# Patient Record
Sex: FEMALE | Race: OTHER | Employment: UNEMPLOYED | URBAN - METROPOLITAN AREA
[De-identification: names, ages, dates, MRNs, and addresses within clinical notes are randomized per-mention and may not be internally consistent; named-entity substitution may affect disease eponyms.]

---

## 2017-12-12 PROBLEM — E66.09 CLASS 1 OBESITY DUE TO EXCESS CALORIES WITHOUT SERIOUS COMORBIDITY WITH BODY MASS INDEX (BMI) OF 30.0 TO 30.9 IN ADULT: Chronic | Status: ACTIVE | Noted: 2017-12-12

## 2017-12-12 PROBLEM — F41.9 ANXIETY: Chronic | Status: ACTIVE | Noted: 2017-12-12

## 2017-12-12 PROBLEM — E04.2 MULTIPLE THYROID NODULES: Chronic | Status: ACTIVE | Noted: 2017-12-12

## 2017-12-12 PROBLEM — E07.9 THYROID DYSFUNCTION: Chronic | Status: ACTIVE | Noted: 2017-12-12

## 2017-12-12 PROBLEM — E66.811 CLASS 1 OBESITY DUE TO EXCESS CALORIES WITHOUT SERIOUS COMORBIDITY WITH BODY MASS INDEX (BMI) OF 30.0 TO 30.9 IN ADULT: Chronic | Status: ACTIVE | Noted: 2017-12-12

## 2017-12-13 DIAGNOSIS — E66.09 CLASS 1 OBESITY DUE TO EXCESS CALORIES WITHOUT SERIOUS COMORBIDITY WITH BODY MASS INDEX (BMI) OF 30.0 TO 30.9 IN ADULT: Chronic | ICD-10-CM

## 2017-12-13 DIAGNOSIS — F41.9 ANXIETY: Chronic | ICD-10-CM

## 2017-12-13 DIAGNOSIS — E04.2 MULTIPLE THYROID NODULES: Chronic | ICD-10-CM

## 2017-12-13 DIAGNOSIS — E07.9 THYROID DYSFUNCTION: Chronic | ICD-10-CM

## 2017-12-13 LAB
ALBUMIN SERPL-MCNC: 4.5 G/DL (ref 3.9–4.9)
ALP BLD-CCNC: 106 U/L (ref 40–130)
ALT SERPL-CCNC: 17 U/L (ref 0–33)
ANION GAP SERPL CALCULATED.3IONS-SCNC: 15 MEQ/L (ref 9–17)
AST SERPL-CCNC: 15 U/L (ref 0–35)
BASOPHILS ABSOLUTE: 0.1 K/UL (ref 0–0.2)
BASOPHILS RELATIVE PERCENT: 1 %
BILIRUB SERPL-MCNC: 0.5 MG/DL (ref 0–1.2)
BUN BLDV-MCNC: 13 MG/DL (ref 6–20)
CALCIUM SERPL-MCNC: 9.5 MG/DL (ref 8.6–10.2)
CHLORIDE BLD-SCNC: 99 MEQ/L (ref 97–107)
CHOLESTEROL, FASTING: 174 MG/DL (ref 0–199)
CO2: 25 MEQ/L (ref 17–24)
CREAT SERPL-MCNC: 0.68 MG/DL (ref 0.5–0.9)
EOSINOPHILS ABSOLUTE: 0.1 K/UL (ref 0–0.7)
EOSINOPHILS RELATIVE PERCENT: 0.7 %
GFR AFRICAN AMERICAN: >60
GFR NON-AFRICAN AMERICAN: >60
GLOBULIN: 3.6 G/DL (ref 2.3–3.5)
GLUCOSE FASTING: 78 MG/DL (ref 74–109)
HCT VFR BLD CALC: 45.8 % (ref 37–47)
HDLC SERPL-MCNC: 39 MG/DL (ref 40–59)
HEMOGLOBIN: 15.4 G/DL (ref 12–16)
LDL CHOLESTEROL CALCULATED: 116 MG/DL (ref 0–129)
LYMPHOCYTES ABSOLUTE: 1.5 K/UL (ref 1–4.8)
LYMPHOCYTES RELATIVE PERCENT: 16.5 %
MCH RBC QN AUTO: 31.7 PG (ref 27–31.3)
MCHC RBC AUTO-ENTMCNC: 33.5 % (ref 33–37)
MCV RBC AUTO: 94.5 FL (ref 82–100)
MONOCYTES ABSOLUTE: 0.5 K/UL (ref 0.2–0.8)
MONOCYTES RELATIVE PERCENT: 5.8 %
NEUTROPHILS ABSOLUTE: 7 K/UL (ref 1.4–6.5)
NEUTROPHILS RELATIVE PERCENT: 76 %
PDW BLD-RTO: 13.6 % (ref 11.5–14.5)
PLATELET # BLD: 356 K/UL (ref 130–400)
POTASSIUM SERPL-SCNC: 4.8 MEQ/L (ref 3.2–4.4)
RBC # BLD: 4.84 M/UL (ref 4.2–5.4)
SODIUM BLD-SCNC: 139 MEQ/L (ref 132–138)
T4 FREE: 1.24 NG/DL (ref 0.93–1.7)
TOTAL PROTEIN: 8.1 G/DL (ref 6.4–8.1)
TRIGLYCERIDE, FASTING: 94 MG/DL (ref 0–200)
TSH SERPL DL<=0.05 MIU/L-ACNC: 1.09 UIU/ML (ref 0.27–4.2)
WBC # BLD: 9.2 K/UL (ref 4.8–10.8)

## 2017-12-15 LAB — HIV-1 AND HIV-2 ANTIBODIES: NEGATIVE

## 2017-12-21 ENCOUNTER — APPOINTMENT (OUTPATIENT)
Dept: CT IMAGING | Age: 22
End: 2017-12-21
Payer: MEDICAID

## 2017-12-21 ENCOUNTER — HOSPITAL ENCOUNTER (EMERGENCY)
Age: 22
Discharge: HOME | End: 2017-12-21
Attending: EMERGENCY MEDICINE
Payer: MEDICAID

## 2017-12-21 VITALS
HEART RATE: 75 BPM | WEIGHT: 196 LBS | OXYGEN SATURATION: 100 % | RESPIRATION RATE: 16 BRPM | TEMPERATURE: 97.9 F | BODY MASS INDEX: 32.65 KG/M2 | DIASTOLIC BLOOD PRESSURE: 69 MMHG | HEIGHT: 65 IN | SYSTOLIC BLOOD PRESSURE: 112 MMHG

## 2017-12-21 DIAGNOSIS — R10.13 ABDOMINAL PAIN, EPIGASTRIC: Primary | ICD-10-CM

## 2017-12-21 LAB
ALBUMIN SERPL-MCNC: 4.2 G/DL (ref 3.9–4.9)
ALP BLD-CCNC: 94 U/L (ref 40–130)
ALT SERPL-CCNC: 17 U/L (ref 0–33)
ANION GAP SERPL CALCULATED.3IONS-SCNC: 13 MEQ/L (ref 7–13)
AST SERPL-CCNC: 19 U/L (ref 0–35)
BACTERIA: ABNORMAL /HPF
BASOPHILS ABSOLUTE: 0.1 K/UL (ref 0–0.2)
BASOPHILS RELATIVE PERCENT: 0.7 %
BILIRUB SERPL-MCNC: 0.5 MG/DL (ref 0–1.2)
BILIRUBIN URINE: ABNORMAL
BLOOD, URINE: ABNORMAL
BUN BLDV-MCNC: 11 MG/DL (ref 6–20)
CALCIUM SERPL-MCNC: 8.9 MG/DL (ref 8.6–10.2)
CHLORIDE BLD-SCNC: 102 MEQ/L (ref 98–107)
CLARITY: ABNORMAL
CO2: 22 MEQ/L (ref 22–29)
COLOR: ABNORMAL
CREAT SERPL-MCNC: 0.54 MG/DL (ref 0.5–0.9)
EOSINOPHILS ABSOLUTE: 0.1 K/UL (ref 0–0.7)
EOSINOPHILS RELATIVE PERCENT: 1.5 %
EPITHELIAL CELLS, UA: ABNORMAL /HPF
GFR AFRICAN AMERICAN: >60
GFR NON-AFRICAN AMERICAN: >60
GLOBULIN: 3.2 G/DL (ref 2.3–3.5)
GLUCOSE BLD-MCNC: 86 MG/DL (ref 74–109)
GLUCOSE URINE: NEGATIVE MG/DL
HCT VFR BLD CALC: 41.8 % (ref 37–47)
HEMOGLOBIN: 14.8 G/DL (ref 12–16)
KETONES, URINE: NEGATIVE MG/DL
LEUKOCYTE ESTERASE, URINE: ABNORMAL
LIPASE: 22 U/L (ref 13–60)
LYMPHOCYTES ABSOLUTE: 2.1 K/UL (ref 1–4.8)
LYMPHOCYTES RELATIVE PERCENT: 23.3 %
MCH RBC QN AUTO: 32.7 PG (ref 27–31.3)
MCHC RBC AUTO-ENTMCNC: 35.5 % (ref 33–37)
MCV RBC AUTO: 92 FL (ref 82–100)
MONOCYTES ABSOLUTE: 0.6 K/UL (ref 0.2–0.8)
MONOCYTES RELATIVE PERCENT: 6.1 %
MUCUS: PRESENT
NEUTROPHILS ABSOLUTE: 6.3 K/UL (ref 1.4–6.5)
NEUTROPHILS RELATIVE PERCENT: 68.4 %
NITRITE, URINE: NEGATIVE
PDW BLD-RTO: 13.1 % (ref 11.5–14.5)
PH UA: 5.5 (ref 5–9)
PLATELET # BLD: 322 K/UL (ref 130–400)
POTASSIUM SERPL-SCNC: 4.3 MEQ/L (ref 3.5–5.1)
PROTEIN UA: 30 MG/DL
RBC # BLD: 4.54 M/UL (ref 4.2–5.4)
RBC UA: ABNORMAL /HPF (ref 0–2)
SODIUM BLD-SCNC: 137 MEQ/L (ref 132–144)
SPECIFIC GRAVITY UA: 1.04 (ref 1–1.03)
TOTAL PROTEIN: 7.4 G/DL (ref 6.4–8.1)
UROBILINOGEN, URINE: 0.2 E.U./DL
WBC # BLD: 9.2 K/UL (ref 4.8–10.8)
WBC UA: ABNORMAL /HPF (ref 0–5)

## 2017-12-21 PROCEDURE — 6360000002 HC RX W HCPCS: Performed by: EMERGENCY MEDICINE

## 2017-12-21 PROCEDURE — 36415 COLL VENOUS BLD VENIPUNCTURE: CPT

## 2017-12-21 PROCEDURE — 83690 ASSAY OF LIPASE: CPT

## 2017-12-21 PROCEDURE — 80053 COMPREHEN METABOLIC PANEL: CPT

## 2017-12-21 PROCEDURE — 6360000004 HC RX CONTRAST MEDICATION: Performed by: EMERGENCY MEDICINE

## 2017-12-21 PROCEDURE — 2580000003 HC RX 258: Performed by: EMERGENCY MEDICINE

## 2017-12-21 PROCEDURE — 96374 THER/PROPH/DIAG INJ IV PUSH: CPT

## 2017-12-21 PROCEDURE — 96375 TX/PRO/DX INJ NEW DRUG ADDON: CPT

## 2017-12-21 PROCEDURE — 74177 CT ABD & PELVIS W/CONTRAST: CPT

## 2017-12-21 PROCEDURE — S0028 INJECTION, FAMOTIDINE, 20 MG: HCPCS | Performed by: EMERGENCY MEDICINE

## 2017-12-21 PROCEDURE — 85025 COMPLETE CBC W/AUTO DIFF WBC: CPT

## 2017-12-21 PROCEDURE — 81001 URINALYSIS AUTO W/SCOPE: CPT

## 2017-12-21 PROCEDURE — 2500000003 HC RX 250 WO HCPCS: Performed by: EMERGENCY MEDICINE

## 2017-12-21 PROCEDURE — 99284 EMERGENCY DEPT VISIT MOD MDM: CPT

## 2017-12-21 RX ORDER — KETOROLAC TROMETHAMINE 30 MG/ML
30 INJECTION, SOLUTION INTRAMUSCULAR; INTRAVENOUS ONCE
Status: COMPLETED | OUTPATIENT
Start: 2017-12-21 | End: 2017-12-21

## 2017-12-21 RX ORDER — 0.9 % SODIUM CHLORIDE 0.9 %
1000 INTRAVENOUS SOLUTION INTRAVENOUS ONCE
Status: COMPLETED | OUTPATIENT
Start: 2017-12-21 | End: 2017-12-21

## 2017-12-21 RX ORDER — ONDANSETRON 2 MG/ML
4 INJECTION INTRAMUSCULAR; INTRAVENOUS ONCE
Status: COMPLETED | OUTPATIENT
Start: 2017-12-21 | End: 2017-12-21

## 2017-12-21 RX ORDER — MORPHINE SULFATE 4 MG/ML
4 INJECTION, SOLUTION INTRAMUSCULAR; INTRAVENOUS
Status: DISCONTINUED | OUTPATIENT
Start: 2017-12-21 | End: 2017-12-21 | Stop reason: HOSPADM

## 2017-12-21 RX ORDER — TRAMADOL HYDROCHLORIDE 50 MG/1
50 TABLET ORAL EVERY 6 HOURS PRN
Qty: 20 TABLET | Refills: 0 | Status: SHIPPED | OUTPATIENT
Start: 2017-12-21 | End: 2017-12-26

## 2017-12-21 RX ADMIN — Medication 4 MG: at 11:59

## 2017-12-21 RX ADMIN — IOPAMIDOL 100 ML: 612 INJECTION, SOLUTION INTRAVENOUS at 12:43

## 2017-12-21 RX ADMIN — FAMOTIDINE 20 MG: 10 INJECTION, SOLUTION INTRAVENOUS at 13:23

## 2017-12-21 RX ADMIN — SODIUM CHLORIDE 1000 ML: 900 INJECTION, SOLUTION INTRAVENOUS at 12:10

## 2017-12-21 RX ADMIN — KETOROLAC TROMETHAMINE 30 MG: 30 INJECTION, SOLUTION INTRAMUSCULAR at 12:00

## 2017-12-21 RX ADMIN — ONDANSETRON 4 MG: 2 INJECTION INTRAMUSCULAR; INTRAVENOUS at 12:00

## 2017-12-21 ASSESSMENT — ENCOUNTER SYMPTOMS
COUGH: 0
VOMITING: 0
BACK PAIN: 0
SORE THROAT: 0
DIARRHEA: 0
SHORTNESS OF BREATH: 0
ABDOMINAL PAIN: 1
NAUSEA: 1

## 2017-12-21 ASSESSMENT — PAIN SCALES - GENERAL
PAINLEVEL_OUTOF10: 3
PAINLEVEL_OUTOF10: 0
PAINLEVEL_OUTOF10: 7

## 2017-12-21 ASSESSMENT — PAIN DESCRIPTION - LOCATION
LOCATION: ABDOMEN
LOCATION: ABDOMEN

## 2017-12-21 ASSESSMENT — PAIN DESCRIPTION - DESCRIPTORS
DESCRIPTORS: CRAMPING
DESCRIPTORS: ACHING;CRAMPING

## 2017-12-21 ASSESSMENT — PAIN DESCRIPTION - PAIN TYPE
TYPE: ACUTE PAIN
TYPE: ACUTE PAIN

## 2017-12-21 ASSESSMENT — PAIN DESCRIPTION - ORIENTATION
ORIENTATION: LEFT;LOWER
ORIENTATION: LEFT;LOWER

## 2017-12-21 NOTE — ED TRIAGE NOTES
Pt comes in with abdominal pain x 3 months. Stated pain was worse today.  Denies emesis, but does have nausea

## 2017-12-21 NOTE — ED NOTES
Pt denies any n/v since medicated. Pain is a 3. Boyfriend remains at bedside. No needs at this time. Will continue to monitor.      Maxwell Boyer RN  12/21/17 2038

## 2017-12-22 LAB
GFR AFRICAN AMERICAN: >60
GFR NON-AFRICAN AMERICAN: >60
PERFORMED ON: NORMAL
POC CREATININE: 0.7 MG/DL (ref 0.6–1.1)
POC SAMPLE TYPE: NORMAL

## 2018-01-17 ENCOUNTER — OFFICE VISIT (OUTPATIENT)
Dept: SURGERY | Age: 23
End: 2018-01-17

## 2018-01-17 VITALS
WEIGHT: 191 LBS | SYSTOLIC BLOOD PRESSURE: 120 MMHG | HEART RATE: 105 BPM | HEIGHT: 65 IN | DIASTOLIC BLOOD PRESSURE: 80 MMHG | BODY MASS INDEX: 31.82 KG/M2

## 2018-01-17 DIAGNOSIS — E05.20 GOITER, TOXIC, MULTINODULAR: ICD-10-CM

## 2018-01-17 DIAGNOSIS — E05.90 HYPERTHYROIDISM: Primary | ICD-10-CM

## 2018-01-17 PROBLEM — Z3A.01 LESS THAN 8 WEEKS GESTATION OF PREGNANCY: Chronic | Status: ACTIVE | Noted: 2018-01-17

## 2018-01-17 PROCEDURE — G8427 DOCREV CUR MEDS BY ELIG CLIN: HCPCS | Performed by: INTERNAL MEDICINE

## 2018-01-17 PROCEDURE — G8417 CALC BMI ABV UP PARAM F/U: HCPCS | Performed by: INTERNAL MEDICINE

## 2018-01-17 PROCEDURE — 1036F TOBACCO NON-USER: CPT | Performed by: INTERNAL MEDICINE

## 2018-01-17 PROCEDURE — G8484 FLU IMMUNIZE NO ADMIN: HCPCS | Performed by: INTERNAL MEDICINE

## 2018-01-17 PROCEDURE — 99203 OFFICE O/P NEW LOW 30 MIN: CPT | Performed by: INTERNAL MEDICINE

## 2018-01-27 ENCOUNTER — HOSPITAL ENCOUNTER (OUTPATIENT)
Dept: ULTRASOUND IMAGING | Age: 23
Discharge: HOME | End: 2018-01-27
Payer: MEDICAID

## 2018-01-27 DIAGNOSIS — E05.20 GOITER, TOXIC, MULTINODULAR: ICD-10-CM

## 2018-01-27 PROCEDURE — 76536 US EXAM OF HEAD AND NECK: CPT

## 2018-01-31 ENCOUNTER — OFFICE VISIT (OUTPATIENT)
Dept: OBGYN CLINIC | Age: 23
End: 2018-01-31
Payer: MEDICAID

## 2018-01-31 VITALS
HEIGHT: 65 IN | BODY MASS INDEX: 32.15 KG/M2 | DIASTOLIC BLOOD PRESSURE: 82 MMHG | WEIGHT: 193 LBS | SYSTOLIC BLOOD PRESSURE: 126 MMHG

## 2018-01-31 DIAGNOSIS — N91.2 AMENORRHEA: ICD-10-CM

## 2018-01-31 DIAGNOSIS — N91.2 AMENORRHEA: Primary | ICD-10-CM

## 2018-01-31 LAB
BACTERIA: ABNORMAL /HPF
BILIRUBIN URINE: NEGATIVE
BLOOD, URINE: NEGATIVE
CLARITY: ABNORMAL
COLOR: ABNORMAL
CONTROL: YES
EPITHELIAL CELLS, UA: ABNORMAL /HPF
GLUCOSE URINE: NEGATIVE MG/DL
KETONES, URINE: NEGATIVE MG/DL
LEUKOCYTE ESTERASE, URINE: ABNORMAL
MUCUS: PRESENT
NITRITE, URINE: NEGATIVE
PH UA: 5 (ref 5–9)
PREGNANCY TEST URINE, POC: POSITIVE
PROTEIN UA: ABNORMAL MG/DL
RBC UA: ABNORMAL /HPF (ref 0–2)
SPECIFIC GRAVITY UA: 1.03 (ref 1–1.03)
UROBILINOGEN, URINE: 0.2 E.U./DL
WBC UA: ABNORMAL /HPF (ref 0–5)
YEAST: PRESENT

## 2018-01-31 PROCEDURE — 81025 URINE PREGNANCY TEST: CPT | Performed by: OBSTETRICS & GYNECOLOGY

## 2018-01-31 PROCEDURE — 1036F TOBACCO NON-USER: CPT | Performed by: OBSTETRICS & GYNECOLOGY

## 2018-01-31 PROCEDURE — G8427 DOCREV CUR MEDS BY ELIG CLIN: HCPCS | Performed by: OBSTETRICS & GYNECOLOGY

## 2018-01-31 PROCEDURE — G8484 FLU IMMUNIZE NO ADMIN: HCPCS | Performed by: OBSTETRICS & GYNECOLOGY

## 2018-01-31 PROCEDURE — 99204 OFFICE O/P NEW MOD 45 MIN: CPT | Performed by: OBSTETRICS & GYNECOLOGY

## 2018-01-31 PROCEDURE — G8417 CALC BMI ABV UP PARAM F/U: HCPCS | Performed by: OBSTETRICS & GYNECOLOGY

## 2018-01-31 RX ORDER — PNV,CALCIUM 72/IRON/FOLIC ACID 27 MG-1 MG
TABLET ORAL
Refills: 10 | COMMUNITY
Start: 2018-01-22 | End: 2018-08-01

## 2018-02-01 ASSESSMENT — ENCOUNTER SYMPTOMS
SORE THROAT: 0
DIARRHEA: 0
COUGH: 0
NAUSEA: 0
ABDOMINAL DISTENTION: 0
VOMITING: 0
CONSTIPATION: 0
SHORTNESS OF BREATH: 0
BLOOD IN STOOL: 0
WHEEZING: 0
ABDOMINAL PAIN: 0

## 2018-02-01 NOTE — PROGRESS NOTES
Genitourinary: Positive for menstrual problem. Negative for difficulty urinating, dyspareunia, dysuria, frequency, genital sores, hematuria, pelvic pain, urgency, vaginal bleeding, vaginal discharge and vaginal pain. Musculoskeletal: Negative for arthralgias. Skin: Negative for rash. Neurological: Negative for dizziness, weakness, light-headedness and headaches. Hematological: Negative for adenopathy. Does not bruise/bleed easily. Psychiatric/Behavioral: Negative for confusion and sleep disturbance. Objective:   /82 (Site: Right Arm, Position: Sitting, Cuff Size: Large Adult)   Ht 5' 5\" (1.651 m)   Wt 193 lb (87.5 kg)   LMP 12/19/2017 (Approximate)   Breastfeeding? No   BMI 32.12 kg/m²     Physical Exam   Constitutional: She is oriented to person, place, and time. She appears well-developed and well-nourished. Eyes: Pupils are equal, round, and reactive to light. Cardiovascular: Normal rate, regular rhythm, normal heart sounds and intact distal pulses. Pulmonary/Chest: Effort normal.   Abdominal: Soft. Bowel sounds are normal.   Genitourinary: There is no rash, tenderness, lesion or injury on the right labia. There is no rash, tenderness, lesion or injury on the left labia. Uterus is not deviated, not enlarged, not fixed and not tender. Cervix exhibits no motion tenderness, no discharge and no friability. Right adnexum displays no mass, no tenderness and no fullness. Left adnexum displays no mass, no tenderness and no fullness. No erythema, tenderness or bleeding in the vagina. No foreign body in the vagina. No signs of injury around the vagina. No vaginal discharge found. Neurological: She is alert and oriented to person, place, and time. Psychiatric: She has a normal mood and affect. Assessment:      1. Amenorrhea  POCT urine pregnancy    C. Trachomatis / N.  Gonorrhoeae, DNA    CBC Auto Differential    HCG, Quantitative, Pregnancy    Hemoglobinopathy Eval (Electrophoresis)    Hepatitis B Surface Antigen    PAP SMEAR    POCT urine pregnancy    RPR Reflex to Titer and TPPA    Rubella antibody, IgG    Type and screen    Urine Culture    Urinalysis    Urine Drug Screen 9 Panel    US OB Less Than 14 Weeks Single Fetus    Varicella Zoster Antibody, IgG    Bacterial Vaginosis Panel    Candida DNA probe    Trichomonas Screen    HSV 1/2, DNA PCR         Plan:      Orders Placed This Encounter   Procedures    C. Trachomatis / N. Gonorrhoeae, DNA     Standing Status:   Future     Standing Expiration Date:   1/31/2019    Urine Culture     Standing Status:   Future     Number of Occurrences:   1     Standing Expiration Date:   1/31/2019     Order Specific Question:   Specify (ex-cath, midstream, cysto, etc)? Answer:   midstream    Bacterial Vaginosis Panel     Standing Status:   Future     Standing Expiration Date:   1/31/2019    Candida DNA probe     Candida Vaginitis Panel-MDL Test Code 560     Standing Status:   Future     Standing Expiration Date:   1/31/2019    Trichomonas Screen     Standing Status:   Future     Standing Expiration Date:   1/31/2019     OB Less Than 14 Weeks Single Fetus     Standing Status:   Future     Standing Expiration Date:   1/31/2019    CBC Auto Differential     Standing Status:   Future     Standing Expiration Date:   1/31/2019    HCG, Quantitative, Pregnancy     Standing Status:   Future     Standing Expiration Date:   1/31/2019    Hemoglobinopathy Eval (Electrophoresis)     Standing Status:   Future     Standing Expiration Date:   1/31/2019    Hepatitis B Surface Antigen     Standing Status:   Future     Standing Expiration Date:   1/31/2019    PAP SMEAR     Standing Status:   Future     Standing Expiration Date:   1/31/2019     Order Specific Question:   Collection Type     Answer:    Thin Prep     Order Specific Question:   Prior Abnormal Pap Test     Answer:   No     Order Specific Question:   Screening or Diagnostic

## 2018-02-02 LAB
AMPHETAMINE SCREEN, URINE: NEGATIVE NG/ML
BARBITURATE SCREEN URINE: NEGATIVE NG/ML
BENZODIAZEPINE SCREEN, URINE: NEGATIVE NG/ML
CANNABINOID SCREEN URINE: NEGATIVE NG/ML
COCAINE METABOLITE SCREEN URINE: NEGATIVE NG/ML
CREATININE URINE: 297 MG/DL (ref 20–400)
Lab: NORMAL
MDMA URINE: NEGATIVE NG/ML
METHADONE SCREEN, URINE: NEGATIVE NG/ML
OPIATE SCREEN URINE: NEGATIVE NG/ML
OXYCODONE SCREEN URINE: NEGATIVE NG/ML
PHENCYCLIDINE SCREEN URINE: NEGATIVE NG/ML
PROPOXYPHENE SCREEN: NEGATIVE NG/ML

## 2018-02-03 LAB
ORGANISM: ABNORMAL
URINE CULTURE, ROUTINE: ABNORMAL
URINE CULTURE, ROUTINE: ABNORMAL

## 2018-02-05 RX ORDER — SULFAMETHOXAZOLE AND TRIMETHOPRIM 800; 160 MG/1; MG/1
1 TABLET ORAL 2 TIMES DAILY
Qty: 14 TABLET | Refills: 0 | Status: SHIPPED | OUTPATIENT
Start: 2018-02-05 | End: 2018-02-05 | Stop reason: CLARIF

## 2018-02-05 RX ORDER — CEPHALEXIN 500 MG/1
500 CAPSULE ORAL 2 TIMES DAILY
Qty: 14 CAPSULE | Refills: 0 | Status: SHIPPED | OUTPATIENT
Start: 2018-02-05 | End: 2018-02-12

## 2018-02-07 DIAGNOSIS — N91.2 AMENORRHEA: ICD-10-CM

## 2018-02-07 LAB
ABO/RH: NORMAL
ANTIBODY SCREEN: NORMAL
BASOPHILS ABSOLUTE: 0 K/UL (ref 0–0.2)
BASOPHILS RELATIVE PERCENT: 0.6 %
EOSINOPHILS ABSOLUTE: 0.1 K/UL (ref 0–0.7)
EOSINOPHILS RELATIVE PERCENT: 1.8 %
GONADOTROPIN, CHORIONIC (HCG) QUANT: 4231 MIU/ML
HCT VFR BLD CALC: 42.2 % (ref 37–47)
HEMOGLOBIN: 13.9 G/DL (ref 12–16)
HEPATITIS B SURFACE ANTIGEN INTERPRETATION: NORMAL
LYMPHOCYTES ABSOLUTE: 2.2 K/UL (ref 1–4.8)
LYMPHOCYTES RELATIVE PERCENT: 28.1 %
MCH RBC QN AUTO: 31.1 PG (ref 27–31.3)
MCHC RBC AUTO-ENTMCNC: 33 % (ref 33–37)
MCV RBC AUTO: 94.4 FL (ref 82–100)
MONOCYTES ABSOLUTE: 0.4 K/UL (ref 0.2–0.8)
MONOCYTES RELATIVE PERCENT: 5.7 %
NEUTROPHILS ABSOLUTE: 4.9 K/UL (ref 1.4–6.5)
NEUTROPHILS RELATIVE PERCENT: 63.8 %
PDW BLD-RTO: 13.6 % (ref 11.5–14.5)
PLATELET # BLD: 311 K/UL (ref 130–400)
RBC # BLD: 4.47 M/UL (ref 4.2–5.4)
RUBELLA ANTIBODY IGG: 102.9 IU/ML
WBC # BLD: 7.7 K/UL (ref 4.8–10.8)

## 2018-02-08 LAB
RPR: NORMAL
VZV IGG SER QL IA: 22 IV

## 2018-02-09 LAB
HEMOGLOBIN A-1 QUANTITATION: 96.7 % (ref 95–97.9)
HEMOGLOBIN A2 QUANTITATION: 2.9 % (ref 2–3.5)
HEMOGLOBIN C QUANTITATION: 0 % (ref 0–0)
HEMOGLOBIN E QUANTITATION: 0 % (ref 0–0)
HEMOGLOBIN ELECTROPHORESIS: NORMAL
HEMOGLOBIN EVALUATION: NORMAL
HEMOGLOBIN F QUANTITATION: 0.4 % (ref 0–2.1)
HEMOGLOBIN OTHER: 0 % (ref 0–0)
HEMOGLOBIN S QUANTITATION: 0 % (ref 0–0)
SICKLE CELL: NORMAL

## 2018-02-13 ENCOUNTER — HOSPITAL ENCOUNTER (EMERGENCY)
Age: 23
Discharge: HOME | End: 2018-02-13
Payer: MEDICAID

## 2018-02-13 VITALS
HEART RATE: 94 BPM | DIASTOLIC BLOOD PRESSURE: 83 MMHG | TEMPERATURE: 98.8 F | OXYGEN SATURATION: 99 % | RESPIRATION RATE: 16 BRPM | SYSTOLIC BLOOD PRESSURE: 130 MMHG | WEIGHT: 193 LBS | BODY MASS INDEX: 32.15 KG/M2 | HEIGHT: 65 IN

## 2018-02-13 DIAGNOSIS — R11.2 NON-INTRACTABLE VOMITING WITH NAUSEA, UNSPECIFIED VOMITING TYPE: Primary | ICD-10-CM

## 2018-02-13 PROCEDURE — 99283 EMERGENCY DEPT VISIT LOW MDM: CPT

## 2018-02-13 PROCEDURE — 6360000002 HC RX W HCPCS: Performed by: NURSE PRACTITIONER

## 2018-02-13 PROCEDURE — 96372 THER/PROPH/DIAG INJ SC/IM: CPT

## 2018-02-13 RX ORDER — DOXYLAMINE SUCCINATE AND PYRIDOXINE HYDROCHLORIDE, DELAYED RELEASE TABLETS 10 MG/10 MG 10; 10 MG/1; MG/1
TABLET, DELAYED RELEASE ORAL
Qty: 60 TABLET | Refills: 1 | Status: SHIPPED | OUTPATIENT
Start: 2018-02-13 | End: 2018-03-20 | Stop reason: ALTCHOICE

## 2018-02-13 RX ORDER — PROMETHAZINE HYDROCHLORIDE 25 MG/ML
25 INJECTION, SOLUTION INTRAMUSCULAR; INTRAVENOUS ONCE
Status: COMPLETED | OUTPATIENT
Start: 2018-02-13 | End: 2018-02-13

## 2018-02-13 RX ADMIN — PROMETHAZINE HYDROCHLORIDE 25 MG: 25 INJECTION INTRAMUSCULAR; INTRAVENOUS at 10:07

## 2018-02-13 ASSESSMENT — PAIN DESCRIPTION - PAIN TYPE: TYPE: ACUTE PAIN

## 2018-02-13 ASSESSMENT — ENCOUNTER SYMPTOMS
SHORTNESS OF BREATH: 0
DIARRHEA: 0
COUGH: 0
NAUSEA: 1
VOMITING: 1
ABDOMINAL PAIN: 1

## 2018-02-13 ASSESSMENT — PAIN DESCRIPTION - LOCATION: LOCATION: ABDOMEN

## 2018-02-13 ASSESSMENT — PAIN SCALES - GENERAL: PAINLEVEL_OUTOF10: 6

## 2018-02-13 NOTE — ED PROVIDER NOTES
[address] [facility] ED  eMERGENCY dEPARTMENT eNCOUnter      Pt Name: Kristal Morales  MRN: [id number]  [place] 1995  Date of evaluation: 2/13/2018  Provider: Edu [id number]       Chief Complaint   Patient presents with    Abdominal Pain      pt states 8 weeks pregnant, denies vaginal bleeding, onset this am    Nausea    Emesis         HISTORY OF PRESENT ILLNESS  (Location/Symptom, Timing/Onset, Context/Setting, Quality, Duration, Modifying Factors, Severity.)   Kristal Morales is a 25 y.o. female who presents to the emergency department With complaints of nausea vomiting and abdominal pain times one episode this morning. Patient states that she had epigastric abdominal pain only with the active vomiting. The patient has some mild remaining nausea that no further episodes of vomiting or abdominal pain. She admits to being approximately 8 weeks pregnant and is followed by Dr. Shashank Lugo. No headaches dizziness lightheadedness fever sweats or chills chest pain shortness of breath diarrhea constipation. Denies dysuria. Denies vaginal bleeding or discharge. HPI    Nursing Notes were reviewed and I agree. REVIEW OF SYSTEMS    (2-9 systems for level 4, 10 or more for level 5)     Review of Systems   Constitutional: Negative for chills, diaphoresis, fatigue and fever. HENT: Negative for congestion. Respiratory: Negative for cough and shortness of breath. Cardiovascular: Negative for chest pain and palpitations. Gastrointestinal: Positive for abdominal pain, nausea and vomiting. Negative for diarrhea. Genitourinary: Negative for dysuria and flank pain. Skin: Negative for rash. Neurological: Negative for dizziness, light-headedness and headaches. Except as noted above the remainder of the review of systems was reviewed and negative.        PAST HISTORY     Past Medical History:   Diagnosis Date    Anxiety 12/12/2017    Class 1 obesity due [facility]    In 1 day  For continued evaluation and management      DISCHARGE MEDICATIONS:  New Prescriptions    DOXYLAMINE-PYRIDOXINE (DICLEGIS) 10-10 MG TBEC    Start: 2 tabs p.o. q.h.s.; if symptoms persist after 2 days increase to 1 tab p.o. q.a.m. and 2 tabs p.o. q.h.s.; may increase to 1 tab p.o. q.a.m., 1 tab p.o. q. midafternoon and 2 tabs p.o. q.h.s.  4 tabs per day. If unable to afford, instruct the patient about substituting the OTC components.        (Please note that portions of this note were completed with a voice recognition program.  Efforts were made to edit the dictations but occasionally words are mis-transcribed.)    Joseluis Mast, [address] [facility], [address] [facility]  02/13/18 1738

## 2018-02-13 NOTE — ED NOTES
Medication verified with JUAN Saleem and Dr. Theodore Claude prior to administration. Pt reports nausea and pain up her esophagus that started this morning. Resps even and non labored. Lungs CTA. Skin p/w/d. +2 radials.  Mucous membranes pink and moist. SHARI larios [facility]  02/13/18 3346

## 2018-02-19 ENCOUNTER — OFFICE VISIT (OUTPATIENT)
Dept: ENDOCRINOLOGY | Age: 23
End: 2018-02-19
Payer: MEDICAID

## 2018-02-19 VITALS
HEART RATE: 116 BPM | WEIGHT: 198 LBS | BODY MASS INDEX: 32.99 KG/M2 | HEIGHT: 65 IN | SYSTOLIC BLOOD PRESSURE: 114 MMHG | DIASTOLIC BLOOD PRESSURE: 72 MMHG

## 2018-02-19 DIAGNOSIS — E05.90 HYPERTHYROIDISM: ICD-10-CM

## 2018-02-19 DIAGNOSIS — E04.9 GOITER: ICD-10-CM

## 2018-02-19 DIAGNOSIS — E04.9 GOITER: Primary | ICD-10-CM

## 2018-02-19 LAB
T4 FREE: 1.11 NG/DL (ref 0.93–1.7)
TSH SERPL DL<=0.05 MIU/L-ACNC: 2.04 UIU/ML (ref 0.27–4.2)

## 2018-02-19 PROCEDURE — 99213 OFFICE O/P EST LOW 20 MIN: CPT | Performed by: INTERNAL MEDICINE

## 2018-02-19 PROCEDURE — G8427 DOCREV CUR MEDS BY ELIG CLIN: HCPCS | Performed by: INTERNAL MEDICINE

## 2018-02-19 PROCEDURE — G8484 FLU IMMUNIZE NO ADMIN: HCPCS | Performed by: INTERNAL MEDICINE

## 2018-02-19 PROCEDURE — G8417 CALC BMI ABV UP PARAM F/U: HCPCS | Performed by: INTERNAL MEDICINE

## 2018-02-19 PROCEDURE — 1036F TOBACCO NON-USER: CPT | Performed by: INTERNAL MEDICINE

## 2018-02-19 NOTE — PROGRESS NOTES
Subjective:      Patient ID: Eli Foster is a 25 y.o. female. Other   This is a chronic (hyperthyroidism ) problem. The current episode started more than 1 year ago. The problem has been resolved. Associated symptoms include fatigue. Associated symptoms comments: Patient's lab work is normal without any Tapazole  Is seeing OB for her current pregnancy. Treatments tried: tapazole in the past.     Patient Active Problem List   Diagnosis    Anxiety    Multiple thyroid nodules    Thyroid dysfunction    Class 1 obesity due to excess calories without serious comorbidity with body mass index (BMI) of 30.0 to 30.9 in adult    Less than 8 weeks gestation of pregnancy    Hyperthyroidism           No Known Allergies      Current Outpatient Prescriptions:     doxylamine-pyridoxine (DICLEGIS) 10-10 MG TBEC, Start: 2 tabs p.o. q.h.s.; if symptoms persist after 2 days increase to 1 tab p.o. q.a.m. and 2 tabs p.o. q.h.s.; may increase to 1 tab p.o. q.a.m., 1 tab p.o. q. midafternoon and 2 tabs p.o. q.h.s.  4 tabs per day. If unable to afford, instruct the patient about substituting the OTC components. , Disp: 60 tablet, Rfl: 1    Prenatal Vit-Fe Fumarate-FA (PREPLUS) 27-1 MG TABS, , Disp: , Rfl: 10    Prenatal Vit-Fe Fumarate-FA (PRENATAL VITAMIN) 27-0.8 MG TABS, Take 1 tablet by mouth daily, Disp: 30 tablet, Rfl: 11      Review of Systems   Constitutional: Positive for fatigue. Endocrine: Negative for cold intolerance and heat intolerance. Psychiatric/Behavioral: The patient is nervous/anxious. All other systems reviewed and are negative. Vitals:    02/19/18 0959   BP: 114/72   Site: Left Arm   Position: Sitting   Cuff Size: Medium Adult   Pulse: 116   Weight: 198 lb (89.8 kg)   Height: 5' 5\" (1.651 m)       Objective:   Physical Exam   Constitutional: She appears well-developed and well-nourished. HENT:   Head: Normocephalic and atraumatic. Neck: Neck supple. Thyromegaly present. Cardiovascular: Normal rate. Pulmonary/Chest: Effort normal.   Musculoskeletal: Normal range of motion. Neurological: She is alert. EXAMINATION: US HEAD NECK SOFT TISSUE THYROID       CLINICAL HISTORY: 70-year-old with hyperthyroidism       COMPARISONS: None available.       FINDINGS: Thyroid ultrasonography was performed. The gland is at the upper limits of normal in size       The right lobe measures 5.2 x 1.8 x 1.6 cm. There is a 6 x 5 x 4 mm hypoechoic solid nodule which is smooth, wider than tall, without echogenic foci in the posterior inferior right lobe. It demonstrates ACR TI RADS TR 4 characteristics. The remainder of    the right lobe is homogeneous and within normal limits in echotexture. Thyroid isthmus measures 5-6 mm in thickness. No isthmic nodules.       Left lobe measures 4.9 x 1.6 x 1.3 cm and demonstrates normal echotexture. No left thyroid cysts, nodules or calcifications.       No significant thyroid hyperemia on color Doppler assessment           Impression   THYROID GLAND AT THE UPPER LIMITS OF NORMAL IN SIZE WITH SUBCENTIMETER RIGHT THYROID HYPOECHOIC NODULE WITH ULTRASOUND CHARACTERISTICS AS DETAILED ABOVE. OTHERWISE UNREMARKABLE THYROID ULTRASOUND.       TR4: Moderately suspicious;  ? 1.0 cm follow up, ? 1.5 cm FNA             follow up: 1, 2, 3 and 5 years         Assessment:      1. Goiter  T4, Free    TSH without Reflex    Thyroid Peroxidase Antibody    Thyroid Stimulating Immunoglobulin   2.  Hyperthyroidism             Plan:      Orders Placed This Encounter   Procedures    T4, Free     Standing Status:   Future     Number of Occurrences:   1     Standing Expiration Date:   2/19/2019    TSH without Reflex     Standing Status:   Future     Number of Occurrences:   1     Standing Expiration Date:   2/19/2019    Thyroid Peroxidase Antibody     Standing Status:   Future     Number of Occurrences:   1     Standing Expiration Date:   2/19/2019   [facility] Thyroid Stimulating

## 2018-02-21 LAB — THYROID PEROXIDASE (TPO) ABS: 1.3 IU/ML (ref 0–9)

## 2018-02-22 ENCOUNTER — HOSPITAL ENCOUNTER (OUTPATIENT)
Dept: ULTRASOUND IMAGING | Age: 23
Discharge: HOME | End: 2018-02-24
Payer: MEDICAID

## 2018-02-22 DIAGNOSIS — N91.2 AMENORRHEA: ICD-10-CM

## 2018-02-22 PROCEDURE — 76801 OB US < 14 WKS SINGLE FETUS: CPT

## 2018-02-22 PROCEDURE — 76817 TRANSVAGINAL US OBSTETRIC: CPT

## 2018-02-23 ENCOUNTER — OFFICE VISIT (OUTPATIENT)
Dept: OBGYN CLINIC | Age: 23
End: 2018-02-23
Payer: MEDICAID

## 2018-02-23 VITALS
HEIGHT: 65 IN | WEIGHT: 198 LBS | SYSTOLIC BLOOD PRESSURE: 136 MMHG | DIASTOLIC BLOOD PRESSURE: 82 MMHG | BODY MASS INDEX: 32.99 KG/M2

## 2018-02-23 DIAGNOSIS — O20.0 THREATENED ABORTION IN EARLY PREGNANCY: Primary | ICD-10-CM

## 2018-02-23 PROCEDURE — G8484 FLU IMMUNIZE NO ADMIN: HCPCS | Performed by: OBSTETRICS & GYNECOLOGY

## 2018-02-23 PROCEDURE — 1036F TOBACCO NON-USER: CPT | Performed by: OBSTETRICS & GYNECOLOGY

## 2018-02-23 PROCEDURE — G8417 CALC BMI ABV UP PARAM F/U: HCPCS | Performed by: OBSTETRICS & GYNECOLOGY

## 2018-02-23 PROCEDURE — 99213 OFFICE O/P EST LOW 20 MIN: CPT | Performed by: OBSTETRICS & GYNECOLOGY

## 2018-02-23 PROCEDURE — G8427 DOCREV CUR MEDS BY ELIG CLIN: HCPCS | Performed by: OBSTETRICS & GYNECOLOGY

## 2018-02-23 RX ORDER — ESCITALOPRAM OXALATE 10 MG/1
TABLET ORAL
Refills: 3 | COMMUNITY
Start: 2018-02-03 | End: 2018-02-24

## 2018-02-23 ASSESSMENT — ENCOUNTER SYMPTOMS
APNEA: 0
SHORTNESS OF BREATH: 0
ABDOMINAL PAIN: 0

## 2018-02-24 ENCOUNTER — HOSPITAL ENCOUNTER (EMERGENCY)
Age: 23
Discharge: HOME | End: 2018-02-24
Attending: EMERGENCY MEDICINE
Payer: MEDICAID

## 2018-02-24 VITALS
WEIGHT: 198 LBS | OXYGEN SATURATION: 100 % | SYSTOLIC BLOOD PRESSURE: 142 MMHG | BODY MASS INDEX: 32.99 KG/M2 | DIASTOLIC BLOOD PRESSURE: 92 MMHG | RESPIRATION RATE: 18 BRPM | TEMPERATURE: 98.7 F | HEIGHT: 65 IN | HEART RATE: 92 BPM

## 2018-02-24 DIAGNOSIS — O03.9 SPONTANEOUS MISCARRIAGE: Primary | ICD-10-CM

## 2018-02-24 LAB
BILIRUBIN URINE: NEGATIVE
BLOOD, URINE: ABNORMAL
CLARITY: ABNORMAL
COLOR: YELLOW
EPITHELIAL CELLS, UA: ABNORMAL /HPF
GLUCOSE URINE: NEGATIVE MG/DL
KETONES, URINE: NEGATIVE MG/DL
LEUKOCYTE ESTERASE, URINE: ABNORMAL
NITRITE, URINE: NEGATIVE
PH UA: 7 (ref 5–9)
PROTEIN UA: NEGATIVE MG/DL
RBC UA: ABNORMAL /HPF (ref 0–2)
SPECIFIC GRAVITY UA: 1.02 (ref 1–1.03)
THYROID STIMULATING IMMUNOGLOBULIN: 89 %
URINE REFLEX TO CULTURE: YES
UROBILINOGEN, URINE: 0.2 E.U./DL
WBC UA: ABNORMAL /HPF (ref 0–5)

## 2018-02-24 PROCEDURE — 87086 URINE CULTURE/COLONY COUNT: CPT

## 2018-02-24 PROCEDURE — 6370000000 HC RX 637 (ALT 250 FOR IP): Performed by: EMERGENCY MEDICINE

## 2018-02-24 PROCEDURE — 81001 URINALYSIS AUTO W/SCOPE: CPT

## 2018-02-24 PROCEDURE — 99284 EMERGENCY DEPT VISIT MOD MDM: CPT

## 2018-02-24 RX ORDER — ACETAMINOPHEN AND CODEINE PHOSPHATE 300; 30 MG/1; MG/1
1 TABLET ORAL EVERY 4 HOURS PRN
Qty: 14 TABLET | Refills: 0 | Status: SHIPPED | OUTPATIENT
Start: 2018-02-24 | End: 2018-02-27

## 2018-02-24 RX ORDER — HYDROCODONE BITARTRATE AND ACETAMINOPHEN 5; 325 MG/1; MG/1
1 TABLET ORAL ONCE
Status: COMPLETED | OUTPATIENT
Start: 2018-02-24 | End: 2018-02-24

## 2018-02-24 RX ADMIN — HYDROCODONE BITARTRATE AND ACETAMINOPHEN 1 TABLET: 5; 325 TABLET ORAL at 20:03

## 2018-02-24 ASSESSMENT — PAIN SCALES - GENERAL
PAINLEVEL_OUTOF10: 8
PAINLEVEL_OUTOF10: 8

## 2018-02-24 ASSESSMENT — ENCOUNTER SYMPTOMS
SHORTNESS OF BREATH: 0
VOMITING: 0
COUGH: 0

## 2018-02-24 ASSESSMENT — PAIN DESCRIPTION - PAIN TYPE: TYPE: ACUTE PAIN

## 2018-02-24 ASSESSMENT — PAIN DESCRIPTION - DESCRIPTORS: DESCRIPTORS: CRAMPING

## 2018-02-24 ASSESSMENT — PAIN DESCRIPTION - FREQUENCY: FREQUENCY: INTERMITTENT

## 2018-02-24 ASSESSMENT — PAIN DESCRIPTION - ORIENTATION: ORIENTATION: LOWER

## 2018-02-24 ASSESSMENT — PAIN DESCRIPTION - LOCATION: LOCATION: ABDOMEN

## 2018-02-25 NOTE — ED PROVIDER NOTES
Surgical History:   Procedure Laterality Date    BACK SURGERY  2004         CURRENT MEDICATIONS       Previous Medications    DOXYLAMINE-PYRIDOXINE (DICLEGIS) 10-10 MG TBEC    Start: 2 tabs p.o. q.h.s.; if symptoms persist after 2 days increase to 1 tab p.o. q.a.m. and 2 tabs p.o. q.h.s.; may increase to 1 tab p.o. q.a.m., 1 tab p.o. q. midafternoon and 2 tabs p.o. q.h.s.  4 tabs per day. If unable to afford, instruct the patient about substituting the OTC components. PRENATAL VIT-FE FUMARATE-FA (PRENATAL VITAMIN) 27-0.8 MG TABS    Take 1 tablet by mouth daily    PRENATAL VIT-FE FUMARATE-FA (PREPLUS) 27-1 MG TABS           ALLERGIES     Review of patient's allergies indicates no known allergies. FAMILY HISTORY       Family History   Problem Relation Age of Onset    No Known Problems Mother     Diabetes Father     No Known Problems Sister     No Known Problems Brother     No Known Problems Sister           SOCIAL HISTORY       Social History     Social History    Marital status: Single     Spouse name: N/A    Number of children: N/A    Years of education: N/A     Social History Main Topics    Smoking status: Never Smoker    Smokeless tobacco: Never Used    Alcohol use No    Drug use: No    Sexual activity: Yes      Comment: no birth-control     Other Topics Concern    None     Social History Narrative    None       SCREENINGS             PHYSICAL EXAM    (up to 7 for level 4, 8 or more for level 5)     ED Triage Vitals [02/24/18 1931]   BP Temp Temp Source Pulse Resp SpO2 Height Weight   (!) 142/92 98.7 °F (37.1 °C) Oral 92 18 100 % 5' 5\" (1.651 m) 198 lb (89.8 kg)       Physical Exam   Constitutional: She appears well-developed and well-nourished. No distress. HENT:   Head: Normocephalic and atraumatic. Mouth/Throat: Oropharynx is clear and moist.   Eyes: Conjunctivae are normal.   Pupils are equally round and reacting normally. Extraoccular muscles are grossly intact.     Neck: Normal range

## 2018-02-25 NOTE — ED TRIAGE NOTES
Pt is approx 9 weeks pregnant, had her first appt with ob-gyn yesterday andf they were unable to hear the baby's heart beat, she started bleeding about an hour ago, she is having lower abdominal cramping that started before the bleeding started, she denies painful urination but said she feels pressure when she urinates.

## 2018-02-26 ENCOUNTER — OFFICE VISIT (OUTPATIENT)
Dept: OBGYN CLINIC | Age: 23
End: 2018-02-26
Payer: MEDICAID

## 2018-02-26 VITALS
BODY MASS INDEX: 33.15 KG/M2 | WEIGHT: 199 LBS | DIASTOLIC BLOOD PRESSURE: 80 MMHG | SYSTOLIC BLOOD PRESSURE: 116 MMHG | HEIGHT: 65 IN

## 2018-02-26 DIAGNOSIS — O02.1 MISSED ABORTION: Primary | ICD-10-CM

## 2018-02-26 DIAGNOSIS — O02.1 MISSED ABORTION: ICD-10-CM

## 2018-02-26 LAB
GONADOTROPIN, CHORIONIC (HCG) QUANT: 643.1 MIU/ML
URINE CULTURE, ROUTINE: NORMAL

## 2018-02-26 PROCEDURE — 99213 OFFICE O/P EST LOW 20 MIN: CPT | Performed by: OBSTETRICS & GYNECOLOGY

## 2018-02-26 PROCEDURE — G8427 DOCREV CUR MEDS BY ELIG CLIN: HCPCS | Performed by: OBSTETRICS & GYNECOLOGY

## 2018-02-26 PROCEDURE — G8417 CALC BMI ABV UP PARAM F/U: HCPCS | Performed by: OBSTETRICS & GYNECOLOGY

## 2018-02-26 PROCEDURE — 1036F TOBACCO NON-USER: CPT | Performed by: OBSTETRICS & GYNECOLOGY

## 2018-02-26 PROCEDURE — G8484 FLU IMMUNIZE NO ADMIN: HCPCS | Performed by: OBSTETRICS & GYNECOLOGY

## 2018-02-26 RX ORDER — OXYCODONE HYDROCHLORIDE AND ACETAMINOPHEN 5; 325 MG/1; MG/1
1 TABLET ORAL EVERY 6 HOURS PRN
Qty: 12 TABLET | Refills: 0 | Status: SHIPPED | OUTPATIENT
Start: 2018-02-26 | End: 2018-03-01

## 2018-02-26 RX ORDER — METHYLERGONOVINE MALEATE 0.2 MG/1
0.2 TABLET ORAL 3 TIMES DAILY
Qty: 6 TABLET | Refills: 0 | Status: SHIPPED | OUTPATIENT
Start: 2018-02-26 | End: 2018-02-28

## 2018-02-26 RX ORDER — DOXYCYCLINE HYCLATE 100 MG/1
100 CAPSULE ORAL 2 TIMES DAILY
Qty: 20 CAPSULE | Refills: 0 | Status: SHIPPED | OUTPATIENT
Start: 2018-02-26 | End: 2018-03-08

## 2018-02-26 ASSESSMENT — ENCOUNTER SYMPTOMS
APNEA: 0
SHORTNESS OF BREATH: 0
ABDOMINAL PAIN: 1

## 2018-03-06 ENCOUNTER — OFFICE VISIT (OUTPATIENT)
Dept: FAMILY MEDICINE CLINIC | Age: 23
End: 2018-03-06
Payer: MEDICAID

## 2018-03-06 VITALS
DIASTOLIC BLOOD PRESSURE: 64 MMHG | HEART RATE: 87 BPM | BODY MASS INDEX: 32.99 KG/M2 | TEMPERATURE: 98.2 F | WEIGHT: 198 LBS | OXYGEN SATURATION: 98 % | RESPIRATION RATE: 16 BRPM | HEIGHT: 65 IN | SYSTOLIC BLOOD PRESSURE: 100 MMHG

## 2018-03-06 DIAGNOSIS — M25.50 PAIN IN JOINT INVOLVING MULTIPLE SITES: Chronic | ICD-10-CM

## 2018-03-06 PROBLEM — E05.90 HYPERTHYROIDISM: Status: RESOLVED | Noted: 2018-01-17 | Resolved: 2018-03-06

## 2018-03-06 PROBLEM — E07.9 THYROID DYSFUNCTION: Chronic | Status: RESOLVED | Noted: 2017-12-12 | Resolved: 2018-03-06

## 2018-03-06 PROBLEM — Z3A.01 LESS THAN 8 WEEKS GESTATION OF PREGNANCY: Chronic | Status: RESOLVED | Noted: 2018-01-17 | Resolved: 2018-03-06

## 2018-03-06 PROCEDURE — 99213 OFFICE O/P EST LOW 20 MIN: CPT | Performed by: FAMILY MEDICINE

## 2018-03-06 PROCEDURE — G8417 CALC BMI ABV UP PARAM F/U: HCPCS | Performed by: FAMILY MEDICINE

## 2018-03-06 PROCEDURE — G8427 DOCREV CUR MEDS BY ELIG CLIN: HCPCS | Performed by: FAMILY MEDICINE

## 2018-03-06 PROCEDURE — 1036F TOBACCO NON-USER: CPT | Performed by: FAMILY MEDICINE

## 2018-03-06 PROCEDURE — G8484 FLU IMMUNIZE NO ADMIN: HCPCS | Performed by: FAMILY MEDICINE

## 2018-03-06 RX ORDER — NAPROXEN 500 MG/1
500 TABLET ORAL 2 TIMES DAILY PRN
Qty: 60 TABLET | Refills: 0 | Status: SHIPPED | OUTPATIENT
Start: 2018-03-06 | End: 2018-03-12

## 2018-03-06 RX ORDER — NAPROXEN 500 MG/1
500 TABLET ORAL 2 TIMES DAILY PRN
Qty: 60 TABLET | Refills: 0 | Status: SHIPPED | OUTPATIENT
Start: 2018-03-06 | End: 2018-03-06 | Stop reason: SDUPTHER

## 2018-03-06 ASSESSMENT — ENCOUNTER SYMPTOMS
NAUSEA: 0
ABDOMINAL PAIN: 0
DIARRHEA: 0
CONSTIPATION: 0
BACK PAIN: 1
VOMITING: 0

## 2018-03-12 ENCOUNTER — OFFICE VISIT (OUTPATIENT)
Dept: OBGYN CLINIC | Age: 23
End: 2018-03-12
Payer: MEDICAID

## 2018-03-12 ENCOUNTER — HOSPITAL ENCOUNTER (OUTPATIENT)
Dept: ULTRASOUND IMAGING | Age: 23
Discharge: HOME | End: 2018-03-14
Payer: MEDICAID

## 2018-03-12 VITALS
SYSTOLIC BLOOD PRESSURE: 116 MMHG | HEIGHT: 65 IN | BODY MASS INDEX: 33.49 KG/M2 | DIASTOLIC BLOOD PRESSURE: 82 MMHG | WEIGHT: 201 LBS

## 2018-03-12 DIAGNOSIS — O20.0 THREATENED ABORTION: Primary | ICD-10-CM

## 2018-03-12 DIAGNOSIS — O02.1 MISSED ABORTION: ICD-10-CM

## 2018-03-12 DIAGNOSIS — O20.0 THREATENED ABORTION IN EARLY PREGNANCY: ICD-10-CM

## 2018-03-12 LAB — GONADOTROPIN, CHORIONIC (HCG) QUANT: 1.4 MIU/ML

## 2018-03-12 PROCEDURE — G8427 DOCREV CUR MEDS BY ELIG CLIN: HCPCS | Performed by: OBSTETRICS & GYNECOLOGY

## 2018-03-12 PROCEDURE — 1036F TOBACCO NON-USER: CPT | Performed by: OBSTETRICS & GYNECOLOGY

## 2018-03-12 PROCEDURE — 76856 US EXAM PELVIC COMPLETE: CPT

## 2018-03-12 PROCEDURE — G8482 FLU IMMUNIZE ORDER/ADMIN: HCPCS | Performed by: OBSTETRICS & GYNECOLOGY

## 2018-03-12 PROCEDURE — 99212 OFFICE O/P EST SF 10 MIN: CPT | Performed by: OBSTETRICS & GYNECOLOGY

## 2018-03-12 PROCEDURE — G8417 CALC BMI ABV UP PARAM F/U: HCPCS | Performed by: OBSTETRICS & GYNECOLOGY

## 2018-03-12 PROCEDURE — 76830 TRANSVAGINAL US NON-OB: CPT

## 2018-03-12 ASSESSMENT — ENCOUNTER SYMPTOMS
SHORTNESS OF BREATH: 0
APNEA: 0
ABDOMINAL PAIN: 1

## 2018-03-12 NOTE — PROGRESS NOTES
Subjective:      Patient ID:  Bekah Joyce is a 25 y.o. female with chief complaint of:  Chief Complaint   Patient presents with    Results     f/u results. pt did not have labs or u/s done yet. pt will call and schedule u/s and get bloodwork done today. pt still wanted to speak with Dr. Christina Maharaj. Patient presents today as a follow-up to a missed AB that was diagnosed last week. Patient was to have a repeat ultrasound and hCG levels obtained for further confirmation however she has been noncompliant. She denies any bleeding there has been some crampy discomfort in the suprapubic region. He was discussed with ultrasound they will scan her today at 3 PM and patient will leave the office today and have blood work obtained        Past Medical History:   Diagnosis Date    Anxiety 12/12/2017    Class 1 obesity due to excess calories without serious comorbidity with body mass index (BMI) of 30.0 to 30.9 in adult 12/12/2017    Hyperthyroidism 1/17/2018     Past Surgical History:   Procedure Laterality Date    BACK SURGERY  2004     Family History   Problem Relation Age of Onset    No Known Problems Mother     Diabetes Father     No Known Problems Sister     No Known Problems Brother     No Known Problems Sister     Rheum Arthritis Maternal Grandmother      Current Outpatient Prescriptions on File Prior to Visit   Medication Sig Dispense Refill    doxylamine-pyridoxine (DICLEGIS) 10-10 MG TBEC Start: 2 tabs p.o. q.h.s.; if symptoms persist after 2 days increase to 1 tab p.o. q.a.m. and 2 tabs p.o. q.h.s.; may increase to 1 tab p.o. q.a.m., 1 tab p.o. q. midafternoon and 2 tabs p.o. q.h.s.  4 tabs per day. If unable to afford, instruct the patient about substituting the OTC components. 60 tablet 1    Prenatal Vit-Fe Fumarate-FA (PREPLUS) 27-1 MG TABS   10     No current facility-administered medications on file prior to visit. Allergies:  Patient has no known allergies.     Review of Systems Constitutional: Negative for fatigue and fever. Respiratory: Negative for apnea and shortness of breath. Cardiovascular: Negative for chest pain and palpitations. Gastrointestinal: Positive for abdominal pain. Genitourinary: Positive for pelvic pain. Negative for difficulty urinating, dysuria, vaginal bleeding and vaginal discharge. Neurological: Negative for dizziness, weakness and light-headedness. Objective:   /82 (Site: Right Arm, Position: Sitting, Cuff Size: Large Adult)   Ht 5' 5\" (1.651 m)   Wt 201 lb (91.2 kg)   BMI 33.45 kg/m²     Physical Exam   Constitutional: She is oriented to person, place, and time. She appears well-developed and well-nourished. Abdominal: Bowel sounds are normal.   Neurological: She is alert and oriented to person, place, and time. Psychiatric: She has a normal mood and affect. Assessment:      1. Threatened            Plan:      No orders of the defined types were placed in this encounter. No orders of the defined types were placed in this encounter. And light of previous findings we'll repeat ultrasound and lab work today is missed AB is further conference will proceed with operative evacuation of products of conception       Return for follow up results.      Reji Sanchez,

## 2018-03-16 ENCOUNTER — TELEPHONE (OUTPATIENT)
Dept: OBGYN CLINIC | Age: 23
End: 2018-03-16

## 2018-03-16 NOTE — TELEPHONE ENCOUNTER
Called pt and made her aware of her u/s results and Dr. Alfredo Eastern message that she will need no further follow up care. Pt understood.

## 2018-03-20 ENCOUNTER — OFFICE VISIT (OUTPATIENT)
Dept: FAMILY MEDICINE CLINIC | Age: 23
End: 2018-03-20
Payer: MEDICAID

## 2018-03-20 VITALS
HEART RATE: 88 BPM | WEIGHT: 199 LBS | SYSTOLIC BLOOD PRESSURE: 104 MMHG | DIASTOLIC BLOOD PRESSURE: 72 MMHG | RESPIRATION RATE: 16 BRPM | BODY MASS INDEX: 33.15 KG/M2 | TEMPERATURE: 97.7 F | OXYGEN SATURATION: 98 % | HEIGHT: 65 IN

## 2018-03-20 DIAGNOSIS — Z00.00 ENCOUNTER FOR HEALTH MAINTENANCE EXAMINATION: Primary | ICD-10-CM

## 2018-03-20 DIAGNOSIS — Z11.1 TUBERCULOSIS SCREENING: Chronic | ICD-10-CM

## 2018-03-20 PROCEDURE — 99395 PREV VISIT EST AGE 18-39: CPT | Performed by: FAMILY MEDICINE

## 2018-03-20 PROCEDURE — 86580 TB INTRADERMAL TEST: CPT | Performed by: FAMILY MEDICINE

## 2018-03-20 NOTE — PROGRESS NOTES
Tuberculin skin test applied to left ventral forearm. Explained she will need to come in to have the test read in 48-72 hours. Patient verbalized understanding. Forms filled out and scanned into media. Patient kept original copies and will return with them when she comes to have the test read.

## 2018-03-20 NOTE — PROGRESS NOTES
Subjective:       Anya Mata is a 25 y.o. female and is here for a comprehensive physical exam.  The patient reports no problems     History:  Any STD's in the past? none  Past Medical History:   Diagnosis Date    Anxiety 12/12/2017    Class 1 obesity due to excess calories without serious comorbidity with body mass index (BMI) of 30.0 to 30.9 in adult 12/12/2017    Hyperthyroidism 1/17/2018     Patient Active Problem List    Diagnosis Date Noted    Pain in joint involving multiple sites 03/06/2018    Anxiety 12/12/2017    Multiple thyroid nodules 12/12/2017    Class 1 obesity due to excess calories without serious comorbidity with body mass index (BMI) of 30.0 to 30.9 in adult 12/12/2017     Past Surgical History:   Procedure Laterality Date    BACK SURGERY  2004     Family History   Problem Relation Age of Onset    No Known Problems Mother     Diabetes Father     No Known Problems Sister     No Known Problems Brother     No Known Problems Sister     Rheum Arthritis Maternal Grandmother      Social History     Social History    Marital status: Single     Spouse name: N/A    Number of children: N/A    Years of education: N/A     Social History Main Topics    Smoking status: Never Smoker    Smokeless tobacco: Never Used    Alcohol use No    Drug use: No    Sexual activity: Yes      Comment: no birth-control     Other Topics Concern    None     Social History Narrative    None     Current Outpatient Prescriptions   Medication Sig Dispense Refill    NAPROXEN 500 MG EC tablet   0    doxylamine-pyridoxine (DICLEGIS) 10-10 MG TBEC Start: 2 tabs p.o. q.h.s.; if symptoms persist after 2 days increase to 1 tab p.o. q.a.m. and 2 tabs p.o. q.h.s.; may increase to 1 tab p.o. q.a.m., 1 tab p.o. q. midafternoon and 2 tabs p.o. q.h.s.  4 tabs per day. If unable to afford, instruct the patient about substituting the OTC components.  60 tablet 1    Prenatal Vit-Fe Fumarate-FA (PREPLUS) 27-1 MG unlabored   Chest Wall:    No tenderness or deformity    Heart:    Regular rate and rhythm, S1 and S2 normal, no murmur, rub   or gallop   Breast Exam:    No tenderness, masses, or nipple abnormality   Abdomen:     Soft, non-tender, bowel sounds active all four quadrants,     no masses, no organomegaly           Extremities:   Extremities normal, atraumatic, no cyanosis or edema   Pulses:   2+ and symmetric all extremities   Skin:   Skin color, texture, turgor normal, no rashes or lesions   Lymph nodes:   Cervical, supraclavicular, and axillary nodes normal   Neurologic:   CNII-XII intact, normal strength, sensation and reflexes     throughout         Assessment:      Healthy female exam.        Plan:      1. Healthy  2. Patient Counseling:  --Nutrition: Stressed importance of moderation in sodium/caffeine intake, saturated fat and cholesterol, caloric balance, sufficient intake of fresh fruits, vegetables, fiber, calcium, iron, and 1 mg of folate supplement per day (for females capable of pregnancy). --Discussed the issue of estrogen replacement, calcium supplement, and the daily use of baby aspirin. --Exercise: Stressed the importance of regular exercise. --Substance Abuse: Discussed cessation/primary prevention of tobacco, alcohol, or other drug use; driving or other dangerous activities under the influence; availability of treatment for abuse. --Sexuality: Continue PNVs given desire for pregnancy  --Injury prevention: Discussed safety belts, safety helmets, smoke detector, smoking near bedding or upholstery. --Dental health: Discussed importance of regular tooth brushing, flossing, and dental visits. --Immunizations reviewed.   3. Follow up in one year

## 2018-03-23 ENCOUNTER — NURSE ONLY (OUTPATIENT)
Dept: FAMILY MEDICINE CLINIC | Age: 23
End: 2018-03-23

## 2018-03-23 VITALS — WEIGHT: 199 LBS | BODY MASS INDEX: 33.15 KG/M2 | HEIGHT: 65 IN

## 2018-03-23 PROBLEM — Z11.1 ENCOUNTER FOR PPD SKIN TEST READING: Chronic | Status: RESOLVED | Noted: 2018-03-20 | Resolved: 2018-03-23

## 2018-03-30 ENCOUNTER — NURSE ONLY (OUTPATIENT)
Dept: FAMILY MEDICINE CLINIC | Age: 23
End: 2018-03-30
Payer: MEDICAID

## 2018-03-30 DIAGNOSIS — Z11.1 VISIT FOR TB SKIN TEST: Primary | ICD-10-CM

## 2018-03-30 PROCEDURE — 86580 TB INTRADERMAL TEST: CPT | Performed by: PHYSICIAN ASSISTANT

## 2018-04-02 ENCOUNTER — NURSE ONLY (OUTPATIENT)
Dept: FAMILY MEDICINE CLINIC | Age: 23
End: 2018-04-02

## 2018-04-02 DIAGNOSIS — Z11.1 ENCOUNTER FOR PPD SKIN TEST READING: Primary | ICD-10-CM

## 2018-04-02 LAB
INDURATION: NORMAL
TB SKIN TEST: NEGATIVE

## 2018-06-08 ENCOUNTER — HOSPITAL ENCOUNTER (EMERGENCY)
Age: 23
Discharge: HOME | End: 2018-06-08
Payer: MEDICAID

## 2018-06-08 VITALS
HEART RATE: 78 BPM | BODY MASS INDEX: 33.32 KG/M2 | TEMPERATURE: 98.1 F | SYSTOLIC BLOOD PRESSURE: 107 MMHG | HEIGHT: 65 IN | WEIGHT: 200 LBS | DIASTOLIC BLOOD PRESSURE: 81 MMHG | OXYGEN SATURATION: 99 % | RESPIRATION RATE: 18 BRPM

## 2018-06-08 DIAGNOSIS — N30.00 ACUTE CYSTITIS WITHOUT HEMATURIA: Primary | ICD-10-CM

## 2018-06-08 DIAGNOSIS — K29.00 ACUTE GASTRITIS WITHOUT HEMORRHAGE, UNSPECIFIED GASTRITIS TYPE: ICD-10-CM

## 2018-06-08 LAB
ALBUMIN SERPL-MCNC: 4.2 G/DL (ref 3.9–4.9)
ALP BLD-CCNC: 107 U/L (ref 40–130)
ALT SERPL-CCNC: 26 U/L (ref 0–33)
ANION GAP SERPL CALCULATED.3IONS-SCNC: 14 MEQ/L (ref 7–13)
AST SERPL-CCNC: 22 U/L (ref 0–35)
BACTERIA: ABNORMAL /HPF
BASOPHILS ABSOLUTE: 0.1 K/UL (ref 0–0.2)
BASOPHILS RELATIVE PERCENT: 1.1 %
BILIRUB SERPL-MCNC: 0.4 MG/DL (ref 0–1.2)
BILIRUBIN URINE: NEGATIVE
BLOOD, URINE: ABNORMAL
BUN BLDV-MCNC: 15 MG/DL (ref 6–20)
CALCIUM SERPL-MCNC: 9.4 MG/DL (ref 8.6–10.2)
CHLORIDE BLD-SCNC: 100 MEQ/L (ref 98–107)
CLARITY: ABNORMAL
CO2: 21 MEQ/L (ref 22–29)
COLOR: YELLOW
CREAT SERPL-MCNC: 0.78 MG/DL (ref 0.5–0.9)
EOSINOPHILS ABSOLUTE: 0.2 K/UL (ref 0–0.7)
EOSINOPHILS RELATIVE PERCENT: 1.8 %
EPITHELIAL CELLS, UA: ABNORMAL /HPF
GFR AFRICAN AMERICAN: >60
GFR NON-AFRICAN AMERICAN: >60
GLOBULIN: 3.9 G/DL (ref 2.3–3.5)
GLUCOSE BLD-MCNC: 93 MG/DL (ref 74–109)
GLUCOSE URINE: NEGATIVE MG/DL
HCT VFR BLD CALC: 44.6 % (ref 37–47)
HEMOGLOBIN: 15.1 G/DL (ref 12–16)
KETONES, URINE: NEGATIVE MG/DL
LACTIC ACID: 1.5 MMOL/L (ref 0.5–2.2)
LEUKOCYTE ESTERASE, URINE: ABNORMAL
LIPASE: 30 U/L (ref 13–60)
LYMPHOCYTES ABSOLUTE: 2.5 K/UL (ref 1–4.8)
LYMPHOCYTES RELATIVE PERCENT: 24.7 %
MCH RBC QN AUTO: 30.9 PG (ref 27–31.3)
MCHC RBC AUTO-ENTMCNC: 33.9 % (ref 33–37)
MCV RBC AUTO: 91.3 FL (ref 82–100)
MONOCYTES ABSOLUTE: 0.6 K/UL (ref 0.2–0.8)
MONOCYTES RELATIVE PERCENT: 5.7 %
NEUTROPHILS ABSOLUTE: 6.7 K/UL (ref 1.4–6.5)
NEUTROPHILS RELATIVE PERCENT: 66.7 %
NITRITE, URINE: NEGATIVE
PDW BLD-RTO: 12.9 % (ref 11.5–14.5)
PH UA: 5.5 (ref 5–9)
PLATELET # BLD: 352 K/UL (ref 130–400)
POTASSIUM SERPL-SCNC: 4.7 MEQ/L (ref 3.5–5.1)
PREGNANCY TEST URINE, POC: NEGATIVE
PROTEIN UA: ABNORMAL MG/DL
RBC # BLD: 4.89 M/UL (ref 4.2–5.4)
RBC UA: ABNORMAL /HPF (ref 0–2)
SODIUM BLD-SCNC: 135 MEQ/L (ref 132–144)
SPECIFIC GRAVITY UA: 1.03 (ref 1–1.03)
TOTAL PROTEIN: 8.1 G/DL (ref 6.4–8.1)
URINE REFLEX TO CULTURE: YES
UROBILINOGEN, URINE: 1 E.U./DL
WBC # BLD: 10.1 K/UL (ref 4.8–10.8)
WBC UA: ABNORMAL /HPF (ref 0–5)

## 2018-06-08 PROCEDURE — 96374 THER/PROPH/DIAG INJ IV PUSH: CPT

## 2018-06-08 PROCEDURE — C9113 INJ PANTOPRAZOLE SODIUM, VIA: HCPCS | Performed by: PHYSICIAN ASSISTANT

## 2018-06-08 PROCEDURE — 6370000000 HC RX 637 (ALT 250 FOR IP): Performed by: PHYSICIAN ASSISTANT

## 2018-06-08 PROCEDURE — 83605 ASSAY OF LACTIC ACID: CPT

## 2018-06-08 PROCEDURE — 83690 ASSAY OF LIPASE: CPT

## 2018-06-08 PROCEDURE — 99284 EMERGENCY DEPT VISIT MOD MDM: CPT

## 2018-06-08 PROCEDURE — 2500000003 HC RX 250 WO HCPCS: Performed by: PHYSICIAN ASSISTANT

## 2018-06-08 PROCEDURE — 85025 COMPLETE CBC W/AUTO DIFF WBC: CPT

## 2018-06-08 PROCEDURE — 2580000003 HC RX 258: Performed by: PHYSICIAN ASSISTANT

## 2018-06-08 PROCEDURE — 36415 COLL VENOUS BLD VENIPUNCTURE: CPT

## 2018-06-08 PROCEDURE — 6360000002 HC RX W HCPCS: Performed by: PHYSICIAN ASSISTANT

## 2018-06-08 PROCEDURE — 87086 URINE CULTURE/COLONY COUNT: CPT

## 2018-06-08 PROCEDURE — S0028 INJECTION, FAMOTIDINE, 20 MG: HCPCS | Performed by: PHYSICIAN ASSISTANT

## 2018-06-08 PROCEDURE — 81001 URINALYSIS AUTO W/SCOPE: CPT

## 2018-06-08 PROCEDURE — 80053 COMPREHEN METABOLIC PANEL: CPT

## 2018-06-08 PROCEDURE — 96375 TX/PRO/DX INJ NEW DRUG ADDON: CPT

## 2018-06-08 RX ORDER — PANTOPRAZOLE SODIUM 40 MG/10ML
40 INJECTION, POWDER, LYOPHILIZED, FOR SOLUTION INTRAVENOUS ONCE
Status: COMPLETED | OUTPATIENT
Start: 2018-06-08 | End: 2018-06-08

## 2018-06-08 RX ORDER — SUCRALFATE 1 G/1
1 TABLET ORAL ONCE
Status: COMPLETED | OUTPATIENT
Start: 2018-06-08 | End: 2018-06-08

## 2018-06-08 RX ORDER — SUCRALFATE ORAL 1 G/10ML
1 SUSPENSION ORAL 4 TIMES DAILY
Qty: 400 ML | Refills: 0 | Status: SHIPPED | OUTPATIENT
Start: 2018-06-08 | End: 2018-08-01

## 2018-06-08 RX ORDER — CEPHALEXIN 500 MG/1
500 CAPSULE ORAL 4 TIMES DAILY
Qty: 40 CAPSULE | Refills: 0 | Status: SHIPPED | OUTPATIENT
Start: 2018-06-08 | End: 2018-08-01

## 2018-06-08 RX ORDER — 0.9 % SODIUM CHLORIDE 0.9 %
1000 INTRAVENOUS SOLUTION INTRAVENOUS ONCE
Status: COMPLETED | OUTPATIENT
Start: 2018-06-08 | End: 2018-06-08

## 2018-06-08 RX ORDER — ONDANSETRON 2 MG/ML
4 INJECTION INTRAMUSCULAR; INTRAVENOUS ONCE
Status: COMPLETED | OUTPATIENT
Start: 2018-06-08 | End: 2018-06-08

## 2018-06-08 RX ADMIN — SODIUM CHLORIDE 1000 ML: 9 INJECTION, SOLUTION INTRAVENOUS at 21:36

## 2018-06-08 RX ADMIN — ONDANSETRON 4 MG: 2 INJECTION INTRAMUSCULAR; INTRAVENOUS at 21:38

## 2018-06-08 RX ADMIN — SUCRALFATE 1 G: 1 TABLET ORAL at 21:40

## 2018-06-08 RX ADMIN — PANTOPRAZOLE SODIUM 40 MG: 40 INJECTION, POWDER, FOR SOLUTION INTRAVENOUS at 21:40

## 2018-06-08 RX ADMIN — FAMOTIDINE 20 MG: 10 INJECTION, SOLUTION INTRAVENOUS at 21:38

## 2018-06-08 ASSESSMENT — ENCOUNTER SYMPTOMS
ALLERGIC/IMMUNOLOGIC NEGATIVE: 1
EYE PAIN: 0
APNEA: 0
TROUBLE SWALLOWING: 0
COLOR CHANGE: 0
SHORTNESS OF BREATH: 0
ABDOMINAL PAIN: 1
DIARRHEA: 0
NAUSEA: 1
VOMITING: 1

## 2018-06-08 ASSESSMENT — PAIN SCALES - GENERAL: PAINLEVEL_OUTOF10: 6

## 2018-06-08 ASSESSMENT — PAIN DESCRIPTION - ORIENTATION: ORIENTATION: MID;UPPER

## 2018-06-08 ASSESSMENT — PAIN DESCRIPTION - DESCRIPTORS: DESCRIPTORS: CRAMPING

## 2018-06-08 ASSESSMENT — PAIN DESCRIPTION - LOCATION: LOCATION: ABDOMEN

## 2018-06-08 ASSESSMENT — PAIN DESCRIPTION - PAIN TYPE: TYPE: ACUTE PAIN

## 2018-06-10 LAB — URINE CULTURE, ROUTINE: NORMAL

## 2018-07-25 DIAGNOSIS — M25.50 PAIN IN JOINT INVOLVING MULTIPLE SITES: Chronic | ICD-10-CM

## 2018-07-25 LAB
C-REACTIVE PROTEIN: 18.3 MG/L (ref 0–5)
RHEUMATOID FACTOR: <10 IU/ML (ref 0–14)

## 2018-07-26 LAB
ANA INTERPRETATION: NORMAL
ANTI-NUCLEAR ANTIBODY (ANA): NEGATIVE

## 2018-07-27 LAB — HLA B27: NEGATIVE

## 2018-08-01 ENCOUNTER — OFFICE VISIT (OUTPATIENT)
Dept: FAMILY MEDICINE CLINIC | Age: 23
End: 2018-08-01
Payer: MEDICAID

## 2018-08-01 VITALS
DIASTOLIC BLOOD PRESSURE: 80 MMHG | WEIGHT: 213 LBS | BODY MASS INDEX: 35.45 KG/M2 | TEMPERATURE: 97.7 F | RESPIRATION RATE: 18 BRPM | OXYGEN SATURATION: 98 % | SYSTOLIC BLOOD PRESSURE: 110 MMHG | HEART RATE: 85 BPM

## 2018-08-01 DIAGNOSIS — M25.50 PAIN IN JOINT INVOLVING MULTIPLE SITES: Primary | Chronic | ICD-10-CM

## 2018-08-01 DIAGNOSIS — E04.2 MULTIPLE THYROID NODULES: Chronic | ICD-10-CM

## 2018-08-01 PROBLEM — E66.812 CLASS 2 OBESITY DUE TO EXCESS CALORIES WITHOUT SERIOUS COMORBIDITY WITH BODY MASS INDEX (BMI) OF 35.0 TO 35.9 IN ADULT: Chronic | Status: ACTIVE | Noted: 2017-12-12

## 2018-08-01 LAB
T4 FREE: 1.33 NG/DL (ref 0.93–1.7)
TSH SERPL DL<=0.05 MIU/L-ACNC: 2.74 UIU/ML (ref 0.27–4.2)

## 2018-08-01 PROCEDURE — 99214 OFFICE O/P EST MOD 30 MIN: CPT | Performed by: FAMILY MEDICINE

## 2018-08-01 PROCEDURE — G8417 CALC BMI ABV UP PARAM F/U: HCPCS | Performed by: FAMILY MEDICINE

## 2018-08-01 PROCEDURE — 1036F TOBACCO NON-USER: CPT | Performed by: FAMILY MEDICINE

## 2018-08-01 PROCEDURE — G8427 DOCREV CUR MEDS BY ELIG CLIN: HCPCS | Performed by: FAMILY MEDICINE

## 2018-08-01 ASSESSMENT — PATIENT HEALTH QUESTIONNAIRE - PHQ9
SUM OF ALL RESPONSES TO PHQ QUESTIONS 1-9: 1
2. FEELING DOWN, DEPRESSED OR HOPELESS: 1
1. LITTLE INTEREST OR PLEASURE IN DOING THINGS: 0
SUM OF ALL RESPONSES TO PHQ9 QUESTIONS 1 & 2: 1

## 2018-08-01 NOTE — PROGRESS NOTES
Subjective  Randell Green, 21 y.o. female presents today with:  Chief Complaint   Patient presents with    Discuss Labs     Here to discuss labs           HPI    Complains of increasing size of thyroid nodules with pain in neck unrelieved by NSAIDs. Was supposed to be on methimazole but was pregnant the last time she saw Dr. Juventino Zambrano. Has appointment in 10 days with Dr. Juventino Zambrano. Continues to have ankle and wrist pain. Ankle pain becomes severe and causes her to stop walking after about 10 minutes. Naprosyn helps some. She takes it about once a day. She is aware that it may cause gastritis with bleeding and renal damage. No other questions and or concerns for today's visit      Review of Systems This patient has no chest pain or pressure. She has gained weight and complains of intermittent shortness of breath. There is no nausea, diaphoresis or radiation of pain into the neck or arm or back.'  No diarrhea. No constipation. She does experience cold intolerance. No difficulty swallowing. Past Medical History:   Diagnosis Date    Anxiety 12/12/2017    Class 1 obesity due to excess calories without serious comorbidity with body mass index (BMI) of 30.0 to 30.9 in adult 12/12/2017    Hyperthyroidism 1/17/2018     Past Surgical History:   Procedure Laterality Date    BACK SURGERY  2004     Social History     Social History    Marital status: Single     Spouse name: N/A    Number of children: N/A    Years of education: N/A     Occupational History    Not on file.      Social History Main Topics    Smoking status: Never Smoker    Smokeless tobacco: Never Used    Alcohol use No    Drug use: No    Sexual activity: Yes      Comment: no birth-control     Other Topics Concern    Not on file     Social History Narrative    No narrative on file     Family History   Problem Relation Age of Onset    No Known Problems Mother     Diabetes Father     No Known Problems Sister     No Known nodules        With h/o of hyperthyroidism. One nodule is borderline. Patient has follow-up with Dr. Francisco Javier Galeana. TSH and free T4 ordered in advance of that appointment for his rev    TSH Without Reflex [[id number] Custom]   - Future Order    T4, Free [[id number] Custom]   - Future Order               Reviewed with the patient: all disease processes, current clinical status, medications, activities and diet.      Side effects, adverse effects of the medication prescribed today, as well as treatment plan/ rationale and result expectations have been discussed with the patient who expresses understanding and desires to proceed.     Close follow up to evaluate treatment results and for coordination of care. I have reviewed the patient's medical history in detail and updated the computerized patient record. More than 50% of the appointment was spent in face-to-face counseling, education and care coordination. Orders Placed This Encounter   Procedures    TSH Without Reflex     Standing Status:   Future     Standing Expiration Date:   8/1/2019    T4, Free     Standing Status:   Future     Standing Expiration Date:   8/1/2019     Orders Placed This Encounter   Medications    NAPROXEN 500 MG EC tablet     Sig: Take 1 tablet by mouth 2 times daily as needed for Pain     Dispense:  60 tablet     Refill:  2     Medications Discontinued During This Encounter   Medication Reason    cephALEXin (KEFLEX) 500 MG capsule LIST CLEANUP    sucralfate (CARAFATE) 1 GM/10ML suspension LIST CLEANUP    Prenatal Vit-Fe Fumarate-FA (PREPLUS) 27-1 MG TABS LIST CLEANUP    NAPROXEN 500 MG EC tablet REORDER     Return in about 3 months (around 11/1/2018) for joint pain. Controlled Substances Monitoring:     No flowsheet data found.     Dwain Harkins MD

## 2018-08-07 VITALS
RESPIRATION RATE: 18 BRPM | HEIGHT: 65 IN | DIASTOLIC BLOOD PRESSURE: 85 MMHG | WEIGHT: 213 LBS | HEART RATE: 90 BPM | OXYGEN SATURATION: 99 % | SYSTOLIC BLOOD PRESSURE: 131 MMHG | BODY MASS INDEX: 35.49 KG/M2 | TEMPERATURE: 98.4 F

## 2018-08-07 ASSESSMENT — PAIN DESCRIPTION - PAIN TYPE: TYPE: ACUTE PAIN

## 2018-08-07 ASSESSMENT — PAIN SCALES - GENERAL: PAINLEVEL_OUTOF10: 9

## 2018-08-07 ASSESSMENT — PAIN DESCRIPTION - LOCATION: LOCATION: THROAT;NECK

## 2018-08-07 ASSESSMENT — PAIN DESCRIPTION - DESCRIPTORS: DESCRIPTORS: DISCOMFORT;PRESSURE

## 2018-08-08 ENCOUNTER — HOSPITAL ENCOUNTER (EMERGENCY)
Age: 23
Discharge: HOME | End: 2018-08-08
Attending: EMERGENCY MEDICINE
Payer: MEDICAID

## 2018-08-08 ENCOUNTER — APPOINTMENT (OUTPATIENT)
Dept: GENERAL RADIOLOGY | Age: 23
End: 2018-08-08
Payer: MEDICAID

## 2018-08-08 DIAGNOSIS — R00.2 PALPITATIONS: Primary | ICD-10-CM

## 2018-08-08 LAB
EKG ATRIAL RATE: 85 BPM
EKG P AXIS: 53 DEGREES
EKG P-R INTERVAL: 148 MS
EKG Q-T INTERVAL: 350 MS
EKG QRS DURATION: 70 MS
EKG QTC CALCULATION (BAZETT): 416 MS
EKG R AXIS: 11 DEGREES
EKG T AXIS: 18 DEGREES
EKG VENTRICULAR RATE: 85 BPM

## 2018-08-08 PROCEDURE — 93005 ELECTROCARDIOGRAM TRACING: CPT

## 2018-08-08 PROCEDURE — 93010 ELECTROCARDIOGRAM REPORT: CPT | Performed by: INTERNAL MEDICINE

## 2018-08-08 PROCEDURE — 70360 X-RAY EXAM OF NECK: CPT

## 2018-08-08 PROCEDURE — 99284 EMERGENCY DEPT VISIT MOD MDM: CPT

## 2018-08-08 ASSESSMENT — ENCOUNTER SYMPTOMS
NAUSEA: 0
COUGH: 0
VOMITING: 0
SORE THROAT: 1

## 2018-08-08 NOTE — ED TRIAGE NOTES
Pt arrived to ED c/o heart palpitations. Pt states she is also having difficulty swallowing and trouble breathing due to her thyroid related issues. Pt has history of hyperthyroid but states she now has nodules, goiter, and weight gain. PCP cancelled her appointment and has not rescheduled.

## 2018-08-09 ENCOUNTER — OFFICE VISIT (OUTPATIENT)
Dept: FAMILY MEDICINE CLINIC | Age: 23
End: 2018-08-09
Payer: MEDICAID

## 2018-08-09 VITALS
OXYGEN SATURATION: 98 % | HEART RATE: 80 BPM | BODY MASS INDEX: 35.49 KG/M2 | WEIGHT: 213 LBS | SYSTOLIC BLOOD PRESSURE: 108 MMHG | RESPIRATION RATE: 14 BRPM | TEMPERATURE: 97.2 F | DIASTOLIC BLOOD PRESSURE: 60 MMHG | HEIGHT: 65 IN

## 2018-08-09 DIAGNOSIS — R07.0 THROAT PAIN: Primary | ICD-10-CM

## 2018-08-09 PROCEDURE — G8417 CALC BMI ABV UP PARAM F/U: HCPCS | Performed by: FAMILY MEDICINE

## 2018-08-09 PROCEDURE — 1036F TOBACCO NON-USER: CPT | Performed by: FAMILY MEDICINE

## 2018-08-09 PROCEDURE — 99213 OFFICE O/P EST LOW 20 MIN: CPT | Performed by: FAMILY MEDICINE

## 2018-08-09 PROCEDURE — G8427 DOCREV CUR MEDS BY ELIG CLIN: HCPCS | Performed by: FAMILY MEDICINE

## 2018-08-09 ASSESSMENT — ENCOUNTER SYMPTOMS
CHOKING: 0
RHINORRHEA: 0
COUGH: 0
TROUBLE SWALLOWING: 1
CHEST TIGHTNESS: 0
SHORTNESS OF BREATH: 0
SORE THROAT: 1

## 2018-08-09 NOTE — PROGRESS NOTES
Sister     No Known Problems Brother     No Known Problems Sister     Rheum Arthritis Maternal Grandmother      No Known Allergies  Current Outpatient Prescriptions   Medication Sig Dispense Refill    NAPROXEN 500 MG EC tablet Take 1 tablet by mouth 2 times daily as needed for Pain 60 tablet 2     No current facility-administered medications for this visit. PMH, Surgical Hx, Family Hx, and Social Hx reviewed and updated. Health Maintenance reviewed. Objective    Vitals:    08/09/18 0709   BP: 108/60   Site: Right Arm   Position: Sitting   Cuff Size: Large Adult   Pulse: 80   Resp: 14   Temp: 97.2 °F (36.2 °C)   TempSrc: Temporal   SpO2: 98%   Weight: 213 lb (96.6 kg)   Height: 5' 5\" (1.651 m)       Physical Exam   Constitutional: She is oriented to person, place, and time. She appears well-developed and well-nourished. No distress. HENT:   Head: Normocephalic and atraumatic. Right Ear: Tympanic membrane, external ear and ear canal normal.   Left Ear: Tympanic membrane, external ear and ear canal normal.   Nose: Nose normal.   Mouth/Throat: Uvula is midline, oropharynx is clear and moist and mucous membranes are normal.   Eyes: Conjunctivae are normal. No scleral icterus. Neck: Normal range of motion. Neck supple. No edema, no erythema and normal range of motion present. No thyroid mass and no thyromegaly present. Diffuse anterior neck TTP mild   Cardiovascular: Normal rate, regular rhythm and normal heart sounds. Pulmonary/Chest: Effort normal and breath sounds normal.   Lymphadenopathy:     She has no cervical adenopathy. Neurological: She is alert and oriented to person, place, and time. Skin: Skin is warm and dry. Psychiatric: She has a normal mood and affect.          No results found for: LABA1C  Lab Results   Component Value Date    CREATININE 0.78 06/08/2018     Lab Results   Component Value Date    ALT 26 06/08/2018    AST 22 06/08/2018     Lab Results   Component Value Date HDL 39 (L) 12/13/2017    LDLCALC 116 12/13/2017        Assessment & Plan   Visit Diagnoses and Associated Orders     Throat pain    -  Primary    Unclear etiology. Possibly related to thyroiditis though TFTs are normal.  Has history of hyperthyroidism. Has endocrine appointment tomorrow. Consider ENT. No orders of the defined types were placed in this encounter. No orders of the defined types were placed in this encounter. There are no discontinued medications. Return for has f/u appt made. patient understands plan of care and all questions were answered    Controlled Substances Monitoring:     No flowsheet data found.     Juju Gandara MD

## 2018-08-10 ENCOUNTER — OFFICE VISIT (OUTPATIENT)
Dept: ENDOCRINOLOGY | Age: 23
End: 2018-08-10
Payer: MEDICAID

## 2018-08-10 VITALS
SYSTOLIC BLOOD PRESSURE: 110 MMHG | WEIGHT: 212 LBS | DIASTOLIC BLOOD PRESSURE: 76 MMHG | HEIGHT: 65 IN | BODY MASS INDEX: 35.32 KG/M2 | HEART RATE: 80 BPM

## 2018-08-10 DIAGNOSIS — E04.9 GOITER: ICD-10-CM

## 2018-08-10 DIAGNOSIS — E05.90 HYPERTHYROIDISM: Primary | ICD-10-CM

## 2018-08-10 DIAGNOSIS — E88.81 INSULIN RESISTANCE: ICD-10-CM

## 2018-08-10 PROCEDURE — G8417 CALC BMI ABV UP PARAM F/U: HCPCS | Performed by: INTERNAL MEDICINE

## 2018-08-10 PROCEDURE — 99213 OFFICE O/P EST LOW 20 MIN: CPT | Performed by: INTERNAL MEDICINE

## 2018-08-10 PROCEDURE — 1036F TOBACCO NON-USER: CPT | Performed by: INTERNAL MEDICINE

## 2018-08-10 PROCEDURE — G8427 DOCREV CUR MEDS BY ELIG CLIN: HCPCS | Performed by: INTERNAL MEDICINE

## 2018-08-17 NOTE — PROGRESS NOTES
Subjective:      Patient ID: Evette Lazaro is a 21 y.o. female. Other   This is a chronic (hyperthyroidism ) problem. The current episode started more than 1 year ago. The problem has been resolved. Treatments tried: tapazole in the past.     6 month f/u   Reviewed labs    Results for Sophie Reddy (MRN [id number]) as of 8/17/2018 07:40   Ref. Range 8/1/2018 07:58   TSH Latest Ref Range: 0.270 - 4.200 uIU/mL 2.740   T4 Free Latest Ref Range: 0.93 - 1.70 ng/dL 1.33       Goiter had thyroid u/s done 6 months ago     Obesity Body mass index is 35.28 kg/m². Pt has gained weight     Patient Active Problem List   Diagnosis    Anxiety    Multiple thyroid nodules    Class 2 obesity due to excess calories without serious comorbidity with body mass index (BMI) of 35.0 to 35.9 in adult    Pain in joint involving multiple sites           No Known Allergies      Current Outpatient Prescriptions:     NAPROXEN 500 MG EC tablet, Take 1 tablet by mouth 2 times daily as needed for Pain, Disp: 60 tablet, Rfl: 2      Review of Systems   Endocrine: Negative for cold intolerance and heat intolerance. Psychiatric/Behavioral: The patient is nervous/anxious. All other systems reviewed and are negative. Vitals:    08/10/18 1031   BP: 110/76   Site: Left Arm   Position: Sitting   Cuff Size: Medium Adult   Pulse: 80   Weight: 212 lb (96.2 kg)   Height: 5' 5\" (1.651 m)       Objective:   Physical Exam   Constitutional: She appears well-developed and well-nourished. HENT:   Head: Normocephalic and atraumatic. Eyes: Conjunctivae are normal.   Neck: Neck supple. Thyromegaly present. Cardiovascular: Normal rate. Pulmonary/Chest: Effort normal.   Abdominal:   Obese    Musculoskeletal: Normal range of motion. Neurological: She is alert. Assessment:       Diagnosis Orders   1. Hyperthyroidism  T4, Free    TSH without Reflex    Basic Metabolic Panel    US HEAD NECK SOFT TISSUE THYROID   2.  Goiter 3. Insulin resistance             Plan:      Orders Placed This Encounter   Procedures    US HEAD NECK SOFT TISSUE THYROID     Standing Status:   Future     Standing Expiration Date:   8/10/2019     Order Specific Question:   Reason for exam:     Answer:   goiter    T4, Free     Standing Status:   Future     Standing Expiration Date:   8/10/2019    TSH without Reflex     Standing Status:   Future     Standing Expiration Date:   8/10/2019    Basic Metabolic Panel     Standing Status:   Future     Standing Expiration Date:   8/10/2019

## 2018-09-05 ENCOUNTER — OFFICE VISIT (OUTPATIENT)
Dept: FAMILY MEDICINE CLINIC | Age: 23
End: 2018-09-05
Payer: MEDICAID

## 2018-09-05 VITALS
WEIGHT: 216 LBS | HEART RATE: 88 BPM | DIASTOLIC BLOOD PRESSURE: 66 MMHG | OXYGEN SATURATION: 98 % | RESPIRATION RATE: 16 BRPM | HEIGHT: 65 IN | BODY MASS INDEX: 35.99 KG/M2 | TEMPERATURE: 97.5 F | SYSTOLIC BLOOD PRESSURE: 116 MMHG

## 2018-09-05 DIAGNOSIS — E66.09 CLASS 2 OBESITY DUE TO EXCESS CALORIES WITHOUT SERIOUS COMORBIDITY WITH BODY MASS INDEX (BMI) OF 35.0 TO 35.9 IN ADULT: Primary | Chronic | ICD-10-CM

## 2018-09-05 PROCEDURE — G8427 DOCREV CUR MEDS BY ELIG CLIN: HCPCS | Performed by: FAMILY MEDICINE

## 2018-09-05 PROCEDURE — 99213 OFFICE O/P EST LOW 20 MIN: CPT | Performed by: FAMILY MEDICINE

## 2018-09-05 PROCEDURE — 1036F TOBACCO NON-USER: CPT | Performed by: FAMILY MEDICINE

## 2018-09-05 PROCEDURE — G8417 CALC BMI ABV UP PARAM F/U: HCPCS | Performed by: FAMILY MEDICINE

## 2018-09-05 NOTE — PROGRESS NOTES
Maternal Grandmother      No Known Allergies  Current Outpatient Prescriptions   Medication Sig Dispense Refill    NAPROXEN 500 MG EC tablet Take 1 tablet by mouth 2 times daily as needed for Pain 60 tablet 2     No current facility-administered medications for this visit. PMH, Surgical Hx, Family Hx, and Social Hx reviewed and updated. Health Maintenance reviewed. Objective    Vitals:    09/05/18 0744   BP: 116/66   Site: Right Arm   Position: Sitting   Cuff Size: Large Adult   Pulse: 88   Resp: 16   Temp: 97.5 °F (36.4 °C)   TempSrc: Temporal   SpO2: 98%   Weight: 216 lb (98 kg)   Height: 5' 5\" (1.651 m)       Physical Exam   Constitutional: She is oriented to person, place, and time. Obese no acute distress   HENT:   Head: Normocephalic. Eyes: Conjunctivae are normal.   Pulmonary/Chest: Effort normal.   Neurological: She is alert and oriented to person, place, and time. Skin: Skin is warm and dry. No rash noted. Acanthosis nigricans, mild, around neck   Psychiatric: She has a normal mood and affect. Her behavior is normal. Judgment and thought content normal.         No results found for: LABA1C  Lab Results   Component Value Date    CREATININE 0.78 06/08/2018     Lab Results   Component Value Date    ALT 26 06/08/2018    AST 22 06/08/2018     Lab Results   Component Value Date    HDL 39 (L) 12/13/2017    LDLCALC 116 12/13/2017        Assessment & Plan   Visit Diagnoses and Associated Orders     Class 2 obesity due to excess calories without serious comorbidity with body mass index (BMI) of 35.0 to 35.9 in adult    -  Primary    Hemoglobin A1C [15540 Custom]   - Future Order               Reviewed healthy, low carb diet, moderately rigorous exercise 45 minutes 5 days a week. Discussed complications related to obesity including diabetes.   Follow up when necessary and for annual exam.  Patient understands plan of care and all questions were answered    Please note this report has been partially

## 2018-09-14 ENCOUNTER — HOSPITAL ENCOUNTER (EMERGENCY)
Age: 23
Discharge: HOME | End: 2018-09-14
Payer: MEDICAID

## 2018-09-14 ENCOUNTER — APPOINTMENT (OUTPATIENT)
Dept: GENERAL RADIOLOGY | Age: 23
End: 2018-09-14
Payer: MEDICAID

## 2018-09-14 VITALS
OXYGEN SATURATION: 98 % | DIASTOLIC BLOOD PRESSURE: 65 MMHG | HEART RATE: 85 BPM | BODY MASS INDEX: 35.99 KG/M2 | TEMPERATURE: 98.2 F | SYSTOLIC BLOOD PRESSURE: 116 MMHG | WEIGHT: 216 LBS | RESPIRATION RATE: 18 BRPM | HEIGHT: 65 IN

## 2018-09-14 DIAGNOSIS — R00.2 PALPITATIONS: ICD-10-CM

## 2018-09-14 DIAGNOSIS — R51.9 ACUTE NONINTRACTABLE HEADACHE, UNSPECIFIED HEADACHE TYPE: Primary | ICD-10-CM

## 2018-09-14 LAB
ALBUMIN SERPL-MCNC: 4.4 G/DL (ref 3.9–4.9)
ALP BLD-CCNC: 94 U/L (ref 40–130)
ALT SERPL-CCNC: 27 U/L (ref 0–33)
ANION GAP SERPL CALCULATED.3IONS-SCNC: 11 MEQ/L (ref 7–13)
AST SERPL-CCNC: 19 U/L (ref 0–35)
BASOPHILS ABSOLUTE: 0.1 K/UL (ref 0–0.2)
BASOPHILS RELATIVE PERCENT: 0.9 %
BILIRUB SERPL-MCNC: 0.3 MG/DL (ref 0–1.2)
BUN BLDV-MCNC: 11 MG/DL (ref 6–20)
CALCIUM SERPL-MCNC: 9.4 MG/DL (ref 8.6–10.2)
CHLORIDE BLD-SCNC: 105 MEQ/L (ref 98–107)
CO2: 24 MEQ/L (ref 22–29)
CREAT SERPL-MCNC: 0.73 MG/DL (ref 0.5–0.9)
EKG ATRIAL RATE: 88 BPM
EKG P AXIS: 43 DEGREES
EKG P-R INTERVAL: 150 MS
EKG Q-T INTERVAL: 374 MS
EKG QRS DURATION: 74 MS
EKG QTC CALCULATION (BAZETT): 452 MS
EKG R AXIS: 8 DEGREES
EKG T AXIS: 12 DEGREES
EKG VENTRICULAR RATE: 88 BPM
EOSINOPHILS ABSOLUTE: 0.2 K/UL (ref 0–0.7)
EOSINOPHILS RELATIVE PERCENT: 1.7 %
GFR AFRICAN AMERICAN: >60
GFR NON-AFRICAN AMERICAN: >60
GLOBULIN: 3.5 G/DL (ref 2.3–3.5)
GLUCOSE BLD-MCNC: 92 MG/DL (ref 74–109)
HCT VFR BLD CALC: 42.8 % (ref 37–47)
HEMOGLOBIN: 14.7 G/DL (ref 12–16)
LYMPHOCYTES ABSOLUTE: 2.5 K/UL (ref 1–4.8)
LYMPHOCYTES RELATIVE PERCENT: 27.2 %
MAGNESIUM: 2.1 MG/DL (ref 1.7–2.3)
MCH RBC QN AUTO: 30.4 PG (ref 27–31.3)
MCHC RBC AUTO-ENTMCNC: 34.2 % (ref 33–37)
MCV RBC AUTO: 88.8 FL (ref 82–100)
MONOCYTES ABSOLUTE: 0.6 K/UL (ref 0.2–0.8)
MONOCYTES RELATIVE PERCENT: 6.6 %
NEUTROPHILS ABSOLUTE: 5.9 K/UL (ref 1.4–6.5)
NEUTROPHILS RELATIVE PERCENT: 63.6 %
PDW BLD-RTO: 13.1 % (ref 11.5–14.5)
PLATELET # BLD: 364 K/UL (ref 130–400)
POTASSIUM SERPL-SCNC: 4 MEQ/L (ref 3.5–5.1)
RBC # BLD: 4.82 M/UL (ref 4.2–5.4)
SODIUM BLD-SCNC: 140 MEQ/L (ref 132–144)
TOTAL PROTEIN: 7.9 G/DL (ref 6.4–8.1)
TROPONIN: <0.01 NG/ML (ref 0–0.01)
WBC # BLD: 9.3 K/UL (ref 4.8–10.8)

## 2018-09-14 PROCEDURE — 96375 TX/PRO/DX INJ NEW DRUG ADDON: CPT

## 2018-09-14 PROCEDURE — 2580000003 HC RX 258: Performed by: PHYSICIAN ASSISTANT

## 2018-09-14 PROCEDURE — 96374 THER/PROPH/DIAG INJ IV PUSH: CPT

## 2018-09-14 PROCEDURE — 93005 ELECTROCARDIOGRAM TRACING: CPT

## 2018-09-14 PROCEDURE — 6360000002 HC RX W HCPCS: Performed by: PHYSICIAN ASSISTANT

## 2018-09-14 PROCEDURE — 36415 COLL VENOUS BLD VENIPUNCTURE: CPT

## 2018-09-14 PROCEDURE — 99284 EMERGENCY DEPT VISIT MOD MDM: CPT

## 2018-09-14 PROCEDURE — 85025 COMPLETE CBC W/AUTO DIFF WBC: CPT

## 2018-09-14 PROCEDURE — 83735 ASSAY OF MAGNESIUM: CPT

## 2018-09-14 PROCEDURE — 71045 X-RAY EXAM CHEST 1 VIEW: CPT

## 2018-09-14 PROCEDURE — 80053 COMPREHEN METABOLIC PANEL: CPT

## 2018-09-14 PROCEDURE — 84484 ASSAY OF TROPONIN QUANT: CPT

## 2018-09-14 RX ORDER — METOCLOPRAMIDE HYDROCHLORIDE 5 MG/ML
10 INJECTION INTRAMUSCULAR; INTRAVENOUS ONCE
Status: COMPLETED | OUTPATIENT
Start: 2018-09-14 | End: 2018-09-14

## 2018-09-14 RX ORDER — 0.9 % SODIUM CHLORIDE 0.9 %
1000 INTRAVENOUS SOLUTION INTRAVENOUS ONCE
Status: COMPLETED | OUTPATIENT
Start: 2018-09-14 | End: 2018-09-14

## 2018-09-14 RX ORDER — KETOROLAC TROMETHAMINE 30 MG/ML
30 INJECTION, SOLUTION INTRAMUSCULAR; INTRAVENOUS ONCE
Status: COMPLETED | OUTPATIENT
Start: 2018-09-14 | End: 2018-09-14

## 2018-09-14 RX ORDER — DEXAMETHASONE SODIUM PHOSPHATE 10 MG/ML
10 INJECTION INTRAMUSCULAR; INTRAVENOUS ONCE
Status: DISCONTINUED | OUTPATIENT
Start: 2018-09-14 | End: 2018-09-14 | Stop reason: RX

## 2018-09-14 RX ORDER — DEXAMETHASONE SODIUM PHOSPHATE 4 MG/ML
10 INJECTION, SOLUTION INTRA-ARTICULAR; INTRALESIONAL; INTRAMUSCULAR; INTRAVENOUS; SOFT TISSUE ONCE
Status: DISCONTINUED | OUTPATIENT
Start: 2018-09-14 | End: 2018-09-15 | Stop reason: HOSPADM

## 2018-09-14 RX ORDER — DIPHENHYDRAMINE HYDROCHLORIDE 50 MG/ML
25 INJECTION INTRAMUSCULAR; INTRAVENOUS ONCE
Status: COMPLETED | OUTPATIENT
Start: 2018-09-14 | End: 2018-09-14

## 2018-09-14 RX ADMIN — DIPHENHYDRAMINE HYDROCHLORIDE 25 MG: 50 INJECTION, SOLUTION INTRAMUSCULAR; INTRAVENOUS at 20:35

## 2018-09-14 RX ADMIN — SODIUM CHLORIDE 1000 ML: 9 INJECTION, SOLUTION INTRAVENOUS at 20:34

## 2018-09-14 RX ADMIN — METOCLOPRAMIDE 10 MG: 5 INJECTION, SOLUTION INTRAMUSCULAR; INTRAVENOUS at 20:35

## 2018-09-14 RX ADMIN — KETOROLAC TROMETHAMINE 30 MG: 30 INJECTION, SOLUTION INTRAMUSCULAR at 20:34

## 2018-09-14 ASSESSMENT — ENCOUNTER SYMPTOMS
VOMITING: 0
DIARRHEA: 0
RHINORRHEA: 0
COUGH: 0
BACK PAIN: 0
EYE PAIN: 0
PHOTOPHOBIA: 0
NAUSEA: 0
SORE THROAT: 0
ABDOMINAL PAIN: 0
SHORTNESS OF BREATH: 0

## 2018-09-14 ASSESSMENT — PAIN DESCRIPTION - PAIN TYPE: TYPE: ACUTE PAIN

## 2018-09-14 ASSESSMENT — PAIN SCALES - GENERAL
PAINLEVEL_OUTOF10: 6
PAINLEVEL_OUTOF10: 3
PAINLEVEL_OUTOF10: 4

## 2018-09-14 ASSESSMENT — PAIN DESCRIPTION - LOCATION
LOCATION: HEAD
LOCATION: HEAD

## 2018-09-14 NOTE — ED TRIAGE NOTES
Patient arrived to ER via walk in with complaints of dizziness and headache. Patient states it started around 1500. Patient took Tylenol around 1600 and hasn't helped with her headache. Patient is alert and oriented. Mainly [place] speaking,  is assisting with translation. Patient is able to follow commands. Neuro checks are intact.

## 2018-09-15 NOTE — ED NOTES
Pt given water and hot blanket, family at bedside. Lights off.  TV on.      Neville Griffiths RN  25/80/65 [id number]

## 2018-09-15 NOTE — ED PROVIDER NOTES
regular rhythm, normal heart sounds, intact distal pulses and normal pulses. Pulmonary/Chest: Effort normal and breath sounds normal.   Abdominal: Soft. Normal appearance and bowel sounds are normal. There is no tenderness. Neurological: She is alert and oriented to person, place, and time. She has normal strength. Bilateral equal hand grasps. Freely moving all 4 extremities without difficulty. No focal neurological deficits. Skin: Skin is warm, dry and intact. No rash noted. She is not diaphoretic. Nursing note and vitals reviewed. All other labs were within normal range or not returned as of this dictation. EMERGENCY DEPARTMENT COURSE and DIFFERENTIAL DIAGNOSIS/MDM:   Vitals:    Vitals:    09/14/18 1933   BP: 114/61   Pulse: 82   Resp: 16   Temp: 98.2 °F (36.8 °C)   TempSrc: Oral   SpO2: 98%   Weight: 216 lb (98 kg)   Height: 5' 5\" (1.651 m)            EKG reveals normal sinus rhythm at 88 bpm with no acute ST changes and no ectopy. Chest x-ray is negative. Lab work is grossly normal.  Patient does have a history of anxiety and  Hyperthyroidism. Social history in the emergency Department normal saline, Reglan, Toradol and Benadryl. On reevaluation patient's symptoms have resolved. Patient asking if she can go home now. Patient is instructed follow-up with her family physician in 2 days. She is advised return to the ED for any new or worsening symptoms. Patient verbalized understanding of this plan. Patient stable and ready for discharge. PROCEDURES:  Unless otherwise noted below, none     Procedures      FINAL IMPRESSION      1. Acute nonintractable headache, unspecified headache type    2.  Palpitations          DISPOSITION/PLAN   DISPOSITION Decision To Discharge 09/14/2018 09:51:59 PM          Nadir Costello PA-C (electronically signed)  Attending Emergency Physician       Nadir Costello PA-C  09/14/18 [id number]

## 2018-09-17 PROCEDURE — 93010 ELECTROCARDIOGRAM REPORT: CPT | Performed by: INTERNAL MEDICINE

## 2018-10-19 ENCOUNTER — NURSE ONLY (OUTPATIENT)
Dept: FAMILY MEDICINE CLINIC | Age: 23
End: 2018-10-19
Payer: MEDICAID

## 2018-10-19 DIAGNOSIS — Z23 FLU VACCINE NEED: Primary | ICD-10-CM

## 2018-10-19 PROCEDURE — 90688 IIV4 VACCINE SPLT 0.5 ML IM: CPT | Performed by: FAMILY MEDICINE

## 2018-10-19 PROCEDURE — 90471 IMMUNIZATION ADMIN: CPT | Performed by: FAMILY MEDICINE

## 2018-10-29 ENCOUNTER — HOSPITAL ENCOUNTER (EMERGENCY)
Age: 23
Discharge: HOME | End: 2018-10-29
Payer: MEDICAID

## 2018-10-29 VITALS
DIASTOLIC BLOOD PRESSURE: 80 MMHG | TEMPERATURE: 97.4 F | RESPIRATION RATE: 16 BRPM | SYSTOLIC BLOOD PRESSURE: 113 MMHG | BODY MASS INDEX: 35.57 KG/M2 | HEIGHT: 65 IN | HEART RATE: 87 BPM | OXYGEN SATURATION: 98 % | WEIGHT: 213.5 LBS

## 2018-10-29 DIAGNOSIS — J06.9 ACUTE UPPER RESPIRATORY INFECTION: Primary | ICD-10-CM

## 2018-10-29 LAB — S PYO AG THROAT QL: NEGATIVE

## 2018-10-29 PROCEDURE — 87880 STREP A ASSAY W/OPTIC: CPT

## 2018-10-29 PROCEDURE — 99283 EMERGENCY DEPT VISIT LOW MDM: CPT

## 2018-10-29 PROCEDURE — 87081 CULTURE SCREEN ONLY: CPT

## 2018-10-29 ASSESSMENT — ENCOUNTER SYMPTOMS
SORE THROAT: 1
EYES NEGATIVE: 1
COUGH: 1
GASTROINTESTINAL NEGATIVE: 1

## 2018-10-29 ASSESSMENT — PAIN DESCRIPTION - LOCATION: LOCATION: THROAT

## 2018-10-29 ASSESSMENT — PAIN SCALES - GENERAL: PAINLEVEL_OUTOF10: 8

## 2018-10-29 NOTE — ED PROVIDER NOTES
N/A    Number of children: N/A    Years of education: N/A     Social History Main Topics    Smoking status: Never Smoker    Smokeless tobacco: Never Used    Alcohol use No    Drug use: No    Sexual activity: Yes      Comment: no birth-control     Other Topics Concern    None     Social History Narrative    None       SCREENINGS      @[id number])@      PHYSICAL EXAM    (up to 7 for level 4, 8 or more for level 5)     ED Triage Vitals [10/29/18 1330]   BP Temp Temp Source Pulse Resp SpO2 Height Weight   113/80 97.4 °F (36.3 °C) Oral 87 16 98 % 5' 5\" (1.651 m) 213 lb 8 oz (96.8 kg)       Physical Exam   Constitutional: She is oriented to person, place, and time. She appears well-developed and well-nourished. No distress. HENT:   Head: Normocephalic and atraumatic. Nose: Rhinorrhea present. Mouth/Throat: Posterior oropharyngeal erythema present. No oropharyngeal exudate or posterior oropharyngeal edema. Eyes: Pupils are equal, round, and reactive to light. Conjunctivae are normal.   Neck: Normal range of motion. Neck supple. Cardiovascular: Normal rate and regular rhythm. No murmur heard. Pulmonary/Chest: Breath sounds normal. No respiratory distress. She has no wheezes. She has no rales. Abdominal: Soft. She exhibits no distension. There is no tenderness. Musculoskeletal: Normal range of motion. Neurological: She is alert and oriented to person, place, and time. No cranial nerve deficit. Skin: Skin is warm and dry. No rash noted. No erythema. Psychiatric: She has a normal mood and affect. Judgment normal.         All other labs were within normal range or not returned as of this dictation. EMERGENCY DEPARTMENT COURSE and DIFFERENTIALDIAGNOSIS/MDM:   Vitals:    Vitals:    10/29/18 1330   BP: 113/80   Pulse: 87   Resp: 16   Temp: 97.4 °F (36.3 °C)   TempSrc: Oral   SpO2: 98%   Weight: 213 lb 8 oz (96.8 kg)   Height: 5' 5\" (1.651 m)       Strep test is negative.   Patient instructed

## 2018-10-31 ENCOUNTER — OFFICE VISIT (OUTPATIENT)
Dept: FAMILY MEDICINE CLINIC | Age: 23
End: 2018-10-31
Payer: MEDICAID

## 2018-10-31 VITALS
HEART RATE: 77 BPM | TEMPERATURE: 97 F | BODY MASS INDEX: 35.99 KG/M2 | HEIGHT: 65 IN | RESPIRATION RATE: 16 BRPM | WEIGHT: 216 LBS | DIASTOLIC BLOOD PRESSURE: 64 MMHG | SYSTOLIC BLOOD PRESSURE: 122 MMHG | OXYGEN SATURATION: 98 %

## 2018-10-31 DIAGNOSIS — J06.9 VIRAL URI: Primary | ICD-10-CM

## 2018-10-31 PROCEDURE — G8482 FLU IMMUNIZE ORDER/ADMIN: HCPCS | Performed by: FAMILY MEDICINE

## 2018-10-31 PROCEDURE — 1036F TOBACCO NON-USER: CPT | Performed by: FAMILY MEDICINE

## 2018-10-31 PROCEDURE — G8417 CALC BMI ABV UP PARAM F/U: HCPCS | Performed by: FAMILY MEDICINE

## 2018-10-31 PROCEDURE — 99213 OFFICE O/P EST LOW 20 MIN: CPT | Performed by: FAMILY MEDICINE

## 2018-10-31 PROCEDURE — G8427 DOCREV CUR MEDS BY ELIG CLIN: HCPCS | Performed by: FAMILY MEDICINE

## 2018-10-31 NOTE — PROGRESS NOTES
tablet 2     No current facility-administered medications for this visit. PMH, Surgical Hx, Family Hx, and Social Hxreviewed and updated. Health Maintenance reviewed. Objective    Vitals:    10/31/18 0700   BP: 122/64   Site: Left Upper Arm   Position: Sitting   Cuff Size: Large Adult   Pulse: 77   Resp: 16   Temp: 97 °F (36.1 °C)   TempSrc: Temporal   SpO2: 98%   Weight: 216 lb (98 kg)   Height: 5' 5\" (1.651 m)       Physical Exam   Constitutional: She is oriented to person, place, and time. She appears well-developed and well-nourished. HENT:   Head: Normocephalic and atraumatic. Right Ear: External ear normal.   Left Ear: External ear normal.   Nose: Nose normal.   Mouth/Throat: Oropharynx is clear and moist. No oropharyngeal exudate. Bilateral TMs within normal limits   Eyes: Conjunctivae are normal. Right eye exhibits no discharge. Left eye exhibits no discharge. No scleral icterus. Neck: Normal range of motion. Neck supple. No thyromegaly present. Cardiovascular: Normal rate, regular rhythm and normal heart sounds. Pulmonary/Chest: Effort normal and breath sounds normal.   Lymphadenopathy:     She has no cervical adenopathy. Neurological: She is alert and oriented to person, place, and time. Skin: Skin is warm and dry. Psychiatric: She has a normal mood and affect. No results found for: LABA1C  Lab Results   Component Value Date    CREATININE 0.73 09/14/2018     Lab Results   Component Value Date    ALT 27 09/14/2018    AST 19 09/14/2018     Lab Results   Component Value Date    HDL 39 (L) 12/13/2017    LDLCALC 116 12/13/2017        Assessment & Plan   Visit Diagnoses and Associated Orders     Viral URI    -  Primary    Information regarding supportive care provided. Patient understands plan of care and all questions were answered. No orders of the defined types were placed in this encounter.     No orders of the defined types were placed in this

## 2018-11-01 LAB — S PYO THROAT QL CULT: NORMAL

## 2018-11-09 ENCOUNTER — HOSPITAL ENCOUNTER (EMERGENCY)
Age: 23
Discharge: HOME | End: 2018-11-09
Payer: MEDICAID

## 2018-11-09 VITALS
TEMPERATURE: 98.8 F | HEART RATE: 88 BPM | OXYGEN SATURATION: 98 % | RESPIRATION RATE: 16 BRPM | SYSTOLIC BLOOD PRESSURE: 119 MMHG | BODY MASS INDEX: 34.99 KG/M2 | WEIGHT: 210 LBS | DIASTOLIC BLOOD PRESSURE: 77 MMHG | HEIGHT: 65 IN

## 2018-11-09 DIAGNOSIS — R11.0 NAUSEA: ICD-10-CM

## 2018-11-09 DIAGNOSIS — R10.84 GENERALIZED ABDOMINAL PAIN: Primary | ICD-10-CM

## 2018-11-09 LAB
ALBUMIN SERPL-MCNC: 4.1 G/DL (ref 3.9–4.9)
ALP BLD-CCNC: 93 U/L (ref 40–130)
ALT SERPL-CCNC: 33 U/L (ref 0–33)
ANION GAP SERPL CALCULATED.3IONS-SCNC: 7 MEQ/L (ref 7–13)
AST SERPL-CCNC: 31 U/L (ref 0–35)
BASOPHILS ABSOLUTE: 0 K/UL (ref 0–0.2)
BASOPHILS RELATIVE PERCENT: 0.5 %
BILIRUB SERPL-MCNC: 0.3 MG/DL (ref 0–1.2)
BILIRUBIN URINE: NEGATIVE
BLOOD, URINE: NEGATIVE
BUN BLDV-MCNC: 10 MG/DL (ref 6–20)
CALCIUM SERPL-MCNC: 9 MG/DL (ref 8.6–10.2)
CHLORIDE BLD-SCNC: 105 MEQ/L (ref 98–107)
CHP ED QC CHECK: NORMAL
CLARITY: CLEAR
CO2: 23 MEQ/L (ref 22–29)
COLOR: YELLOW
CREAT SERPL-MCNC: 0.63 MG/DL (ref 0.5–0.9)
EKG ATRIAL RATE: 90 BPM
EKG P AXIS: 38 DEGREES
EKG P-R INTERVAL: 156 MS
EKG Q-T INTERVAL: 360 MS
EKG QRS DURATION: 72 MS
EKG QTC CALCULATION (BAZETT): 440 MS
EKG R AXIS: 9 DEGREES
EKG T AXIS: 8 DEGREES
EKG VENTRICULAR RATE: 90 BPM
EOSINOPHILS ABSOLUTE: 0.1 K/UL (ref 0–0.7)
EOSINOPHILS RELATIVE PERCENT: 1.4 %
GFR AFRICAN AMERICAN: >60
GFR NON-AFRICAN AMERICAN: >60
GLOBULIN: 3.5 G/DL (ref 2.3–3.5)
GLUCOSE BLD-MCNC: 93 MG/DL (ref 74–109)
GLUCOSE URINE: NEGATIVE MG/DL
HCG QUALITATIVE: NEGATIVE
HCT VFR BLD CALC: 41.7 % (ref 37–47)
HEMOGLOBIN: 14 G/DL (ref 12–16)
KETONES, URINE: NEGATIVE MG/DL
LEUKOCYTE ESTERASE, URINE: NEGATIVE
LYMPHOCYTES ABSOLUTE: 2.1 K/UL (ref 1–4.8)
LYMPHOCYTES RELATIVE PERCENT: 24 %
MCH RBC QN AUTO: 30.3 PG (ref 27–31.3)
MCHC RBC AUTO-ENTMCNC: 33.6 % (ref 33–37)
MCV RBC AUTO: 90.1 FL (ref 82–100)
MONOCYTES ABSOLUTE: 0.5 K/UL (ref 0.2–0.8)
MONOCYTES RELATIVE PERCENT: 5.9 %
NEUTROPHILS ABSOLUTE: 6.1 K/UL (ref 1.4–6.5)
NEUTROPHILS RELATIVE PERCENT: 68.2 %
NITRITE, URINE: NEGATIVE
PDW BLD-RTO: 13.3 % (ref 11.5–14.5)
PH UA: 7.5 (ref 5–9)
PLATELET # BLD: 346 K/UL (ref 130–400)
POTASSIUM SERPL-SCNC: 5.5 MEQ/L (ref 3.5–5.1)
PREGNANCY TEST URINE, POC: NEGATIVE
PROTEIN UA: NEGATIVE MG/DL
RBC # BLD: 4.63 M/UL (ref 4.2–5.4)
SODIUM BLD-SCNC: 135 MEQ/L (ref 132–144)
SPECIFIC GRAVITY UA: 1.02 (ref 1–1.03)
TOTAL PROTEIN: 7.6 G/DL (ref 6.4–8.1)
URINE REFLEX TO CULTURE: NORMAL
UROBILINOGEN, URINE: 1 E.U./DL
WBC # BLD: 8.9 K/UL (ref 4.8–10.8)

## 2018-11-09 PROCEDURE — 84703 CHORIONIC GONADOTROPIN ASSAY: CPT

## 2018-11-09 PROCEDURE — 36415 COLL VENOUS BLD VENIPUNCTURE: CPT

## 2018-11-09 PROCEDURE — 93005 ELECTROCARDIOGRAM TRACING: CPT

## 2018-11-09 PROCEDURE — 81003 URINALYSIS AUTO W/O SCOPE: CPT

## 2018-11-09 PROCEDURE — 99284 EMERGENCY DEPT VISIT MOD MDM: CPT

## 2018-11-09 PROCEDURE — 85025 COMPLETE CBC W/AUTO DIFF WBC: CPT

## 2018-11-09 PROCEDURE — 80053 COMPREHEN METABOLIC PANEL: CPT

## 2018-11-09 RX ORDER — POLYETHYLENE GLYCOL 3350 17 G/17G
17 POWDER, FOR SOLUTION ORAL DAILY
Qty: 1 BOTTLE | Refills: 0 | Status: SHIPPED | OUTPATIENT
Start: 2018-11-09 | End: 2018-11-16

## 2018-11-09 RX ORDER — PROMETHAZINE HYDROCHLORIDE 25 MG/1
12.5-25 TABLET ORAL EVERY 6 HOURS PRN
Qty: 12 TABLET | Refills: 0 | Status: SHIPPED | OUTPATIENT
Start: 2018-11-09 | End: 2018-11-16

## 2018-11-09 ASSESSMENT — PAIN DESCRIPTION - LOCATION: LOCATION: CHEST

## 2018-11-09 ASSESSMENT — ENCOUNTER SYMPTOMS
COUGH: 0
BLOOD IN STOOL: 0
VOMITING: 0
ABDOMINAL DISTENTION: 0
ANAL BLEEDING: 0
ABDOMINAL PAIN: 1
EYE DISCHARGE: 0
CONSTIPATION: 1
VOICE CHANGE: 0
APNEA: 0
PHOTOPHOBIA: 0
SHORTNESS OF BREATH: 0
DIARRHEA: 0

## 2018-11-09 ASSESSMENT — PAIN SCALES - GENERAL: PAINLEVEL_OUTOF10: 2

## 2018-11-09 ASSESSMENT — PAIN DESCRIPTION - DESCRIPTORS: DESCRIPTORS: DISCOMFORT

## 2018-11-09 NOTE — ED NOTES
Pt resting in bved  No distress noted talking to famuily member     Noe Villeda, [address] [facility]  11/09/18 7012

## 2018-11-09 NOTE — ED PROVIDER NOTES
not bruise/bleed easily. Psychiatric/Behavioral: Negative for behavioral problems, self-injury and sleep disturbance. All other systems reviewed and are negative. Except as noted above the remainder of the review of systems was reviewed and negative. PAST MEDICAL HISTORY     Past Medical History:   Diagnosis Date    Anxiety 12/12/2017    Class 1 obesity due to excess calories without serious comorbidity with body mass index (BMI) of 30.0 to 30.9 in adult 12/12/2017    Hyperthyroidism 1/17/2018         SURGICALHISTORY       Past Surgical History:   Procedure Laterality Date    BACK SURGERY  2004         CURRENT MEDICATIONS       Previous Medications    NAPROXEN 500 MG EC TABLET    Take 1 tablet by mouth 2 times daily as needed for Pain       ALLERGIES     Patient has no known allergies. FAMILY HISTORY       Family History   Problem Relation Age of Onset    No Known Problems Mother     Diabetes Father     No Known Problems Sister     No Known Problems Brother     No Known Problems Sister     Rheum Arthritis Maternal Grandmother           SOCIAL HISTORY       Social History     Social History    Marital status:      Spouse name: N/A    Number of children: N/A    Years of education: N/A     Social History Main Topics    Smoking status: Never Smoker    Smokeless tobacco: Never Used    Alcohol use No    Drug use: No    Sexual activity: Yes      Comment: no birth-control     Other Topics Concern    None     Social History Narrative    None       SCREENINGS      @[id number])@      PHYSICAL EXAM    (up to 7 for level 4, 8 or more for level 5)     ED Triage Vitals [11/09/18 1622]   BP Temp Temp Source Pulse Resp SpO2 Height Weight   117/78 98.8 °F (37.1 °C) Oral 94 16 98 % 5' 5\" (1.651 m) 210 lb (95.3 kg)       Physical Exam   Constitutional: She is oriented to person, place, and time. She appears well-developed and well-nourished. No distress.    HENT:   Head: Normocephalic and

## 2018-11-13 PROCEDURE — 93010 ELECTROCARDIOGRAM REPORT: CPT | Performed by: INTERNAL MEDICINE

## 2018-11-15 ENCOUNTER — HOSPITAL ENCOUNTER (EMERGENCY)
Age: 23
Discharge: HOME | End: 2018-11-15

## 2018-11-15 VITALS
RESPIRATION RATE: 24 BRPM | HEIGHT: 64 IN | TEMPERATURE: 97.9 F | WEIGHT: 200 LBS | HEART RATE: 105 BPM | BODY MASS INDEX: 34.15 KG/M2 | SYSTOLIC BLOOD PRESSURE: 116 MMHG | DIASTOLIC BLOOD PRESSURE: 76 MMHG | OXYGEN SATURATION: 100 %

## 2018-11-15 DIAGNOSIS — K59.00 CONSTIPATION, UNSPECIFIED CONSTIPATION TYPE: Primary | ICD-10-CM

## 2018-11-15 DIAGNOSIS — R10.84 GENERALIZED ABDOMINAL PAIN: ICD-10-CM

## 2018-11-15 LAB
ALBUMIN SERPL-MCNC: 4.2 G/DL (ref 3.9–4.9)
ALP BLD-CCNC: 104 U/L (ref 40–130)
ALT SERPL-CCNC: 31 U/L (ref 0–33)
ANION GAP SERPL CALCULATED.3IONS-SCNC: 13 MEQ/L (ref 7–13)
AST SERPL-CCNC: 20 U/L (ref 0–35)
BASOPHILS ABSOLUTE: 0.1 K/UL (ref 0–0.2)
BASOPHILS RELATIVE PERCENT: 0.8 %
BILIRUB SERPL-MCNC: 0.3 MG/DL (ref 0–1.2)
BILIRUBIN URINE: NEGATIVE
BLOOD, URINE: NEGATIVE
BUN BLDV-MCNC: 10 MG/DL (ref 6–20)
CALCIUM SERPL-MCNC: 9.4 MG/DL (ref 8.6–10.2)
CHLORIDE BLD-SCNC: 100 MEQ/L (ref 98–107)
CHP ED QC CHECK: YES
CLARITY: CLEAR
CO2: 24 MEQ/L (ref 22–29)
COLOR: YELLOW
CREAT SERPL-MCNC: 0.69 MG/DL (ref 0.5–0.9)
EOSINOPHILS ABSOLUTE: 0.1 K/UL (ref 0–0.7)
EOSINOPHILS RELATIVE PERCENT: 1.3 %
GFR AFRICAN AMERICAN: >60
GFR NON-AFRICAN AMERICAN: >60
GLOBULIN: 3.6 G/DL (ref 2.3–3.5)
GLUCOSE BLD-MCNC: 114 MG/DL (ref 74–109)
GLUCOSE URINE: NEGATIVE MG/DL
GONADOTROPIN, CHORIONIC (HCG) QUANT: <0.1 MIU/ML
HCT VFR BLD CALC: 40.2 % (ref 37–47)
HEMOGLOBIN: 13.8 G/DL (ref 12–16)
KETONES, URINE: NEGATIVE MG/DL
LACTIC ACID: 1.5 MMOL/L (ref 0.5–2.2)
LEUKOCYTE ESTERASE, URINE: NEGATIVE
LIPASE: 22 U/L (ref 13–60)
LYMPHOCYTES ABSOLUTE: 1.5 K/UL (ref 1–4.8)
LYMPHOCYTES RELATIVE PERCENT: 13 %
MAGNESIUM: 1.8 MG/DL (ref 1.7–2.3)
MCH RBC QN AUTO: 30.4 PG (ref 27–31.3)
MCHC RBC AUTO-ENTMCNC: 34.2 % (ref 33–37)
MCV RBC AUTO: 88.9 FL (ref 82–100)
MONOCYTES ABSOLUTE: 0.7 K/UL (ref 0.2–0.8)
MONOCYTES RELATIVE PERCENT: 6.4 %
NEUTROPHILS ABSOLUTE: 8.9 K/UL (ref 1.4–6.5)
NEUTROPHILS RELATIVE PERCENT: 78.5 %
NITRITE, URINE: NEGATIVE
PDW BLD-RTO: 13.2 % (ref 11.5–14.5)
PH UA: 6 (ref 5–9)
PLATELET # BLD: 348 K/UL (ref 130–400)
POTASSIUM SERPL-SCNC: 3.7 MEQ/L (ref 3.5–5.1)
PREGNANCY TEST URINE, POC: NEGATIVE
PROTEIN UA: ABNORMAL MG/DL
RBC # BLD: 4.53 M/UL (ref 4.2–5.4)
SODIUM BLD-SCNC: 137 MEQ/L (ref 132–144)
SPECIFIC GRAVITY UA: 1.01 (ref 1–1.03)
TOTAL PROTEIN: 7.8 G/DL (ref 6.4–8.1)
URINE REFLEX TO CULTURE: ABNORMAL
UROBILINOGEN, URINE: 1 E.U./DL
WBC # BLD: 11.3 K/UL (ref 4.8–10.8)

## 2018-11-15 PROCEDURE — 83735 ASSAY OF MAGNESIUM: CPT

## 2018-11-15 PROCEDURE — 87491 CHLMYD TRACH DNA AMP PROBE: CPT

## 2018-11-15 PROCEDURE — 84702 CHORIONIC GONADOTROPIN TEST: CPT

## 2018-11-15 PROCEDURE — 96375 TX/PRO/DX INJ NEW DRUG ADDON: CPT

## 2018-11-15 PROCEDURE — 2580000003 HC RX 258: Performed by: PHYSICIAN ASSISTANT

## 2018-11-15 PROCEDURE — 6370000000 HC RX 637 (ALT 250 FOR IP): Performed by: PHYSICIAN ASSISTANT

## 2018-11-15 PROCEDURE — 99283 EMERGENCY DEPT VISIT LOW MDM: CPT

## 2018-11-15 PROCEDURE — 80053 COMPREHEN METABOLIC PANEL: CPT

## 2018-11-15 PROCEDURE — 83690 ASSAY OF LIPASE: CPT

## 2018-11-15 PROCEDURE — 83605 ASSAY OF LACTIC ACID: CPT

## 2018-11-15 PROCEDURE — 87591 N.GONORRHOEAE DNA AMP PROB: CPT

## 2018-11-15 PROCEDURE — 2500000003 HC RX 250 WO HCPCS: Performed by: PHYSICIAN ASSISTANT

## 2018-11-15 PROCEDURE — 85025 COMPLETE CBC W/AUTO DIFF WBC: CPT

## 2018-11-15 PROCEDURE — 6360000002 HC RX W HCPCS: Performed by: PHYSICIAN ASSISTANT

## 2018-11-15 PROCEDURE — 81003 URINALYSIS AUTO W/O SCOPE: CPT

## 2018-11-15 PROCEDURE — S0028 INJECTION, FAMOTIDINE, 20 MG: HCPCS | Performed by: PHYSICIAN ASSISTANT

## 2018-11-15 PROCEDURE — 36415 COLL VENOUS BLD VENIPUNCTURE: CPT

## 2018-11-15 PROCEDURE — 96374 THER/PROPH/DIAG INJ IV PUSH: CPT

## 2018-11-15 RX ORDER — KETOROLAC TROMETHAMINE 30 MG/ML
30 INJECTION, SOLUTION INTRAMUSCULAR; INTRAVENOUS ONCE
Status: COMPLETED | OUTPATIENT
Start: 2018-11-15 | End: 2018-11-15

## 2018-11-15 RX ORDER — METOCLOPRAMIDE 10 MG/1
10 TABLET ORAL 4 TIMES DAILY
Qty: 40 TABLET | Refills: 0 | Status: SHIPPED | OUTPATIENT
Start: 2018-11-15 | End: 2019-01-06

## 2018-11-15 RX ORDER — 0.9 % SODIUM CHLORIDE 0.9 %
2000 INTRAVENOUS SOLUTION INTRAVENOUS ONCE
Status: COMPLETED | OUTPATIENT
Start: 2018-11-15 | End: 2018-11-15

## 2018-11-15 RX ORDER — ACETAMINOPHEN 500 MG
1000 TABLET ORAL ONCE
Status: COMPLETED | OUTPATIENT
Start: 2018-11-15 | End: 2018-11-15

## 2018-11-15 RX ORDER — ONDANSETRON 2 MG/ML
4 INJECTION INTRAMUSCULAR; INTRAVENOUS ONCE
Status: COMPLETED | OUTPATIENT
Start: 2018-11-15 | End: 2018-11-15

## 2018-11-15 RX ADMIN — SODIUM CHLORIDE 2000 ML: 9 INJECTION, SOLUTION INTRAVENOUS at 00:51

## 2018-11-15 RX ADMIN — KETOROLAC TROMETHAMINE 30 MG: 30 INJECTION, SOLUTION INTRAMUSCULAR at 01:24

## 2018-11-15 RX ADMIN — ONDANSETRON 4 MG: 2 INJECTION INTRAMUSCULAR; INTRAVENOUS at 00:51

## 2018-11-15 RX ADMIN — ACETAMINOPHEN 1000 MG: 500 TABLET ORAL at 00:51

## 2018-11-15 RX ADMIN — FAMOTIDINE 20 MG: 10 INJECTION, SOLUTION INTRAVENOUS at 00:51

## 2018-11-15 ASSESSMENT — ENCOUNTER SYMPTOMS
DIARRHEA: 1
APNEA: 0
NAUSEA: 1
COLOR CHANGE: 0
ALLERGIC/IMMUNOLOGIC NEGATIVE: 1
ABDOMINAL DISTENTION: 1
VOMITING: 1
TROUBLE SWALLOWING: 0
BLOOD IN STOOL: 0
ABDOMINAL PAIN: 1
ANAL BLEEDING: 0
EYE PAIN: 0
CONSTIPATION: 1
SHORTNESS OF BREATH: 0

## 2018-11-15 ASSESSMENT — PAIN SCALES - GENERAL
PAINLEVEL_OUTOF10: 6
PAINLEVEL_OUTOF10: 8
PAINLEVEL_OUTOF10: 8

## 2018-11-15 ASSESSMENT — PAIN DESCRIPTION - FREQUENCY: FREQUENCY: CONTINUOUS

## 2018-11-15 ASSESSMENT — PAIN DESCRIPTION - LOCATION: LOCATION: ABDOMEN

## 2018-11-15 ASSESSMENT — PAIN DESCRIPTION - PAIN TYPE: TYPE: ACUTE PAIN

## 2018-11-15 ASSESSMENT — PAIN DESCRIPTION - ORIENTATION: ORIENTATION: MID;UPPER

## 2018-11-15 ASSESSMENT — PAIN DESCRIPTION - DESCRIPTORS: DESCRIPTORS: PRESSURE;OTHER (COMMENT)

## 2018-11-20 LAB
C. TRACHOMATIS DNA ,URINE: NEGATIVE
N. GONORRHOEAE DNA, URINE: NEGATIVE

## 2018-12-20 ENCOUNTER — APPOINTMENT (OUTPATIENT)
Dept: ULTRASOUND IMAGING | Age: 23
End: 2018-12-20
Payer: MEDICAID

## 2018-12-20 ENCOUNTER — HOSPITAL ENCOUNTER (EMERGENCY)
Age: 23
Discharge: HOME | End: 2018-12-20
Attending: EMERGENCY MEDICINE
Payer: MEDICAID

## 2018-12-20 VITALS
BODY MASS INDEX: 43.32 KG/M2 | OXYGEN SATURATION: 99 % | HEART RATE: 112 BPM | WEIGHT: 260 LBS | DIASTOLIC BLOOD PRESSURE: 98 MMHG | TEMPERATURE: 98 F | SYSTOLIC BLOOD PRESSURE: 142 MMHG | RESPIRATION RATE: 16 BRPM | HEIGHT: 65 IN

## 2018-12-20 DIAGNOSIS — O26.891 ABDOMINAL PAIN DURING PREGNANCY IN FIRST TRIMESTER: Primary | ICD-10-CM

## 2018-12-20 DIAGNOSIS — R10.9 ABDOMINAL PAIN DURING PREGNANCY IN FIRST TRIMESTER: Primary | ICD-10-CM

## 2018-12-20 LAB
ALBUMIN SERPL-MCNC: 4.4 G/DL (ref 3.9–4.9)
ALP BLD-CCNC: 89 U/L (ref 40–130)
ALT SERPL-CCNC: 38 U/L (ref 0–33)
ANION GAP SERPL CALCULATED.3IONS-SCNC: 11 MEQ/L (ref 7–13)
AST SERPL-CCNC: 25 U/L (ref 0–35)
BASOPHILS ABSOLUTE: 0 K/UL (ref 0–0.2)
BASOPHILS RELATIVE PERCENT: 0.4 %
BILIRUB SERPL-MCNC: 0.3 MG/DL (ref 0–1.2)
BILIRUBIN URINE: NEGATIVE
BLOOD, URINE: NEGATIVE
BUN BLDV-MCNC: 7 MG/DL (ref 6–20)
CALCIUM SERPL-MCNC: 9 MG/DL (ref 8.6–10.2)
CHLORIDE BLD-SCNC: 105 MEQ/L (ref 98–107)
CHP ED QC CHECK: NORMAL
CLARITY: ABNORMAL
CO2: 24 MEQ/L (ref 22–29)
COLOR: YELLOW
CREAT SERPL-MCNC: 0.71 MG/DL (ref 0.5–0.9)
EOSINOPHILS ABSOLUTE: 0.1 K/UL (ref 0–0.7)
EOSINOPHILS RELATIVE PERCENT: 1.2 %
GFR AFRICAN AMERICAN: >60
GFR NON-AFRICAN AMERICAN: >60
GLOBULIN: 3.5 G/DL (ref 2.3–3.5)
GLUCOSE BLD-MCNC: 89 MG/DL (ref 74–109)
GLUCOSE URINE: NEGATIVE MG/DL
GONADOTROPIN, CHORIONIC (HCG) QUANT: 2050 MIU/ML
HCT VFR BLD CALC: 42.2 % (ref 37–47)
HEMOGLOBIN: 14 G/DL (ref 12–16)
KETONES, URINE: NEGATIVE MG/DL
LEUKOCYTE ESTERASE, URINE: NEGATIVE
LYMPHOCYTES ABSOLUTE: 2 K/UL (ref 1–4.8)
LYMPHOCYTES RELATIVE PERCENT: 17.2 %
MCH RBC QN AUTO: 30 PG (ref 27–31.3)
MCHC RBC AUTO-ENTMCNC: 33.3 % (ref 33–37)
MCV RBC AUTO: 90.1 FL (ref 82–100)
MONOCYTES ABSOLUTE: 0.7 K/UL (ref 0.2–0.8)
MONOCYTES RELATIVE PERCENT: 5.7 %
NEUTROPHILS ABSOLUTE: 8.8 K/UL (ref 1.4–6.5)
NEUTROPHILS RELATIVE PERCENT: 75.5 %
NITRITE, URINE: NEGATIVE
PDW BLD-RTO: 13.8 % (ref 11.5–14.5)
PH UA: 5 (ref 5–9)
PLATELET # BLD: 364 K/UL (ref 130–400)
POTASSIUM SERPL-SCNC: 3.9 MEQ/L (ref 3.5–5.1)
PREGNANCY TEST URINE, POC: POSITIVE
PROTEIN UA: NEGATIVE MG/DL
RBC # BLD: 4.68 M/UL (ref 4.2–5.4)
SODIUM BLD-SCNC: 140 MEQ/L (ref 132–144)
SPECIFIC GRAVITY UA: 1.01 (ref 1–1.03)
TOTAL PROTEIN: 7.9 G/DL (ref 6.4–8.1)
UROBILINOGEN, URINE: 0.2 E.U./DL
WBC # BLD: 11.6 K/UL (ref 4.8–10.8)

## 2018-12-20 PROCEDURE — 76801 OB US < 14 WKS SINGLE FETUS: CPT

## 2018-12-20 PROCEDURE — 76817 TRANSVAGINAL US OBSTETRIC: CPT

## 2018-12-20 PROCEDURE — 84702 CHORIONIC GONADOTROPIN TEST: CPT

## 2018-12-20 PROCEDURE — 6370000000 HC RX 637 (ALT 250 FOR IP): Performed by: EMERGENCY MEDICINE

## 2018-12-20 PROCEDURE — 96374 THER/PROPH/DIAG INJ IV PUSH: CPT

## 2018-12-20 PROCEDURE — 99284 EMERGENCY DEPT VISIT MOD MDM: CPT

## 2018-12-20 PROCEDURE — 85025 COMPLETE CBC W/AUTO DIFF WBC: CPT

## 2018-12-20 PROCEDURE — 81003 URINALYSIS AUTO W/O SCOPE: CPT

## 2018-12-20 PROCEDURE — 2580000003 HC RX 258

## 2018-12-20 PROCEDURE — 6360000002 HC RX W HCPCS: Performed by: EMERGENCY MEDICINE

## 2018-12-20 PROCEDURE — 36415 COLL VENOUS BLD VENIPUNCTURE: CPT

## 2018-12-20 PROCEDURE — 80053 COMPREHEN METABOLIC PANEL: CPT

## 2018-12-20 RX ORDER — ACETAMINOPHEN 500 MG
1000 TABLET ORAL ONCE
Status: COMPLETED | OUTPATIENT
Start: 2018-12-20 | End: 2018-12-20

## 2018-12-20 RX ORDER — ONDANSETRON 2 MG/ML
4 INJECTION INTRAMUSCULAR; INTRAVENOUS ONCE
Status: COMPLETED | OUTPATIENT
Start: 2018-12-20 | End: 2018-12-20

## 2018-12-20 RX ORDER — 0.9 % SODIUM CHLORIDE 0.9 %
1000 INTRAVENOUS SOLUTION INTRAVENOUS ONCE
Status: COMPLETED | OUTPATIENT
Start: 2018-12-20 | End: 2018-12-20

## 2018-12-20 RX ORDER — SODIUM CHLORIDE 9 MG/ML
INJECTION, SOLUTION INTRAVENOUS
Status: COMPLETED
Start: 2018-12-20 | End: 2018-12-20

## 2018-12-20 RX ADMIN — ONDANSETRON 4 MG: 2 INJECTION INTRAMUSCULAR; INTRAVENOUS at 17:08

## 2018-12-20 RX ADMIN — SODIUM CHLORIDE 1000 ML: 9 INJECTION, SOLUTION INTRAVENOUS at 17:08

## 2018-12-20 RX ADMIN — ACETAMINOPHEN 1000 MG: 500 TABLET ORAL at 17:07

## 2018-12-20 RX ADMIN — Medication 1000 ML: at 17:08

## 2018-12-20 ASSESSMENT — ENCOUNTER SYMPTOMS
NAUSEA: 1
SORE THROAT: 0
ABDOMINAL PAIN: 0
COUGH: 0
VOMITING: 0
DIARRHEA: 0
BACK PAIN: 0
SHORTNESS OF BREATH: 0

## 2018-12-20 ASSESSMENT — PAIN DESCRIPTION - PROGRESSION: CLINICAL_PROGRESSION: NOT CHANGED

## 2018-12-20 ASSESSMENT — PAIN DESCRIPTION - LOCATION: LOCATION: ABDOMEN

## 2018-12-20 ASSESSMENT — PAIN DESCRIPTION - PAIN TYPE: TYPE: ACUTE PAIN

## 2018-12-20 ASSESSMENT — PAIN DESCRIPTION - ORIENTATION: ORIENTATION: MID;LOWER

## 2018-12-20 ASSESSMENT — PAIN SCALES - GENERAL
PAINLEVEL_OUTOF10: 3
PAINLEVEL_OUTOF10: 2

## 2018-12-20 ASSESSMENT — PAIN DESCRIPTION - ONSET: ONSET: ON-GOING

## 2018-12-20 ASSESSMENT — PAIN DESCRIPTION - DESCRIPTORS: DESCRIPTORS: ACHING;CRAMPING;DISCOMFORT

## 2018-12-20 ASSESSMENT — PAIN DESCRIPTION - FREQUENCY: FREQUENCY: CONTINUOUS

## 2019-01-01 ENCOUNTER — HOSPITAL ENCOUNTER (EMERGENCY)
Age: 24
Discharge: HOME | End: 2019-01-01
Attending: FAMILY MEDICINE
Payer: MEDICAID

## 2019-01-01 VITALS
OXYGEN SATURATION: 99 % | TEMPERATURE: 97.9 F | WEIGHT: 216 LBS | RESPIRATION RATE: 18 BRPM | DIASTOLIC BLOOD PRESSURE: 62 MMHG | HEIGHT: 60 IN | SYSTOLIC BLOOD PRESSURE: 116 MMHG | HEART RATE: 82 BPM | BODY MASS INDEX: 42.41 KG/M2

## 2019-01-01 DIAGNOSIS — O21.9 NAUSEA AND VOMITING IN PREGNANCY: Primary | ICD-10-CM

## 2019-01-01 LAB
ALBUMIN SERPL-MCNC: 4.1 G/DL (ref 3.9–4.9)
ALP BLD-CCNC: 91 U/L (ref 40–130)
ALT SERPL-CCNC: 28 U/L (ref 0–33)
ANION GAP SERPL CALCULATED.3IONS-SCNC: 12 MEQ/L (ref 7–13)
AST SERPL-CCNC: 21 U/L (ref 0–35)
BASOPHILS ABSOLUTE: 0.1 K/UL (ref 0–0.2)
BASOPHILS RELATIVE PERCENT: 0.6 %
BILIRUB SERPL-MCNC: 0.3 MG/DL (ref 0–1.2)
BUN BLDV-MCNC: 9 MG/DL (ref 6–20)
CALCIUM SERPL-MCNC: 9.2 MG/DL (ref 8.6–10.2)
CHLORIDE BLD-SCNC: 99 MEQ/L (ref 98–107)
CO2: 25 MEQ/L (ref 22–29)
CREAT SERPL-MCNC: 0.56 MG/DL (ref 0.5–0.9)
EOSINOPHILS ABSOLUTE: 0.1 K/UL (ref 0–0.7)
EOSINOPHILS RELATIVE PERCENT: 1 %
GFR AFRICAN AMERICAN: >60
GFR NON-AFRICAN AMERICAN: >60
GLOBULIN: 3.7 G/DL (ref 2.3–3.5)
GLUCOSE BLD-MCNC: 90 MG/DL (ref 74–109)
HCT VFR BLD CALC: 44.1 % (ref 37–47)
HEMOGLOBIN: 15 G/DL (ref 12–16)
LYMPHOCYTES ABSOLUTE: 1.9 K/UL (ref 1–4.8)
LYMPHOCYTES RELATIVE PERCENT: 17.3 %
MCH RBC QN AUTO: 30.6 PG (ref 27–31.3)
MCHC RBC AUTO-ENTMCNC: 33.9 % (ref 33–37)
MCV RBC AUTO: 90.3 FL (ref 82–100)
MONOCYTES ABSOLUTE: 0.4 K/UL (ref 0.2–0.8)
MONOCYTES RELATIVE PERCENT: 3.9 %
NEUTROPHILS ABSOLUTE: 8.5 K/UL (ref 1.4–6.5)
NEUTROPHILS RELATIVE PERCENT: 77.2 %
PDW BLD-RTO: 13.5 % (ref 11.5–14.5)
PLATELET # BLD: 328 K/UL (ref 130–400)
POTASSIUM SERPL-SCNC: 3.8 MEQ/L (ref 3.5–5.1)
RBC # BLD: 4.89 M/UL (ref 4.2–5.4)
SODIUM BLD-SCNC: 136 MEQ/L (ref 132–144)
TOTAL PROTEIN: 7.8 G/DL (ref 6.4–8.1)
WBC # BLD: 11 K/UL (ref 4.8–10.8)

## 2019-01-01 PROCEDURE — 80053 COMPREHEN METABOLIC PANEL: CPT

## 2019-01-01 PROCEDURE — 36415 COLL VENOUS BLD VENIPUNCTURE: CPT

## 2019-01-01 PROCEDURE — 6360000002 HC RX W HCPCS: Performed by: FAMILY MEDICINE

## 2019-01-01 PROCEDURE — 99283 EMERGENCY DEPT VISIT LOW MDM: CPT

## 2019-01-01 PROCEDURE — 96374 THER/PROPH/DIAG INJ IV PUSH: CPT

## 2019-01-01 PROCEDURE — 85025 COMPLETE CBC W/AUTO DIFF WBC: CPT

## 2019-01-01 PROCEDURE — 2580000003 HC RX 258: Performed by: FAMILY MEDICINE

## 2019-01-01 RX ORDER — 0.9 % SODIUM CHLORIDE 0.9 %
1000 INTRAVENOUS SOLUTION INTRAVENOUS ONCE
Status: COMPLETED | OUTPATIENT
Start: 2019-01-01 | End: 2019-01-01

## 2019-01-01 RX ORDER — ONDANSETRON 2 MG/ML
4 INJECTION INTRAMUSCULAR; INTRAVENOUS ONCE
Status: COMPLETED | OUTPATIENT
Start: 2019-01-01 | End: 2019-01-01

## 2019-01-01 RX ORDER — DOXYLAMINE SUCCINATE AND PYRIDOXINE HYDROCHLORIDE, DELAYED RELEASE TABLETS 10 MG/10 MG 10; 10 MG/1; MG/1
1 TABLET, DELAYED RELEASE ORAL 2 TIMES DAILY PRN
Qty: 40 TABLET | Refills: 0 | Status: SHIPPED | OUTPATIENT
Start: 2019-01-01 | End: 2019-02-13

## 2019-01-01 RX ADMIN — ONDANSETRON 4 MG: 2 INJECTION INTRAMUSCULAR; INTRAVENOUS at 18:05

## 2019-01-01 RX ADMIN — SODIUM CHLORIDE 1000 ML: 9 INJECTION, SOLUTION INTRAVENOUS at 18:05

## 2019-01-01 ASSESSMENT — ENCOUNTER SYMPTOMS
ALLERGIC/IMMUNOLOGIC NEGATIVE: 1
VOMITING: 1
ABDOMINAL PAIN: 0
EYES NEGATIVE: 1
SHORTNESS OF BREATH: 0

## 2019-01-02 ENCOUNTER — OFFICE VISIT (OUTPATIENT)
Dept: OBGYN CLINIC | Age: 24
End: 2019-01-02
Payer: MEDICAID

## 2019-01-02 VITALS — WEIGHT: 220 LBS | BODY MASS INDEX: 42.97 KG/M2 | SYSTOLIC BLOOD PRESSURE: 114 MMHG | DIASTOLIC BLOOD PRESSURE: 86 MMHG

## 2019-01-02 DIAGNOSIS — N91.2 AMENORRHEA: ICD-10-CM

## 2019-01-02 DIAGNOSIS — N91.2 AMENORRHEA: Primary | ICD-10-CM

## 2019-01-02 DIAGNOSIS — Z32.01 POSITIVE URINE PREGNANCY TEST: ICD-10-CM

## 2019-01-02 LAB — GONADOTROPIN, CHORIONIC (HCG) QUANT: NORMAL MIU/ML

## 2019-01-02 PROCEDURE — 1036F TOBACCO NON-USER: CPT | Performed by: OBSTETRICS & GYNECOLOGY

## 2019-01-02 PROCEDURE — G8482 FLU IMMUNIZE ORDER/ADMIN: HCPCS | Performed by: OBSTETRICS & GYNECOLOGY

## 2019-01-02 PROCEDURE — G8428 CUR MEDS NOT DOCUMENT: HCPCS | Performed by: OBSTETRICS & GYNECOLOGY

## 2019-01-02 PROCEDURE — G8417 CALC BMI ABV UP PARAM F/U: HCPCS | Performed by: OBSTETRICS & GYNECOLOGY

## 2019-01-02 PROCEDURE — 99213 OFFICE O/P EST LOW 20 MIN: CPT | Performed by: OBSTETRICS & GYNECOLOGY

## 2019-01-02 RX ORDER — ASCORBIC ACID, CHOLECALCIFEROL, .ALPHA.-TOCOPHEROL ACETATE, DL-, PYRIDOXINE HYDROCHLORIDE, FOLIC ACID, CYANOCOBALAMIN, BIOTIN, CALCIUM CARBONATE, FERROUS ASPARTO GLYCINATE, IRON, POTASSIUM IODIDE, MAGNESIUM OXIDE, DOCONEXENT AND LOWBUSH BLUEBERRY 60; 1000; 10; 26; 400; 13; 280; 80; 9; 9; 150; 25; 350; 25; 600 MG/1; [IU]/1; [IU]/1; MG/1; UG/1; UG/1; UG/1; MG/1; MG/1; MG/1; UG/1; MG/1; MG/1; MG/1; UG/1
1 CAPSULE, GELATIN COATED ORAL DAILY
Qty: 30 CAPSULE | Refills: 5 | Status: SHIPPED | OUTPATIENT
Start: 2019-01-02 | End: 2019-01-04

## 2019-01-04 ENCOUNTER — TELEPHONE (OUTPATIENT)
Dept: OBGYN CLINIC | Age: 24
End: 2019-01-04

## 2019-01-04 RX ORDER — VITAMINS AND MINERALS 1; 1000; 100; 400; 30; 3; 3; 15; 20; 12; 7; 200; 29; 20 MG/1; [IU]/1; MG/1; [IU]/1; [IU]/1; MG/1; MG/1; MG/1; MG/1; UG/1; MG/1; MG/1; MG/1; MG/1
1 TABLET, CHEWABLE ORAL DAILY
Qty: 30 TABLET | Refills: 5 | Status: SHIPPED | OUTPATIENT
Start: 2019-01-04 | End: 2019-01-11

## 2019-01-05 LAB — PROGESTERONE, LC/MS/MS: 8.41 NG/ML

## 2019-01-05 ASSESSMENT — ENCOUNTER SYMPTOMS
ABDOMINAL PAIN: 1
SHORTNESS OF BREATH: 0
CONSTIPATION: 0
APNEA: 0
NAUSEA: 0
VOMITING: 0

## 2019-01-06 ENCOUNTER — HOSPITAL ENCOUNTER (EMERGENCY)
Age: 24
Discharge: HOME | End: 2019-01-06
Attending: EMERGENCY MEDICINE
Payer: MEDICAID

## 2019-01-06 VITALS
SYSTOLIC BLOOD PRESSURE: 114 MMHG | RESPIRATION RATE: 16 BRPM | OXYGEN SATURATION: 98 % | WEIGHT: 220 LBS | TEMPERATURE: 97.9 F | DIASTOLIC BLOOD PRESSURE: 79 MMHG | HEIGHT: 65 IN | HEART RATE: 112 BPM | BODY MASS INDEX: 36.65 KG/M2

## 2019-01-06 DIAGNOSIS — Z3A.08 8 WEEKS GESTATION OF PREGNANCY: Primary | ICD-10-CM

## 2019-01-06 DIAGNOSIS — K59.00 CONSTIPATION, UNSPECIFIED CONSTIPATION TYPE: ICD-10-CM

## 2019-01-06 LAB
ANION GAP SERPL CALCULATED.3IONS-SCNC: 15 MEQ/L (ref 7–13)
BACTERIA: ABNORMAL /HPF
BASOPHILS ABSOLUTE: 0.1 K/UL (ref 0–0.2)
BASOPHILS RELATIVE PERCENT: 0.5 %
BILIRUBIN URINE: NEGATIVE
BLOOD, URINE: ABNORMAL
BUN BLDV-MCNC: 9 MG/DL (ref 6–20)
CALCIUM SERPL-MCNC: 9.1 MG/DL (ref 8.6–10.2)
CHLORIDE BLD-SCNC: 97 MEQ/L (ref 98–107)
CHP ED QC CHECK: YES
CLARITY: ABNORMAL
CO2: 23 MEQ/L (ref 22–29)
COLOR: YELLOW
CREAT SERPL-MCNC: 0.53 MG/DL (ref 0.5–0.9)
EOSINOPHILS ABSOLUTE: 0.1 K/UL (ref 0–0.7)
EOSINOPHILS RELATIVE PERCENT: 0.6 %
EPITHELIAL CELLS, UA: ABNORMAL /HPF
GFR AFRICAN AMERICAN: >60
GFR NON-AFRICAN AMERICAN: >60
GLUCOSE BLD-MCNC: 83 MG/DL (ref 74–109)
GLUCOSE URINE: NEGATIVE MG/DL
GONADOTROPIN, CHORIONIC (HCG) QUANT: NORMAL MIU/ML
HCT VFR BLD CALC: 39.7 % (ref 37–47)
HEMOGLOBIN: 13.1 G/DL (ref 12–16)
KETONES, URINE: 15 MG/DL
LEUKOCYTE ESTERASE, URINE: NEGATIVE
LYMPHOCYTES ABSOLUTE: 1.8 K/UL (ref 1–4.8)
LYMPHOCYTES RELATIVE PERCENT: 13.5 %
MCH RBC QN AUTO: 29.9 PG (ref 27–31.3)
MCHC RBC AUTO-ENTMCNC: 33 % (ref 33–37)
MCV RBC AUTO: 90.4 FL (ref 82–100)
MONOCYTES ABSOLUTE: 0.7 K/UL (ref 0.2–0.8)
MONOCYTES RELATIVE PERCENT: 4.9 %
NEUTROPHILS ABSOLUTE: 10.8 K/UL (ref 1.4–6.5)
NEUTROPHILS RELATIVE PERCENT: 80.5 %
NITRITE, URINE: NEGATIVE
PDW BLD-RTO: 13.3 % (ref 11.5–14.5)
PH UA: 5 (ref 5–9)
PLATELET # BLD: 330 K/UL (ref 130–400)
POTASSIUM SERPL-SCNC: 3.7 MEQ/L (ref 3.5–5.1)
PREGNANCY TEST URINE, POC: POSITIVE
PROTEIN UA: NEGATIVE MG/DL
RBC # BLD: 4.39 M/UL (ref 4.2–5.4)
RBC UA: ABNORMAL /HPF (ref 0–2)
REASON FOR REJECTION: NORMAL
REJECTED TEST: NORMAL
SODIUM BLD-SCNC: 135 MEQ/L (ref 132–144)
SPECIFIC GRAVITY UA: 1.02 (ref 1–1.03)
URINE REFLEX TO CULTURE: YES
UROBILINOGEN, URINE: 0.2 E.U./DL
WBC # BLD: 13.4 K/UL (ref 4.8–10.8)
WBC UA: ABNORMAL /HPF (ref 0–5)

## 2019-01-06 PROCEDURE — 6360000002 HC RX W HCPCS: Performed by: PERSONAL EMERGENCY RESPONSE ATTENDANT

## 2019-01-06 PROCEDURE — 36415 COLL VENOUS BLD VENIPUNCTURE: CPT

## 2019-01-06 PROCEDURE — 99284 EMERGENCY DEPT VISIT MOD MDM: CPT

## 2019-01-06 PROCEDURE — 81001 URINALYSIS AUTO W/SCOPE: CPT

## 2019-01-06 PROCEDURE — 87086 URINE CULTURE/COLONY COUNT: CPT

## 2019-01-06 PROCEDURE — 96374 THER/PROPH/DIAG INJ IV PUSH: CPT

## 2019-01-06 PROCEDURE — 96375 TX/PRO/DX INJ NEW DRUG ADDON: CPT

## 2019-01-06 PROCEDURE — 80048 BASIC METABOLIC PNL TOTAL CA: CPT

## 2019-01-06 PROCEDURE — 2580000003 HC RX 258: Performed by: PERSONAL EMERGENCY RESPONSE ATTENDANT

## 2019-01-06 PROCEDURE — 84702 CHORIONIC GONADOTROPIN TEST: CPT

## 2019-01-06 PROCEDURE — 85025 COMPLETE CBC W/AUTO DIFF WBC: CPT

## 2019-01-06 RX ORDER — METOCLOPRAMIDE 10 MG/1
10 TABLET ORAL 4 TIMES DAILY
Qty: 20 TABLET | Refills: 0 | Status: SHIPPED | OUTPATIENT
Start: 2019-01-06 | End: 2019-01-15 | Stop reason: SDUPTHER

## 2019-01-06 RX ORDER — 0.9 % SODIUM CHLORIDE 0.9 %
1000 INTRAVENOUS SOLUTION INTRAVENOUS ONCE
Status: COMPLETED | OUTPATIENT
Start: 2019-01-06 | End: 2019-01-06

## 2019-01-06 RX ORDER — LORAZEPAM 2 MG/ML
1 INJECTION INTRAMUSCULAR ONCE
Status: COMPLETED | OUTPATIENT
Start: 2019-01-06 | End: 2019-01-06

## 2019-01-06 RX ORDER — METOCLOPRAMIDE HYDROCHLORIDE 5 MG/ML
10 INJECTION INTRAMUSCULAR; INTRAVENOUS ONCE
Status: COMPLETED | OUTPATIENT
Start: 2019-01-06 | End: 2019-01-06

## 2019-01-06 RX ADMIN — SODIUM CHLORIDE 1000 ML: 9 INJECTION, SOLUTION INTRAVENOUS at 19:19

## 2019-01-06 RX ADMIN — METOCLOPRAMIDE 10 MG: 5 INJECTION, SOLUTION INTRAMUSCULAR; INTRAVENOUS at 19:18

## 2019-01-06 RX ADMIN — LORAZEPAM 1 MG: 2 INJECTION INTRAMUSCULAR; INTRAVENOUS at 19:49

## 2019-01-06 ASSESSMENT — PAIN SCALES - GENERAL: PAINLEVEL_OUTOF10: 7

## 2019-01-06 ASSESSMENT — PAIN DESCRIPTION - DESCRIPTORS: DESCRIPTORS: CRAMPING

## 2019-01-06 ASSESSMENT — ENCOUNTER SYMPTOMS
COLOR CHANGE: 0
SORE THROAT: 0
ABDOMINAL PAIN: 1
COUGH: 0
BLOOD IN STOOL: 0
RHINORRHEA: 0
VOMITING: 1
DIARRHEA: 0
SHORTNESS OF BREATH: 0
CONSTIPATION: 1
NAUSEA: 1

## 2019-01-06 ASSESSMENT — PAIN DESCRIPTION - LOCATION: LOCATION: ABDOMEN

## 2019-01-08 LAB — URINE CULTURE, ROUTINE: NORMAL

## 2019-01-09 ENCOUNTER — HOSPITAL ENCOUNTER (OUTPATIENT)
Dept: ULTRASOUND IMAGING | Age: 24
Discharge: HOME | End: 2019-01-11
Payer: MEDICAID

## 2019-01-09 DIAGNOSIS — N91.2 AMENORRHEA: ICD-10-CM

## 2019-01-09 DIAGNOSIS — Z32.01 POSITIVE URINE PREGNANCY TEST: ICD-10-CM

## 2019-01-09 PROCEDURE — 76801 OB US < 14 WKS SINGLE FETUS: CPT

## 2019-01-11 ENCOUNTER — INITIAL PRENATAL (OUTPATIENT)
Dept: OBGYN CLINIC | Age: 24
End: 2019-01-11
Payer: MEDICAID

## 2019-01-11 VITALS
SYSTOLIC BLOOD PRESSURE: 124 MMHG | BODY MASS INDEX: 36.28 KG/M2 | HEART RATE: 95 BPM | WEIGHT: 218 LBS | DIASTOLIC BLOOD PRESSURE: 86 MMHG

## 2019-01-11 DIAGNOSIS — E07.9 THYROID DISEASE IN PREGNANCY IN FIRST TRIMESTER: ICD-10-CM

## 2019-01-11 DIAGNOSIS — Z34.81 ENCOUNTER FOR SUPERVISION OF OTHER NORMAL PREGNANCY IN FIRST TRIMESTER: ICD-10-CM

## 2019-01-11 DIAGNOSIS — O99.281 THYROID DISEASE IN PREGNANCY IN FIRST TRIMESTER: ICD-10-CM

## 2019-01-11 DIAGNOSIS — Z3A.01 7 WEEKS GESTATION OF PREGNANCY: Primary | ICD-10-CM

## 2019-01-11 DIAGNOSIS — Z3A.01 7 WEEKS GESTATION OF PREGNANCY: ICD-10-CM

## 2019-01-11 LAB
ABO/RH: NORMAL
ANTIBODY SCREEN: NORMAL
BASOPHILS ABSOLUTE: 0 K/UL (ref 0–0.2)
BASOPHILS RELATIVE PERCENT: 0.4 %
EOSINOPHILS ABSOLUTE: 0.1 K/UL (ref 0–0.7)
EOSINOPHILS RELATIVE PERCENT: 0.7 %
HCT VFR BLD CALC: 40.6 % (ref 37–47)
HEMOGLOBIN: 13.9 G/DL (ref 12–16)
HEPATITIS B SURFACE ANTIGEN INTERPRETATION: NORMAL
LYMPHOCYTES ABSOLUTE: 1.9 K/UL (ref 1–4.8)
LYMPHOCYTES RELATIVE PERCENT: 18.8 %
MCH RBC QN AUTO: 31 PG (ref 27–31.3)
MCHC RBC AUTO-ENTMCNC: 34.2 % (ref 33–37)
MCV RBC AUTO: 90.6 FL (ref 82–100)
MONOCYTES ABSOLUTE: 0.6 K/UL (ref 0.2–0.8)
MONOCYTES RELATIVE PERCENT: 6.3 %
NEUTROPHILS ABSOLUTE: 7.5 K/UL (ref 1.4–6.5)
NEUTROPHILS RELATIVE PERCENT: 73.8 %
PDW BLD-RTO: 13.6 % (ref 11.5–14.5)
PLATELET # BLD: 366 K/UL (ref 130–400)
RBC # BLD: 4.48 M/UL (ref 4.2–5.4)
RUBELLA ANTIBODY IGG: 131.8 IU/ML
WBC # BLD: 10.1 K/UL (ref 4.8–10.8)

## 2019-01-11 PROCEDURE — G8427 DOCREV CUR MEDS BY ELIG CLIN: HCPCS | Performed by: OBSTETRICS & GYNECOLOGY

## 2019-01-11 PROCEDURE — 1036F TOBACCO NON-USER: CPT | Performed by: OBSTETRICS & GYNECOLOGY

## 2019-01-11 PROCEDURE — G8417 CALC BMI ABV UP PARAM F/U: HCPCS | Performed by: OBSTETRICS & GYNECOLOGY

## 2019-01-11 PROCEDURE — 99213 OFFICE O/P EST LOW 20 MIN: CPT | Performed by: OBSTETRICS & GYNECOLOGY

## 2019-01-11 PROCEDURE — G8482 FLU IMMUNIZE ORDER/ADMIN: HCPCS | Performed by: OBSTETRICS & GYNECOLOGY

## 2019-01-11 RX ORDER — PNV NO.63/IRON,CARB/FOLIC/DHA 27-800-200
1 CAPSULE ORAL DAILY
Qty: 30 CAPSULE | Refills: 5 | Status: SHIPPED | OUTPATIENT
Start: 2019-01-11 | End: 2020-02-12

## 2019-01-13 LAB — RPR: NORMAL

## 2019-01-14 LAB
HEMOGLOBIN A-1 QUANTITATION: 96.8 % (ref 95–97.9)
HEMOGLOBIN A2 QUANTITATION: 2.8 % (ref 2–3.5)
HEMOGLOBIN C QUANTITATION: 0 % (ref 0–0)
HEMOGLOBIN E QUANTITATION: 0 % (ref 0–0)
HEMOGLOBIN ELECTROPHORESIS: NORMAL
HEMOGLOBIN EVALUATION: NORMAL
HEMOGLOBIN F QUANTITATION: 0.4 % (ref 0–2.1)
HEMOGLOBIN OTHER: 0 % (ref 0–0)
HEMOGLOBIN S QUANTITATION: 0 % (ref 0–0)
HERPES TYPE 1/2 IGM COMBINED: 0.24 IV
HERPES TYPE I/II IGG COMBINED: 0.34 IV
SICKLE CELL: NORMAL

## 2019-01-14 RX ORDER — METOCLOPRAMIDE 10 MG/1
10 TABLET ORAL 4 TIMES DAILY
Qty: 20 TABLET | Refills: 0 | OUTPATIENT
Start: 2019-01-14

## 2019-01-15 RX ORDER — METOCLOPRAMIDE 10 MG/1
10 TABLET ORAL 4 TIMES DAILY
Qty: 20 TABLET | Refills: 0 | Status: SHIPPED | OUTPATIENT
Start: 2019-01-15 | End: 2019-01-17 | Stop reason: SDUPTHER

## 2019-01-16 LAB — VZV IGG SER QL IA: 0.54

## 2019-01-17 RX ORDER — METOCLOPRAMIDE 10 MG/1
10 TABLET ORAL 4 TIMES DAILY
Qty: 20 TABLET | Refills: 0 | Status: SHIPPED | OUTPATIENT
Start: 2019-01-17 | End: 2019-01-22 | Stop reason: SDUPTHER

## 2019-01-22 ENCOUNTER — TELEPHONE (OUTPATIENT)
Dept: OBGYN CLINIC | Age: 24
End: 2019-01-22

## 2019-01-22 RX ORDER — METOCLOPRAMIDE 10 MG/1
10 TABLET ORAL 4 TIMES DAILY
Qty: 20 TABLET | Refills: 2 | Status: SHIPPED | OUTPATIENT
Start: 2019-01-22 | End: 2019-01-24 | Stop reason: SDUPTHER

## 2019-01-22 RX ORDER — METRONIDAZOLE 500 MG/1
500 TABLET ORAL 2 TIMES DAILY
Qty: 14 TABLET | Refills: 0 | Status: SHIPPED | OUTPATIENT
Start: 2019-01-22 | End: 2019-01-29

## 2019-01-24 RX ORDER — METOCLOPRAMIDE 10 MG/1
TABLET ORAL
Qty: 20 TABLET | Refills: 0 | Status: SHIPPED | OUTPATIENT
Start: 2019-01-24 | End: 2019-01-30 | Stop reason: SDUPTHER

## 2019-01-25 ENCOUNTER — HOSPITAL ENCOUNTER (EMERGENCY)
Age: 24
Discharge: HOME | End: 2019-01-25
Payer: MEDICAID

## 2019-01-25 VITALS
HEIGHT: 65 IN | HEART RATE: 93 BPM | OXYGEN SATURATION: 98 % | BODY MASS INDEX: 35.99 KG/M2 | SYSTOLIC BLOOD PRESSURE: 114 MMHG | DIASTOLIC BLOOD PRESSURE: 74 MMHG | WEIGHT: 216 LBS | RESPIRATION RATE: 18 BRPM | TEMPERATURE: 97.3 F

## 2019-01-25 DIAGNOSIS — R11.0 NAUSEA: Primary | ICD-10-CM

## 2019-01-25 LAB
CARRIER SCREEN, 4 CONDITIONS: NORMAL
EER CARRIER SCREEN, 4 CONDITIONS: NORMAL

## 2019-01-25 PROCEDURE — 6370000000 HC RX 637 (ALT 250 FOR IP): Performed by: NURSE PRACTITIONER

## 2019-01-25 PROCEDURE — 99283 EMERGENCY DEPT VISIT LOW MDM: CPT

## 2019-01-25 RX ORDER — METOCLOPRAMIDE 10 MG/1
10 TABLET ORAL ONCE
Status: COMPLETED | OUTPATIENT
Start: 2019-01-25 | End: 2019-01-25

## 2019-01-25 RX ADMIN — METOCLOPRAMIDE HYDROCHLORIDE 10 MG: 10 TABLET ORAL at 11:13

## 2019-01-25 ASSESSMENT — ENCOUNTER SYMPTOMS
VOMITING: 0
SHORTNESS OF BREATH: 0
BACK PAIN: 0
NAUSEA: 0
COUGH: 0
SORE THROAT: 0
DIARRHEA: 1
ABDOMINAL PAIN: 0

## 2019-01-29 ENCOUNTER — HOSPITAL ENCOUNTER (EMERGENCY)
Age: 24
Discharge: HOME | End: 2019-01-29
Attending: STUDENT IN AN ORGANIZED HEALTH CARE EDUCATION/TRAINING PROGRAM
Payer: MEDICAID

## 2019-01-29 ENCOUNTER — APPOINTMENT (OUTPATIENT)
Dept: GENERAL RADIOLOGY | Age: 24
End: 2019-01-29
Payer: MEDICAID

## 2019-01-29 VITALS
TEMPERATURE: 98.1 F | BODY MASS INDEX: 35.99 KG/M2 | HEIGHT: 65 IN | OXYGEN SATURATION: 99 % | WEIGHT: 216 LBS | HEART RATE: 94 BPM | SYSTOLIC BLOOD PRESSURE: 110 MMHG | RESPIRATION RATE: 18 BRPM | DIASTOLIC BLOOD PRESSURE: 93 MMHG

## 2019-01-29 DIAGNOSIS — J02.9 ACUTE PHARYNGITIS, UNSPECIFIED ETIOLOGY: ICD-10-CM

## 2019-01-29 DIAGNOSIS — O26.90 COMPLICATION OF PREGNANCY, ANTEPARTUM: ICD-10-CM

## 2019-01-29 DIAGNOSIS — J06.9 UPPER RESPIRATORY TRACT INFECTION, UNSPECIFIED TYPE: Primary | ICD-10-CM

## 2019-01-29 LAB
BACTERIA: ABNORMAL /HPF
BILIRUBIN URINE: ABNORMAL
BLOOD, URINE: NEGATIVE
CLARITY: CLEAR
COLOR: ABNORMAL
EPITHELIAL CELLS, UA: ABNORMAL /HPF (ref 0–5)
GLUCOSE URINE: NEGATIVE MG/DL
HCG, URINE, POC: POSITIVE
HYALINE CASTS: ABNORMAL /HPF (ref 0–5)
KETONES, URINE: 15 MG/DL
LEUKOCYTE ESTERASE, URINE: ABNORMAL
Lab: NORMAL
NEGATIVE QC PASS/FAIL: NORMAL
NITRITE, URINE: NEGATIVE
PH UA: 5.5 (ref 5–9)
POSITIVE QC PASS/FAIL: NORMAL
PROTEIN UA: ABNORMAL MG/DL
RAPID INFLUENZA  B AGN: NEGATIVE
RAPID INFLUENZA A AGN: NEGATIVE
RBC UA: ABNORMAL /HPF (ref 0–5)
S PYO AG THROAT QL: NEGATIVE
SPECIFIC GRAVITY UA: 1.03 (ref 1–1.03)
URINE REFLEX TO CULTURE: YES
UROBILINOGEN, URINE: 1 E.U./DL
WBC UA: ABNORMAL /HPF (ref 0–5)

## 2019-01-29 PROCEDURE — 81001 URINALYSIS AUTO W/SCOPE: CPT

## 2019-01-29 PROCEDURE — 71046 X-RAY EXAM CHEST 2 VIEWS: CPT

## 2019-01-29 PROCEDURE — 87804 INFLUENZA ASSAY W/OPTIC: CPT

## 2019-01-29 PROCEDURE — 99283 EMERGENCY DEPT VISIT LOW MDM: CPT

## 2019-01-29 PROCEDURE — 87077 CULTURE AEROBIC IDENTIFY: CPT

## 2019-01-29 PROCEDURE — 87186 SC STD MICRODIL/AGAR DIL: CPT

## 2019-01-29 PROCEDURE — 87880 STREP A ASSAY W/OPTIC: CPT

## 2019-01-29 PROCEDURE — 87086 URINE CULTURE/COLONY COUNT: CPT

## 2019-01-29 PROCEDURE — 6370000000 HC RX 637 (ALT 250 FOR IP): Performed by: STUDENT IN AN ORGANIZED HEALTH CARE EDUCATION/TRAINING PROGRAM

## 2019-01-29 PROCEDURE — 87081 CULTURE SCREEN ONLY: CPT

## 2019-01-29 RX ORDER — ACETAMINOPHEN 500 MG
1000 TABLET ORAL ONCE
Status: COMPLETED | OUTPATIENT
Start: 2019-01-29 | End: 2019-01-29

## 2019-01-29 RX ORDER — ACETAMINOPHEN 500 MG
500 TABLET ORAL EVERY 6 HOURS PRN
COMMUNITY
End: 2019-01-29

## 2019-01-29 RX ORDER — ACETAMINOPHEN 500 MG
1000 TABLET ORAL EVERY 6 HOURS PRN
Qty: 30 TABLET | Refills: 0 | Status: SHIPPED | OUTPATIENT
Start: 2019-01-29 | End: 2019-02-13

## 2019-01-29 RX ADMIN — ACETAMINOPHEN 1000 MG: 500 TABLET ORAL at 08:53

## 2019-01-29 ASSESSMENT — ENCOUNTER SYMPTOMS
PHOTOPHOBIA: 0
BACK PAIN: 0
SHORTNESS OF BREATH: 0
SINUS PRESSURE: 0
COUGH: 1
CHEST TIGHTNESS: 0
ABDOMINAL PAIN: 0
TROUBLE SWALLOWING: 0
DIARRHEA: 0
VOMITING: 1

## 2019-01-29 ASSESSMENT — PAIN SCALES - GENERAL
PAINLEVEL_OUTOF10: 6
PAINLEVEL_OUTOF10: 8
PAINLEVEL_OUTOF10: 8

## 2019-01-29 ASSESSMENT — PAIN DESCRIPTION - PAIN TYPE: TYPE: ACUTE PAIN

## 2019-01-29 ASSESSMENT — PAIN DESCRIPTION - DESCRIPTORS: DESCRIPTORS: BURNING

## 2019-01-29 ASSESSMENT — PAIN DESCRIPTION - LOCATION
LOCATION: HEAD
LOCATION: THROAT

## 2019-01-30 RX ORDER — METOCLOPRAMIDE 10 MG/1
TABLET ORAL
Qty: 20 TABLET | Refills: 3 | Status: SHIPPED | OUTPATIENT
Start: 2019-01-30 | End: 2019-02-13

## 2019-01-31 LAB — S PYO THROAT QL CULT: NORMAL

## 2019-02-01 LAB
ORGANISM: ABNORMAL
URINE CULTURE, ROUTINE: ABNORMAL
URINE CULTURE, ROUTINE: ABNORMAL

## 2019-02-05 ENCOUNTER — ROUTINE PRENATAL (OUTPATIENT)
Dept: OBGYN CLINIC | Age: 24
End: 2019-02-05
Payer: MEDICAID

## 2019-02-05 VITALS
WEIGHT: 211 LBS | HEART RATE: 101 BPM | BODY MASS INDEX: 35.11 KG/M2 | DIASTOLIC BLOOD PRESSURE: 82 MMHG | SYSTOLIC BLOOD PRESSURE: 116 MMHG

## 2019-02-05 DIAGNOSIS — Z3A.11 11 WEEKS GESTATION OF PREGNANCY: Primary | ICD-10-CM

## 2019-02-05 DIAGNOSIS — Z34.81 ENCOUNTER FOR SUPERVISION OF OTHER NORMAL PREGNANCY IN FIRST TRIMESTER: ICD-10-CM

## 2019-02-05 DIAGNOSIS — Z3A.11 11 WEEKS GESTATION OF PREGNANCY: ICD-10-CM

## 2019-02-05 PROCEDURE — 1036F TOBACCO NON-USER: CPT | Performed by: OBSTETRICS & GYNECOLOGY

## 2019-02-05 PROCEDURE — G8482 FLU IMMUNIZE ORDER/ADMIN: HCPCS | Performed by: OBSTETRICS & GYNECOLOGY

## 2019-02-05 PROCEDURE — G8427 DOCREV CUR MEDS BY ELIG CLIN: HCPCS | Performed by: OBSTETRICS & GYNECOLOGY

## 2019-02-05 PROCEDURE — G8417 CALC BMI ABV UP PARAM F/U: HCPCS | Performed by: OBSTETRICS & GYNECOLOGY

## 2019-02-05 PROCEDURE — 99213 OFFICE O/P EST LOW 20 MIN: CPT | Performed by: OBSTETRICS & GYNECOLOGY

## 2019-02-05 RX ORDER — PROMETHAZINE HYDROCHLORIDE 12.5 MG/1
12.5 TABLET ORAL EVERY 8 HOURS PRN
Qty: 60 TABLET | Refills: 1 | Status: SHIPPED | OUTPATIENT
Start: 2019-02-05 | End: 2019-02-13

## 2019-02-12 LAB
EER NON INVASIVE PRENATAL ANEUPLOIDY: NORMAL
FETAL FRACTION: 3.2
FETAL GENDER: NORMAL
GESTATIONAL AGE (DAYS): 3
GESTATIONAL AGE(WEEKS): 11
Lab: NORMAL
MATERNAL WEIGHT: 211
MONOSOMY X: NORMAL
REPORT FETUS GENDER: YES
TRIPLOIDY (VANISHING TWIN): NORMAL
TRISOMY 13 RISK: NORMAL
TRISOMY 18 RISK ASSESSMENT: NORMAL
TRISOMY 21 RISK: NORMAL

## 2019-02-13 ENCOUNTER — HOSPITAL ENCOUNTER (EMERGENCY)
Age: 24
Discharge: HOME | End: 2019-02-13
Payer: COMMERCIAL

## 2019-02-13 VITALS
SYSTOLIC BLOOD PRESSURE: 129 MMHG | DIASTOLIC BLOOD PRESSURE: 80 MMHG | BODY MASS INDEX: 35.16 KG/M2 | HEART RATE: 87 BPM | TEMPERATURE: 98 F | HEIGHT: 65 IN | OXYGEN SATURATION: 98 % | RESPIRATION RATE: 18 BRPM | WEIGHT: 211 LBS

## 2019-02-13 DIAGNOSIS — O21.0 MILD HYPEREMESIS GRAVIDARUM, ANTEPARTUM: ICD-10-CM

## 2019-02-13 DIAGNOSIS — R10.13 EPIGASTRIC PAIN: Primary | ICD-10-CM

## 2019-02-13 LAB
ALBUMIN SERPL-MCNC: 4 G/DL (ref 3.5–4.6)
ALP BLD-CCNC: 101 U/L (ref 40–130)
ALT SERPL-CCNC: 80 U/L (ref 0–33)
AMORPHOUS: NORMAL
AMYLASE: 72 U/L (ref 22–93)
ANION GAP SERPL CALCULATED.3IONS-SCNC: 17 MEQ/L (ref 9–15)
ANISOCYTOSIS: ABNORMAL
AST SERPL-CCNC: 41 U/L (ref 0–35)
ATYPICAL LYMPHOCYTE RELATIVE PERCENT: 2 %
BACTERIA: NORMAL /HPF
BASOPHILS ABSOLUTE: 0 K/UL (ref 0–0.2)
BASOPHILS RELATIVE PERCENT: 0.7 %
BILIRUB SERPL-MCNC: 0.6 MG/DL (ref 0.2–0.7)
BILIRUBIN URINE: ABNORMAL
BLOOD, URINE: NEGATIVE
BUN BLDV-MCNC: 5 MG/DL (ref 6–20)
CALCIUM SERPL-MCNC: 9.8 MG/DL (ref 8.5–9.9)
CHLORIDE BLD-SCNC: 100 MEQ/L (ref 95–107)
CLARITY: CLEAR
CO2: 21 MEQ/L (ref 20–31)
COLOR: ABNORMAL
CREAT SERPL-MCNC: 0.46 MG/DL (ref 0.5–0.9)
EOSINOPHILS ABSOLUTE: 0.4 K/UL (ref 0–0.7)
EOSINOPHILS RELATIVE PERCENT: 3 %
EPITHELIAL CELLS, UA: NORMAL /HPF
GFR AFRICAN AMERICAN: >60
GFR NON-AFRICAN AMERICAN: >60
GLOBULIN: 4.3 G/DL (ref 2.3–3.5)
GLUCOSE BLD-MCNC: 77 MG/DL (ref 70–99)
GLUCOSE URINE: NEGATIVE MG/DL
GONADOTROPIN, CHORIONIC (HCG) QUANT: NORMAL MIU/ML
HCT VFR BLD CALC: 46.1 % (ref 37–47)
HEMOGLOBIN: 15.4 G/DL (ref 12–16)
KETONES, URINE: 40 MG/DL
LEUKOCYTE ESTERASE, URINE: NEGATIVE
LIPASE: 18 U/L (ref 12–95)
LYMPHOCYTES ABSOLUTE: 2.2 K/UL (ref 1–4.8)
LYMPHOCYTES RELATIVE PERCENT: 15 %
MCH RBC QN AUTO: 30.1 PG (ref 27–31.3)
MCHC RBC AUTO-ENTMCNC: 33.3 % (ref 33–37)
MCV RBC AUTO: 90.4 FL (ref 82–100)
MICROCYTES: ABNORMAL
MONOCYTES ABSOLUTE: 0.3 K/UL (ref 0.2–0.8)
MONOCYTES RELATIVE PERCENT: 1.8 %
NEUTROPHILS ABSOLUTE: 10.1 K/UL (ref 1.4–6.5)
NEUTROPHILS RELATIVE PERCENT: 79 %
NITRITE, URINE: POSITIVE
PDW BLD-RTO: 13.8 % (ref 11.5–14.5)
PH UA: 5 (ref 5–9)
PLATELET # BLD: 304 K/UL (ref 130–400)
PLATELET SLIDE REVIEW: ABNORMAL
POTASSIUM SERPL-SCNC: 3.9 MEQ/L (ref 3.4–4.9)
PROTEIN UA: 100 MG/DL
RBC # BLD: 5.1 M/UL (ref 4.2–5.4)
RBC UA: NORMAL /HPF (ref 0–2)
SLIDE REVIEW: ABNORMAL
SMUDGE CELLS: 0.9
SODIUM BLD-SCNC: 138 MEQ/L (ref 135–144)
SPECIFIC GRAVITY UA: >=1.03 (ref 1–1.03)
TOTAL PROTEIN: 8.3 G/DL (ref 6.3–8)
URINE REFLEX TO CULTURE: YES
UROBILINOGEN, URINE: 2 E.U./DL
WBC # BLD: 12.8 K/UL (ref 4.8–10.8)
WBC UA: NORMAL /HPF (ref 0–5)

## 2019-02-13 PROCEDURE — 83690 ASSAY OF LIPASE: CPT

## 2019-02-13 PROCEDURE — 84702 CHORIONIC GONADOTROPIN TEST: CPT

## 2019-02-13 PROCEDURE — 99284 EMERGENCY DEPT VISIT MOD MDM: CPT

## 2019-02-13 PROCEDURE — 81001 URINALYSIS AUTO W/SCOPE: CPT

## 2019-02-13 PROCEDURE — 80053 COMPREHEN METABOLIC PANEL: CPT

## 2019-02-13 PROCEDURE — 96374 THER/PROPH/DIAG INJ IV PUSH: CPT

## 2019-02-13 PROCEDURE — 82150 ASSAY OF AMYLASE: CPT

## 2019-02-13 PROCEDURE — 85025 COMPLETE CBC W/AUTO DIFF WBC: CPT

## 2019-02-13 PROCEDURE — 87086 URINE CULTURE/COLONY COUNT: CPT

## 2019-02-13 PROCEDURE — 2580000003 HC RX 258: Performed by: NURSE PRACTITIONER

## 2019-02-13 PROCEDURE — 6360000002 HC RX W HCPCS: Performed by: NURSE PRACTITIONER

## 2019-02-13 PROCEDURE — 36415 COLL VENOUS BLD VENIPUNCTURE: CPT

## 2019-02-13 RX ORDER — 0.9 % SODIUM CHLORIDE 0.9 %
1000 INTRAVENOUS SOLUTION INTRAVENOUS ONCE
Status: COMPLETED | OUTPATIENT
Start: 2019-02-13 | End: 2019-02-13

## 2019-02-13 RX ORDER — ONDANSETRON 2 MG/ML
4 INJECTION INTRAMUSCULAR; INTRAVENOUS ONCE
Status: COMPLETED | OUTPATIENT
Start: 2019-02-13 | End: 2019-02-13

## 2019-02-13 RX ORDER — ONDANSETRON 4 MG/1
4 TABLET, ORALLY DISINTEGRATING ORAL EVERY 8 HOURS PRN
Qty: 15 TABLET | Refills: 0 | Status: SHIPPED | OUTPATIENT
Start: 2019-02-13 | End: 2019-02-18

## 2019-02-13 RX ADMIN — SODIUM CHLORIDE 1000 ML: 9 INJECTION, SOLUTION INTRAVENOUS at 19:15

## 2019-02-13 RX ADMIN — ONDANSETRON 4 MG: 2 INJECTION INTRAMUSCULAR; INTRAVENOUS at 19:15

## 2019-02-13 ASSESSMENT — ENCOUNTER SYMPTOMS
CHEST TIGHTNESS: 0
DIARRHEA: 0
ABDOMINAL PAIN: 1
SORE THROAT: 0
ABDOMINAL DISTENTION: 0
TROUBLE SWALLOWING: 0
RHINORRHEA: 0
VOMITING: 1
COUGH: 0
WHEEZING: 0
CONSTIPATION: 0
SHORTNESS OF BREATH: 0
NAUSEA: 1
BACK PAIN: 0
COLOR CHANGE: 0
BLOOD IN STOOL: 0

## 2019-02-13 ASSESSMENT — PAIN SCALES - GENERAL: PAINLEVEL_OUTOF10: 8

## 2019-02-13 ASSESSMENT — PAIN DESCRIPTION - FREQUENCY: FREQUENCY: CONTINUOUS

## 2019-02-13 ASSESSMENT — PAIN DESCRIPTION - PAIN TYPE: TYPE: ACUTE PAIN

## 2019-02-13 ASSESSMENT — PAIN DESCRIPTION - DESCRIPTORS: DESCRIPTORS: ACHING;BURNING

## 2019-02-13 ASSESSMENT — PAIN DESCRIPTION - LOCATION: LOCATION: ABDOMEN

## 2019-02-15 LAB — URINE CULTURE, ROUTINE: NORMAL

## 2019-02-19 ENCOUNTER — OFFICE VISIT (OUTPATIENT)
Dept: FAMILY MEDICINE CLINIC | Age: 24
End: 2019-02-19
Payer: COMMERCIAL

## 2019-02-19 VITALS
TEMPERATURE: 97.9 F | HEIGHT: 65 IN | DIASTOLIC BLOOD PRESSURE: 68 MMHG | SYSTOLIC BLOOD PRESSURE: 106 MMHG | WEIGHT: 210 LBS | HEART RATE: 76 BPM | BODY MASS INDEX: 34.99 KG/M2 | RESPIRATION RATE: 16 BRPM | OXYGEN SATURATION: 98 %

## 2019-02-19 DIAGNOSIS — M25.50 PAIN IN JOINT INVOLVING MULTIPLE SITES: Primary | Chronic | ICD-10-CM

## 2019-02-19 PROCEDURE — G8417 CALC BMI ABV UP PARAM F/U: HCPCS | Performed by: FAMILY MEDICINE

## 2019-02-19 PROCEDURE — 1036F TOBACCO NON-USER: CPT | Performed by: FAMILY MEDICINE

## 2019-02-19 PROCEDURE — 99214 OFFICE O/P EST MOD 30 MIN: CPT | Performed by: FAMILY MEDICINE

## 2019-02-19 PROCEDURE — G8482 FLU IMMUNIZE ORDER/ADMIN: HCPCS | Performed by: FAMILY MEDICINE

## 2019-02-19 PROCEDURE — G8427 DOCREV CUR MEDS BY ELIG CLIN: HCPCS | Performed by: FAMILY MEDICINE

## 2019-02-19 ASSESSMENT — PATIENT HEALTH QUESTIONNAIRE - PHQ9
SUM OF ALL RESPONSES TO PHQ QUESTIONS 1-9: 0
1. LITTLE INTEREST OR PLEASURE IN DOING THINGS: 0
2. FEELING DOWN, DEPRESSED OR HOPELESS: 0
SUM OF ALL RESPONSES TO PHQ9 QUESTIONS 1 & 2: 0
SUM OF ALL RESPONSES TO PHQ QUESTIONS 1-9: 0

## 2019-02-25 ENCOUNTER — HOSPITAL ENCOUNTER (EMERGENCY)
Age: 24
Discharge: HOME | End: 2019-02-25
Payer: COMMERCIAL

## 2019-02-25 VITALS
BODY MASS INDEX: 34.99 KG/M2 | TEMPERATURE: 98.6 F | HEART RATE: 80 BPM | HEIGHT: 65 IN | OXYGEN SATURATION: 100 % | SYSTOLIC BLOOD PRESSURE: 114 MMHG | WEIGHT: 210 LBS | DIASTOLIC BLOOD PRESSURE: 71 MMHG | RESPIRATION RATE: 18 BRPM

## 2019-02-25 DIAGNOSIS — E86.0 DEHYDRATION: ICD-10-CM

## 2019-02-25 DIAGNOSIS — O21.0 HYPEREMESIS OF PREGNANCY: Primary | ICD-10-CM

## 2019-02-25 LAB
ALBUMIN SERPL-MCNC: 3.4 G/DL (ref 3.5–4.6)
ALP BLD-CCNC: 88 U/L (ref 40–130)
ALT SERPL-CCNC: 54 U/L (ref 0–33)
ANION GAP SERPL CALCULATED.3IONS-SCNC: 15 MEQ/L (ref 9–15)
AST SERPL-CCNC: 53 U/L (ref 0–35)
BACTERIA: ABNORMAL /HPF
BASOPHILS ABSOLUTE: 0.1 K/UL (ref 0–0.2)
BASOPHILS RELATIVE PERCENT: 0.6 %
BILIRUB SERPL-MCNC: 0.6 MG/DL (ref 0.2–0.7)
BILIRUBIN URINE: ABNORMAL
BLOOD, URINE: NEGATIVE
BUN BLDV-MCNC: 4 MG/DL (ref 6–20)
CALCIUM SERPL-MCNC: 9.1 MG/DL (ref 8.5–9.9)
CHLORIDE BLD-SCNC: 97 MEQ/L (ref 95–107)
CLARITY: ABNORMAL
CO2: 19 MEQ/L (ref 20–31)
COLOR: ABNORMAL
CREAT SERPL-MCNC: 0.44 MG/DL (ref 0.5–0.9)
CRYSTALS, UA: ABNORMAL
EOSINOPHILS ABSOLUTE: 0.1 K/UL (ref 0–0.7)
EOSINOPHILS RELATIVE PERCENT: 0.9 %
EPITHELIAL CELLS, UA: ABNORMAL /HPF (ref 0–5)
GFR AFRICAN AMERICAN: >60
GFR NON-AFRICAN AMERICAN: >60
GLOBULIN: 4 G/DL (ref 2.3–3.5)
GLUCOSE BLD-MCNC: 79 MG/DL (ref 70–99)
GLUCOSE URINE: NEGATIVE MG/DL
HCT VFR BLD CALC: 41 % (ref 37–47)
HEMOGLOBIN: 13.9 G/DL (ref 12–16)
HYALINE CASTS: ABNORMAL /HPF (ref 0–5)
KETONES, URINE: >=80 MG/DL
LEUKOCYTE ESTERASE, URINE: ABNORMAL
LYMPHOCYTES ABSOLUTE: 1.7 K/UL (ref 1–4.8)
LYMPHOCYTES RELATIVE PERCENT: 17.3 %
MCH RBC QN AUTO: 30.7 PG (ref 27–31.3)
MCHC RBC AUTO-ENTMCNC: 34 % (ref 33–37)
MCV RBC AUTO: 90.3 FL (ref 82–100)
MONOCYTES ABSOLUTE: 0.6 K/UL (ref 0.2–0.8)
MONOCYTES RELATIVE PERCENT: 6.1 %
NEUTROPHILS ABSOLUTE: 7.2 K/UL (ref 1.4–6.5)
NEUTROPHILS RELATIVE PERCENT: 75.1 %
NITRITE, URINE: NEGATIVE
PDW BLD-RTO: 13.7 % (ref 11.5–14.5)
PH UA: 5 (ref 5–9)
PLATELET # BLD: 316 K/UL (ref 130–400)
POTASSIUM SERPL-SCNC: 4.8 MEQ/L (ref 3.4–4.9)
PROTEIN UA: NEGATIVE MG/DL
RBC # BLD: 4.54 M/UL (ref 4.2–5.4)
RBC UA: ABNORMAL /HPF (ref 0–2)
SODIUM BLD-SCNC: 131 MEQ/L (ref 135–144)
SPECIFIC GRAVITY UA: 1.03 (ref 1–1.03)
TOTAL PROTEIN: 7.4 G/DL (ref 6.3–8)
URINE REFLEX TO CULTURE: YES
UROBILINOGEN, URINE: 2 E.U./DL
WBC # BLD: 9.5 K/UL (ref 4.8–10.8)
WBC UA: ABNORMAL /HPF (ref 0–5)

## 2019-02-25 PROCEDURE — 87086 URINE CULTURE/COLONY COUNT: CPT

## 2019-02-25 PROCEDURE — 80053 COMPREHEN METABOLIC PANEL: CPT

## 2019-02-25 PROCEDURE — 85025 COMPLETE CBC W/AUTO DIFF WBC: CPT

## 2019-02-25 PROCEDURE — 2580000003 HC RX 258: Performed by: NURSE PRACTITIONER

## 2019-02-25 PROCEDURE — 6360000002 HC RX W HCPCS: Performed by: NURSE PRACTITIONER

## 2019-02-25 PROCEDURE — 36415 COLL VENOUS BLD VENIPUNCTURE: CPT

## 2019-02-25 PROCEDURE — 96374 THER/PROPH/DIAG INJ IV PUSH: CPT

## 2019-02-25 PROCEDURE — 81001 URINALYSIS AUTO W/SCOPE: CPT

## 2019-02-25 PROCEDURE — 99283 EMERGENCY DEPT VISIT LOW MDM: CPT

## 2019-02-25 RX ORDER — 0.9 % SODIUM CHLORIDE 0.9 %
1000 INTRAVENOUS SOLUTION INTRAVENOUS ONCE
Status: COMPLETED | OUTPATIENT
Start: 2019-02-25 | End: 2019-02-25

## 2019-02-25 RX ORDER — METOCLOPRAMIDE HYDROCHLORIDE 5 MG/ML
10 INJECTION INTRAMUSCULAR; INTRAVENOUS ONCE
Status: COMPLETED | OUTPATIENT
Start: 2019-02-25 | End: 2019-02-25

## 2019-02-25 RX ORDER — ONDANSETRON 2 MG/ML
4 INJECTION INTRAMUSCULAR; INTRAVENOUS ONCE
Status: DISCONTINUED | OUTPATIENT
Start: 2019-02-25 | End: 2019-02-25

## 2019-02-25 RX ORDER — METOCLOPRAMIDE 10 MG/1
10 TABLET ORAL 3 TIMES DAILY
Qty: 30 TABLET | Refills: 0 | Status: SHIPPED | OUTPATIENT
Start: 2019-02-25 | End: 2019-03-25 | Stop reason: ALTCHOICE

## 2019-02-25 RX ADMIN — METOCLOPRAMIDE 10 MG: 5 INJECTION, SOLUTION INTRAMUSCULAR; INTRAVENOUS at 18:47

## 2019-02-25 RX ADMIN — SODIUM CHLORIDE 1000 ML: 9 INJECTION, SOLUTION INTRAVENOUS at 18:47

## 2019-02-25 ASSESSMENT — ENCOUNTER SYMPTOMS
VOICE CHANGE: 0
COUGH: 0
NAUSEA: 0
ABDOMINAL PAIN: 0
VOMITING: 1
BACK PAIN: 0
COLOR CHANGE: 0
TROUBLE SWALLOWING: 0
DIARRHEA: 0
SHORTNESS OF BREATH: 0
SORE THROAT: 0
CONSTIPATION: 0

## 2019-02-27 LAB — URINE CULTURE, ROUTINE: NORMAL

## 2019-03-05 ENCOUNTER — ROUTINE PRENATAL (OUTPATIENT)
Dept: OBGYN CLINIC | Age: 24
End: 2019-03-05
Payer: COMMERCIAL

## 2019-03-05 VITALS
WEIGHT: 206 LBS | SYSTOLIC BLOOD PRESSURE: 92 MMHG | BODY MASS INDEX: 34.28 KG/M2 | HEART RATE: 103 BPM | DIASTOLIC BLOOD PRESSURE: 72 MMHG

## 2019-03-05 DIAGNOSIS — Z34.82 ENCOUNTER FOR SUPERVISION OF OTHER NORMAL PREGNANCY IN SECOND TRIMESTER: ICD-10-CM

## 2019-03-05 DIAGNOSIS — Z3A.15 15 WEEKS GESTATION OF PREGNANCY: ICD-10-CM

## 2019-03-05 DIAGNOSIS — Z3A.15 15 WEEKS GESTATION OF PREGNANCY: Primary | ICD-10-CM

## 2019-03-05 LAB
BILIRUBIN, POC: NORMAL
BLOOD URINE, POC: NEGATIVE
CLARITY, POC: NORMAL
COLOR, POC: NORMAL
GLUCOSE URINE, POC: NEGATIVE
KETONES, POC: NORMAL
LEUKOCYTE EST, POC: NEGATIVE
NITRITE, POC: NEGATIVE
PH, POC: 6
PROTEIN, POC: NORMAL
SPECIFIC GRAVITY, POC: 1.03
UROBILINOGEN, POC: NORMAL

## 2019-03-05 PROCEDURE — G8482 FLU IMMUNIZE ORDER/ADMIN: HCPCS | Performed by: OBSTETRICS & GYNECOLOGY

## 2019-03-05 PROCEDURE — 1036F TOBACCO NON-USER: CPT | Performed by: OBSTETRICS & GYNECOLOGY

## 2019-03-05 PROCEDURE — 99213 OFFICE O/P EST LOW 20 MIN: CPT | Performed by: OBSTETRICS & GYNECOLOGY

## 2019-03-05 PROCEDURE — G8417 CALC BMI ABV UP PARAM F/U: HCPCS | Performed by: OBSTETRICS & GYNECOLOGY

## 2019-03-05 PROCEDURE — G8427 DOCREV CUR MEDS BY ELIG CLIN: HCPCS | Performed by: OBSTETRICS & GYNECOLOGY

## 2019-03-08 LAB
AFP INTERPRETATION: NORMAL
AFP MOM: 0.67
AFP SPECIMEN: NORMAL
DATING: NORMAL
ESTIMATED DUE DATE: NORMAL
FETUS COUNT: NORMAL
GESTATIONAL AGE CALC AT COLLECT: NORMAL
HISTORY/NEURAL TUBE DEFECTS: NO
INSULIN DEP. DIABETIC: NO
MATERNAL AGE AT EDD: 24.3 YR
MATERNAL WEIGHT: NORMAL
PT AFP: 16 NG/ML
RACE: NORMAL
SMOKING: NO

## 2019-03-18 ENCOUNTER — NURSE ONLY (OUTPATIENT)
Dept: FAMILY MEDICINE CLINIC | Age: 24
End: 2019-03-18
Payer: COMMERCIAL

## 2019-03-18 DIAGNOSIS — Z11.1 PPD SCREENING TEST: ICD-10-CM

## 2019-03-18 DIAGNOSIS — Z11.1 ENCOUNTER FOR PPD SKIN TEST READING: Primary | ICD-10-CM

## 2019-03-18 PROCEDURE — 86580 TB INTRADERMAL TEST: CPT | Performed by: NURSE PRACTITIONER

## 2019-03-20 LAB
INDURATION: NORMAL
TB SKIN TEST: NORMAL

## 2019-03-25 ENCOUNTER — APPOINTMENT (OUTPATIENT)
Dept: CT IMAGING | Age: 24
End: 2019-03-25
Payer: COMMERCIAL

## 2019-03-25 ENCOUNTER — OFFICE VISIT (OUTPATIENT)
Dept: FAMILY MEDICINE CLINIC | Age: 24
End: 2019-03-25
Payer: COMMERCIAL

## 2019-03-25 ENCOUNTER — HOSPITAL ENCOUNTER (EMERGENCY)
Age: 24
Discharge: HOME | End: 2019-03-25
Attending: EMERGENCY MEDICINE
Payer: COMMERCIAL

## 2019-03-25 VITALS
BODY MASS INDEX: 33.66 KG/M2 | SYSTOLIC BLOOD PRESSURE: 100 MMHG | TEMPERATURE: 98.2 F | HEART RATE: 94 BPM | RESPIRATION RATE: 16 BRPM | DIASTOLIC BLOOD PRESSURE: 66 MMHG | HEIGHT: 65 IN | WEIGHT: 202 LBS | OXYGEN SATURATION: 97 %

## 2019-03-25 VITALS
WEIGHT: 202 LBS | BODY MASS INDEX: 33.66 KG/M2 | HEART RATE: 60 BPM | TEMPERATURE: 97.7 F | SYSTOLIC BLOOD PRESSURE: 122 MMHG | OXYGEN SATURATION: 99 % | DIASTOLIC BLOOD PRESSURE: 62 MMHG | RESPIRATION RATE: 16 BRPM | HEIGHT: 65 IN

## 2019-03-25 DIAGNOSIS — R00.2 PALPITATIONS: Primary | ICD-10-CM

## 2019-03-25 DIAGNOSIS — E87.1 HYPONATREMIA: Chronic | ICD-10-CM

## 2019-03-25 DIAGNOSIS — R11.2 NON-INTRACTABLE VOMITING WITH NAUSEA, UNSPECIFIED VOMITING TYPE: ICD-10-CM

## 2019-03-25 DIAGNOSIS — O21.0 HYPEREMESIS AFFECTING PREGNANCY, ANTEPARTUM: ICD-10-CM

## 2019-03-25 DIAGNOSIS — R06.02 SHORTNESS OF BREATH: Primary | Chronic | ICD-10-CM

## 2019-03-25 DIAGNOSIS — R00.0 TACHYCARDIA: ICD-10-CM

## 2019-03-25 DIAGNOSIS — M25.50 PAIN IN JOINT INVOLVING MULTIPLE SITES: Chronic | ICD-10-CM

## 2019-03-25 DIAGNOSIS — R06.02 SOB (SHORTNESS OF BREATH): ICD-10-CM

## 2019-03-25 DIAGNOSIS — R63.4 WEIGHT LOSS: Chronic | ICD-10-CM

## 2019-03-25 DIAGNOSIS — Z3A.20 20 WEEKS GESTATION OF PREGNANCY: Chronic | ICD-10-CM

## 2019-03-25 DIAGNOSIS — N30.00 ACUTE CYSTITIS WITHOUT HEMATURIA: ICD-10-CM

## 2019-03-25 LAB
ALBUMIN SERPL-MCNC: 3.7 G/DL (ref 3.5–4.6)
ALP BLD-CCNC: 109 U/L (ref 40–130)
ALT SERPL-CCNC: 34 U/L (ref 0–33)
ANION GAP SERPL CALCULATED.3IONS-SCNC: 13 MEQ/L (ref 9–15)
AST SERPL-CCNC: 25 U/L (ref 0–35)
BACTERIA: ABNORMAL /HPF
BASOPHILS ABSOLUTE: 0 K/UL (ref 0–0.2)
BASOPHILS RELATIVE PERCENT: 0.4 %
BILIRUB SERPL-MCNC: 0.4 MG/DL (ref 0.2–0.7)
BILIRUBIN URINE: ABNORMAL
BLOOD, URINE: NEGATIVE
BUN BLDV-MCNC: 5 MG/DL (ref 6–20)
CALCIUM SERPL-MCNC: 9.1 MG/DL (ref 8.5–9.9)
CHLORIDE BLD-SCNC: 104 MEQ/L (ref 95–107)
CLARITY: ABNORMAL
CO2: 21 MEQ/L (ref 20–31)
COLOR: ABNORMAL
CREAT SERPL-MCNC: 0.38 MG/DL (ref 0.5–0.9)
EKG ATRIAL RATE: 98 BPM
EKG P AXIS: 33 DEGREES
EKG P-R INTERVAL: 136 MS
EKG Q-T INTERVAL: 354 MS
EKG QRS DURATION: 70 MS
EKG QTC CALCULATION (BAZETT): 451 MS
EKG R AXIS: 3 DEGREES
EKG T AXIS: 0 DEGREES
EKG VENTRICULAR RATE: 98 BPM
EOSINOPHILS ABSOLUTE: 0.1 K/UL (ref 0–0.7)
EOSINOPHILS RELATIVE PERCENT: 0.9 %
EPITHELIAL CELLS, UA: ABNORMAL /HPF (ref 0–5)
GFR AFRICAN AMERICAN: >60
GFR NON-AFRICAN AMERICAN: >60
GLOBULIN: 3.7 G/DL (ref 2.3–3.5)
GLUCOSE BLD-MCNC: 87 MG/DL (ref 70–99)
GLUCOSE URINE: NEGATIVE MG/DL
HCT VFR BLD CALC: 39 % (ref 37–47)
HEMOGLOBIN: 13.4 G/DL (ref 12–16)
KETONES, URINE: ABNORMAL MG/DL
LEUKOCYTE ESTERASE, URINE: ABNORMAL
LYMPHOCYTES ABSOLUTE: 1.2 K/UL (ref 1–4.8)
LYMPHOCYTES RELATIVE PERCENT: 14.3 %
MCH RBC QN AUTO: 30.8 PG (ref 27–31.3)
MCHC RBC AUTO-ENTMCNC: 34.4 % (ref 33–37)
MCV RBC AUTO: 89.4 FL (ref 82–100)
MONOCYTES ABSOLUTE: 0.5 K/UL (ref 0.2–0.8)
MONOCYTES RELATIVE PERCENT: 6 %
NEUTROPHILS ABSOLUTE: 6.7 K/UL (ref 1.4–6.5)
NEUTROPHILS RELATIVE PERCENT: 78.4 %
NITRITE, URINE: POSITIVE
PDW BLD-RTO: 13.6 % (ref 11.5–14.5)
PH UA: 5.5 (ref 5–9)
PLATELET # BLD: 297 K/UL (ref 130–400)
POC CREATININE WHOLE BLOOD: 0.5
POTASSIUM SERPL-SCNC: 3.9 MEQ/L (ref 3.4–4.9)
PROTEIN UA: ABNORMAL MG/DL
RBC # BLD: 4.36 M/UL (ref 4.2–5.4)
RBC UA: ABNORMAL /HPF (ref 0–2)
SODIUM BLD-SCNC: 138 MEQ/L (ref 135–144)
SPECIFIC GRAVITY UA: 1.03 (ref 1–1.03)
TOTAL PROTEIN: 7.4 G/DL (ref 6.3–8)
UROBILINOGEN, URINE: 2 E.U./DL
WBC # BLD: 8.6 K/UL (ref 4.8–10.8)
WBC UA: ABNORMAL /HPF (ref 0–5)

## 2019-03-25 PROCEDURE — 96367 TX/PROPH/DG ADDL SEQ IV INF: CPT

## 2019-03-25 PROCEDURE — 2580000003 HC RX 258: Performed by: EMERGENCY MEDICINE

## 2019-03-25 PROCEDURE — 96375 TX/PRO/DX INJ NEW DRUG ADDON: CPT

## 2019-03-25 PROCEDURE — 1036F TOBACCO NON-USER: CPT | Performed by: FAMILY MEDICINE

## 2019-03-25 PROCEDURE — 81001 URINALYSIS AUTO W/SCOPE: CPT

## 2019-03-25 PROCEDURE — 93005 ELECTROCARDIOGRAM TRACING: CPT

## 2019-03-25 PROCEDURE — 99215 OFFICE O/P EST HI 40 MIN: CPT | Performed by: FAMILY MEDICINE

## 2019-03-25 PROCEDURE — 96366 THER/PROPH/DIAG IV INF ADDON: CPT

## 2019-03-25 PROCEDURE — 6360000004 HC RX CONTRAST MEDICATION: Performed by: EMERGENCY MEDICINE

## 2019-03-25 PROCEDURE — G8427 DOCREV CUR MEDS BY ELIG CLIN: HCPCS | Performed by: FAMILY MEDICINE

## 2019-03-25 PROCEDURE — 99285 EMERGENCY DEPT VISIT HI MDM: CPT

## 2019-03-25 PROCEDURE — 80053 COMPREHEN METABOLIC PANEL: CPT

## 2019-03-25 PROCEDURE — G8482 FLU IMMUNIZE ORDER/ADMIN: HCPCS | Performed by: FAMILY MEDICINE

## 2019-03-25 PROCEDURE — 71275 CT ANGIOGRAPHY CHEST: CPT

## 2019-03-25 PROCEDURE — G8417 CALC BMI ABV UP PARAM F/U: HCPCS | Performed by: FAMILY MEDICINE

## 2019-03-25 PROCEDURE — 6370000000 HC RX 637 (ALT 250 FOR IP): Performed by: EMERGENCY MEDICINE

## 2019-03-25 PROCEDURE — 93000 ELECTROCARDIOGRAM COMPLETE: CPT | Performed by: FAMILY MEDICINE

## 2019-03-25 PROCEDURE — 6360000002 HC RX W HCPCS: Performed by: EMERGENCY MEDICINE

## 2019-03-25 PROCEDURE — 96365 THER/PROPH/DIAG IV INF INIT: CPT

## 2019-03-25 PROCEDURE — 85025 COMPLETE CBC W/AUTO DIFF WBC: CPT

## 2019-03-25 PROCEDURE — 36415 COLL VENOUS BLD VENIPUNCTURE: CPT

## 2019-03-25 RX ORDER — ONDANSETRON 4 MG/1
4 TABLET, ORALLY DISINTEGRATING ORAL 3 TIMES DAILY PRN
Qty: 21 TABLET | Refills: 0 | Status: ON HOLD | OUTPATIENT
Start: 2019-03-25 | End: 2019-07-08

## 2019-03-25 RX ORDER — DEXTROSE AND SODIUM CHLORIDE 5; .9 G/100ML; G/100ML
1000 INJECTION, SOLUTION INTRAVENOUS ONCE
Status: COMPLETED | OUTPATIENT
Start: 2019-03-25 | End: 2019-03-25

## 2019-03-25 RX ORDER — ONDANSETRON 4 MG/1
4 TABLET, ORALLY DISINTEGRATING ORAL 3 TIMES DAILY PRN
Qty: 21 TABLET | Refills: 0 | Status: SHIPPED | OUTPATIENT
Start: 2019-03-25 | End: 2019-03-25 | Stop reason: SDUPTHER

## 2019-03-25 RX ORDER — HYDROXYZINE PAMOATE 25 MG/1
25 CAPSULE ORAL ONCE
Status: COMPLETED | OUTPATIENT
Start: 2019-03-25 | End: 2019-03-25

## 2019-03-25 RX ORDER — ONDANSETRON 2 MG/ML
4 INJECTION INTRAMUSCULAR; INTRAVENOUS ONCE
Status: COMPLETED | OUTPATIENT
Start: 2019-03-25 | End: 2019-03-25

## 2019-03-25 RX ORDER — AMOXICILLIN AND CLAVULANATE POTASSIUM 875; 125 MG/1; MG/1
1 TABLET, FILM COATED ORAL 2 TIMES DAILY
Qty: 14 TABLET | Refills: 0 | Status: SHIPPED | OUTPATIENT
Start: 2019-03-25 | End: 2019-04-01

## 2019-03-25 RX ORDER — SODIUM CHLORIDE 0.9 % (FLUSH) 0.9 %
10 SYRINGE (ML) INJECTION
Status: COMPLETED | OUTPATIENT
Start: 2019-03-25 | End: 2019-03-25

## 2019-03-25 RX ORDER — PNV,CALCIUM 72/IRON/FOLIC ACID 27 MG-1 MG
TABLET ORAL
COMMUNITY
Start: 2019-03-12 | End: 2019-03-25 | Stop reason: ALTCHOICE

## 2019-03-25 RX ADMIN — IOPAMIDOL 100 ML: 612 INJECTION, SOLUTION INTRAVENOUS at 16:23

## 2019-03-25 RX ADMIN — HYDROXYZINE PAMOATE 25 MG: 25 CAPSULE ORAL at 15:43

## 2019-03-25 RX ADMIN — ONDANSETRON 4 MG: 2 INJECTION INTRAMUSCULAR; INTRAVENOUS at 15:43

## 2019-03-25 RX ADMIN — Medication 10 ML: at 16:24

## 2019-03-25 RX ADMIN — DEXTROSE AND SODIUM CHLORIDE 1000 ML: 5; 900 INJECTION, SOLUTION INTRAVENOUS at 15:41

## 2019-03-25 RX ADMIN — CEFTRIAXONE SODIUM 1 G: 1 INJECTION, POWDER, FOR SOLUTION INTRAMUSCULAR; INTRAVENOUS at 17:25

## 2019-03-25 ASSESSMENT — ENCOUNTER SYMPTOMS
VOMITING: 1
BACK PAIN: 0
CONSTIPATION: 1
DIARRHEA: 0
NAUSEA: 1
COUGH: 0
SORE THROAT: 0
SHORTNESS OF BREATH: 1
ABDOMINAL PAIN: 0

## 2019-03-25 ASSESSMENT — PAIN SCALES - GENERAL: PAINLEVEL_OUTOF10: 7

## 2019-03-27 ENCOUNTER — HOSPITAL ENCOUNTER (OUTPATIENT)
Dept: ULTRASOUND IMAGING | Age: 24
Discharge: HOME | End: 2019-03-29
Payer: COMMERCIAL

## 2019-03-27 DIAGNOSIS — Z3A.15 15 WEEKS GESTATION OF PREGNANCY: ICD-10-CM

## 2019-03-27 DIAGNOSIS — Z34.82 ENCOUNTER FOR SUPERVISION OF OTHER NORMAL PREGNANCY IN SECOND TRIMESTER: ICD-10-CM

## 2019-03-27 LAB
GFR AFRICAN AMERICAN: >60
GFR NON-AFRICAN AMERICAN: >60
PERFORMED ON: ABNORMAL
POC CREATININE: 0.5 MG/DL (ref 0.6–1.1)
POC SAMPLE TYPE: ABNORMAL

## 2019-03-27 PROCEDURE — 93010 ELECTROCARDIOGRAM REPORT: CPT | Performed by: INTERNAL MEDICINE

## 2019-03-27 PROCEDURE — 76805 OB US >/= 14 WKS SNGL FETUS: CPT

## 2019-04-02 ENCOUNTER — ROUTINE PRENATAL (OUTPATIENT)
Dept: OBGYN CLINIC | Age: 24
End: 2019-04-02
Payer: COMMERCIAL

## 2019-04-02 VITALS
BODY MASS INDEX: 33.61 KG/M2 | SYSTOLIC BLOOD PRESSURE: 112 MMHG | WEIGHT: 202 LBS | HEART RATE: 103 BPM | DIASTOLIC BLOOD PRESSURE: 72 MMHG

## 2019-04-02 DIAGNOSIS — E87.1 HYPONATREMIA: Chronic | ICD-10-CM

## 2019-04-02 DIAGNOSIS — R63.4 WEIGHT LOSS: Chronic | ICD-10-CM

## 2019-04-02 DIAGNOSIS — M25.50 PAIN IN JOINT INVOLVING MULTIPLE SITES: Chronic | ICD-10-CM

## 2019-04-02 DIAGNOSIS — Z34.82 ENCOUNTER FOR SUPERVISION OF OTHER NORMAL PREGNANCY IN SECOND TRIMESTER: ICD-10-CM

## 2019-04-02 DIAGNOSIS — Z3A.19 19 WEEKS GESTATION OF PREGNANCY: Primary | ICD-10-CM

## 2019-04-02 LAB
ALBUMIN SERPL-MCNC: 3.7 G/DL (ref 3.5–4.6)
ALP BLD-CCNC: 109 U/L (ref 40–130)
ALT SERPL-CCNC: 45 U/L (ref 0–33)
ANION GAP SERPL CALCULATED.3IONS-SCNC: 14 MEQ/L (ref 9–15)
AST SERPL-CCNC: 31 U/L (ref 0–35)
BASOPHILS ABSOLUTE: 0 K/UL (ref 0–0.2)
BASOPHILS RELATIVE PERCENT: 0.5 %
BILIRUB SERPL-MCNC: 0.3 MG/DL (ref 0.2–0.7)
BUN BLDV-MCNC: 6 MG/DL (ref 6–20)
C-REACTIVE PROTEIN: 15.9 MG/L (ref 0–5)
CALCIUM SERPL-MCNC: 9.3 MG/DL (ref 8.5–9.9)
CHLORIDE BLD-SCNC: 103 MEQ/L (ref 95–107)
CO2: 19 MEQ/L (ref 20–31)
CREAT SERPL-MCNC: 0.38 MG/DL (ref 0.5–0.9)
EOSINOPHILS ABSOLUTE: 0.1 K/UL (ref 0–0.7)
EOSINOPHILS RELATIVE PERCENT: 1 %
GFR AFRICAN AMERICAN: >60
GFR NON-AFRICAN AMERICAN: >60
GLOBULIN: 3.6 G/DL (ref 2.3–3.5)
GLUCOSE BLD-MCNC: 81 MG/DL (ref 70–99)
HAV IGM SER IA-ACNC: NORMAL
HBA1C MFR BLD: 5.2 % (ref 4.8–5.9)
HCT VFR BLD CALC: 38.6 % (ref 37–47)
HEMOGLOBIN: 12.9 G/DL (ref 12–16)
HEPATITIS B CORE IGM ANTIBODY: NORMAL
HEPATITIS B SURFACE ANTIGEN INTERPRETATION: NORMAL
HEPATITIS C ANTIBODY INTERPRETATION: NORMAL
HEPATITIS INTERPRETATION:: NORMAL
LYMPHOCYTES ABSOLUTE: 1.3 K/UL (ref 1–4.8)
LYMPHOCYTES RELATIVE PERCENT: 16.5 %
MCH RBC QN AUTO: 31 PG (ref 27–31.3)
MCHC RBC AUTO-ENTMCNC: 33.4 % (ref 33–37)
MCV RBC AUTO: 92.6 FL (ref 82–100)
MONOCYTES ABSOLUTE: 0.5 K/UL (ref 0.2–0.8)
MONOCYTES RELATIVE PERCENT: 5.9 %
NEUTROPHILS ABSOLUTE: 6.1 K/UL (ref 1.4–6.5)
NEUTROPHILS RELATIVE PERCENT: 76.1 %
PDW BLD-RTO: 13.7 % (ref 11.5–14.5)
PLATELET # BLD: 349 K/UL (ref 130–400)
POTASSIUM SERPL-SCNC: 3.5 MEQ/L (ref 3.4–4.9)
RBC # BLD: 4.16 M/UL (ref 4.2–5.4)
RHEUMATOID FACTOR: <10 IU/ML (ref 0–14)
SODIUM BLD-SCNC: 136 MEQ/L (ref 135–144)
TOTAL PROTEIN: 7.3 G/DL (ref 6.3–8)
TSH SERPL DL<=0.05 MIU/L-ACNC: 2.57 UIU/ML (ref 0.44–3.86)
WBC # BLD: 8 K/UL (ref 4.8–10.8)

## 2019-04-02 PROCEDURE — G8427 DOCREV CUR MEDS BY ELIG CLIN: HCPCS | Performed by: OBSTETRICS & GYNECOLOGY

## 2019-04-02 PROCEDURE — 1036F TOBACCO NON-USER: CPT | Performed by: OBSTETRICS & GYNECOLOGY

## 2019-04-02 PROCEDURE — 99213 OFFICE O/P EST LOW 20 MIN: CPT | Performed by: OBSTETRICS & GYNECOLOGY

## 2019-04-02 PROCEDURE — G8417 CALC BMI ABV UP PARAM F/U: HCPCS | Performed by: OBSTETRICS & GYNECOLOGY

## 2019-04-02 NOTE — PROGRESS NOTES
Patient's last menstrual period was 10/21/2018. Please reference prenatal and OB flow chart for further information  PT here today for routine prenatal care  Pt endorses fetal movement and denies loss of fluid, contractions or vaginal bleeding  Pt without complaints  ROS:  Pt denies headache, dysuria, nausea/vomiting  PE:  /72   Pulse 103   Wt 202 lb (91.6 kg)   LMP 10/21/2018   BMI 33.61 kg/m²   Gen - Alert and oriented x 3  HEENT- NC/AT, CVS - RRR, Lungs - CTAB  Abd - FH Appropriate fetal growth  LE no edema  Reassuring fetal status at this time    patient doing much better was seen in ED no abnormalities noted some palpitations noted but none since    Upon completion of the visit all questions were answered and the patients follow-up and testing schedule were reviewed.

## 2019-04-04 LAB — HLA B27: NEGATIVE

## 2019-04-05 LAB
ANA IGG, ELISA: NORMAL
CELIAC PANEL: 6 UNITS (ref 0–19)
EPSTEIN-BARR EARLY ANTIGEN ANTIBODY: <5 U/ML (ref 0–10.9)

## 2019-04-26 ENCOUNTER — HOSPITAL ENCOUNTER (EMERGENCY)
Age: 24
Discharge: HOME | End: 2019-04-26
Payer: COMMERCIAL

## 2019-04-26 VITALS
OXYGEN SATURATION: 97 % | DIASTOLIC BLOOD PRESSURE: 71 MMHG | TEMPERATURE: 97.5 F | WEIGHT: 202 LBS | SYSTOLIC BLOOD PRESSURE: 108 MMHG | RESPIRATION RATE: 18 BRPM | HEART RATE: 101 BPM | HEIGHT: 65 IN | BODY MASS INDEX: 33.66 KG/M2

## 2019-04-26 DIAGNOSIS — J06.9 ACUTE UPPER RESPIRATORY INFECTION: Primary | ICD-10-CM

## 2019-04-26 LAB
RAPID INFLUENZA  B AGN: NEGATIVE
RAPID INFLUENZA A AGN: NEGATIVE
S PYO AG THROAT QL: NEGATIVE

## 2019-04-26 PROCEDURE — 87081 CULTURE SCREEN ONLY: CPT

## 2019-04-26 PROCEDURE — 87880 STREP A ASSAY W/OPTIC: CPT

## 2019-04-26 PROCEDURE — 87804 INFLUENZA ASSAY W/OPTIC: CPT

## 2019-04-26 PROCEDURE — 99283 EMERGENCY DEPT VISIT LOW MDM: CPT

## 2019-04-26 ASSESSMENT — ENCOUNTER SYMPTOMS
EYES NEGATIVE: 1
RHINORRHEA: 1
GASTROINTESTINAL NEGATIVE: 1
COUGH: 1

## 2019-04-26 ASSESSMENT — PAIN DESCRIPTION - FREQUENCY: FREQUENCY: CONTINUOUS

## 2019-04-26 ASSESSMENT — PAIN DESCRIPTION - PAIN TYPE: TYPE: ACUTE PAIN

## 2019-04-26 ASSESSMENT — PAIN DESCRIPTION - LOCATION: LOCATION: GENERALIZED;THROAT

## 2019-04-26 ASSESSMENT — PAIN DESCRIPTION - DESCRIPTORS: DESCRIPTORS: ACHING;SORE

## 2019-04-26 ASSESSMENT — PAIN SCALES - GENERAL: PAINLEVEL_OUTOF10: 8

## 2019-04-26 NOTE — ED PROVIDER NOTES
[address] [facility] ED  eMERGENCY dEPARTMENT eNCOUnter      Pt Name: Neha Conroy  MRN: [id number]  [place] 1995  Date of evaluation: 4/26/2019  Provider: Merissa Marx PA-C      HISTORY OF PRESENT ILLNESS    Neha Conroy is a 25 y.o. female who presents to the Emergency Department with chief complaint of congestion, cough, and body aches. Patient states symptoms started in the last few days. Patient took Tylenol approximately 4 hours ago. She denies known ill contacts and has no other concerns at this time. REVIEW OF SYSTEMS       Review of Systems   Constitutional: Negative. HENT: Positive for congestion and rhinorrhea. Eyes: Negative. Respiratory: Positive for cough. Cardiovascular: Negative. Gastrointestinal: Negative. Endocrine: Negative. Genitourinary: Negative. Musculoskeletal: Positive for myalgias. Skin: Negative. Neurological: Negative. Psychiatric/Behavioral: Negative. PAST MEDICAL HISTORY     Past Medical History:   Diagnosis Date    Anxiety 12/12/2017    Class 1 obesity due to excess calories without serious comorbidity with body mass index (BMI) of 30.0 to 30.9 in adult 12/12/2017    Hyperthyroidism 1/17/2018         SURGICAL HISTORY       Past Surgical History:   Procedure Laterality Date    BACK SURGERY  2004         CURRENT MEDICATIONS       Discharge Medication List as of 4/26/2019  7:33 PM      CONTINUE these medications which have NOT CHANGED    Details   ondansetron (ZOFRAN-ODT) 4 MG disintegrating tablet Take 1 tablet by mouth 3 times daily as needed for Nausea or Vomiting, Disp-21 tablet, R-0Print      Prenatal MV-Min-Fe Cbn-FA-DHA (CONSTANTINO ONE) 27-0.8-200 MG CAPS Take 1 tablet by mouth daily, Disp-30 capsule, R-5Normal             ALLERGIES     Patient has no known allergies.     FAMILY HISTORY       Family History   Problem Relation Age of Onset    No Known Problems Mother     Diabetes Father     No Known

## 2019-04-26 NOTE — ED TRIAGE NOTES
Pt is approx 23 weeks pregnant, c/o a sore throat and general body aches for the past 2 days, denies N/V/D, abdominal pain, and vaginal bleeding, Pt is A&OX3, calm, ambulatory, afebrile, breathes are equal and unlabored, last medicated with tylenol at 1430 today

## 2019-04-28 LAB — S PYO THROAT QL CULT: NORMAL

## 2019-04-30 ENCOUNTER — ROUTINE PRENATAL (OUTPATIENT)
Dept: OBGYN CLINIC | Age: 24
End: 2019-04-30
Payer: COMMERCIAL

## 2019-04-30 VITALS
WEIGHT: 195 LBS | HEART RATE: 100 BPM | DIASTOLIC BLOOD PRESSURE: 70 MMHG | SYSTOLIC BLOOD PRESSURE: 118 MMHG | BODY MASS INDEX: 32.45 KG/M2

## 2019-04-30 DIAGNOSIS — R63.4 WEIGHT LOSS: ICD-10-CM

## 2019-04-30 DIAGNOSIS — O21.9 NAUSEA/VOMITING IN PREGNANCY: ICD-10-CM

## 2019-04-30 DIAGNOSIS — Z3A.23 23 WEEKS GESTATION OF PREGNANCY: Primary | ICD-10-CM

## 2019-04-30 PROCEDURE — G8427 DOCREV CUR MEDS BY ELIG CLIN: HCPCS | Performed by: OBSTETRICS & GYNECOLOGY

## 2019-04-30 PROCEDURE — 1036F TOBACCO NON-USER: CPT | Performed by: OBSTETRICS & GYNECOLOGY

## 2019-04-30 PROCEDURE — G8417 CALC BMI ABV UP PARAM F/U: HCPCS | Performed by: OBSTETRICS & GYNECOLOGY

## 2019-04-30 PROCEDURE — 99213 OFFICE O/P EST LOW 20 MIN: CPT | Performed by: OBSTETRICS & GYNECOLOGY

## 2019-04-30 NOTE — PROGRESS NOTES
Patient's last menstrual period was 10/21/2018. Please reference prenatal and OB flow chart for further information  PT here today for routine prenatal care  Pt endorses fetal movement and denies loss of fluid, contractions or vaginal bleeding  Pt with complaints of continued nausea  ROS:  Pt denies headache, dysuria, nausea/vomiting  PE:  /70   Pulse 100   Wt 195 lb (88.5 kg)   LMP 10/21/2018   BMI 32.45 kg/m²   Gen - Alert and oriented x 3  HEENT- NC/AT, CVS - RRR, Lungs - CTAB  Abd - FH Appropriate fetal growth  LE no edema  Reassuring fetal status at this time    Upon completion of the visit all questions were answered and the patients follow-up and testing schedule were reviewed.

## 2019-05-05 ENCOUNTER — HOSPITAL ENCOUNTER (OUTPATIENT)
Age: 24
Discharge: HOME | End: 2019-05-05
Attending: OBSTETRICS & GYNECOLOGY | Admitting: OBSTETRICS & GYNECOLOGY
Payer: COMMERCIAL

## 2019-05-05 VITALS
SYSTOLIC BLOOD PRESSURE: 106 MMHG | HEART RATE: 100 BPM | TEMPERATURE: 98 F | RESPIRATION RATE: 18 BRPM | DIASTOLIC BLOOD PRESSURE: 69 MMHG

## 2019-05-05 LAB
AMPHETAMINE SCREEN, URINE: NORMAL
BACTERIA: ABNORMAL /HPF
BARBITURATE SCREEN URINE: NORMAL
BENZODIAZEPINE SCREEN, URINE: NORMAL
BILIRUBIN URINE: ABNORMAL
BLOOD, URINE: NEGATIVE
CANNABINOID SCREEN URINE: NORMAL
CLARITY: CLEAR
COCAINE METABOLITE SCREEN URINE: NORMAL
COLOR: ABNORMAL
EPITHELIAL CELLS, UA: ABNORMAL /HPF (ref 0–5)
GLUCOSE URINE: NEGATIVE MG/DL
HYALINE CASTS: ABNORMAL /HPF (ref 0–5)
KETONES, URINE: ABNORMAL MG/DL
LEUKOCYTE ESTERASE, URINE: ABNORMAL
Lab: NORMAL
NITRITE, URINE: NEGATIVE
OPIATE SCREEN URINE: NORMAL
PH UA: 6 (ref 5–9)
PHENCYCLIDINE SCREEN URINE: NORMAL
PROTEIN UA: 30 MG/DL
RBC UA: ABNORMAL /HPF (ref 0–5)
SPECIFIC GRAVITY UA: 1.03 (ref 1–1.03)
UROBILINOGEN, URINE: 2 E.U./DL
WBC UA: ABNORMAL /HPF (ref 0–5)

## 2019-05-05 PROCEDURE — 59025 FETAL NON-STRESS TEST: CPT

## 2019-05-05 PROCEDURE — 59025 FETAL NON-STRESS TEST: CPT | Performed by: OBSTETRICS & GYNECOLOGY

## 2019-05-05 PROCEDURE — 99283 EMERGENCY DEPT VISIT LOW MDM: CPT

## 2019-05-05 PROCEDURE — 81001 URINALYSIS AUTO W/SCOPE: CPT

## 2019-05-05 PROCEDURE — 80307 DRUG TEST PRSMV CHEM ANLYZR: CPT

## 2019-05-05 PROCEDURE — 99283 EMERGENCY DEPT VISIT LOW MDM: CPT | Performed by: OBSTETRICS & GYNECOLOGY

## 2019-05-06 NOTE — FLOWSHEET NOTE
Pt given verbal and printed d/c instructions; pt verbalizes understanding. Pt ambulatory off unit in stable condition with spouse at 2125.

## 2019-05-21 DIAGNOSIS — R63.4 WEIGHT LOSS: ICD-10-CM

## 2019-05-21 DIAGNOSIS — Z3A.23 23 WEEKS GESTATION OF PREGNANCY: ICD-10-CM

## 2019-05-21 DIAGNOSIS — O21.9 NAUSEA/VOMITING IN PREGNANCY: ICD-10-CM

## 2019-05-21 LAB
GLUCOSE, 1HR PP: 116 MG/DL (ref 60–140)
HCT VFR BLD CALC: 35.5 % (ref 37–47)
HEMOGLOBIN: 12.2 G/DL (ref 12–16)
T4 FREE: 1.11 NG/DL (ref 0.84–1.68)
TSH SERPL DL<=0.05 MIU/L-ACNC: 2.06 UIU/ML (ref 0.44–3.86)

## 2019-05-28 ENCOUNTER — OFFICE VISIT (OUTPATIENT)
Dept: FAMILY MEDICINE CLINIC | Age: 24
End: 2019-05-28
Payer: COMMERCIAL

## 2019-05-28 ENCOUNTER — ROUTINE PRENATAL (OUTPATIENT)
Dept: OBGYN CLINIC | Age: 24
End: 2019-05-28
Payer: COMMERCIAL

## 2019-05-28 VITALS
TEMPERATURE: 98.4 F | HEART RATE: 104 BPM | BODY MASS INDEX: 32.65 KG/M2 | WEIGHT: 196 LBS | DIASTOLIC BLOOD PRESSURE: 62 MMHG | SYSTOLIC BLOOD PRESSURE: 108 MMHG | HEIGHT: 65 IN | OXYGEN SATURATION: 98 % | RESPIRATION RATE: 16 BRPM

## 2019-05-28 VITALS
WEIGHT: 195 LBS | SYSTOLIC BLOOD PRESSURE: 108 MMHG | DIASTOLIC BLOOD PRESSURE: 74 MMHG | HEART RATE: 104 BPM | BODY MASS INDEX: 32.45 KG/M2

## 2019-05-28 DIAGNOSIS — R07.82 INTERCOSTAL PAIN: Chronic | ICD-10-CM

## 2019-05-28 DIAGNOSIS — R06.02 SHORTNESS OF BREATH: Chronic | ICD-10-CM

## 2019-05-28 DIAGNOSIS — R00.2 PALPITATIONS: Primary | Chronic | ICD-10-CM

## 2019-05-28 DIAGNOSIS — Z34.03 ENCOUNTER FOR SUPERVISION OF NORMAL FIRST PREGNANCY IN THIRD TRIMESTER: Primary | ICD-10-CM

## 2019-05-28 PROCEDURE — 99213 OFFICE O/P EST LOW 20 MIN: CPT | Performed by: OBSTETRICS & GYNECOLOGY

## 2019-05-28 PROCEDURE — G8427 DOCREV CUR MEDS BY ELIG CLIN: HCPCS | Performed by: FAMILY MEDICINE

## 2019-05-28 PROCEDURE — 1036F TOBACCO NON-USER: CPT | Performed by: OBSTETRICS & GYNECOLOGY

## 2019-05-28 PROCEDURE — G8417 CALC BMI ABV UP PARAM F/U: HCPCS | Performed by: OBSTETRICS & GYNECOLOGY

## 2019-05-28 PROCEDURE — G8427 DOCREV CUR MEDS BY ELIG CLIN: HCPCS | Performed by: OBSTETRICS & GYNECOLOGY

## 2019-05-28 PROCEDURE — G8417 CALC BMI ABV UP PARAM F/U: HCPCS | Performed by: FAMILY MEDICINE

## 2019-05-28 PROCEDURE — 99214 OFFICE O/P EST MOD 30 MIN: CPT | Performed by: FAMILY MEDICINE

## 2019-05-28 PROCEDURE — 1036F TOBACCO NON-USER: CPT | Performed by: FAMILY MEDICINE

## 2019-05-28 NOTE — PROGRESS NOTES
Subjective  Ninoska Lopes, 25 y.o. female presents today with:  Chief Complaint   Patient presents with    Chest Pain     pt states she is having chest pain on and off. dizziness. denies headache. HPI    Years ago was told she had costochondritis. Since then he chest has been tender. Pain increases with deep inspiration. Sometimes feels like heart is going to come out of her chest chest and she feels short of breath. Last up to 2 hours. Was working the last time it happened. Pulse was 120 at that time. Has been having a lot of dizziness especially when bathing. Gets very short of breath when walking up stairs and needs to take a break - x 2 weeks. Has increasing edema BLE. No other questions and or concerns for today's visit      Review of Systems  Vomits after every meal.  No diarrhea or constipation. No fevers or chills. No syncopal episodes. No rashes. 7 history of hyponatremia.     Past Medical History:   Diagnosis Date    Anxiety 12/12/2017    Class 1 obesity due to excess calories without serious comorbidity with body mass index (BMI) of 30.0 to 30.9 in adult 12/12/2017    Hyperthyroidism 1/17/2018    Mental disorder     anxiety     Past Surgical History:   Procedure Laterality Date    BACK SURGERY  2004    tumor removed     Social History     Socioeconomic History    Marital status:      Spouse name: Not on file    Number of children: Not on file    Years of education: Not on file    Highest education level: Not on file   Occupational History    Not on file   Social Needs    Financial resource strain: Not on file    Food insecurity:     Worry: Not on file     Inability: Not on file    Transportation needs:     Medical: Not on file     Non-medical: Not on file   Tobacco Use    Smoking status: Never Smoker    Smokeless tobacco: Never Used   Substance and Sexual Activity    Alcohol use: No    Drug use: No    Sexual activity: Yes   Lifestyle    Physical activity:     Days per week: Not on file     Minutes per session: Not on file    Stress: Not on file   Relationships    Social connections:     Talks on phone: Not on file     Gets together: Not on file     Attends Latter day service: Not on file     Active member of club or organization: Not on file     Attends meetings of clubs or organizations: Not on file     Relationship status: Not on file    Intimate partner violence:     Fear of current or ex partner: Not on file     Emotionally abused: Not on file     Physically abused: Not on file     Forced sexual activity: Not on file   Other Topics Concern    Not on file   Social History Narrative    Not on file     Family History   Problem Relation Age of Onset    No Known Problems Mother     Diabetes Father     No Known Problems Sister     No Known Problems Brother     No Known Problems Sister     Rheum Arthritis Maternal Grandmother      No Known Allergies  Current Outpatient Medications   Medication Sig Dispense Refill    ondansetron (ZOFRAN-ODT) 4 MG disintegrating tablet Take 1 tablet by mouth 3 times daily as needed for Nausea or Vomiting 21 tablet 0    Prenatal MV-Min-Fe Cbn-FA-DHA (CONSTANTINO ONE) 27-0.8-200 MG CAPS Take 1 tablet by mouth daily 30 capsule 5     No current facility-administered medications for this visit. PMH, Surgical Hx, Family Hx, and Social Hxreviewed and updated. Health Maintenance reviewed. Objective    Vitals:    05/28/19 1700   BP: 108/62   Pulse: 104   Resp: 16   Temp: 98.4 °F (36.9 °C)   SpO2: 98%   Weight: 196 lb (88.9 kg)   Height: 5' 5\" (1.651 m)        Physical Exam   Constitutional: She is oriented to person, place, and time. She appears well-developed and well-nourished. HENT:   Head: Normocephalic and atraumatic. Eyes: Conjunctivae are normal.   Neck: Neck supple. Carotid bruit is not present. No thyromegaly present. Cardiovascular: Normal rate, regular rhythm, S1 normal, S2 normal and normal heart sounds. Exam reveals no gallop and no friction rub. No murmur heard. Pulmonary/Chest: Effort normal. She has no wheezes. She has no rales. She exhibits tenderness. Musculoskeletal: She exhibits no edema (BLE). Lymphadenopathy:     She has no cervical adenopathy. Neurological: She is alert and oriented to person, place, and time. Skin: Skin is warm and dry. Psychiatric: She has a normal mood and affect. Lab Results   Component Value Date    LABA1C 5.2 04/02/2019     Lab Results   Component Value Date    CREATININE 0.38 (L) 04/02/2019     Lab Results   Component Value Date    ALT 45 (H) 04/02/2019    AST 31 04/02/2019     Lab Results   Component Value Date    HDL 39 (L) 12/13/2017    LDLCALC 116 12/13/2017        Assessment & Plan   Visit Diagnoses and Associated Orders     Palpitations    -  Primary    Tachycardia and dyspnea with minimal exertion occurring daily. Holter monitor given recurrent complaint and high level of patient anxiety. Labs. Basic Metabolic Panel [[id number] Custom]   - Future Order    Magnesium [[id number] Custom]   - Future Order    Holter Monitor 48 Hour [[id number] Custom]           Shortness of breath        See above. Has ruled out for PE for similar symptoms in March 2019         Intercostal pain        Chronic intermittent for years. APAP per OTC instructions. No NSAIDs. Reviewed with the patient: all disease processes, current clinical status, medications, activities and diet.      Side effects, adverse effects of the medication prescribed today, as well as treatment plan/ rationale and result expectations have been discussed with the patient who expresses understanding and desires to proceed.     Close follow up to evaluate treatment results and for coordination of care. I have reviewed the patient's medical history in detail and updated the computerized patient record.     More than 50% of the appointment was spent in face-to-face counseling, education and care coordination. Please note this report has been partially produced using speech recognition software and may contain mistakes related to that system including errors in grammar, punctuation and spelling as well as words and phrases that may seem inappropriate. If there are questions or concerns, please feel free to contact me to clarify. Orders Placed This Encounter   Procedures    Basic Metabolic Panel     Standing Status:   Future     Standing Expiration Date:   5/28/2020    Magnesium     Standing Status:   Future     Standing Expiration Date:   5/28/2020    Holter Monitor 48 Hour     Order Specific Question:   Reason for Exam?     Answer: Tachycardia     No orders of the defined types were placed in this encounter. There are no discontinued medications. Return in about 2 weeks (around 6/11/2019) for palpitation. Controlled Substances Monitoring:     No flowsheet data found.     Ivelisse Villar MD

## 2019-05-28 NOTE — PROGRESS NOTES
Patient's last menstrual period was 10/21/2018. Please reference prenatal and OB flow chart for further information  PT here today for routine prenatal care  Pt endorses fetal movement and denies loss of fluid, contractions or vaginal bleeding  Pt without complaints  ROS:  Pt denies headache, dysuria, nausea/vomiting  PE:  /74   Pulse 104   Wt 195 lb (88.5 kg)   LMP 10/21/2018   BMI 32.45 kg/m²   Gen - Alert and oriented x 3  HEENT- NC/AT, CVS - RRR, Lungs - CTAB  Abd - FH Appropriate fetal growth  LE no edema  Reassuring fetal status at this time    Upon completion of the visit all questions were answered and the patients follow-up and testing schedule were reviewed.

## 2019-06-01 ENCOUNTER — HOSPITAL ENCOUNTER (OUTPATIENT)
Age: 24
Discharge: HOME | End: 2019-06-01
Attending: OBSTETRICS & GYNECOLOGY | Admitting: OBSTETRICS & GYNECOLOGY
Payer: COMMERCIAL

## 2019-06-01 VITALS
HEIGHT: 65 IN | SYSTOLIC BLOOD PRESSURE: 104 MMHG | HEART RATE: 93 BPM | RESPIRATION RATE: 16 BRPM | TEMPERATURE: 98.6 F | WEIGHT: 196 LBS | OXYGEN SATURATION: 98 % | BODY MASS INDEX: 32.65 KG/M2 | DIASTOLIC BLOOD PRESSURE: 63 MMHG

## 2019-06-01 LAB
AMPHETAMINE SCREEN, URINE: NORMAL
BACTERIA: NEGATIVE /HPF
BARBITURATE SCREEN URINE: NORMAL
BENZODIAZEPINE SCREEN, URINE: NORMAL
BILIRUBIN URINE: NEGATIVE
BLOOD, URINE: NEGATIVE
CANNABINOID SCREEN URINE: NORMAL
CLARITY: CLEAR
COCAINE METABOLITE SCREEN URINE: NORMAL
COLOR: YELLOW
EPITHELIAL CELLS, UA: NORMAL /HPF (ref 0–5)
GLUCOSE URINE: NEGATIVE MG/DL
HYALINE CASTS: NORMAL /HPF (ref 0–5)
KETONES, URINE: NEGATIVE MG/DL
LEUKOCYTE ESTERASE, URINE: ABNORMAL
Lab: NORMAL
NITRITE, URINE: NEGATIVE
OPIATE SCREEN URINE: NORMAL
PH UA: 7 (ref 5–9)
PHENCYCLIDINE SCREEN URINE: NORMAL
PROTEIN UA: NEGATIVE MG/DL
RBC UA: NORMAL /HPF (ref 0–5)
SPECIFIC GRAVITY UA: 1.01 (ref 1–1.03)
UROBILINOGEN, URINE: 1 E.U./DL
WBC UA: NORMAL /HPF (ref 0–5)

## 2019-06-01 PROCEDURE — 81001 URINALYSIS AUTO W/SCOPE: CPT

## 2019-06-01 PROCEDURE — 6360000002 HC RX W HCPCS: Performed by: OBSTETRICS & GYNECOLOGY

## 2019-06-01 PROCEDURE — 80307 DRUG TEST PRSMV CHEM ANLYZR: CPT

## 2019-06-01 PROCEDURE — 96372 THER/PROPH/DIAG INJ SC/IM: CPT

## 2019-06-01 PROCEDURE — 99220 PR INITIAL OBSERVATION CARE/DAY 70 MINUTES: CPT | Performed by: OBSTETRICS & GYNECOLOGY

## 2019-06-01 PROCEDURE — 99283 EMERGENCY DEPT VISIT LOW MDM: CPT

## 2019-06-01 PROCEDURE — 6370000000 HC RX 637 (ALT 250 FOR IP): Performed by: OBSTETRICS & GYNECOLOGY

## 2019-06-01 RX ORDER — ACETAMINOPHEN 325 MG/1
650 TABLET ORAL EVERY 4 HOURS PRN
Status: DISCONTINUED | OUTPATIENT
Start: 2019-06-01 | End: 2019-06-01 | Stop reason: HOSPADM

## 2019-06-01 RX ORDER — TERBUTALINE SULFATE 1 MG/ML
0.25 INJECTION, SOLUTION SUBCUTANEOUS ONCE
Status: COMPLETED | OUTPATIENT
Start: 2019-06-01 | End: 2019-06-01

## 2019-06-01 RX ADMIN — ACETAMINOPHEN 650 MG: 325 TABLET ORAL at 12:37

## 2019-06-01 RX ADMIN — TERBUTALINE SULFATE 0.25 MG: 1 INJECTION SUBCUTANEOUS at 12:36

## 2019-06-01 ASSESSMENT — PAIN SCALES - GENERAL: PAINLEVEL_OUTOF10: 8

## 2019-06-01 NOTE — FLOWSHEET NOTE
1327-Dr. Cavazos at desk- viewed strip orders to discharge patient home and for her to follow-up with OBGYN this week/   Patient states she does not feel any pain or contractions, states pain 0/0 pain and states she feels \"much better\". Discharge instructions fiven to patient- patient verbalized understanding, FOB with patient and also verbalizes understanding and will follow-up with Dr. Susan Richey this week.

## 2019-06-01 NOTE — H&P
Isma Bartholomew is a 25 y.o. female patient. No diagnosis found. Past Medical History:   Diagnosis Date    Anxiety 2017    Class 1 obesity due to excess calories without serious comorbidity with body mass index (BMI) of 30.0 to 30.9 in adult 2017    Hyperthyroidism 2018    Mental disorder     anxiety     OB History        2    Para        Term                AB        Living           SAB        TAB        Ectopic        Molar        Multiple        Live Births                  28w0d  Estimated Date of Delivery: 19  No Known Allergies  Active Problems:    * No active hospital problems. *  Resolved Problems:    * No resolved hospital problems. *    Blood pressure 111/69, pulse 90, temperature 98.6 °F (37 °C), temperature source Oral, resp. rate 16, last menstrual period 10/21/2018, not currently breastfeeding. Maternal Medical History:   Reason for admission: Contractions. But no VB, LOF. Good FM    Contractions: Onset was 1-2 hours ago. Frequency: irregular. Perceived severity is mild. Fetal activity: Perceived fetal activity is normal.          Maternal Exam:   Uterine Assessment: Contraction strength is mild. Contraction frequency is irregular. Abdomen: Patient reports no abdominal tenderness. Introitus: Normal vulva. Cervix: Cervix evaluated by digital exam.    cx closed/thick    Assessment:  Not in labor. Membrane status: intact. Fetal well-being: normal.   Irreg ctx but cx closed    Plan:  Await U/A, bedrest, PO hydration.  See OB this week      Diane Love MD  2019

## 2019-06-01 NOTE — FLOWSHEET NOTE
Speaking to patient via - patient denies any vaginal bleeding, denies AROM, and denies having vaginal intercourse within 24 hours. Call to Dr. González Nunez- to evaluate patient- orders to give patient fluids.  at bedside- SVE closed.

## 2019-06-01 NOTE — FLOWSHEET NOTE
Call to Dr. Caldwell Pod to inform her that the patient has received PO fluids, urine is back,  And patient still hyacinth- orders received.

## 2019-06-01 NOTE — FLOWSHEET NOTE
Pulse checked before terb given to patient- patient educated on side effects of the medication-verbalizes understanding.

## 2019-06-06 DIAGNOSIS — R00.2 PALPITATIONS: Chronic | ICD-10-CM

## 2019-06-06 LAB
ANION GAP SERPL CALCULATED.3IONS-SCNC: 13 MEQ/L (ref 9–15)
BUN BLDV-MCNC: 4 MG/DL (ref 6–20)
CALCIUM SERPL-MCNC: 8.9 MG/DL (ref 8.5–9.9)
CHLORIDE BLD-SCNC: 104 MEQ/L (ref 95–107)
CO2: 20 MEQ/L (ref 20–31)
CREAT SERPL-MCNC: 0.38 MG/DL (ref 0.5–0.9)
GFR AFRICAN AMERICAN: >60
GFR NON-AFRICAN AMERICAN: >60
GLUCOSE BLD-MCNC: 92 MG/DL (ref 70–99)
MAGNESIUM: 2 MG/DL (ref 1.7–2.4)
POTASSIUM SERPL-SCNC: 3.4 MEQ/L (ref 3.4–4.9)
SODIUM BLD-SCNC: 137 MEQ/L (ref 135–144)

## 2019-06-10 ENCOUNTER — TELEPHONE (OUTPATIENT)
Dept: OBGYN CLINIC | Age: 24
End: 2019-06-10

## 2019-06-10 DIAGNOSIS — Z34.83 ENCOUNTER FOR SUPERVISION OF OTHER NORMAL PREGNANCY IN THIRD TRIMESTER: Primary | ICD-10-CM

## 2019-06-18 ENCOUNTER — OFFICE VISIT (OUTPATIENT)
Dept: FAMILY MEDICINE CLINIC | Age: 24
End: 2019-06-18
Payer: COMMERCIAL

## 2019-06-18 VITALS
DIASTOLIC BLOOD PRESSURE: 68 MMHG | HEIGHT: 65 IN | SYSTOLIC BLOOD PRESSURE: 110 MMHG | WEIGHT: 197.8 LBS | RESPIRATION RATE: 16 BRPM | TEMPERATURE: 97.9 F | HEART RATE: 114 BPM | OXYGEN SATURATION: 98 % | BODY MASS INDEX: 32.96 KG/M2

## 2019-06-18 DIAGNOSIS — R00.2 PALPITATIONS: Primary | Chronic | ICD-10-CM

## 2019-06-18 PROCEDURE — 99213 OFFICE O/P EST LOW 20 MIN: CPT | Performed by: FAMILY MEDICINE

## 2019-06-18 PROCEDURE — G8417 CALC BMI ABV UP PARAM F/U: HCPCS | Performed by: FAMILY MEDICINE

## 2019-06-18 PROCEDURE — G8427 DOCREV CUR MEDS BY ELIG CLIN: HCPCS | Performed by: FAMILY MEDICINE

## 2019-06-18 PROCEDURE — 1036F TOBACCO NON-USER: CPT | Performed by: FAMILY MEDICINE

## 2019-06-18 ASSESSMENT — ENCOUNTER SYMPTOMS
COUGH: 0
SHORTNESS OF BREATH: 1
CHEST TIGHTNESS: 0
CHOKING: 0

## 2019-06-18 NOTE — PROGRESS NOTES
Subjective  Lucretia Gómez, 25 y.o. female presents today with:  Chief Complaint   Patient presents with    Palpitations     pt states she has an appt 07/01/19 to get holter monitor. states still having the palpitations. pt states dizziness at times-more when taking a shower. HPI    Years ago was told she had costochondritis. Since then he chest has been tender. Pain increases with deep inspiration.      FOr 4 months now feels like heart is going to come out of her chest chest and she feels short of breath. Last up to 2 hours. Was working the last time it happened. Pulse was 120 at that time. Has been having a lot of dizziness especially when bathing. Gets very short of breath when walking up stairs and needs to take a break - x 6 weeks. Has increasing edema BLE. No other questions and or concerns for today's visit      Review of Systems   Constitutional: Positive for activity change and fatigue. Negative for unexpected weight change. Respiratory: Positive for shortness of breath. Negative for cough, choking and chest tightness. Cardiovascular: Positive for palpitations and leg swelling (intermittent). Negative for chest pain.          Past Medical History:   Diagnosis Date    Anxiety 12/12/2017    Class 1 obesity due to excess calories without serious comorbidity with body mass index (BMI) of 30.0 to 30.9 in adult 12/12/2017    Hyperthyroidism 1/17/2018    Mental disorder     anxiety     Past Surgical History:   Procedure Laterality Date    BACK SURGERY  2004    tumor removed     Social History     Socioeconomic History    Marital status:      Spouse name: Not on file    Number of children: Not on file    Years of education: Not on file    Highest education level: Not on file   Occupational History    Not on file   Social Needs    Financial resource strain: Not on file    Food insecurity:     Worry: Not on file     Inability: Not on file    Transportation needs: well-nourished. HENT:   Head: Normocephalic and atraumatic. Eyes: Conjunctivae are normal.   Neck: Neck supple. Carotid bruit is not present. No thyromegaly present. Cardiovascular: Normal rate, regular rhythm, S1 normal and S2 normal.   Likely physiologic tachycardia given pregnancy   Pulmonary/Chest: Effort normal. She has no wheezes. She has no rales. Musculoskeletal: She exhibits no edema. Lymphadenopathy:     She has no cervical adenopathy. Neurological: She is alert and oriented to person, place, and time. Skin: Skin is warm and dry. Psychiatric: She has a normal mood and affect. Lab Results   Component Value Date    LABA1C 5.2 04/02/2019     Lab Results   Component Value Date    CREATININE 0.38 (L) 06/06/2019     Lab Results   Component Value Date    ALT 45 (H) 04/02/2019    AST 31 04/02/2019     Lab Results   Component Value Date    HDL 39 (L) 12/13/2017    LDLCALC 116 12/13/2017        Assessment & Plan   Visit Diagnoses and Associated Orders     Palpitations    -  Primary    Continue with Holter monitor. Reviewed with the patient: all disease processes, current clinical status, medications, activities and diet.      Side effects, adverse effects of the medication prescribed today, as well as treatment plan/ rationale and result expectations have been discussed with the patient who expresses understanding and desires to proceed.     Close follow up to evaluate treatment results and for coordination of care. I have reviewed the patient's medical history in detail and updated the computerized patient record. More than 50% of the appointment was spent in face-to-face counseling, education and care coordination. No orders of the defined types were placed in this encounter. No orders of the defined types were placed in this encounter. There are no discontinued medications. Return if symptoms worsen or fail to improve.         Controlled Substance Monitoring:    Acute and Chronic Pain Monitoring:   No flowsheet data found.         Zoë Lagos MD

## 2019-06-24 ENCOUNTER — HOSPITAL ENCOUNTER (OUTPATIENT)
Dept: ULTRASOUND IMAGING | Age: 24
Discharge: HOME | End: 2019-06-26
Payer: COMMERCIAL

## 2019-06-24 DIAGNOSIS — Z34.83 ENCOUNTER FOR SUPERVISION OF OTHER NORMAL PREGNANCY IN THIRD TRIMESTER: ICD-10-CM

## 2019-06-24 PROCEDURE — 76815 OB US LIMITED FETUS(S): CPT

## 2019-06-25 ENCOUNTER — ROUTINE PRENATAL (OUTPATIENT)
Dept: OBGYN CLINIC | Age: 24
End: 2019-06-25
Payer: COMMERCIAL

## 2019-06-25 VITALS
SYSTOLIC BLOOD PRESSURE: 100 MMHG | HEART RATE: 100 BPM | BODY MASS INDEX: 32.12 KG/M2 | DIASTOLIC BLOOD PRESSURE: 62 MMHG | WEIGHT: 193 LBS

## 2019-06-25 DIAGNOSIS — Z3A.31 31 WEEKS GESTATION OF PREGNANCY: ICD-10-CM

## 2019-06-25 DIAGNOSIS — Z34.83 ENCOUNTER FOR SUPERVISION OF OTHER NORMAL PREGNANCY IN THIRD TRIMESTER: ICD-10-CM

## 2019-06-25 DIAGNOSIS — Z34.83 ENCOUNTER FOR SUPERVISION OF OTHER NORMAL PREGNANCY IN THIRD TRIMESTER: Primary | ICD-10-CM

## 2019-06-25 LAB
BILIRUBIN, POC: ABNORMAL
BLOOD URINE, POC: ABNORMAL
CLARITY, POC: ABNORMAL
COLOR, POC: YELLOW
GLUCOSE URINE, POC: ABNORMAL
KETONES, POC: ABNORMAL
LEUKOCYTE EST, POC: ABNORMAL
NITRITE, POC: ABNORMAL
PH, POC: 6
PROTEIN, POC: ABNORMAL
SPECIFIC GRAVITY, POC: 1.03
UROBILINOGEN, POC: ABNORMAL

## 2019-06-25 PROCEDURE — G8427 DOCREV CUR MEDS BY ELIG CLIN: HCPCS | Performed by: OBSTETRICS & GYNECOLOGY

## 2019-06-25 PROCEDURE — 99213 OFFICE O/P EST LOW 20 MIN: CPT | Performed by: OBSTETRICS & GYNECOLOGY

## 2019-06-25 PROCEDURE — G8417 CALC BMI ABV UP PARAM F/U: HCPCS | Performed by: OBSTETRICS & GYNECOLOGY

## 2019-06-25 PROCEDURE — 1036F TOBACCO NON-USER: CPT | Performed by: OBSTETRICS & GYNECOLOGY

## 2019-06-25 NOTE — PROGRESS NOTES
Patient's last menstrual period was 10/21/2018. Please reference prenatal and OB flow chart for further information  PT here today for routine prenatal care  Pt endorses fetal movement and denies loss of fluid, contractions or vaginal bleeding  Pt without complaints  ROS:  Pt denies headache, dysuria, nausea/vomiting  PE:  /62   Pulse 100   Wt 193 lb (87.5 kg)   LMP 10/21/2018   BMI 32.12 kg/m²   Gen - Alert and oriented x 3  HEENT- NC/AT, CVS - RRR, Lungs - CTAB  Abd - FH Appropriate fetal growth  LE no edema  Reassuring fetal status at this time    Upon completion of the visit all questions were answered and the patients follow-up and testing schedule were reviewed.

## 2019-06-27 LAB — URINE CULTURE, ROUTINE: NORMAL

## 2019-07-01 ENCOUNTER — HOSPITAL ENCOUNTER (OUTPATIENT)
Dept: NON INVASIVE DIAGNOSTICS | Age: 24
Discharge: HOME | End: 2019-07-01
Payer: COMMERCIAL

## 2019-07-01 ENCOUNTER — HOSPITAL ENCOUNTER (OUTPATIENT)
Age: 24
Setting detail: OBSERVATION
Discharge: HOME | End: 2019-07-02
Attending: OBSTETRICS & GYNECOLOGY | Admitting: OBSTETRICS & GYNECOLOGY
Payer: COMMERCIAL

## 2019-07-01 PROBLEM — Z3A.20 20 WEEKS GESTATION OF PREGNANCY: Chronic | Status: RESOLVED | Noted: 2019-03-25 | Resolved: 2019-07-01

## 2019-07-01 LAB
BACTERIA: ABNORMAL /HPF
BILIRUBIN URINE: ABNORMAL
BLOOD, URINE: ABNORMAL
CLARITY: CLEAR
COLOR: ABNORMAL
EPITHELIAL CELLS, UA: ABNORMAL /HPF (ref 0–5)
FETAL FIBRONECTIN: POSITIVE
GLUCOSE URINE: NEGATIVE MG/DL
HYALINE CASTS: ABNORMAL /HPF (ref 0–5)
KETONES, URINE: NEGATIVE MG/DL
LEUKOCYTE ESTERASE, URINE: NEGATIVE
NITRITE, URINE: NEGATIVE
PH UA: 6 (ref 5–9)
PROTEIN UA: NEGATIVE MG/DL
RBC UA: ABNORMAL /HPF (ref 0–2)
SPECIFIC GRAVITY UA: 1.03 (ref 1–1.03)
UROBILINOGEN, URINE: 1 E.U./DL
WBC UA: ABNORMAL /HPF (ref 0–5)

## 2019-07-01 PROCEDURE — 93226 XTRNL ECG REC<48 HR SCAN A/R: CPT

## 2019-07-01 PROCEDURE — 93225 XTRNL ECG REC<48 HRS REC: CPT

## 2019-07-01 PROCEDURE — 81001 URINALYSIS AUTO W/SCOPE: CPT

## 2019-07-01 PROCEDURE — 82731 ASSAY OF FETAL FIBRONECTIN: CPT

## 2019-07-01 RX ORDER — SODIUM CHLORIDE, SODIUM LACTATE, POTASSIUM CHLORIDE, CALCIUM CHLORIDE 600; 310; 30; 20 MG/100ML; MG/100ML; MG/100ML; MG/100ML
INJECTION, SOLUTION INTRAVENOUS CONTINUOUS
Status: DISCONTINUED | OUTPATIENT
Start: 2019-07-01 | End: 2019-07-02 | Stop reason: HOSPADM

## 2019-07-01 RX ORDER — BETAMETHASONE SODIUM PHOSPHATE AND BETAMETHASONE ACETATE 3; 3 MG/ML; MG/ML
12 INJECTION, SUSPENSION INTRA-ARTICULAR; INTRALESIONAL; INTRAMUSCULAR; SOFT TISSUE ONCE
Status: COMPLETED | OUTPATIENT
Start: 2019-07-01 | End: 2019-07-02

## 2019-07-01 NOTE — FLOWSHEET NOTE
Pt arrive c/o ctx that started about 3 hours ago. Denies LOF, vaginal bleeding. States has had intercourse in last 24 hours. Pt also states she feels her due date is wrong based on her LMP.

## 2019-07-02 ENCOUNTER — APPOINTMENT (OUTPATIENT)
Dept: ULTRASOUND IMAGING | Age: 24
End: 2019-07-02
Payer: COMMERCIAL

## 2019-07-02 ENCOUNTER — TELEPHONE (OUTPATIENT)
Dept: OBGYN CLINIC | Age: 24
End: 2019-07-02

## 2019-07-02 VITALS
DIASTOLIC BLOOD PRESSURE: 60 MMHG | HEART RATE: 93 BPM | TEMPERATURE: 97.8 F | RESPIRATION RATE: 16 BRPM | SYSTOLIC BLOOD PRESSURE: 113 MMHG

## 2019-07-02 PROBLEM — O47.03 PRETERM UTERINE CONTRACTIONS IN THIRD TRIMESTER, ANTEPARTUM: Status: ACTIVE | Noted: 2019-07-02

## 2019-07-02 PROBLEM — R87.89 POSITIVE FETAL FIBRONECTIN AT 22 WEEKS TO 34 WEEKS GESTATION: Status: ACTIVE | Noted: 2019-07-02

## 2019-07-02 PROBLEM — O09.899 POSITIVE FETAL FIBRONECTIN AT 22 WEEKS TO 34 WEEKS GESTATION: Status: ACTIVE | Noted: 2019-07-02

## 2019-07-02 PROCEDURE — 99217 PR OBSERVATION CARE DISCHARGE MANAGEMENT: CPT | Performed by: OBSTETRICS & GYNECOLOGY

## 2019-07-02 PROCEDURE — 76817 TRANSVAGINAL US OBSTETRIC: CPT

## 2019-07-02 PROCEDURE — 59025 FETAL NON-STRESS TEST: CPT | Performed by: OBSTETRICS & GYNECOLOGY

## 2019-07-02 PROCEDURE — G0378 HOSPITAL OBSERVATION PER HR: HCPCS

## 2019-07-02 PROCEDURE — 2580000003 HC RX 258: Performed by: OBSTETRICS & GYNECOLOGY

## 2019-07-02 PROCEDURE — 6360000002 HC RX W HCPCS: Performed by: OBSTETRICS & GYNECOLOGY

## 2019-07-02 PROCEDURE — 96372 THER/PROPH/DIAG INJ SC/IM: CPT

## 2019-07-02 PROCEDURE — 59025 FETAL NON-STRESS TEST: CPT

## 2019-07-02 RX ADMIN — SODIUM CHLORIDE, POTASSIUM CHLORIDE, SODIUM LACTATE AND CALCIUM CHLORIDE: 600; 310; 30; 20 INJECTION, SOLUTION INTRAVENOUS at 00:30

## 2019-07-02 RX ADMIN — BETAMETHASONE SODIUM PHOSPHATE AND BETAMETHASONE ACETATE 12 MG: 3; 3 INJECTION, SUSPENSION INTRA-ARTICULAR; INTRALESIONAL; INTRAMUSCULAR at 01:01

## 2019-07-02 NOTE — DISCHARGE SUMMARY
Obstetrical Discharge Form    Gestational Age:32w3d  Antepartum complications:  contractions  Date of Delivery: n/a  Type of Delivery: n/a  Delivered By:           Lydia Mendoza: This patient has no babies on file.   Anesthesia: None    Intrapartum complications: None    Postpartum complications: n/a    LABS:   Labs:    CBC:   Lab Results   Component Value Date    WBC 8.0 2019    RBC 4.16 2019    HGB 12.2 2019    HCT 35.5 2019    MCV 92.6 2019    RDW 13.7 2019     2019       Discharge Medication:    Haroon Corona, [address] Medication Instructions [id number]    Printed on:19 6550   Medication Information                      ondansetron (ZOFRAN-ODT) 4 MG disintegrating tablet  Take 1 tablet by mouth 3 times daily as needed for Nausea or Vomiting             Prenatal MV-Min-Fe Cbn-FA-DHA (CONSTANTINO ONE) 27-0.8-200 MG CAPS  Take 1 tablet by mouth daily                  Discharge Date: 19    Plan:     Follow up office in one week  NST with next steroid dose in one day  Routine precautions given pelvic rest for two weeks    Joann Giron DO

## 2019-07-02 NOTE — TELEPHONE ENCOUNTER
Jerilyn Hernandez, Emergency Contact, called thru patient asking if patient can have a week off work because she has been having contractions and thought maybe she could get some rest.  Pt is a home health aide.

## 2019-07-02 NOTE — FLOWSHEET NOTE
Dr. Jennie Bynum at bedside. Pt given discharge instructions and labor precautions. Pt and significant other verbalizes understanding. Appt for tomorrow at 9am for Betamethasone shot and NST.

## 2019-07-02 NOTE — H&P
Subjective:       Rebekah Person is a 25 y.o.  female with [facility] 19 at 28 and 3/7 weeks gestation who is being admitted for observation. Her current obstetrical history is significant for  ctx, pos FFN. Patient reports Ctx. No LOF, No VB. Fetal Movement: normal.      OB Hx  SAB x1  Current    PMHx  Tachycardia - on Holter monitor  Hyperthyroid  Anxiety    PSHx  Back Sx - tumor removed    Fam Hx  N/C    Soc Hx  Denies    All  NKDA    ROS otherwise WNL       Objective:       LMP 10/21/2018     General:   alert, appears stated age and cooperative   Skin:   normal   HEENT:  EOMI, Sclera clear, anicteric, Oropharynx clear, no lesions, Neck supple with midline trachea, Thyroid without masses and Trachea midline   Lungs:   clear to auscultation bilaterally   Heart:   regular rate and rhythm, S1, S2 normal, no murmur, click, rub or gallop and pulse 90s   Breasts:   self-exam is taught and encouraged   Abdomen:  soft, non-tender; bowel sounds normal; no masses,  no organomegaly and gravid   Pelvis:  Vulva and vagina appear normal. Bimanual exam reveals normal uterus and adnexa. FHT:  140 BPM   Uterine Size: S=D   Presentations: Cephalic   Cervix:     Dilation: Closed    Effacement: Long    Station:  -3    Consistency: Firm    Position: Posterior     Lab Review   A, Rh+  FFN pos    U/S   19 - C.L. 2.6cm    Assessment:      32 and 3/7 weeks gestation.  Ctx  Obstetrical history significant for Pos FFN. Plan:      Risks, benefits, alternatives and possible complications have been discussed in detail with the patient. Pre-admission, admission, and post admission procedures and expectations were discussed in detail. All questions answered, all appropriate consents will be signed at the Hospital. Admission is planned for today. Observation, IV fluids, celestone  U/S for C.L. In AM    Reassuring fetal status, cont to monitor.

## 2019-07-03 ENCOUNTER — HOSPITAL ENCOUNTER (OUTPATIENT)
Age: 24
Discharge: HOME | End: 2019-07-03
Attending: OBSTETRICS & GYNECOLOGY | Admitting: OBSTETRICS & GYNECOLOGY
Payer: COMMERCIAL

## 2019-07-03 VITALS
TEMPERATURE: 98.3 F | SYSTOLIC BLOOD PRESSURE: 100 MMHG | HEART RATE: 84 BPM | DIASTOLIC BLOOD PRESSURE: 58 MMHG | RESPIRATION RATE: 16 BRPM

## 2019-07-03 PROCEDURE — G0378 HOSPITAL OBSERVATION PER HR: HCPCS

## 2019-07-03 PROCEDURE — 59025 FETAL NON-STRESS TEST: CPT | Performed by: OBSTETRICS & GYNECOLOGY

## 2019-07-03 PROCEDURE — 6360000002 HC RX W HCPCS: Performed by: OBSTETRICS & GYNECOLOGY

## 2019-07-03 PROCEDURE — 96372 THER/PROPH/DIAG INJ SC/IM: CPT

## 2019-07-03 PROCEDURE — 59025 FETAL NON-STRESS TEST: CPT

## 2019-07-03 PROCEDURE — G0379 DIRECT REFER HOSPITAL OBSERV: HCPCS

## 2019-07-03 RX ORDER — BETAMETHASONE SODIUM PHOSPHATE AND BETAMETHASONE ACETATE 3; 3 MG/ML; MG/ML
12 INJECTION, SUSPENSION INTRA-ARTICULAR; INTRALESIONAL; INTRAMUSCULAR; SOFT TISSUE ONCE
Status: COMPLETED | OUTPATIENT
Start: 2019-07-03 | End: 2019-07-03

## 2019-07-03 RX ADMIN — BETAMETHASONE SODIUM PHOSPHATE AND BETAMETHASONE ACETATE 12 MG: 3; 3 INJECTION, SUSPENSION INTRA-ARTICULAR; INTRALESIONAL; INTRAMUSCULAR at 09:44

## 2019-07-03 NOTE — FLOWSHEET NOTE
Through , occ cont (better than what she had the other night), no pain, feels baby move, no ROM, no vag. bleeding.

## 2019-07-06 ENCOUNTER — HOSPITAL ENCOUNTER (OUTPATIENT)
Age: 24
Discharge: HOME | End: 2019-07-06
Attending: OBSTETRICS & GYNECOLOGY | Admitting: OBSTETRICS & GYNECOLOGY
Payer: COMMERCIAL

## 2019-07-06 VITALS
RESPIRATION RATE: 18 BRPM | TEMPERATURE: 98.5 F | OXYGEN SATURATION: 96 % | HEART RATE: 98 BPM | SYSTOLIC BLOOD PRESSURE: 100 MMHG | DIASTOLIC BLOOD PRESSURE: 55 MMHG

## 2019-07-06 LAB
AMPHETAMINE SCREEN, URINE: NORMAL
BACTERIA: ABNORMAL /HPF
BARBITURATE SCREEN URINE: NORMAL
BENZODIAZEPINE SCREEN, URINE: NORMAL
BILIRUBIN URINE: ABNORMAL
BLOOD, URINE: NEGATIVE
CANNABINOID SCREEN URINE: NORMAL
CLARITY: CLEAR
CLUE CELLS: ABNORMAL
COCAINE METABOLITE SCREEN URINE: NORMAL
COLOR: ABNORMAL
EPITHELIAL CELLS, UA: ABNORMAL /HPF (ref 0–5)
GLUCOSE URINE: NEGATIVE MG/DL
HYALINE CASTS: ABNORMAL /HPF (ref 0–5)
KETONES, URINE: 40 MG/DL
LEUKOCYTE ESTERASE, URINE: ABNORMAL
Lab: NORMAL
NITRITE, URINE: NEGATIVE
OPIATE SCREEN URINE: NORMAL
PH UA: 5.5 (ref 5–9)
PHENCYCLIDINE SCREEN URINE: NORMAL
PLACENTA ALPHA MICROGLOBULIN-1: NEGATIVE
PROTEIN UA: 30 MG/DL
RBC UA: ABNORMAL /HPF (ref 0–5)
SPECIFIC GRAVITY UA: 1.03 (ref 1–1.03)
TRICHOMONAS PREP: ABNORMAL
TRICHOMONAS VAGINALIS SCREEN: NEGATIVE
UROBILINOGEN, URINE: 1 E.U./DL
WBC UA: ABNORMAL /HPF (ref 0–5)
YEAST WET PREP: ABNORMAL

## 2019-07-06 PROCEDURE — 87660 TRICHOMONAS VAGIN DIR PROBE: CPT

## 2019-07-06 PROCEDURE — 81001 URINALYSIS AUTO W/SCOPE: CPT

## 2019-07-06 PROCEDURE — G0378 HOSPITAL OBSERVATION PER HR: HCPCS

## 2019-07-06 PROCEDURE — 6360000002 HC RX W HCPCS: Performed by: OBSTETRICS & GYNECOLOGY

## 2019-07-06 PROCEDURE — 87081 CULTURE SCREEN ONLY: CPT

## 2019-07-06 PROCEDURE — 87210 SMEAR WET MOUNT SALINE/INK: CPT

## 2019-07-06 PROCEDURE — 2580000003 HC RX 258: Performed by: OBSTETRICS & GYNECOLOGY

## 2019-07-06 PROCEDURE — 84112 EVAL AMNIOTIC FLUID PROTEIN: CPT

## 2019-07-06 PROCEDURE — G0379 DIRECT REFER HOSPITAL OBSERV: HCPCS

## 2019-07-06 PROCEDURE — 87491 CHLMYD TRACH DNA AMP PROBE: CPT

## 2019-07-06 PROCEDURE — 99284 EMERGENCY DEPT VISIT MOD MDM: CPT

## 2019-07-06 PROCEDURE — 99218 PR INITIAL OBSERVATION CARE/DAY 30 MINUTES: CPT | Performed by: OBSTETRICS & GYNECOLOGY

## 2019-07-06 PROCEDURE — 96372 THER/PROPH/DIAG INJ SC/IM: CPT

## 2019-07-06 PROCEDURE — 87591 N.GONORRHOEAE DNA AMP PROB: CPT

## 2019-07-06 PROCEDURE — 80307 DRUG TEST PRSMV CHEM ANLYZR: CPT

## 2019-07-06 PROCEDURE — 87086 URINE CULTURE/COLONY COUNT: CPT

## 2019-07-06 RX ORDER — TERBUTALINE SULFATE 1 MG/ML
0.25 INJECTION, SOLUTION SUBCUTANEOUS ONCE
Status: COMPLETED | OUTPATIENT
Start: 2019-07-06 | End: 2019-07-06

## 2019-07-06 RX ORDER — SODIUM CHLORIDE, SODIUM LACTATE, POTASSIUM CHLORIDE, AND CALCIUM CHLORIDE .6; .31; .03; .02 G/100ML; G/100ML; G/100ML; G/100ML
500 INJECTION, SOLUTION INTRAVENOUS ONCE
Status: COMPLETED | OUTPATIENT
Start: 2019-07-06 | End: 2019-07-06

## 2019-07-06 RX ORDER — SODIUM CHLORIDE, SODIUM LACTATE, POTASSIUM CHLORIDE, CALCIUM CHLORIDE 600; 310; 30; 20 MG/100ML; MG/100ML; MG/100ML; MG/100ML
INJECTION, SOLUTION INTRAVENOUS CONTINUOUS
Status: DISCONTINUED | OUTPATIENT
Start: 2019-07-06 | End: 2019-07-07 | Stop reason: HOSPADM

## 2019-07-06 RX ADMIN — SODIUM CHLORIDE, POTASSIUM CHLORIDE, SODIUM LACTATE AND CALCIUM CHLORIDE 500 ML: 600; 310; 30; 20 INJECTION, SOLUTION INTRAVENOUS at 20:53

## 2019-07-06 RX ADMIN — TERBUTALINE SULFATE 0.25 MG: 1 INJECTION SUBCUTANEOUS at 20:52

## 2019-07-06 ASSESSMENT — PAIN DESCRIPTION - PAIN TYPE: TYPE: ACUTE PAIN

## 2019-07-06 ASSESSMENT — PAIN DESCRIPTION - DESCRIPTORS: DESCRIPTORS: CRAMPING

## 2019-07-06 ASSESSMENT — PAIN DESCRIPTION - FREQUENCY: FREQUENCY: INTERMITTENT

## 2019-07-06 ASSESSMENT — PAIN DESCRIPTION - ORIENTATION: ORIENTATION: INNER;LOWER

## 2019-07-06 ASSESSMENT — PAIN DESCRIPTION - LOCATION: LOCATION: ABDOMEN

## 2019-07-06 ASSESSMENT — PAIN - FUNCTIONAL ASSESSMENT: PAIN_FUNCTIONAL_ASSESSMENT: ACTIVITIES ARE NOT PREVENTED

## 2019-07-06 ASSESSMENT — PAIN SCALES - GENERAL: PAINLEVEL_OUTOF10: 5

## 2019-07-06 ASSESSMENT — PAIN DESCRIPTION - ONSET: ONSET: PROGRESSIVE

## 2019-07-06 NOTE — FLOWSHEET NOTE
Pt presents to triage with c/o \"liquid coming out\". Pt instructed to provide urine sample and change into gown, oriented to triage room.

## 2019-07-08 ENCOUNTER — HOSPITAL ENCOUNTER (OUTPATIENT)
Age: 24
Setting detail: OBSERVATION
Discharge: HOME | End: 2019-07-08
Attending: OBSTETRICS & GYNECOLOGY | Admitting: OBSTETRICS & GYNECOLOGY
Payer: COMMERCIAL

## 2019-07-08 VITALS
OXYGEN SATURATION: 98 % | TEMPERATURE: 97.8 F | RESPIRATION RATE: 16 BRPM | HEART RATE: 116 BPM | DIASTOLIC BLOOD PRESSURE: 68 MMHG | SYSTOLIC BLOOD PRESSURE: 113 MMHG

## 2019-07-08 LAB
AMPHETAMINE SCREEN, URINE: NORMAL
BACTERIA: ABNORMAL /HPF
BARBITURATE SCREEN URINE: NORMAL
BENZODIAZEPINE SCREEN, URINE: NORMAL
BILIRUBIN URINE: NEGATIVE
BLOOD, URINE: NEGATIVE
CANNABINOID SCREEN URINE: NORMAL
CLARITY: CLEAR
COCAINE METABOLITE SCREEN URINE: NORMAL
COLOR: YELLOW
EPITHELIAL CELLS, UA: ABNORMAL /HPF (ref 0–5)
GLUCOSE URINE: NEGATIVE MG/DL
HYALINE CASTS: ABNORMAL /HPF (ref 0–5)
KETONES, URINE: NEGATIVE MG/DL
LEUKOCYTE ESTERASE, URINE: ABNORMAL
Lab: NORMAL
NITRITE, URINE: NEGATIVE
OPIATE SCREEN URINE: NORMAL
PH UA: 7 (ref 5–9)
PHENCYCLIDINE SCREEN URINE: NORMAL
PROTEIN UA: ABNORMAL MG/DL
RBC UA: ABNORMAL /HPF (ref 0–5)
SPECIFIC GRAVITY UA: 1.01 (ref 1–1.03)
URINE CULTURE, ROUTINE: NORMAL
UROBILINOGEN, URINE: 1 E.U./DL
WBC UA: ABNORMAL /HPF (ref 0–5)

## 2019-07-08 PROCEDURE — 81001 URINALYSIS AUTO W/SCOPE: CPT

## 2019-07-08 PROCEDURE — G0379 DIRECT REFER HOSPITAL OBSERV: HCPCS

## 2019-07-08 PROCEDURE — G0378 HOSPITAL OBSERVATION PER HR: HCPCS

## 2019-07-08 PROCEDURE — 80307 DRUG TEST PRSMV CHEM ANLYZR: CPT

## 2019-07-08 NOTE — FLOWSHEET NOTE
Arrives to triage withc/o pressure in back area. . Thinks she has hemmorrhoid and it is bleeding. Pain level 8/10 . Denies vaginal bleeding. + fetal movement. Deneis loss of fluid

## 2019-07-09 LAB — GROUP B STREP CULTURE: NORMAL

## 2019-07-10 ENCOUNTER — ROUTINE PRENATAL (OUTPATIENT)
Dept: OBGYN CLINIC | Age: 24
End: 2019-07-10
Payer: COMMERCIAL

## 2019-07-10 VITALS
BODY MASS INDEX: 32.72 KG/M2 | DIASTOLIC BLOOD PRESSURE: 58 MMHG | SYSTOLIC BLOOD PRESSURE: 92 MMHG | HEART RATE: 98 BPM | WEIGHT: 196.6 LBS

## 2019-07-10 DIAGNOSIS — Z3A.33 33 WEEKS GESTATION OF PREGNANCY: ICD-10-CM

## 2019-07-10 DIAGNOSIS — Z34.83 ENCOUNTER FOR SUPERVISION OF OTHER NORMAL PREGNANCY IN THIRD TRIMESTER: Primary | ICD-10-CM

## 2019-07-10 LAB
BILIRUBIN, POC: ABNORMAL
BLOOD URINE, POC: ABNORMAL
CLARITY, POC: CLEAR
COLOR, POC: YELLOW
GLUCOSE URINE, POC: ABNORMAL
KETONES, POC: ABNORMAL
LEUKOCYTE EST, POC: ABNORMAL
NITRITE, POC: ABNORMAL
PH, POC: 6
PROTEIN, POC: ABNORMAL
SPECIFIC GRAVITY, POC: 1.02
UROBILINOGEN, POC: ABNORMAL

## 2019-07-10 PROCEDURE — G8427 DOCREV CUR MEDS BY ELIG CLIN: HCPCS | Performed by: OBSTETRICS & GYNECOLOGY

## 2019-07-10 PROCEDURE — 1036F TOBACCO NON-USER: CPT | Performed by: OBSTETRICS & GYNECOLOGY

## 2019-07-10 PROCEDURE — G8417 CALC BMI ABV UP PARAM F/U: HCPCS | Performed by: OBSTETRICS & GYNECOLOGY

## 2019-07-10 PROCEDURE — 99213 OFFICE O/P EST LOW 20 MIN: CPT | Performed by: OBSTETRICS & GYNECOLOGY

## 2019-07-10 RX ORDER — METRONIDAZOLE 500 MG/1
500 TABLET ORAL 2 TIMES DAILY
Qty: 14 TABLET | Refills: 0 | Status: SHIPPED | OUTPATIENT
Start: 2019-07-10 | End: 2019-07-17

## 2019-07-10 RX ORDER — HYDROCORTISONE ACETATE 25 MG/1
25 SUPPOSITORY RECTAL EVERY 12 HOURS
Qty: 20 SUPPOSITORY | Refills: 3 | Status: SHIPPED | OUTPATIENT
Start: 2019-07-10 | End: 2020-02-12

## 2019-07-10 NOTE — PATIENT INSTRUCTIONS
(en inglés) sobre \"Hemorroides: Instrucciones de cuidado - [ Hemorrhoids: Care Instructions ]. \"     Si no tiene katarzyna cuenta, cortez clic en el enlace \"Sign Up Now\". Revisado: 7 noviembre, 2018  Versión del contenido: 12.0  © 3511-5873 [employer]. Las instrucciones de cuidado fueron adaptadas bajo licencia por [facility] ([facility]). Si usted tiene [place]anna Sherrill afección médica o sobre estas instrucciones, siempre pregunte a hartley profesional de andree. [employer] niega toda garantía o responsabilidad por hartley uso de esta información. Patient Education        Estreptococo del isael B lara Verlyn Tyler: Instrucciones de cuidado - [ Group B Strep During Pregnancy: Care Instructions ]  Instrucciones de cuidado    Katarzyna infección por estreptococo del isael B es causada por un tipo de bacteria. Es katarzyna clase de bacteria diferente de la que causa la faringitis por estreptococos. Es posible que tenga esta clase de bacteria en hartley organismo. A veces, puede causar Pitney Lisa, emilio la mayor parte del tiempo no hace que se enferme ni le produce síntomas. Emilio si le transmite la bacteria a hartley bebé lara el nacimiento, puede causarle graves [facility] a hartley bebé. Si tiene ana tipo de bacteria en hartley cuerpo, le darán antibióticos cuando esté en trabajo de parto. Los antibióticos ayudan a prevenir problemas para un bebé recién nacido. Después del nacimiento, los médicos observarán a hartley bebé y podrían hacerle pruebas. Si las pruebas de hartley bebé dan positivo para el estreptococo del isael B, recibirá antibióticos. Si planea amamantar a hartley bebé, no se preocupe. Puede amamantarlo en forma . La atención de seguimiento es katarzyna parte clave de hartley tratamiento y seguridad. Asegúrese de hacer y acudir a todas las citas, y llame a hartley médico si está teniendo problemas. También es katarzyna buena idea saber los resultados de yamila exámenes y mantener katarzyna lista de los medicamentos que yamil.   ¿Cómo puede cuidarse

## 2019-07-11 LAB
C TRACH DNA GENITAL QL NAA+PROBE: NEGATIVE
N. GONORRHOEAE DNA: NEGATIVE

## 2019-07-11 PROCEDURE — 93227 XTRNL ECG REC<48 HR R&I: CPT | Performed by: INTERNAL MEDICINE

## 2019-07-16 ENCOUNTER — OFFICE VISIT (OUTPATIENT)
Dept: FAMILY MEDICINE CLINIC | Age: 24
End: 2019-07-16
Payer: COMMERCIAL

## 2019-07-16 VITALS
OXYGEN SATURATION: 98 % | RESPIRATION RATE: 16 BRPM | SYSTOLIC BLOOD PRESSURE: 102 MMHG | HEIGHT: 65 IN | DIASTOLIC BLOOD PRESSURE: 66 MMHG | WEIGHT: 195 LBS | BODY MASS INDEX: 32.49 KG/M2 | TEMPERATURE: 98.7 F | HEART RATE: 112 BPM

## 2019-07-16 DIAGNOSIS — I48.0 PAROXYSMAL ATRIAL FIBRILLATION (HCC): Chronic | ICD-10-CM

## 2019-07-16 DIAGNOSIS — R94.31 PROLONGED Q-T INTERVAL ON ECG: Chronic | ICD-10-CM

## 2019-07-16 PROBLEM — I48.91 ATRIAL FIBRILLATION (HCC): Chronic | Status: ACTIVE | Noted: 2019-07-16

## 2019-07-16 PROCEDURE — G8417 CALC BMI ABV UP PARAM F/U: HCPCS | Performed by: FAMILY MEDICINE

## 2019-07-16 PROCEDURE — 99214 OFFICE O/P EST MOD 30 MIN: CPT | Performed by: FAMILY MEDICINE

## 2019-07-16 PROCEDURE — 1036F TOBACCO NON-USER: CPT | Performed by: FAMILY MEDICINE

## 2019-07-16 PROCEDURE — G8427 DOCREV CUR MEDS BY ELIG CLIN: HCPCS | Performed by: FAMILY MEDICINE

## 2019-07-17 NOTE — PROGRESS NOTES
tobacco: Never Used   Substance and Sexual Activity    Alcohol use: No    Drug use: No    Sexual activity: Yes   Lifestyle    Physical activity:     Days per week: Not on file     Minutes per session: Not on file    Stress: Not on file   Relationships    Social connections:     Talks on phone: Not on file     Gets together: Not on file     Attends Hindu service: Not on file     Active member of club or organization: Not on file     Attends meetings of clubs or organizations: Not on file     Relationship status: Not on file    Intimate partner violence:     Fear of current or ex partner: Not on file     Emotionally abused: Not on file     Physically abused: Not on file     Forced sexual activity: Not on file   Other Topics Concern    Not on file   Social History Narrative    Not on file     Family History   Problem Relation Age of Onset    No Known Problems Mother     Diabetes Father     No Known Problems Sister     No Known Problems Brother     No Known Problems Sister     Rheum Arthritis Maternal Grandmother      No Known Allergies  Current Outpatient Medications   Medication Sig Dispense Refill    metroNIDAZOLE (FLAGYL) 500 MG tablet Take 1 tablet by mouth 2 times daily for 7 days 14 tablet 0    hydrocortisone (ANUSOL-HC) 25 MG suppository Place 1 suppository rectally every 12 hours 20 suppository 3    Prenatal MV-Min-Fe Cbn-FA-DHA (CONSTANTINO ONE) 27-0.8-200 MG CAPS Take 1 tablet by mouth daily 30 capsule 5     No current facility-administered medications for this visit. PMH, Surgical Hx, Family Hx, and Social Hxreviewed and updated. Health Maintenance reviewed. Objective    Vitals:    07/16/19 1721   BP: 102/66   Pulse: 112   Resp: 16   Temp: 98.7 °F (37.1 °C)   SpO2: 98%   Weight: 195 lb (88.5 kg)   Height: 5' 5\" (1.651 m)        Physical Exam   Constitutional: She is oriented to person, place, and time. She appears well-developed and well-nourished. HENT:   Head: Normocephalic.

## 2019-07-19 ENCOUNTER — OFFICE VISIT (OUTPATIENT)
Dept: FAMILY MEDICINE CLINIC | Age: 24
End: 2019-07-19
Payer: COMMERCIAL

## 2019-07-19 VITALS
DIASTOLIC BLOOD PRESSURE: 66 MMHG | OXYGEN SATURATION: 98 % | WEIGHT: 195 LBS | SYSTOLIC BLOOD PRESSURE: 108 MMHG | TEMPERATURE: 98.1 F | HEIGHT: 65 IN | RESPIRATION RATE: 15 BRPM | BODY MASS INDEX: 32.49 KG/M2 | HEART RATE: 93 BPM

## 2019-07-19 DIAGNOSIS — J02.9 SORE THROAT: Primary | ICD-10-CM

## 2019-07-19 DIAGNOSIS — J02.9 SORE THROAT: ICD-10-CM

## 2019-07-19 PROCEDURE — 1036F TOBACCO NON-USER: CPT | Performed by: NURSE PRACTITIONER

## 2019-07-19 PROCEDURE — G8417 CALC BMI ABV UP PARAM F/U: HCPCS | Performed by: NURSE PRACTITIONER

## 2019-07-19 PROCEDURE — G8427 DOCREV CUR MEDS BY ELIG CLIN: HCPCS | Performed by: NURSE PRACTITIONER

## 2019-07-19 PROCEDURE — 99213 OFFICE O/P EST LOW 20 MIN: CPT | Performed by: NURSE PRACTITIONER

## 2019-07-19 ASSESSMENT — ENCOUNTER SYMPTOMS
COLOR CHANGE: 0
WHEEZING: 0
DIARRHEA: 0
COUGH: 0
RHINORRHEA: 0
SHORTNESS OF BREATH: 0
SORE THROAT: 1
VOMITING: 0
NAUSEA: 0

## 2019-07-20 ENCOUNTER — HOSPITAL ENCOUNTER (INPATIENT)
Age: 24
LOS: 4 days | Discharge: HOME | DRG: 540 | End: 2019-07-24
Attending: OBSTETRICS & GYNECOLOGY | Admitting: OBSTETRICS & GYNECOLOGY
Payer: COMMERCIAL

## 2019-07-20 DIAGNOSIS — G89.18 POSTOPERATIVE PAIN: Primary | ICD-10-CM

## 2019-07-20 LAB
ABO/RH: NORMAL
ALBUMIN SERPL-MCNC: 3.1 G/DL (ref 3.5–4.6)
ALP BLD-CCNC: 161 U/L (ref 40–130)
ALT SERPL-CCNC: 19 U/L (ref 0–33)
AMPHETAMINE SCREEN, URINE: NORMAL
ANION GAP SERPL CALCULATED.3IONS-SCNC: 14 MEQ/L (ref 9–15)
ANTIBODY SCREEN: NORMAL
AST SERPL-CCNC: 18 U/L (ref 0–35)
BACTERIA: ABNORMAL /HPF
BARBITURATE SCREEN URINE: NORMAL
BASOPHILS ABSOLUTE: 0 K/UL (ref 0–0.2)
BASOPHILS RELATIVE PERCENT: 0.3 %
BENZODIAZEPINE SCREEN, URINE: NORMAL
BILIRUB SERPL-MCNC: 0.3 MG/DL (ref 0.2–0.7)
BILIRUBIN URINE: NEGATIVE
BLOOD, URINE: NEGATIVE
BUN BLDV-MCNC: 7 MG/DL (ref 6–20)
CALCIUM SERPL-MCNC: 8.6 MG/DL (ref 8.5–9.9)
CANNABINOID SCREEN URINE: NORMAL
CHLORIDE BLD-SCNC: 102 MEQ/L (ref 95–107)
CLARITY: CLEAR
CO2: 20 MEQ/L (ref 20–31)
COCAINE METABOLITE SCREEN URINE: NORMAL
COLOR: YELLOW
CREAT SERPL-MCNC: 0.44 MG/DL (ref 0.5–0.9)
EOSINOPHILS ABSOLUTE: 0.1 K/UL (ref 0–0.7)
EOSINOPHILS RELATIVE PERCENT: 0.6 %
EPITHELIAL CELLS, UA: ABNORMAL /HPF (ref 0–5)
GFR AFRICAN AMERICAN: >60
GFR NON-AFRICAN AMERICAN: >60
GLOBULIN: 3.7 G/DL (ref 2.3–3.5)
GLUCOSE BLD-MCNC: 117 MG/DL (ref 70–99)
GLUCOSE URINE: NEGATIVE MG/DL
HCT VFR BLD CALC: 30.1 % (ref 37–47)
HEMOGLOBIN: 11.5 G/DL (ref 12–16)
HEPATITIS B SURFACE ANTIGEN INTERPRETATION: NORMAL
KETONES, URINE: NEGATIVE MG/DL
LEUKOCYTE ESTERASE, URINE: ABNORMAL
LYMPHOCYTES ABSOLUTE: 1.8 K/UL (ref 1–4.8)
LYMPHOCYTES RELATIVE PERCENT: 19.2 %
Lab: NORMAL
MCH RBC QN AUTO: 32.9 PG (ref 27–31.3)
MCHC RBC AUTO-ENTMCNC: 38.1 % (ref 33–37)
MCV RBC AUTO: 86.3 FL (ref 82–100)
MONOCYTES ABSOLUTE: 0.5 K/UL (ref 0.2–0.8)
MONOCYTES RELATIVE PERCENT: 5.6 %
NEUTROPHILS ABSOLUTE: 6.9 K/UL (ref 1.4–6.5)
NEUTROPHILS RELATIVE PERCENT: 74.3 %
NITRITE, URINE: NEGATIVE
OPIATE SCREEN URINE: NORMAL
PDW BLD-RTO: 12.6 % (ref 11.5–14.5)
PH UA: 7 (ref 5–9)
PHENCYCLIDINE SCREEN URINE: NORMAL
PLATELET # BLD: 374 K/UL (ref 130–400)
POTASSIUM SERPL-SCNC: 3.8 MEQ/L (ref 3.4–4.9)
PROTEIN UA: 30 MG/DL
RBC # BLD: 3.49 M/UL (ref 4.2–5.4)
RBC UA: ABNORMAL /HPF (ref 0–5)
SODIUM BLD-SCNC: 136 MEQ/L (ref 135–144)
SPECIFIC GRAVITY UA: 1.02 (ref 1–1.03)
TOTAL PROTEIN: 6.8 G/DL (ref 6.3–8)
UROBILINOGEN, URINE: 1 E.U./DL
WBC # BLD: 9.2 K/UL (ref 4.8–10.8)
WBC UA: ABNORMAL /HPF (ref 0–5)

## 2019-07-20 PROCEDURE — 86850 RBC ANTIBODY SCREEN: CPT

## 2019-07-20 PROCEDURE — 1220000000 HC SEMI PRIVATE OB R&B

## 2019-07-20 PROCEDURE — 85025 COMPLETE CBC W/AUTO DIFF WBC: CPT

## 2019-07-20 PROCEDURE — 86762 RUBELLA ANTIBODY: CPT

## 2019-07-20 PROCEDURE — 81001 URINALYSIS AUTO W/SCOPE: CPT

## 2019-07-20 PROCEDURE — 80307 DRUG TEST PRSMV CHEM ANLYZR: CPT

## 2019-07-20 PROCEDURE — 87340 HEPATITIS B SURFACE AG IA: CPT

## 2019-07-20 PROCEDURE — 36415 COLL VENOUS BLD VENIPUNCTURE: CPT

## 2019-07-20 PROCEDURE — 80053 COMPREHEN METABOLIC PANEL: CPT

## 2019-07-20 PROCEDURE — 86901 BLOOD TYPING SEROLOGIC RH(D): CPT

## 2019-07-20 PROCEDURE — 86900 BLOOD TYPING SEROLOGIC ABO: CPT

## 2019-07-20 RX ORDER — CLINDAMYCIN PHOSPHATE 900 MG/50ML
900 INJECTION INTRAVENOUS EVERY 8 HOURS
Status: DISCONTINUED | OUTPATIENT
Start: 2019-07-20 | End: 2019-07-21

## 2019-07-20 RX ORDER — DOCUSATE SODIUM 100 MG/1
100 CAPSULE, LIQUID FILLED ORAL 2 TIMES DAILY PRN
Status: DISCONTINUED | OUTPATIENT
Start: 2019-07-20 | End: 2019-07-21

## 2019-07-20 RX ORDER — SODIUM CHLORIDE 0.9 % (FLUSH) 0.9 %
10 SYRINGE (ML) INJECTION EVERY 12 HOURS SCHEDULED
Status: DISCONTINUED | OUTPATIENT
Start: 2019-07-20 | End: 2019-07-21

## 2019-07-20 RX ORDER — SODIUM CHLORIDE 0.9 % (FLUSH) 0.9 %
10 SYRINGE (ML) INJECTION PRN
Status: DISCONTINUED | OUTPATIENT
Start: 2019-07-20 | End: 2019-07-21

## 2019-07-20 RX ORDER — PENICILLIN G 3000000 [IU]/50ML
3 INJECTION, SOLUTION INTRAVENOUS EVERY 4 HOURS
Status: DISCONTINUED | OUTPATIENT
Start: 2019-07-21 | End: 2019-07-21

## 2019-07-20 RX ORDER — SODIUM CHLORIDE, SODIUM LACTATE, POTASSIUM CHLORIDE, CALCIUM CHLORIDE 600; 310; 30; 20 MG/100ML; MG/100ML; MG/100ML; MG/100ML
INJECTION, SOLUTION INTRAVENOUS CONTINUOUS
Status: DISCONTINUED | OUTPATIENT
Start: 2019-07-20 | End: 2019-07-21

## 2019-07-20 RX ORDER — NALBUPHINE HCL 10 MG/ML
10 AMPUL (ML) INJECTION
Status: DISCONTINUED | OUTPATIENT
Start: 2019-07-20 | End: 2019-07-21

## 2019-07-20 RX ORDER — DIPHENHYDRAMINE HCL 25 MG
25 TABLET ORAL EVERY 4 HOURS PRN
Status: DISCONTINUED | OUTPATIENT
Start: 2019-07-20 | End: 2019-07-21

## 2019-07-20 RX ORDER — ACETAMINOPHEN 325 MG/1
650 TABLET ORAL EVERY 4 HOURS PRN
Status: DISCONTINUED | OUTPATIENT
Start: 2019-07-20 | End: 2019-07-21

## 2019-07-20 RX ORDER — ONDANSETRON 2 MG/ML
4 INJECTION INTRAMUSCULAR; INTRAVENOUS EVERY 6 HOURS PRN
Status: DISCONTINUED | OUTPATIENT
Start: 2019-07-20 | End: 2019-07-21

## 2019-07-20 RX ORDER — BETAMETHASONE SODIUM PHOSPHATE AND BETAMETHASONE ACETATE 3; 3 MG/ML; MG/ML
12 INJECTION, SUSPENSION INTRA-ARTICULAR; INTRALESIONAL; INTRAMUSCULAR; SOFT TISSUE DAILY
Status: DISCONTINUED | OUTPATIENT
Start: 2019-07-21 | End: 2019-07-21

## 2019-07-21 ENCOUNTER — ANESTHESIA (OUTPATIENT)
Dept: LABOR AND DELIVERY | Age: 24
DRG: 540 | End: 2019-07-21
Payer: COMMERCIAL

## 2019-07-21 ENCOUNTER — ANESTHESIA EVENT (OUTPATIENT)
Dept: LABOR AND DELIVERY | Age: 24
DRG: 540 | End: 2019-07-21
Payer: COMMERCIAL

## 2019-07-21 VITALS — OXYGEN SATURATION: 100 % | TEMPERATURE: 98.6 F | DIASTOLIC BLOOD PRESSURE: 59 MMHG | SYSTOLIC BLOOD PRESSURE: 97 MMHG

## 2019-07-21 LAB
RUBELLA ANTIBODY IGG: 81.4 IU/ML
THROAT CULTURE: NORMAL

## 2019-07-21 PROCEDURE — 6360000002 HC RX W HCPCS: Performed by: OBSTETRICS & GYNECOLOGY

## 2019-07-21 PROCEDURE — 1220000000 HC SEMI PRIVATE OB R&B

## 2019-07-21 PROCEDURE — 2580000003 HC RX 258: Performed by: OBSTETRICS & GYNECOLOGY

## 2019-07-21 PROCEDURE — 59514 CESAREAN DELIVERY ONLY: CPT | Performed by: OBSTETRICS & GYNECOLOGY

## 2019-07-21 PROCEDURE — 3609079900 HC CESAREAN SECTION: Performed by: OBSTETRICS & GYNECOLOGY

## 2019-07-21 PROCEDURE — 7100000001 HC PACU RECOVERY - ADDTL 15 MIN: Performed by: OBSTETRICS & GYNECOLOGY

## 2019-07-21 PROCEDURE — 2709999900 HC NON-CHARGEABLE SUPPLY: Performed by: OBSTETRICS & GYNECOLOGY

## 2019-07-21 PROCEDURE — 3700000001 HC ADD 15 MINUTES (ANESTHESIA): Performed by: OBSTETRICS & GYNECOLOGY

## 2019-07-21 PROCEDURE — 6360000002 HC RX W HCPCS: Performed by: NURSE ANESTHETIST, CERTIFIED REGISTERED

## 2019-07-21 PROCEDURE — 2500000003 HC RX 250 WO HCPCS: Performed by: NURSE ANESTHETIST, CERTIFIED REGISTERED

## 2019-07-21 PROCEDURE — 3700000000 HC ANESTHESIA ATTENDED CARE: Performed by: OBSTETRICS & GYNECOLOGY

## 2019-07-21 PROCEDURE — 7100000000 HC PACU RECOVERY - FIRST 15 MIN: Performed by: OBSTETRICS & GYNECOLOGY

## 2019-07-21 PROCEDURE — 93005 ELECTROCARDIOGRAM TRACING: CPT | Performed by: OBSTETRICS & GYNECOLOGY

## 2019-07-21 RX ORDER — KETOROLAC TROMETHAMINE 30 MG/ML
INJECTION, SOLUTION INTRAMUSCULAR; INTRAVENOUS PRN
Status: DISCONTINUED | OUTPATIENT
Start: 2019-07-21 | End: 2019-07-21 | Stop reason: SDUPTHER

## 2019-07-21 RX ORDER — MORPHINE SULFATE 1 MG/ML
INJECTION, SOLUTION EPIDURAL; INTRATHECAL; INTRAVENOUS PRN
Status: DISCONTINUED | OUTPATIENT
Start: 2019-07-21 | End: 2019-07-21 | Stop reason: SDUPTHER

## 2019-07-21 RX ORDER — NALBUPHINE HCL 10 MG/ML
5 AMPUL (ML) INJECTION EVERY 4 HOURS PRN
Status: DISCONTINUED | OUTPATIENT
Start: 2019-07-21 | End: 2019-07-21

## 2019-07-21 RX ORDER — BUPIVACAINE HYDROCHLORIDE 7.5 MG/ML
INJECTION, SOLUTION INTRASPINAL PRN
Status: DISCONTINUED | OUTPATIENT
Start: 2019-07-21 | End: 2019-07-21 | Stop reason: SDUPTHER

## 2019-07-21 RX ORDER — OXYTOCIN 10 [USP'U]/ML
INJECTION, SOLUTION INTRAMUSCULAR; INTRAVENOUS PRN
Status: DISCONTINUED | OUTPATIENT
Start: 2019-07-21 | End: 2019-07-21 | Stop reason: SDUPTHER

## 2019-07-21 RX ORDER — NALOXONE HYDROCHLORIDE 0.4 MG/ML
0.4 INJECTION, SOLUTION INTRAMUSCULAR; INTRAVENOUS; SUBCUTANEOUS PRN
Status: DISCONTINUED | OUTPATIENT
Start: 2019-07-21 | End: 2019-07-21

## 2019-07-21 RX ORDER — DIPHENHYDRAMINE HYDROCHLORIDE 50 MG/ML
INJECTION INTRAMUSCULAR; INTRAVENOUS PRN
Status: DISCONTINUED | OUTPATIENT
Start: 2019-07-21 | End: 2019-07-21 | Stop reason: SDUPTHER

## 2019-07-21 RX ORDER — LANOLIN 100 %
OINTMENT (GRAM) TOPICAL
Status: DISCONTINUED | OUTPATIENT
Start: 2019-07-21 | End: 2019-07-24 | Stop reason: HOSPADM

## 2019-07-21 RX ORDER — SODIUM CHLORIDE 0.9 % (FLUSH) 0.9 %
10 SYRINGE (ML) INJECTION PRN
Status: DISCONTINUED | OUTPATIENT
Start: 2019-07-21 | End: 2019-07-24 | Stop reason: HOSPADM

## 2019-07-21 RX ORDER — OXYCODONE HYDROCHLORIDE AND ACETAMINOPHEN 5; 325 MG/1; MG/1
1 TABLET ORAL EVERY 4 HOURS PRN
Status: DISCONTINUED | OUTPATIENT
Start: 2019-07-21 | End: 2019-07-24 | Stop reason: HOSPADM

## 2019-07-21 RX ORDER — ONDANSETRON 2 MG/ML
4 INJECTION INTRAMUSCULAR; INTRAVENOUS EVERY 6 HOURS PRN
Status: DISCONTINUED | OUTPATIENT
Start: 2019-07-21 | End: 2019-07-24 | Stop reason: HOSPADM

## 2019-07-21 RX ORDER — DOCUSATE SODIUM 100 MG/1
100 CAPSULE, LIQUID FILLED ORAL DAILY
Status: DISCONTINUED | OUTPATIENT
Start: 2019-07-21 | End: 2019-07-24 | Stop reason: HOSPADM

## 2019-07-21 RX ORDER — OXYCODONE HYDROCHLORIDE AND ACETAMINOPHEN 5; 325 MG/1; MG/1
2 TABLET ORAL EVERY 4 HOURS PRN
Status: DISCONTINUED | OUTPATIENT
Start: 2019-07-21 | End: 2019-07-24 | Stop reason: HOSPADM

## 2019-07-21 RX ORDER — KETOROLAC TROMETHAMINE 30 MG/ML
30 INJECTION, SOLUTION INTRAMUSCULAR; INTRAVENOUS EVERY 6 HOURS
Status: DISPENSED | OUTPATIENT
Start: 2019-07-21 | End: 2019-07-22

## 2019-07-21 RX ORDER — MAGNESIUM SULFATE IN WATER 40 MG/ML
INJECTION, SOLUTION INTRAVENOUS
Status: DISCONTINUED
Start: 2019-07-21 | End: 2019-07-21 | Stop reason: WASHOUT

## 2019-07-21 RX ORDER — DEXAMETHASONE SODIUM PHOSPHATE 10 MG/ML
INJECTION, SOLUTION INTRAMUSCULAR; INTRAVENOUS PRN
Status: DISCONTINUED | OUTPATIENT
Start: 2019-07-21 | End: 2019-07-21 | Stop reason: SDUPTHER

## 2019-07-21 RX ORDER — SODIUM CHLORIDE, SODIUM LACTATE, POTASSIUM CHLORIDE, CALCIUM CHLORIDE 600; 310; 30; 20 MG/100ML; MG/100ML; MG/100ML; MG/100ML
INJECTION, SOLUTION INTRAVENOUS CONTINUOUS
Status: DISCONTINUED | OUTPATIENT
Start: 2019-07-21 | End: 2019-07-24 | Stop reason: HOSPADM

## 2019-07-21 RX ORDER — DIPHENHYDRAMINE HYDROCHLORIDE 50 MG/ML
25 INJECTION INTRAMUSCULAR; INTRAVENOUS EVERY 6 HOURS PRN
Status: DISCONTINUED | OUTPATIENT
Start: 2019-07-21 | End: 2019-07-24 | Stop reason: HOSPADM

## 2019-07-21 RX ORDER — HYDROCORTISONE ACETATE 25 MG/1
25 SUPPOSITORY RECTAL EVERY 12 HOURS
Status: DISCONTINUED | OUTPATIENT
Start: 2019-07-21 | End: 2019-07-24 | Stop reason: HOSPADM

## 2019-07-21 RX ORDER — PRENATAL VIT/IRON FUM/FOLIC AC 27MG-0.8MG
1 TABLET ORAL DAILY
Status: DISCONTINUED | OUTPATIENT
Start: 2019-07-21 | End: 2019-07-24 | Stop reason: HOSPADM

## 2019-07-21 RX ORDER — SODIUM CHLORIDE 0.9 % (FLUSH) 0.9 %
10 SYRINGE (ML) INJECTION EVERY 12 HOURS SCHEDULED
Status: DISCONTINUED | OUTPATIENT
Start: 2019-07-21 | End: 2019-07-24 | Stop reason: HOSPADM

## 2019-07-21 RX ORDER — IBUPROFEN 800 MG/1
800 TABLET ORAL EVERY 8 HOURS PRN
Status: DISCONTINUED | OUTPATIENT
Start: 2019-07-22 | End: 2019-07-24 | Stop reason: HOSPADM

## 2019-07-21 RX ADMIN — KETOROLAC TROMETHAMINE 30 MG: 30 INJECTION, SOLUTION INTRAMUSCULAR; INTRAVENOUS at 11:50

## 2019-07-21 RX ADMIN — SODIUM CHLORIDE, POTASSIUM CHLORIDE, SODIUM LACTATE AND CALCIUM CHLORIDE: 600; 310; 30; 20 INJECTION, SOLUTION INTRAVENOUS at 00:27

## 2019-07-21 RX ADMIN — Medication 1 MILLI-UNITS/MIN: at 14:43

## 2019-07-21 RX ADMIN — KETOROLAC TROMETHAMINE 30 MG: 30 INJECTION, SOLUTION INTRAMUSCULAR; INTRAVENOUS at 23:28

## 2019-07-21 RX ADMIN — OXYTOCIN 20 UNITS: 10 INJECTION, SOLUTION INTRAMUSCULAR; INTRAVENOUS at 11:50

## 2019-07-21 RX ADMIN — SODIUM CHLORIDE, POTASSIUM CHLORIDE, SODIUM LACTATE AND CALCIUM CHLORIDE: 600; 310; 30; 20 INJECTION, SOLUTION INTRAVENOUS at 10:35

## 2019-07-21 RX ADMIN — SODIUM CHLORIDE, POTASSIUM CHLORIDE, SODIUM LACTATE AND CALCIUM CHLORIDE: 600; 310; 30; 20 INJECTION, SOLUTION INTRAVENOUS at 23:32

## 2019-07-21 RX ADMIN — DIPHENHYDRAMINE HYDROCHLORIDE 25 MG: 50 INJECTION, SOLUTION INTRAMUSCULAR; INTRAVENOUS at 12:04

## 2019-07-21 RX ADMIN — SODIUM CHLORIDE, POTASSIUM CHLORIDE, SODIUM LACTATE AND CALCIUM CHLORIDE: 600; 310; 30; 20 INJECTION, SOLUTION INTRAVENOUS at 14:43

## 2019-07-21 RX ADMIN — BUPIVACAINE HYDROCHLORIDE IN DEXTROSE 1.6 ML: 7.5 INJECTION, SOLUTION SUBARACHNOID at 11:23

## 2019-07-21 RX ADMIN — MORPHINE SULFATE 4.8 MG: 1 INJECTION EPIDURAL; INTRATHECAL; INTRAVENOUS at 12:00

## 2019-07-21 RX ADMIN — PENICILLIN G POTASSIUM 5 MILLION UNITS: 5000000 INJECTION, POWDER, FOR SOLUTION INTRAMUSCULAR; INTRAVENOUS at 00:31

## 2019-07-21 RX ADMIN — SODIUM CHLORIDE, POTASSIUM CHLORIDE, SODIUM LACTATE AND CALCIUM CHLORIDE: 600; 310; 30; 20 INJECTION, SOLUTION INTRAVENOUS at 09:00

## 2019-07-21 RX ADMIN — PENICILLIN G 3 MILLION UNITS: 3000000 INJECTION, SOLUTION INTRAVENOUS at 04:21

## 2019-07-21 RX ADMIN — MORPHINE SULFATE 0.2 MG: 1 INJECTION EPIDURAL; INTRATHECAL; INTRAVENOUS at 11:23

## 2019-07-21 RX ADMIN — CEFAZOLIN SODIUM 2 G: 1 INJECTION, SOLUTION INTRAVENOUS at 11:18

## 2019-07-21 RX ADMIN — PENICILLIN G 3 MILLION UNITS: 3000000 INJECTION, SOLUTION INTRAVENOUS at 09:00

## 2019-07-21 RX ADMIN — DEXAMETHASONE SODIUM PHOSPHATE 10 MG: 10 INJECTION, SOLUTION INTRAMUSCULAR; INTRAVENOUS at 11:50

## 2019-07-21 ASSESSMENT — PAIN SCALES - GENERAL
PAINLEVEL_OUTOF10: 0

## 2019-07-21 ASSESSMENT — PULMONARY FUNCTION TESTS
PIF_VALUE: 1
PIF_VALUE: 0
PIF_VALUE: 1
PIF_VALUE: 0
PIF_VALUE: 1
PIF_VALUE: 0
PIF_VALUE: 3
PIF_VALUE: 0

## 2019-07-21 ASSESSMENT — ENCOUNTER SYMPTOMS: SHORTNESS OF BREATH: 1

## 2019-07-21 NOTE — ANESTHESIA POSTPROCEDURE EVALUATION
Department of Anesthesiology  Postprocedure Note    Patient: Jessie Espino  MRN: [id number]  YOB: 1995  Date of evaluation: 2019  Time:  12:38 PM     Procedure Summary     Date:  19 Room / Location:  [facility] L&D OR    Anesthesia Start:  1118 Anesthesia Stop:      Procedure:   SECTION (N/A ) Diagnosis:      Surgeon:  Lara Jorgensen DO Responsible Provider:  JIM Humphrey CRNA    Anesthesia Type:  spinal ASA Status:  3 - Emergent          Anesthesia Type: spinal    Pedro Phase I:      Pedro Phase II:      Last vitals: Reviewed and per EMR flowsheets.        Anesthesia Post Evaluation    Patient location during evaluation: bedside  Patient participation: complete - patient participated  Level of consciousness: awake and alert  Pain score: 0  Airway patency: patent  Nausea & Vomiting: no nausea  Complications: no  Cardiovascular status: hemodynamically stable  Respiratory status: acceptable  Hydration status: stable

## 2019-07-21 NOTE — PROGRESS NOTES
Dr Solo Piedra called asking if pt had surgery. Told that pt had c/s with no complications at this time  Dr states he that everything looks normal and he doesn't  Think he needs to see pt and to cancel consult . It there is any new problems to reconsult him.

## 2019-07-21 NOTE — FLOWSHEET NOTE
Call to Dr. Eileen Yi. Informed of conversation with pt regarding requesting a . Informed that pt stated her mother had to have a  for similar reasons.  stated to make the pt NPO and she will section her some time tomorrow morning and to inform the pt that there could be complications with both a vaginal and  delivery.  stated she will call at change of shift to schedule a time for the section.

## 2019-07-21 NOTE — H&P
Department of Obstetrics and Gynecology   History & Physical    Pt Name: Donnie Crespo  MRN: [id number] Eladia #: [de-identified]  YOB: 1995  Estimated Date of Delivery: 19      HPI: The patient is a 25 y.o. Xander Marks 29w0d female who presents to [facility] triage for LOF. PT with gross ROM on entry to triage. Pt reports intermittent ctx prior to arrival. Pt with recent cardiac evaluation and pt has not had f/u with cardiologist.   +FM, -VB, +LOF, -CTX    Allergies: Allergies as of 2019    (No Known Allergies)       Medications:  No current facility-administered medications for this encounter. OB History:     Gyn History: Denies h/o abnormal pap smear, h/o STDs. Past Medical History:   Past Medical History:   Diagnosis Date    Anxiety 2017    Atrial fibrillation ([address] 2019    Class 1 obesity due to excess calories without serious comorbidity with body mass index (BMI) of 30.0 to 30.9 in adult 2017    Heart abnormality     Hyperthyroidism 2018    Mental disorder     anxiety    Tachycardia        Past Surgical History:   Past Surgical History:   Procedure Laterality Date    BACK SURGERY  2004    tumor removed       Social History:   Social History     Tobacco Use   Smoking Status Never Smoker   Smokeless Tobacco Never Used        Family History: Noncontributory; Denies h/o cancer. ROS:  Negative except as stated in HPI, denies nausea, vomiting, fever, chills, headache or dysuria.      PE:  Vitals:    19 2156   Temp: 98.3 °F (36.8 °C)       General: well nourished, well developed, in no acute distress  CV: Normal heart sounds  Resp: breathing unlabored  Abdomen: Nontender, no rebound, no guarding  FH: 36  Cx: 2/50/-2    NST - Cat 1: FHR 140s, moderate variability, +accels, -decels    Labs:   Blood Type/Rh: A POS      Assessment:   25 y.o. Xander Marks 29w0d female with PROM    Plan:   Admit for delivery  ANS and GBS prophylaxis  Cardiology

## 2019-07-21 NOTE — FLOWSHEET NOTE
Pt verbalizing concern about heart issues during vaginal labor. Pt stated she wants a . Informed pt I will get  on the phone to discuss the issue.

## 2019-07-21 NOTE — FLOWSHEET NOTE
Pt informed that Dr. Kishor Colmenares will section her in the AM tomorrow. Pt aware she is NPO. And that there could be complications with both a vaginal delivery and a . Pt verbalized understanding and confirmed she still would like a .

## 2019-07-21 NOTE — ANESTHESIA PRE PROCEDURE
900 mg Intravenous Km Degroot MD   Stopped at 19 0100    betamethasone acetate-betamethasone sodium phosphate (CELESTONE) injection 12 mg  12 mg Intramuscular Daily Corina Fox MD        penicillin G potassium IVPB 3 Million Units  3 Million Units Intravenous Q4H Ade Tapia  mL/hr at 19 0900 3 Million Units at 19 0900       Allergies:  No Known Allergies    Problem List:    Patient Active Problem List   Diagnosis Code    Anxiety F41.9    Multiple thyroid nodules E04.2    Class 2 obesity due to excess calories without serious comorbidity with body mass index (BMI) of 35.0 to 35.9 in adult E66.09, Z68.35    Pain in joint involving multiple sites M25.50    Weight loss R63.4    Hyponatremia E87.1    Shortness of breath R06.02    Palpitations R00.2    Intercostal pain R07.82    Positive fetal fibronectin at 22 weeks to 34 weeks gestation R87.89     uterine contractions in third trimester, antepartum O47.03    Prolonged Q-T interval on ECG R94.31    Atrial fibrillation (HCC) I48.91    Normal labor and delivery O80       Past Medical History:        Diagnosis Date    Anxiety 2017    Atrial fibrillation ([address] 2019    Class 1 obesity due to excess calories without serious comorbidity with body mass index (BMI) of 30.0 to 30.9 in adult 2017    Heart abnormality     Hyperthyroidism 2018    Mental disorder     anxiety    Tachycardia        Past Surgical History:        Procedure Laterality Date    BACK SURGERY  2004    tumor removed       Social History:    Social History     Tobacco Use    Smoking status: Never Smoker    Smokeless tobacco: Never Used   Substance Use Topics    Alcohol use:  No                                Counseling given: Not Answered      Vital Signs (Current):   Vitals:    19 0419 19 0615 19 0736 19 0737   BP: 104/67   119/74   Pulse: 93   95   Resp: 16   24   Temp: 36.6 °C (97.9 °F) 36.6 °C (97.8 °F) 36.8 °C (98.3 °F)    TempSrc: Oral Oral Oral    SpO2:                                                  BP Readings from Last 3 Encounters:   07/21/19 119/74   07/19/19 108/66   07/16/19 102/66       NPO Status:                                                                                 BMI:   Wt Readings from Last 3 Encounters:   07/19/19 195 lb (88.5 kg)   07/16/19 195 lb (88.5 kg)   07/10/19 196 lb 9.6 oz (89.2 kg)     There is no height or weight on file to calculate BMI.    CBC:   Lab Results   Component Value Date    WBC 9.2 07/20/2019    RBC 3.49 07/20/2019    HGB 11.5 07/20/2019    HCT 30.1 07/20/2019    MCV 86.3 07/20/2019    RDW 12.6 07/20/2019     07/20/2019       CMP:   Lab Results   Component Value Date     07/20/2019    K 3.8 07/20/2019     07/20/2019    CO2 20 07/20/2019    BUN 7 07/20/2019    CREATININE 0.44 07/20/2019    GFRAA >60.0 07/20/2019    LABGLOM >60.0 07/20/2019    GLUCOSE 117 07/20/2019    PROT 6.8 07/20/2019    CALCIUM 8.6 07/20/2019    BILITOT 0.3 07/20/2019    ALKPHOS 161 07/20/2019    AST 18 07/20/2019    ALT 19 07/20/2019       POC Tests: No results for input(s): POCGLU, POCNA, POCK, POCCL, POCBUN, POCHEMO, POCHCT in the last 72 hours. Coags: No results found for: PROTIME, INR, APTT    HCG (If Applicable):   Lab Results   Component Value Date    PREGTESTUR positive 01/06/2019        ABGs: No results found for: PHART, PO2ART, SPV9FFF, ZHZ6CLQ, BEART, Z0UTXBZV     Type & Screen (If Applicable):  No results found for: LABABO, LABRH    Anesthesia Evaluation    Airway: Mallampati: II        Dental: normal exam         Pulmonary: breath sounds clear to auscultation  (+) shortness of breath:                             Cardiovascular:    (+) dysrhythmias: atrial fibrillation,         Rhythm: regular                      Neuro/Psych:   (+) psychiatric history:            GI/Hepatic/Renal:            ROS comment: pregnant.    Endo/Other:    (+) hyperthyroidism::., .                 Abdominal:   (+) obese,     Abdomen: tender. Vascular:                                        Anesthesia Plan      spinal     ASA 3 - emergent             Anesthetic plan and risks discussed with patient.       Plan discussed with attending and surgical team.                  JIM Liao - RONEN   7/21/2019

## 2019-07-21 NOTE — FLOWSHEET NOTE
RN bedside till [phone]. External monitors removed and dana system applied. Signal lost between device. Continuous monitor adjusting.

## 2019-07-22 DIAGNOSIS — J02.0 PHARYNGITIS DUE TO GROUP B BETA HEMOLYTIC STREPTOCOCCI: Primary | ICD-10-CM

## 2019-07-22 DIAGNOSIS — B95.1 PHARYNGITIS DUE TO GROUP B BETA HEMOLYTIC STREPTOCOCCI: Primary | ICD-10-CM

## 2019-07-22 PROBLEM — I48.91 ATRIAL FIBRILLATION (HCC): Chronic | Status: RESOLVED | Noted: 2019-07-16 | Resolved: 2019-07-22

## 2019-07-22 LAB
BASOPHILS ABSOLUTE: 0 K/UL (ref 0–0.2)
BASOPHILS RELATIVE PERCENT: 0.3 %
EKG ATRIAL RATE: 86 BPM
EKG ATRIAL RATE: 88 BPM
EKG P AXIS: 29 DEGREES
EKG P AXIS: 40 DEGREES
EKG P-R INTERVAL: 140 MS
EKG P-R INTERVAL: 146 MS
EKG Q-T INTERVAL: 354 MS
EKG Q-T INTERVAL: 378 MS
EKG QRS DURATION: 72 MS
EKG QRS DURATION: 80 MS
EKG QTC CALCULATION (BAZETT): 428 MS
EKG QTC CALCULATION (BAZETT): 452 MS
EKG R AXIS: 10 DEGREES
EKG R AXIS: 6 DEGREES
EKG T AXIS: 3 DEGREES
EKG T AXIS: 9 DEGREES
EKG VENTRICULAR RATE: 86 BPM
EKG VENTRICULAR RATE: 88 BPM
EOSINOPHILS ABSOLUTE: 0 K/UL (ref 0–0.7)
EOSINOPHILS RELATIVE PERCENT: 0.4 %
HCT VFR BLD CALC: 27 % (ref 37–47)
HEMOGLOBIN: 9.3 G/DL (ref 12–16)
LV EF: 60 %
LVEF MODALITY: NORMAL
LYMPHOCYTES ABSOLUTE: 1.9 K/UL (ref 1–4.8)
LYMPHOCYTES RELATIVE PERCENT: 18.2 %
MCH RBC QN AUTO: 30.5 PG (ref 27–31.3)
MCHC RBC AUTO-ENTMCNC: 34.4 % (ref 33–37)
MCV RBC AUTO: 88.4 FL (ref 82–100)
MONOCYTES ABSOLUTE: 0.7 K/UL (ref 0.2–0.8)
MONOCYTES RELATIVE PERCENT: 6.5 %
NEUTROPHILS ABSOLUTE: 7.7 K/UL (ref 1.4–6.5)
NEUTROPHILS RELATIVE PERCENT: 74.6 %
PDW BLD-RTO: 12.6 % (ref 11.5–14.5)
PLATELET # BLD: 345 K/UL (ref 130–400)
RBC # BLD: 3.06 M/UL (ref 4.2–5.4)
WBC # BLD: 10.3 K/UL (ref 4.8–10.8)

## 2019-07-22 PROCEDURE — 93005 ELECTROCARDIOGRAM TRACING: CPT | Performed by: OBSTETRICS & GYNECOLOGY

## 2019-07-22 PROCEDURE — 1220000000 HC SEMI PRIVATE OB R&B

## 2019-07-22 PROCEDURE — 6370000000 HC RX 637 (ALT 250 FOR IP): Performed by: OBSTETRICS & GYNECOLOGY

## 2019-07-22 PROCEDURE — 6360000002 HC RX W HCPCS: Performed by: OBSTETRICS & GYNECOLOGY

## 2019-07-22 PROCEDURE — 36415 COLL VENOUS BLD VENIPUNCTURE: CPT

## 2019-07-22 PROCEDURE — 93306 TTE W/DOPPLER COMPLETE: CPT

## 2019-07-22 PROCEDURE — 85025 COMPLETE CBC W/AUTO DIFF WBC: CPT

## 2019-07-22 PROCEDURE — 99254 IP/OBS CNSLTJ NEW/EST MOD 60: CPT | Performed by: INTERNAL MEDICINE

## 2019-07-22 PROCEDURE — 93010 ELECTROCARDIOGRAM REPORT: CPT | Performed by: INTERNAL MEDICINE

## 2019-07-22 RX ORDER — AMOXICILLIN AND CLAVULANATE POTASSIUM 875; 125 MG/1; MG/1
1 TABLET, FILM COATED ORAL 2 TIMES DAILY
Qty: 20 TABLET | Refills: 0 | Status: SHIPPED | OUTPATIENT
Start: 2019-07-22 | End: 2019-07-24 | Stop reason: HOSPADM

## 2019-07-22 RX ORDER — FERROUS SULFATE 325(65) MG
325 TABLET ORAL 2 TIMES DAILY WITH MEALS
Status: DISCONTINUED | OUTPATIENT
Start: 2019-07-22 | End: 2019-07-23

## 2019-07-22 RX ADMIN — PRENATAL VIT W/ FE FUMARATE-FA TAB 27-0.8 MG 1 TABLET: 27-0.8 TAB at 09:43

## 2019-07-22 RX ADMIN — DOCUSATE SODIUM 100 MG: 100 CAPSULE, LIQUID FILLED ORAL at 09:42

## 2019-07-22 RX ADMIN — FERROUS SULFATE TAB 325 MG (65 MG ELEMENTAL FE) 325 MG: 325 (65 FE) TAB at 19:03

## 2019-07-22 RX ADMIN — IBUPROFEN 800 MG: 800 TABLET, FILM COATED ORAL at 21:02

## 2019-07-22 RX ADMIN — OXYCODONE HYDROCHLORIDE AND ACETAMINOPHEN 1 TABLET: 5; 325 TABLET ORAL at 19:05

## 2019-07-22 RX ADMIN — IBUPROFEN 800 MG: 800 TABLET, FILM COATED ORAL at 13:09

## 2019-07-22 RX ADMIN — OXYCODONE HYDROCHLORIDE AND ACETAMINOPHEN 1 TABLET: 5; 325 TABLET ORAL at 23:33

## 2019-07-22 RX ADMIN — KETOROLAC TROMETHAMINE 30 MG: 30 INJECTION, SOLUTION INTRAMUSCULAR; INTRAVENOUS at 06:16

## 2019-07-22 ASSESSMENT — PAIN SCALES - GENERAL
PAINLEVEL_OUTOF10: 4
PAINLEVEL_OUTOF10: 0
PAINLEVEL_OUTOF10: 4
PAINLEVEL_OUTOF10: 0
PAINLEVEL_OUTOF10: 0
PAINLEVEL_OUTOF10: 6
PAINLEVEL_OUTOF10: 6
PAINLEVEL_OUTOF10: 0
PAINLEVEL_OUTOF10: 4

## 2019-07-22 ASSESSMENT — ENCOUNTER SYMPTOMS
NAUSEA: 0
ABDOMINAL PAIN: 0
EYES NEGATIVE: 1
WHEEZING: 0
VOMITING: 0
GASTROINTESTINAL NEGATIVE: 1
SHORTNESS OF BREATH: 0

## 2019-07-22 ASSESSMENT — PAIN DESCRIPTION - DESCRIPTORS
DESCRIPTORS: SORE
DESCRIPTORS: SORE
DESCRIPTORS: SORE;ACHING

## 2019-07-22 ASSESSMENT — PAIN DESCRIPTION - LOCATION
LOCATION: ABDOMEN;INCISION
LOCATION: INCISION
LOCATION: ABDOMEN;INCISION

## 2019-07-22 ASSESSMENT — PAIN DESCRIPTION - PAIN TYPE: TYPE: SURGICAL PAIN

## 2019-07-22 ASSESSMENT — PAIN DESCRIPTION - FREQUENCY: FREQUENCY: INTERMITTENT

## 2019-07-22 ASSESSMENT — PAIN DESCRIPTION - ORIENTATION
ORIENTATION: LOWER
ORIENTATION: LOWER

## 2019-07-22 NOTE — FLOWSHEET NOTE
aware of EKG normal sinus rhythm viewed EKG- Cardiology will be here to see patient- no new orders.

## 2019-07-22 NOTE — FLOWSHEET NOTE
Provided verbal/written instructions to pt, pt confirms understanding and will follow up at her previously scheduled OB visit with Dr Latanya Aguiar on 7/10/19. Discharged from unit via self/car with significant other to home. Breath sounds clear and equal bilaterally.

## 2019-07-22 NOTE — FLOWSHEET NOTE
Came in to room- patient stated she felt dizzy- light-headed. Pulse ox 180's Call to Dr. Carlos Carpenter to advise him of patient0 orders receive for a STAT EKG.  Continuing pulse OX-

## 2019-07-23 LAB
BASOPHILS ABSOLUTE: 0 K/UL (ref 0–0.2)
BASOPHILS RELATIVE PERCENT: 0.3 %
EOSINOPHILS ABSOLUTE: 0.1 K/UL (ref 0–0.7)
EOSINOPHILS RELATIVE PERCENT: 0.7 %
HCT VFR BLD CALC: 28.1 % (ref 37–47)
HEMOGLOBIN: 9.6 G/DL (ref 12–16)
LYMPHOCYTES ABSOLUTE: 2.2 K/UL (ref 1–4.8)
LYMPHOCYTES RELATIVE PERCENT: 22.9 %
MAGNESIUM: 1.8 MG/DL (ref 1.7–2.4)
MCH RBC QN AUTO: 29.8 PG (ref 27–31.3)
MCHC RBC AUTO-ENTMCNC: 34.2 % (ref 33–37)
MCV RBC AUTO: 87.2 FL (ref 82–100)
MONOCYTES ABSOLUTE: 0.5 K/UL (ref 0.2–0.8)
MONOCYTES RELATIVE PERCENT: 5.7 %
NEUTROPHILS ABSOLUTE: 6.8 K/UL (ref 1.4–6.5)
NEUTROPHILS RELATIVE PERCENT: 70.4 %
PDW BLD-RTO: 12.6 % (ref 11.5–14.5)
PLATELET # BLD: 345 K/UL (ref 130–400)
RBC # BLD: 3.22 M/UL (ref 4.2–5.4)
WBC # BLD: 9.6 K/UL (ref 4.8–10.8)

## 2019-07-23 PROCEDURE — 6370000000 HC RX 637 (ALT 250 FOR IP): Performed by: OBSTETRICS & GYNECOLOGY

## 2019-07-23 PROCEDURE — 36415 COLL VENOUS BLD VENIPUNCTURE: CPT

## 2019-07-23 PROCEDURE — 6370000000 HC RX 637 (ALT 250 FOR IP): Performed by: INTERNAL MEDICINE

## 2019-07-23 PROCEDURE — 1220000000 HC SEMI PRIVATE OB R&B

## 2019-07-23 PROCEDURE — 83735 ASSAY OF MAGNESIUM: CPT

## 2019-07-23 PROCEDURE — 85025 COMPLETE CBC W/AUTO DIFF WBC: CPT

## 2019-07-23 RX ORDER — FERROUS SULFATE 325(65) MG
325 TABLET ORAL
Status: DISCONTINUED | OUTPATIENT
Start: 2019-07-23 | End: 2019-07-24 | Stop reason: HOSPADM

## 2019-07-23 RX ORDER — ZOLPIDEM TARTRATE 5 MG/1
5 TABLET ORAL NIGHTLY PRN
Status: DISCONTINUED | OUTPATIENT
Start: 2019-07-23 | End: 2019-07-24 | Stop reason: HOSPADM

## 2019-07-23 RX ORDER — ZOLPIDEM TARTRATE 5 MG/1
5 TABLET ORAL NIGHTLY PRN
Status: DISCONTINUED | OUTPATIENT
Start: 2019-07-24 | End: 2019-07-23

## 2019-07-23 RX ADMIN — FERROUS SULFATE TAB 325 MG (65 MG ELEMENTAL FE) 325 MG: 325 (65 FE) TAB at 16:34

## 2019-07-23 RX ADMIN — FERROUS SULFATE TAB 325 MG (65 MG ELEMENTAL FE) 325 MG: 325 (65 FE) TAB at 09:22

## 2019-07-23 RX ADMIN — IBUPROFEN 800 MG: 800 TABLET, FILM COATED ORAL at 04:56

## 2019-07-23 RX ADMIN — OXYCODONE HYDROCHLORIDE AND ACETAMINOPHEN 1 TABLET: 5; 325 TABLET ORAL at 10:23

## 2019-07-23 RX ADMIN — IBUPROFEN 800 MG: 800 TABLET, FILM COATED ORAL at 16:33

## 2019-07-23 RX ADMIN — DOCUSATE SODIUM 100 MG: 100 CAPSULE, LIQUID FILLED ORAL at 09:22

## 2019-07-23 RX ADMIN — PRENATAL VIT W/ FE FUMARATE-FA TAB 27-0.8 MG 1 TABLET: 27-0.8 TAB at 09:22

## 2019-07-23 ASSESSMENT — PAIN DESCRIPTION - DESCRIPTORS: DESCRIPTORS: SORE

## 2019-07-23 ASSESSMENT — PAIN SCALES - GENERAL
PAINLEVEL_OUTOF10: 0
PAINLEVEL_OUTOF10: 4
PAINLEVEL_OUTOF10: 0
PAINLEVEL_OUTOF10: 0
PAINLEVEL_OUTOF10: 1
PAINLEVEL_OUTOF10: 7

## 2019-07-23 ASSESSMENT — PAIN DESCRIPTION - LOCATION: LOCATION: INCISION

## 2019-07-23 ASSESSMENT — PAIN DESCRIPTION - PAIN TYPE: TYPE: SURGICAL PAIN

## 2019-07-23 ASSESSMENT — PAIN DESCRIPTION - ORIENTATION: ORIENTATION: LOWER

## 2019-07-23 ASSESSMENT — PAIN DESCRIPTION - FREQUENCY: FREQUENCY: INTERMITTENT

## 2019-07-23 NOTE — CONSULTS
Hospital Medicine  Consult     Patient:  Catina Tijerina  MRN: [id number]    CHIEF COMPLAINT:    Chief Complaint   Patient presents with    Rupture of Membranes       History Obtained From:  Patient, EMR  Primary Care Physician: Ashu Barakat MD    HISTORY OF PRESENT ILLNESS:   The patient is a 25 y.o. female with PMH of  has a past medical history of Anxiety, Class 1 obesity due to excess calories without serious comorbidity with body mass index (BMI) of 30.0 to 30.9 in adult, Heart abnormality, Hyperthyroidism, Mental disorder, and Tachycardia. Patient is admitted to labor and delivery and is 2 days after her . Patient has been having episodes of dizziness. Admits to dizziness even with sitting in the bed. She also has dizziness after she took a shower when she walked back to bed. Denies palpitation, chest pain. She was seen by cardiology and malignant arrhythmias, A. fib were ruled out. Her orthostatic vitals are negative except for increase in her heart rate with standing. Patient reports eating about 50% of her meals. Past Medical History:      Diagnosis Date    Anxiety 2017    Class 1 obesity due to excess calories without serious comorbidity with body mass index (BMI) of 30.0 to 30.9 in adult 2017    Heart abnormality     Hyperthyroidism 2018    Mental disorder     anxiety    Tachycardia        Past Surgical History:      Procedure Laterality Date    BACK SURGERY  2004    tumor removed     SECTION N/A 2019     SECTION performed by Sridevi Rivera DO at [facility] L&D OR       Medications Prior to Admission:    Prior to Admission medications    Medication Sig Start Date End Date Taking?  Authorizing Provider   Prenatal MV-Min-Fe Cbn-FA-DHA (CONSTANTINO ONE) 27-0.8-200 MG CAPS Take 1 tablet by mouth daily 19  Yes Sridevi Rivera DO   amoxicillin-clavulanate (AUGMENTIN) 875-125 MG per tablet Take 1 tablet by mouth 2 times daily
and wheezing. Cardiovascular: Positive for palpitations. Negative for chest pain and leg swelling. Gastrointestinal: Negative. Negative for abdominal pain, nausea and vomiting. Endocrine: Negative. Genitourinary: Negative. Musculoskeletal: Negative. Skin: Negative. Negative for rash. Neurological: Negative for dizziness, weakness and headaches. VITALS:  Blood pressure 101/67, pulse 86, temperature 98.6 °F (37 °C), temperature source Oral, resp. rate 16, last menstrual period 10/21/2018, SpO2 99 %, unknown if currently breastfeeding. There is no height or weight on file to calculate BMI. Physical Exam   Constitutional: She is oriented to person, place, and time. She appears well-developed and well-nourished. HENT:   Head: Normocephalic and atraumatic. Eyes: Pupils are equal, round, and reactive to light. Neck: Normal range of motion. Neck supple. No JVD present. No tracheal deviation present. No thyromegaly present. Cardiovascular: Normal rate, regular rhythm, normal heart sounds and intact distal pulses. PMI is not displaced. Exam reveals no gallop, no S3, no distant heart sounds and no friction rub. No murmur heard. Pulmonary/Chest: No respiratory distress. She has no wheezes. She has no rales. She exhibits no tenderness. Abdominal: Soft. Bowel sounds are normal. She exhibits no distension and no mass. There is no tenderness. There is no rebound and no guarding. Musculoskeletal: She exhibits no edema. Neurological: She is alert and oriented to person, place, and time. No cranial nerve deficit. Skin: Skin is warm and dry. No rash noted. She is not diaphoretic. No erythema. No pallor. Psychiatric: She has a normal mood and affect.  Her behavior is normal. Judgment and thought content normal.       LABS:  Recent Results (from the past 24 hour(s))   CBC auto differential    Collection Time: 07/22/19  5:11 AM   Result Value Ref Range    WBC 10.3 4.8 - 10.8 K/uL    RBC

## 2019-07-23 NOTE — PROGRESS NOTES
Chief Complaint   Patient presents with    Rupture of Membranes       SUBJECTIVE:  Pt denies chest pain, dyspnea, dyspnea on exertion, change in exercise capacity, fatigue,  nausea, vomiting, diarrhea, constipation, motor weakness, insomnia, weight loss, syncope, dizziness, lightheadedness, palpitations, PND, orthopnea, or claudication. No complaints. Breast feeding   Past Medical History:   Diagnosis Date    Anxiety 2017    Class 1 obesity due to excess calories without serious comorbidity with body mass index (BMI) of 30.0 to 30.9 in adult 2017    Heart abnormality     Hyperthyroidism 2018    Mental disorder     anxiety    Tachycardia      Patient Active Problem List   Diagnosis    Anxiety    Multiple thyroid nodules    Class 2 obesity due to excess calories without serious comorbidity with body mass index (BMI) of 35.0 to 35.9 in adult    Pain in joint involving multiple sites    Weight loss    Hyponatremia    Shortness of breath    Palpitations    Intercostal pain    Positive fetal fibronectin at 22 weeks to 34 weeks gestation     uterine contractions in third trimester, antepartum    Prolonged Q-T interval on ECG    Normal labor and delivery     premature rupture of membranes with onset of labor more than 24 hours following rupture in third trimester       Current Facility-Administered Medications   Medication Dose Route Frequency Provider Last Rate Last Dose    ferrous sulfate tablet 325 mg  325 mg Oral BID  Lilly Mae MD   325 mg at 19 1903    lactated ringers infusion   Intravenous Continuous Ade Tapia  mL/hr at 19 2332      sodium chloride flush 0.9 % injection 10 mL  10 mL Intravenous 2 times per day Namita Bolaños DO        sodium chloride flush 0.9 % injection 10 mL  10 mL Intravenous PRN Namita Bolaños DO        rho(D) immune globulin (RHOPHYLAC) injection 300 mcg  300 mcg Intramuscular Once [facility]

## 2019-07-24 VITALS
OXYGEN SATURATION: 99 % | SYSTOLIC BLOOD PRESSURE: 108 MMHG | DIASTOLIC BLOOD PRESSURE: 64 MMHG | TEMPERATURE: 97.9 F | HEART RATE: 98 BPM | RESPIRATION RATE: 16 BRPM

## 2019-07-24 PROCEDURE — 6370000000 HC RX 637 (ALT 250 FOR IP)

## 2019-07-24 PROCEDURE — 6370000000 HC RX 637 (ALT 250 FOR IP): Performed by: OBSTETRICS & GYNECOLOGY

## 2019-07-24 PROCEDURE — 99239 HOSP IP/OBS DSCHRG MGMT >30: CPT | Performed by: OBSTETRICS & GYNECOLOGY

## 2019-07-24 PROCEDURE — 6370000000 HC RX 637 (ALT 250 FOR IP): Performed by: INTERNAL MEDICINE

## 2019-07-24 RX ORDER — ZOLPIDEM TARTRATE 5 MG/1
TABLET ORAL
Status: COMPLETED
Start: 2019-07-24 | End: 2019-07-24

## 2019-07-24 RX ORDER — OXYCODONE HYDROCHLORIDE AND ACETAMINOPHEN 5; 325 MG/1; MG/1
1 TABLET ORAL EVERY 6 HOURS PRN
Qty: 20 TABLET | Refills: 0 | Status: SHIPPED | OUTPATIENT
Start: 2019-07-24 | End: 2019-07-29

## 2019-07-24 RX ORDER — IBUPROFEN 800 MG/1
800 TABLET ORAL EVERY 8 HOURS PRN
Qty: 90 TABLET | Refills: 3 | Status: SHIPPED | OUTPATIENT
Start: 2019-07-24 | End: 2020-04-21

## 2019-07-24 RX ADMIN — IBUPROFEN 800 MG: 800 TABLET, FILM COATED ORAL at 17:49

## 2019-07-24 RX ADMIN — FERROUS SULFATE TAB 325 MG (65 MG ELEMENTAL FE) 325 MG: 325 (65 FE) TAB at 17:49

## 2019-07-24 RX ADMIN — PRENATAL VIT W/ FE FUMARATE-FA TAB 27-0.8 MG 1 TABLET: 27-0.8 TAB at 09:08

## 2019-07-24 RX ADMIN — IBUPROFEN 800 MG: 800 TABLET, FILM COATED ORAL at 09:03

## 2019-07-24 RX ADMIN — DOCUSATE SODIUM 100 MG: 100 CAPSULE, LIQUID FILLED ORAL at 09:03

## 2019-07-24 RX ADMIN — ZOLPIDEM TARTRATE 5 MG: 5 TABLET ORAL at 00:16

## 2019-07-24 RX ADMIN — IBUPROFEN 800 MG: 800 TABLET, FILM COATED ORAL at 00:16

## 2019-07-24 RX ADMIN — FERROUS SULFATE TAB 325 MG (65 MG ELEMENTAL FE) 325 MG: 325 (65 FE) TAB at 13:44

## 2019-07-24 RX ADMIN — FERROUS SULFATE TAB 325 MG (65 MG ELEMENTAL FE) 325 MG: 325 (65 FE) TAB at 09:03

## 2019-07-24 ASSESSMENT — PAIN SCALES - GENERAL
PAINLEVEL_OUTOF10: 4
PAINLEVEL_OUTOF10: 7
PAINLEVEL_OUTOF10: 2

## 2019-07-24 ASSESSMENT — PAIN DESCRIPTION - PROGRESSION
CLINICAL_PROGRESSION: GRADUALLY IMPROVING

## 2019-07-24 NOTE — LACTATION NOTE
This note was copied from a baby's chart. In to assist with latch. Baby uninterested after many attempts to wake the baby or get him to latch. Encouraged mother to pump if this is what she wants to do. Discussed her plan as she has only  a couple times since birth and bottle fed with formula the rest of the time. Set her up with pumping and stood there with her for the first 5 minutes of pumping. Mother denies pain. Encouraged to call for help and with any questions or concerns.

## 2019-07-24 NOTE — FLOWSHEET NOTE
Discussed with patient and FOB ambien. Explained that baby cannot be left in room with mother while mother taking [place]. Father stated he will be staying over night. Informed parents infant can go to nursery for rest periods. Parents verbalized understanding.   Electronically signed by Dean Kearns RN on 7/23/2019 at 11:57 PM

## 2019-07-24 NOTE — FLOWSHEET NOTE
Dr Americo Portillo phoned up dated on pt condition  Pt needs to follow up with Dr Audrey Pulido in one week  Dr Americo Portillo states pt can be discharged    states he will put in an order for discharge

## 2019-07-31 ENCOUNTER — OFFICE VISIT (OUTPATIENT)
Dept: OBGYN CLINIC | Age: 24
End: 2019-07-31

## 2019-07-31 VITALS
DIASTOLIC BLOOD PRESSURE: 80 MMHG | SYSTOLIC BLOOD PRESSURE: 120 MMHG | WEIGHT: 185.1 LBS | HEIGHT: 63 IN | BODY MASS INDEX: 32.8 KG/M2

## 2019-07-31 DIAGNOSIS — Z09 POSTOP CHECK: Primary | ICD-10-CM

## 2019-07-31 PROBLEM — R87.89 POSITIVE FETAL FIBRONECTIN AT 22 WEEKS TO 34 WEEKS GESTATION: Status: RESOLVED | Noted: 2019-07-02 | Resolved: 2019-07-21

## 2019-07-31 PROBLEM — O47.03 PRETERM UTERINE CONTRACTIONS IN THIRD TRIMESTER, ANTEPARTUM: Status: RESOLVED | Noted: 2019-07-02 | Resolved: 2019-07-21

## 2019-07-31 PROBLEM — O09.899 POSITIVE FETAL FIBRONECTIN AT 22 WEEKS TO 34 WEEKS GESTATION: Status: RESOLVED | Noted: 2019-07-02 | Resolved: 2019-07-21

## 2019-07-31 PROCEDURE — 99024 POSTOP FOLLOW-UP VISIT: CPT | Performed by: OBSTETRICS & GYNECOLOGY

## 2019-07-31 RX ORDER — DOCUSATE SODIUM 100 MG/1
100 CAPSULE, LIQUID FILLED ORAL 2 TIMES DAILY
Qty: 60 CAPSULE | Refills: 0 | Status: SHIPPED | OUTPATIENT
Start: 2019-07-31 | End: 2019-08-30

## 2019-07-31 NOTE — LETTER
[facility] Obstetrics and Gynecology  [address] [address]  Phone: [phone]  Fax: [phone], DO        July 31, 2019     Patient: Nam Mota   YOB: 1995   Date of Visit: 7/31/2019       To Whom it May Concern:    Nam Mota was seen in my clinic on 7/31/2019. She may return to work on 9/09/19. If you have any questions or concerns, please don't hesitate to call.     Sincerely,         Michelle Smith, DO

## 2019-08-22 ENCOUNTER — TELEPHONE (OUTPATIENT)
Dept: OBGYN CLINIC | Age: 24
End: 2019-08-22

## 2019-10-03 ENCOUNTER — OFFICE VISIT (OUTPATIENT)
Dept: OBGYN CLINIC | Age: 24
End: 2019-10-03
Payer: COMMERCIAL

## 2019-10-03 VITALS
BODY MASS INDEX: 33.32 KG/M2 | WEIGHT: 200 LBS | SYSTOLIC BLOOD PRESSURE: 116 MMHG | DIASTOLIC BLOOD PRESSURE: 70 MMHG | HEIGHT: 65 IN

## 2019-10-03 DIAGNOSIS — T81.89XA INCISIONAL IRRITATION, INITIAL ENCOUNTER: ICD-10-CM

## 2019-10-03 DIAGNOSIS — N92.0 MENORRHAGIA WITH REGULAR CYCLE: Primary | ICD-10-CM

## 2019-10-03 PROCEDURE — G8417 CALC BMI ABV UP PARAM F/U: HCPCS | Performed by: OBSTETRICS & GYNECOLOGY

## 2019-10-03 PROCEDURE — 99214 OFFICE O/P EST MOD 30 MIN: CPT | Performed by: OBSTETRICS & GYNECOLOGY

## 2019-10-03 PROCEDURE — G8427 DOCREV CUR MEDS BY ELIG CLIN: HCPCS | Performed by: OBSTETRICS & GYNECOLOGY

## 2019-10-03 PROCEDURE — 1036F TOBACCO NON-USER: CPT | Performed by: OBSTETRICS & GYNECOLOGY

## 2019-10-03 PROCEDURE — G8484 FLU IMMUNIZE NO ADMIN: HCPCS | Performed by: OBSTETRICS & GYNECOLOGY

## 2019-10-03 RX ORDER — LEVONORGESTREL AND ETHINYL ESTRADIOL 0.15-0.03
1 KIT ORAL DAILY
Qty: 1 PACKET | Refills: 5 | Status: SHIPPED | OUTPATIENT
Start: 2019-10-03 | End: 2020-02-12

## 2019-10-24 ENCOUNTER — NURSE ONLY (OUTPATIENT)
Dept: FAMILY MEDICINE CLINIC | Age: 24
End: 2019-10-24
Payer: COMMERCIAL

## 2019-10-24 DIAGNOSIS — Z23 NEEDS FLU SHOT: Primary | ICD-10-CM

## 2019-10-24 PROCEDURE — 90688 IIV4 VACCINE SPLT 0.5 ML IM: CPT | Performed by: FAMILY MEDICINE

## 2019-10-24 PROCEDURE — 90471 IMMUNIZATION ADMIN: CPT | Performed by: FAMILY MEDICINE

## 2019-11-11 ENCOUNTER — OFFICE VISIT (OUTPATIENT)
Dept: FAMILY MEDICINE CLINIC | Age: 24
End: 2019-11-11
Payer: COMMERCIAL

## 2019-11-11 VITALS
RESPIRATION RATE: 16 BRPM | BODY MASS INDEX: 33.49 KG/M2 | HEART RATE: 108 BPM | HEIGHT: 65 IN | DIASTOLIC BLOOD PRESSURE: 66 MMHG | WEIGHT: 201 LBS | TEMPERATURE: 98.5 F | SYSTOLIC BLOOD PRESSURE: 114 MMHG | OXYGEN SATURATION: 98 %

## 2019-11-11 DIAGNOSIS — R39.89 POSSIBLE URINARY TRACT INFECTION: ICD-10-CM

## 2019-11-11 DIAGNOSIS — Z32.00 POSSIBLE PREGNANCY, NOT CONFIRMED: Primary | ICD-10-CM

## 2019-11-11 DIAGNOSIS — Z32.00 POSSIBLE PREGNANCY, NOT CONFIRMED: ICD-10-CM

## 2019-11-11 LAB
BILIRUBIN, POC: ABNORMAL
BLOOD URINE, POC: ABNORMAL
CLARITY, POC: ABNORMAL
COLOR, POC: YELLOW
CONTROL: NORMAL
GLUCOSE URINE, POC: ABNORMAL
GONADOTROPIN, CHORIONIC (HCG) QUANT: <0.1 MIU/ML
KETONES, POC: ABNORMAL
LEUKOCYTE EST, POC: ABNORMAL
NITRITE, POC: ABNORMAL
PH, POC: 5
PREGNANCY TEST URINE, POC: NORMAL
PROTEIN, POC: ABNORMAL
SPECIFIC GRAVITY, POC: 1.03
UROBILINOGEN, POC: ABNORMAL

## 2019-11-11 PROCEDURE — 99213 OFFICE O/P EST LOW 20 MIN: CPT | Performed by: NURSE PRACTITIONER

## 2019-11-11 PROCEDURE — 81025 URINE PREGNANCY TEST: CPT | Performed by: NURSE PRACTITIONER

## 2019-11-11 PROCEDURE — 81003 URINALYSIS AUTO W/O SCOPE: CPT | Performed by: NURSE PRACTITIONER

## 2019-11-11 ASSESSMENT — ENCOUNTER SYMPTOMS
NAUSEA: 1
SHORTNESS OF BREATH: 0
WHEEZING: 0
VOMITING: 1

## 2019-11-13 LAB — URINE CULTURE, ROUTINE: NORMAL

## 2019-11-26 ENCOUNTER — OFFICE VISIT (OUTPATIENT)
Dept: FAMILY MEDICINE CLINIC | Age: 24
End: 2019-11-26
Payer: COMMERCIAL

## 2019-11-26 VITALS
HEIGHT: 65 IN | OXYGEN SATURATION: 98 % | BODY MASS INDEX: 33.45 KG/M2 | HEART RATE: 96 BPM | RESPIRATION RATE: 16 BRPM | TEMPERATURE: 96.6 F | SYSTOLIC BLOOD PRESSURE: 122 MMHG | DIASTOLIC BLOOD PRESSURE: 74 MMHG | WEIGHT: 200.8 LBS

## 2019-11-26 DIAGNOSIS — E04.2 MULTIPLE THYROID NODULES: Chronic | ICD-10-CM

## 2019-11-26 DIAGNOSIS — J02.0 ACUTE STREPTOCOCCAL PHARYNGITIS: ICD-10-CM

## 2019-11-26 DIAGNOSIS — Z30.016 ENCOUNTER FOR INITIAL PRESCRIPTION OF TRANSDERMAL PATCH HORMONAL CONTRACEPTIVE DEVICE: ICD-10-CM

## 2019-11-26 DIAGNOSIS — R11.0 NAUSEA: Primary | ICD-10-CM

## 2019-11-26 DIAGNOSIS — R11.2 NAUSEA AND VOMITING, INTRACTABILITY OF VOMITING NOT SPECIFIED, UNSPECIFIED VOMITING TYPE: Chronic | ICD-10-CM

## 2019-11-26 PROBLEM — R63.4 WEIGHT LOSS: Chronic | Status: RESOLVED | Noted: 2019-03-25 | Resolved: 2019-11-26

## 2019-11-26 LAB
BILIRUBIN, POC: ABNORMAL
BLOOD URINE, POC: ABNORMAL
CLARITY, POC: CLEAR
COLOR, POC: YELLOW
GLUCOSE URINE, POC: ABNORMAL
HCG, URINE, POC: NEGATIVE
KETONES, POC: ABNORMAL
LEUKOCYTE EST, POC: ABNORMAL
Lab: NORMAL
NEGATIVE QC PASS/FAIL: NORMAL
NITRITE, POC: ABNORMAL
PH, POC: 6
POSITIVE QC PASS/FAIL: NORMAL
PROTEIN, POC: ABNORMAL
S PYO AG THROAT QL: NORMAL
SPECIFIC GRAVITY, POC: 1.02
UROBILINOGEN, POC: ABNORMAL

## 2019-11-26 PROCEDURE — 99214 OFFICE O/P EST MOD 30 MIN: CPT | Performed by: FAMILY MEDICINE

## 2019-11-26 PROCEDURE — G8427 DOCREV CUR MEDS BY ELIG CLIN: HCPCS | Performed by: FAMILY MEDICINE

## 2019-11-26 PROCEDURE — 81025 URINE PREGNANCY TEST: CPT | Performed by: FAMILY MEDICINE

## 2019-11-26 PROCEDURE — G8417 CALC BMI ABV UP PARAM F/U: HCPCS | Performed by: FAMILY MEDICINE

## 2019-11-26 PROCEDURE — 81003 URINALYSIS AUTO W/O SCOPE: CPT | Performed by: FAMILY MEDICINE

## 2019-11-26 PROCEDURE — 87880 STREP A ASSAY W/OPTIC: CPT | Performed by: FAMILY MEDICINE

## 2019-11-26 PROCEDURE — 1036F TOBACCO NON-USER: CPT | Performed by: FAMILY MEDICINE

## 2019-11-26 PROCEDURE — G8482 FLU IMMUNIZE ORDER/ADMIN: HCPCS | Performed by: FAMILY MEDICINE

## 2019-11-26 RX ORDER — AMOXICILLIN AND CLAVULANATE POTASSIUM 875; 125 MG/1; MG/1
1 TABLET, FILM COATED ORAL 2 TIMES DAILY
Qty: 20 TABLET | Refills: 0 | Status: SHIPPED | OUTPATIENT
Start: 2019-11-26 | End: 2019-12-06

## 2019-11-28 LAB
ORGANISM: ABNORMAL
THROAT CULTURE: ABNORMAL
THROAT CULTURE: ABNORMAL

## 2019-12-08 ENCOUNTER — APPOINTMENT (OUTPATIENT)
Dept: CT IMAGING | Age: 24
End: 2019-12-08

## 2019-12-08 ENCOUNTER — HOSPITAL ENCOUNTER (EMERGENCY)
Age: 24
Discharge: HOME | End: 2019-12-08
Attending: EMERGENCY MEDICINE

## 2019-12-08 VITALS
HEIGHT: 65 IN | BODY MASS INDEX: 33.32 KG/M2 | SYSTOLIC BLOOD PRESSURE: 118 MMHG | OXYGEN SATURATION: 97 % | DIASTOLIC BLOOD PRESSURE: 91 MMHG | RESPIRATION RATE: 17 BRPM | TEMPERATURE: 98.2 F | WEIGHT: 200 LBS | HEART RATE: 88 BPM

## 2019-12-08 DIAGNOSIS — M54.2 NECK PAIN: Primary | ICD-10-CM

## 2019-12-08 LAB
ANION GAP SERPL CALCULATED.3IONS-SCNC: 10 MEQ/L (ref 9–15)
BASOPHILS ABSOLUTE: 0.1 K/UL (ref 0–0.2)
BASOPHILS RELATIVE PERCENT: 0.8 %
BUN BLDV-MCNC: 10 MG/DL (ref 6–20)
CALCIUM SERPL-MCNC: 9.1 MG/DL (ref 8.5–9.9)
CHLORIDE BLD-SCNC: 105 MEQ/L (ref 95–107)
CO2: 23 MEQ/L (ref 20–31)
CREAT SERPL-MCNC: 0.68 MG/DL (ref 0.5–0.9)
EOSINOPHILS ABSOLUTE: 0.2 K/UL (ref 0–0.7)
EOSINOPHILS RELATIVE PERCENT: 2.4 %
GFR AFRICAN AMERICAN: >60
GFR NON-AFRICAN AMERICAN: >60
GLUCOSE BLD-MCNC: 106 MG/DL (ref 70–99)
HCT VFR BLD CALC: 35.8 % (ref 37–47)
HEMOGLOBIN: 11.5 G/DL (ref 12–16)
LYMPHOCYTES ABSOLUTE: 1.8 K/UL (ref 1–4.8)
LYMPHOCYTES RELATIVE PERCENT: 22.8 %
MCH RBC QN AUTO: 25.2 PG (ref 27–31.3)
MCHC RBC AUTO-ENTMCNC: 32.3 % (ref 33–37)
MCV RBC AUTO: 78.1 FL (ref 82–100)
MONOCYTES ABSOLUTE: 0.4 K/UL (ref 0.2–0.8)
MONOCYTES RELATIVE PERCENT: 5.6 %
NEUTROPHILS ABSOLUTE: 5.3 K/UL (ref 1.4–6.5)
NEUTROPHILS RELATIVE PERCENT: 68.4 %
PDW BLD-RTO: 16.2 % (ref 11.5–14.5)
PLATELET # BLD: 449 K/UL (ref 130–400)
POTASSIUM SERPL-SCNC: 4 MEQ/L (ref 3.4–4.9)
RBC # BLD: 4.58 M/UL (ref 4.2–5.4)
SODIUM BLD-SCNC: 138 MEQ/L (ref 135–144)
T3 TOTAL: 1.47 NG/ML (ref 0.8–2)
T4 TOTAL: 11.3 UG/DL (ref 4.5–11.7)
TSH SERPL DL<=0.05 MIU/L-ACNC: 2.42 UIU/ML (ref 0.44–3.86)
WBC # BLD: 7.8 K/UL (ref 4.8–10.8)

## 2019-12-08 PROCEDURE — 80048 BASIC METABOLIC PNL TOTAL CA: CPT

## 2019-12-08 PROCEDURE — 84443 ASSAY THYROID STIM HORMONE: CPT

## 2019-12-08 PROCEDURE — 2580000003 HC RX 258: Performed by: EMERGENCY MEDICINE

## 2019-12-08 PROCEDURE — 99284 EMERGENCY DEPT VISIT MOD MDM: CPT

## 2019-12-08 PROCEDURE — 6360000002 HC RX W HCPCS: Performed by: EMERGENCY MEDICINE

## 2019-12-08 PROCEDURE — 36415 COLL VENOUS BLD VENIPUNCTURE: CPT

## 2019-12-08 PROCEDURE — 72125 CT NECK SPINE W/O DYE: CPT

## 2019-12-08 PROCEDURE — 85025 COMPLETE CBC W/AUTO DIFF WBC: CPT

## 2019-12-08 PROCEDURE — 84436 ASSAY OF TOTAL THYROXINE: CPT

## 2019-12-08 PROCEDURE — 96374 THER/PROPH/DIAG INJ IV PUSH: CPT

## 2019-12-08 PROCEDURE — 84480 ASSAY TRIIODOTHYRONINE (T3): CPT

## 2019-12-08 RX ORDER — KETOROLAC TROMETHAMINE 30 MG/ML
30 INJECTION, SOLUTION INTRAMUSCULAR; INTRAVENOUS ONCE
Status: COMPLETED | OUTPATIENT
Start: 2019-12-08 | End: 2019-12-08

## 2019-12-08 RX ORDER — SODIUM CHLORIDE 0.9 % (FLUSH) 0.9 %
3 SYRINGE (ML) INJECTION EVERY 8 HOURS
Status: DISCONTINUED | OUTPATIENT
Start: 2019-12-08 | End: 2019-12-09 | Stop reason: HOSPADM

## 2019-12-08 RX ADMIN — KETOROLAC TROMETHAMINE 30 MG: 30 INJECTION, SOLUTION INTRAMUSCULAR; INTRAVENOUS at 20:50

## 2019-12-08 RX ADMIN — Medication 3 ML: at 20:50

## 2019-12-08 ASSESSMENT — PAIN DESCRIPTION - PAIN TYPE: TYPE: ACUTE PAIN

## 2019-12-08 ASSESSMENT — ENCOUNTER SYMPTOMS
NAUSEA: 0
SINUS PAIN: 0
BLOOD IN STOOL: 0
EYES NEGATIVE: 1
RESPIRATORY NEGATIVE: 1
COUGH: 0
EYE PAIN: 0
CHEST TIGHTNESS: 0
VOMITING: 0
SORE THROAT: 0
BACK PAIN: 0
EYE DISCHARGE: 0
GASTROINTESTINAL NEGATIVE: 1
ABDOMINAL PAIN: 0
SHORTNESS OF BREATH: 0
ABDOMINAL DISTENTION: 0
WHEEZING: 0
DIARRHEA: 0

## 2019-12-08 ASSESSMENT — PAIN SCALES - GENERAL
PAINLEVEL_OUTOF10: 7
PAINLEVEL_OUTOF10: 7

## 2019-12-08 ASSESSMENT — PAIN DESCRIPTION - LOCATION: LOCATION: GENERALIZED

## 2019-12-08 ASSESSMENT — PAIN DESCRIPTION - DESCRIPTORS: DESCRIPTORS: ACHING

## 2020-01-20 DIAGNOSIS — E04.2 MULTIPLE THYROID NODULES: Chronic | ICD-10-CM

## 2020-01-20 DIAGNOSIS — R11.2 NAUSEA AND VOMITING, INTRACTABILITY OF VOMITING NOT SPECIFIED, UNSPECIFIED VOMITING TYPE: Chronic | ICD-10-CM

## 2020-01-20 LAB
ALBUMIN SERPL-MCNC: 4.1 G/DL (ref 3.5–4.6)
ALP BLD-CCNC: 114 U/L (ref 40–130)
ALT SERPL-CCNC: 29 U/L (ref 0–33)
ANION GAP SERPL CALCULATED.3IONS-SCNC: 14 MEQ/L (ref 9–15)
AST SERPL-CCNC: 21 U/L (ref 0–35)
BASOPHILS ABSOLUTE: 0.1 K/UL (ref 0–0.2)
BASOPHILS RELATIVE PERCENT: 1 %
BILIRUB SERPL-MCNC: <0.2 MG/DL (ref 0.2–0.7)
BUN BLDV-MCNC: 11 MG/DL (ref 6–20)
CALCIUM SERPL-MCNC: 8.9 MG/DL (ref 8.5–9.9)
CHLORIDE BLD-SCNC: 102 MEQ/L (ref 95–107)
CO2: 22 MEQ/L (ref 20–31)
CREAT SERPL-MCNC: 0.6 MG/DL (ref 0.5–0.9)
EOSINOPHILS ABSOLUTE: 0.2 K/UL (ref 0–0.7)
EOSINOPHILS RELATIVE PERCENT: 3.1 %
GFR AFRICAN AMERICAN: >60
GFR NON-AFRICAN AMERICAN: >60
GLOBULIN: 3.6 G/DL (ref 2.3–3.5)
GLUCOSE BLD-MCNC: 102 MG/DL (ref 70–99)
HCT VFR BLD CALC: 35.9 % (ref 37–47)
HEMOGLOBIN: 11.6 G/DL (ref 12–16)
LYMPHOCYTES ABSOLUTE: 2 K/UL (ref 1–4.8)
LYMPHOCYTES RELATIVE PERCENT: 29.5 %
MCH RBC QN AUTO: 25.3 PG (ref 27–31.3)
MCHC RBC AUTO-ENTMCNC: 32.3 % (ref 33–37)
MCV RBC AUTO: 78.5 FL (ref 82–100)
MONOCYTES ABSOLUTE: 0.4 K/UL (ref 0.2–0.8)
MONOCYTES RELATIVE PERCENT: 5.6 %
NEUTROPHILS ABSOLUTE: 4.2 K/UL (ref 1.4–6.5)
NEUTROPHILS RELATIVE PERCENT: 60.8 %
PDW BLD-RTO: 15.5 % (ref 11.5–14.5)
PLATELET # BLD: 449 K/UL (ref 130–400)
POTASSIUM SERPL-SCNC: 4.2 MEQ/L (ref 3.4–4.9)
RBC # BLD: 4.57 M/UL (ref 4.2–5.4)
SODIUM BLD-SCNC: 138 MEQ/L (ref 135–144)
T4 FREE: 1.3 NG/DL (ref 0.84–1.68)
TOTAL PROTEIN: 7.7 G/DL (ref 6.3–8)
TSH SERPL DL<=0.05 MIU/L-ACNC: 4.92 UIU/ML (ref 0.44–3.86)
WBC # BLD: 6.9 K/UL (ref 4.8–10.8)

## 2020-01-28 ENCOUNTER — OFFICE VISIT (OUTPATIENT)
Dept: FAMILY MEDICINE CLINIC | Age: 25
End: 2020-01-28
Payer: COMMERCIAL

## 2020-01-28 VITALS
WEIGHT: 211.4 LBS | SYSTOLIC BLOOD PRESSURE: 110 MMHG | RESPIRATION RATE: 18 BRPM | HEART RATE: 93 BPM | DIASTOLIC BLOOD PRESSURE: 72 MMHG | OXYGEN SATURATION: 98 % | TEMPERATURE: 98.2 F | HEIGHT: 65 IN | BODY MASS INDEX: 35.22 KG/M2

## 2020-01-28 DIAGNOSIS — M79.18 CHRONIC MUSCULOSKELETAL PAIN: ICD-10-CM

## 2020-01-28 DIAGNOSIS — D64.9 ANEMIA, UNSPECIFIED TYPE: ICD-10-CM

## 2020-01-28 DIAGNOSIS — G89.29 CHRONIC MUSCULOSKELETAL PAIN: ICD-10-CM

## 2020-01-28 PROBLEM — R79.89 ABNORMAL THYROID BLOOD TEST: Chronic | Status: ACTIVE | Noted: 2020-01-28

## 2020-01-28 LAB
ALBUMIN SERPL-MCNC: 4.4 G/DL (ref 3.5–4.6)
ALP BLD-CCNC: 122 U/L (ref 40–130)
ALT SERPL-CCNC: 34 U/L (ref 0–33)
ANION GAP SERPL CALCULATED.3IONS-SCNC: 14 MEQ/L (ref 9–15)
AST SERPL-CCNC: 23 U/L (ref 0–35)
BILIRUB SERPL-MCNC: 0.4 MG/DL (ref 0.2–0.7)
BUN BLDV-MCNC: 11 MG/DL (ref 6–20)
C-REACTIVE PROTEIN: 15 MG/L (ref 0–5)
CALCIUM SERPL-MCNC: 9.5 MG/DL (ref 8.5–9.9)
CHLORIDE BLD-SCNC: 102 MEQ/L (ref 95–107)
CO2: 22 MEQ/L (ref 20–31)
CREAT SERPL-MCNC: 0.65 MG/DL (ref 0.5–0.9)
FERRITIN: 12.5 NG/ML (ref 13–150)
GFR AFRICAN AMERICAN: >60
GFR NON-AFRICAN AMERICAN: >60
GLOBULIN: 3.7 G/DL (ref 2.3–3.5)
GLUCOSE BLD-MCNC: 97 MG/DL (ref 70–99)
HCT VFR BLD CALC: 38.3 % (ref 37–47)
IRON SATURATION: 10 % (ref 11–46)
IRON: 42 UG/DL (ref 37–145)
POTASSIUM SERPL-SCNC: 4.2 MEQ/L (ref 3.4–4.9)
RETICULOCYTE ABSOLUTE COUNT: 0.07 M/CUMM (ref 0.02–0.11)
RETICULOCYTE COUNT PCT: 1.5 % (ref 0.6–2.2)
SEDIMENTATION RATE, ERYTHROCYTE: 25 MM (ref 0–20)
SODIUM BLD-SCNC: 138 MEQ/L (ref 135–144)
TOTAL IRON BINDING CAPACITY: 419 UG/DL (ref 178–450)
TOTAL PROTEIN: 8.1 G/DL (ref 6.3–8)

## 2020-01-28 PROCEDURE — G8482 FLU IMMUNIZE ORDER/ADMIN: HCPCS | Performed by: FAMILY MEDICINE

## 2020-01-28 PROCEDURE — 99214 OFFICE O/P EST MOD 30 MIN: CPT | Performed by: FAMILY MEDICINE

## 2020-01-28 PROCEDURE — G8417 CALC BMI ABV UP PARAM F/U: HCPCS | Performed by: FAMILY MEDICINE

## 2020-01-28 PROCEDURE — 1036F TOBACCO NON-USER: CPT | Performed by: FAMILY MEDICINE

## 2020-01-28 PROCEDURE — G8427 DOCREV CUR MEDS BY ELIG CLIN: HCPCS | Performed by: FAMILY MEDICINE

## 2020-01-28 RX ORDER — AMOXICILLIN AND CLAVULANATE POTASSIUM 875; 125 MG/1; MG/1
1 TABLET, FILM COATED ORAL 2 TIMES DAILY
Qty: 14 TABLET | Refills: 0 | Status: SHIPPED | OUTPATIENT
Start: 2020-01-28 | End: 2020-02-04

## 2020-01-28 RX ORDER — IBUPROFEN 800 MG/1
800 TABLET ORAL EVERY 8 HOURS PRN
Qty: 90 TABLET | Refills: 3 | Status: CANCELLED | OUTPATIENT
Start: 2020-01-28

## 2020-01-28 ASSESSMENT — PATIENT HEALTH QUESTIONNAIRE - PHQ9
SUM OF ALL RESPONSES TO PHQ9 QUESTIONS 1 & 2: 0
2. FEELING DOWN, DEPRESSED OR HOPELESS: 0
SUM OF ALL RESPONSES TO PHQ QUESTIONS 1-9: 0
SUM OF ALL RESPONSES TO PHQ QUESTIONS 1-9: 0
DEPRESSION UNABLE TO ASSESS: PT REFUSES
1. LITTLE INTEREST OR PLEASURE IN DOING THINGS: 0

## 2020-01-28 NOTE — PROGRESS NOTES
[address], 25 y.o. female presents today with:  Chief Complaint   Patient presents with   Olaf Rodriguez Mass     Patient present today for a lump on her left side of neck X 2 weeks painful to the touch. HPI    2019:    Patient is approximately 4 months postpartum. She complains of anterior neck pain with tenderness for days 2 weeks. She also has had a sore throat with difficulty swallowing and expectoration of an unusual substance that appears to be blood-streaked. She states that she has had fevers and diffuse body aches. She is also had a mild cough. In addition she has experienced nausea and vomiting. She developed a rash around her eyes and upper cheeks over the past several days and for a couple of weeks her cheeks have been very sensitive. 2020:   Recent TSH 4.920 and free T4 1.30 on 2020. Notes above sx persist and that she has had painful lump in posterolateral left neck x 2 weeks. No erythema. Complains of fatigue, cold intolerance; muscles in right arm feel like thet are swollen; concentration is poor; occasional constipation; has gained weight    No other questions and or concerns for today's visit      Review of Systems  No fevers, sweats. No unintended weight loss. No abdominal pain, nausea, vomiting, diarrhea,  bloody stools, black tarry stools. No rashes. No swollen glands.   Periods are regular and moderate flow        Past Medical History:   Diagnosis Date    Anxiety 2017    Class 1 obesity due to excess calories without serious comorbidity with body mass index (BMI) of 30.0 to 30.9 in adult 2017    Heart abnormality     Hyperthyroidism 2018    Mental disorder     anxiety    Tachycardia     Weight loss 3/25/2019     Past Surgical History:   Procedure Laterality Date    BACK SURGERY  2004    tumor removed     SECTION N/A 2019     SECTION performed by Conor Manley DO at [facility] L&D OR Social History     Socioeconomic History    Marital status:      Spouse name: Not on file    Number of children: Not on file    Years of education: Not on file    Highest education level: Not on file   Occupational History    Not on file   Social Needs    Financial resource strain: Not on file    Food insecurity:     Worry: Not on file     Inability: Not on file    Transportation needs:     Medical: Not on file     Non-medical: Not on file   Tobacco Use    Smoking status: Never Smoker    Smokeless tobacco: Never Used   Substance and Sexual Activity    Alcohol use: No    Drug use: No    Sexual activity: Yes   Lifestyle    Physical activity:     Days per week: Not on file     Minutes per session: Not on file    Stress: Not on file   Relationships    Social connections:     Talks on phone: Not on file     Gets together: Not on file     Attends Worship service: Not on file     Active member of club or organization: Not on file     Attends meetings of clubs or organizations: Not on file     Relationship status: Not on file    Intimate partner violence:     Fear of current or ex partner: Not on file     Emotionally abused: Not on file     Physically abused: Not on file     Forced sexual activity: Not on file   Other Topics Concern    Not on file   Social History Narrative    Not on file     Family History   Problem Relation Age of Onset    No Known Problems Mother     Diabetes Father     No Known Problems Sister     No Known Problems Brother     No Known Problems Sister     Rheum Arthritis Maternal Grandmother      No Known Allergies  Current Outpatient Medications   Medication Sig Dispense Refill    amoxicillin-clavulanate (AUGMENTIN) 875-125 MG per tablet Take 1 tablet by mouth 2 times daily for 7 days 14 tablet 0    ibuprofen (ADVIL;MOTRIN) 800 MG tablet Take 1 tablet by mouth every 8 hours as needed for Pain 90 tablet 3    norelgestromin-ethinyl estradiol (ORTHO EVRA) 150-35 been partially produced using speech recognition software and may contain mistakes related to that system including errors in grammar, punctuation and spelling as well as words and phrases that may seem inappropriate. If there are questions or concerns, please feel free to contact me to clarify. Orders Placed This Encounter   Procedures    US HEAD NECK SOFT TISSUE THYROID     Standing Status:   Future     Standing Expiration Date:   1/28/2021     Order Specific Question:   Reason for exam:     Answer:   persistent post-partum anemia, enlarged cervical gland, diffuse pain    Iron And Tibc     Standing Status:   Future     Standing Expiration Date:   1/28/2021     Order Specific Question:   Is Patient Fasting? Answer:   0     Order Specific Question:   No of Hours?      Answer:   0    Ferritin     Standing Status:   Future     Standing Expiration Date:   1/28/2021    Reticulocytes     Standing Status:   Future     Standing Expiration Date:   6/39/9457    COMP METABOLIC PROFILE     Standing Status:   Future     Standing Expiration Date:   3/28/2020    Sedimentation Rate     Standing Status:   Future     Standing Expiration Date:   1/28/2021    C-Reactive Protein     Standing Status:   Future     Standing Expiration Date:   1/28/2021   Shannon Amaya MD, Endocrinology, [place]     Referral Priority:   Routine     Referral Type:   Eval and Treat     Referral Reason:   Specialty Services Required     Referred to Provider:   Isabelle Huffman MD     Requested Specialty:   Endocrinology     Number of Visits Requested:   1    AFL - Hermilo Khan MD, Tika Done     Referral Priority:   Routine     Referral Type:   Eval and Treat     Referral Reason:   Specialty Services Required     Referred to Provider:   Valente Hilton MD     Requested Specialty:   Hematology and Oncology     Number of Visits Requested:   1     Orders Placed This Encounter   Medications    amoxicillin-clavulanate (AUGMENTIN)

## 2020-01-30 PROBLEM — M79.18 CHRONIC MUSCULOSKELETAL PAIN: Chronic | Status: ACTIVE | Noted: 2020-01-30

## 2020-01-30 PROBLEM — G89.29 CHRONIC MUSCULOSKELETAL PAIN: Chronic | Status: ACTIVE | Noted: 2020-01-30

## 2020-02-08 ENCOUNTER — HOSPITAL ENCOUNTER (EMERGENCY)
Age: 25
Discharge: HOME | End: 2020-02-08
Payer: COMMERCIAL

## 2020-02-08 VITALS
HEIGHT: 60 IN | OXYGEN SATURATION: 97 % | TEMPERATURE: 98.6 F | RESPIRATION RATE: 19 BRPM | SYSTOLIC BLOOD PRESSURE: 123 MMHG | WEIGHT: 210 LBS | DIASTOLIC BLOOD PRESSURE: 78 MMHG | BODY MASS INDEX: 41.23 KG/M2 | HEART RATE: 88 BPM

## 2020-02-08 LAB
INFLUENZA A BY PCR: NEGATIVE
INFLUENZA B BY PCR: NEGATIVE
STREP GRP A PCR: NEGATIVE

## 2020-02-08 PROCEDURE — 87502 INFLUENZA DNA AMP PROBE: CPT

## 2020-02-08 PROCEDURE — 87651 STREP A DNA AMP PROBE: CPT

## 2020-02-08 PROCEDURE — 99283 EMERGENCY DEPT VISIT LOW MDM: CPT

## 2020-02-08 RX ORDER — AZITHROMYCIN 250 MG/1
TABLET, FILM COATED ORAL
Qty: 1 PACKET | Refills: 0 | Status: SHIPPED | OUTPATIENT
Start: 2020-02-08 | End: 2020-02-12

## 2020-02-08 RX ORDER — GUAIFENESIN AND DEXTROMETHORPHAN HYDROBROMIDE 600; 30 MG/1; MG/1
1 TABLET, EXTENDED RELEASE ORAL 2 TIMES DAILY
Qty: 14 TABLET | Refills: 0 | Status: SHIPPED | OUTPATIENT
Start: 2020-02-08 | End: 2020-02-25

## 2020-02-08 ASSESSMENT — PAIN DESCRIPTION - DESCRIPTORS: DESCRIPTORS: SORE

## 2020-02-08 ASSESSMENT — PAIN SCALES - GENERAL
PAINLEVEL_OUTOF10: 4
PAINLEVEL_OUTOF10: 6

## 2020-02-08 ASSESSMENT — PAIN DESCRIPTION - LOCATION
LOCATION: EAR;THROAT
LOCATION: THROAT

## 2020-02-08 ASSESSMENT — ENCOUNTER SYMPTOMS
SORE THROAT: 1
COUGH: 1
GASTROINTESTINAL NEGATIVE: 1
EYES NEGATIVE: 1

## 2020-02-08 NOTE — ED PROVIDER NOTES
ONE) 27-0.8-200 MG CAPS    Take 1 tablet by mouth daily       ALLERGIES     Patient has no known allergies. FAMILY HISTORY       Family History   Problem Relation Age of Onset    No Known Problems Mother     Diabetes Father     No Known Problems Sister     No Known Problems Brother     No Known Problems Sister     Rheum Arthritis Maternal Grandmother           SOCIAL HISTORY       Social History     Socioeconomic History    Marital status:      Spouse name: None    Number of children: None    Years of education: None    Highest education level: None   Occupational History    None   Social Needs    Financial resource strain: None    Food insecurity:     Worry: None     Inability: None    Transportation needs:     Medical: None     Non-medical: None   Tobacco Use    Smoking status: Never Smoker    Smokeless tobacco: Never Used   Substance and Sexual Activity    Alcohol use: No    Drug use: No    Sexual activity: Yes     Partners: Male   Lifestyle    Physical activity:     Days per week: None     Minutes per session: None    Stress: None   Relationships    Social connections:     Talks on phone: None     Gets together: None     Attends Pentecostalism service: None     Active member of club or organization: None     Attends meetings of clubs or organizations: None     Relationship status: None    Intimate partner violence:     Fear of current or ex partner: None     Emotionally abused: None     Physically abused: None     Forced sexual activity: None   Other Topics Concern    None   Social History Narrative    None       SCREENINGS      @FLOW([id number])@      PHYSICAL EXAM    (up to 7 for level 4, 8 or more for level 5)     ED Triage Vitals [02/08/20 1441]   BP Temp Temp Source Pulse Resp SpO2 Height Weight   123/78 98 °F (36.7 °C) Oral 94 18 97 % 5' (1.524 m) 210 lb (95.3 kg)       Physical Exam  Constitutional:       General: She is not in acute distress.      Appearance: She is 02/08/2020 03:56:51 PM          Madyson Seo PA-C (electronically signed)  Attending Emergency Physician  [address] [facility], [place]  02/08/20 4072

## 2020-02-08 NOTE — ED TRIAGE NOTES
Pt states for the last 4 days she has been coughing and having a sore throat. Pt states that her  has a sore throat as well.  Pt c/o pain and rates her pain at 6/10

## 2020-02-12 ENCOUNTER — OFFICE VISIT (OUTPATIENT)
Dept: FAMILY MEDICINE CLINIC | Age: 25
End: 2020-02-12
Payer: COMMERCIAL

## 2020-02-12 ENCOUNTER — OFFICE VISIT (OUTPATIENT)
Dept: ENDOCRINOLOGY | Age: 25
End: 2020-02-12
Payer: COMMERCIAL

## 2020-02-12 VITALS
SYSTOLIC BLOOD PRESSURE: 124 MMHG | HEART RATE: 90 BPM | OXYGEN SATURATION: 98 % | HEIGHT: 60 IN | TEMPERATURE: 98 F | WEIGHT: 211.2 LBS | RESPIRATION RATE: 15 BRPM | BODY MASS INDEX: 41.46 KG/M2 | DIASTOLIC BLOOD PRESSURE: 82 MMHG

## 2020-02-12 VITALS
SYSTOLIC BLOOD PRESSURE: 103 MMHG | BODY MASS INDEX: 41.23 KG/M2 | HEART RATE: 96 BPM | DIASTOLIC BLOOD PRESSURE: 71 MMHG | WEIGHT: 210 LBS | HEIGHT: 60 IN

## 2020-02-12 PROCEDURE — G8417 CALC BMI ABV UP PARAM F/U: HCPCS | Performed by: INTERNAL MEDICINE

## 2020-02-12 PROCEDURE — G8482 FLU IMMUNIZE ORDER/ADMIN: HCPCS | Performed by: NURSE PRACTITIONER

## 2020-02-12 PROCEDURE — G8427 DOCREV CUR MEDS BY ELIG CLIN: HCPCS | Performed by: NURSE PRACTITIONER

## 2020-02-12 PROCEDURE — G8428 CUR MEDS NOT DOCUMENT: HCPCS | Performed by: INTERNAL MEDICINE

## 2020-02-12 PROCEDURE — 99213 OFFICE O/P EST LOW 20 MIN: CPT | Performed by: NURSE PRACTITIONER

## 2020-02-12 PROCEDURE — G8482 FLU IMMUNIZE ORDER/ADMIN: HCPCS | Performed by: INTERNAL MEDICINE

## 2020-02-12 PROCEDURE — 99203 OFFICE O/P NEW LOW 30 MIN: CPT | Performed by: INTERNAL MEDICINE

## 2020-02-12 PROCEDURE — 1036F TOBACCO NON-USER: CPT | Performed by: NURSE PRACTITIONER

## 2020-02-12 PROCEDURE — 1036F TOBACCO NON-USER: CPT | Performed by: INTERNAL MEDICINE

## 2020-02-12 PROCEDURE — G8417 CALC BMI ABV UP PARAM F/U: HCPCS | Performed by: NURSE PRACTITIONER

## 2020-02-12 RX ORDER — LEVOTHYROXINE SODIUM 0.05 MG/1
50 TABLET ORAL DAILY
Qty: 30 TABLET | Refills: 3 | Status: SHIPPED | OUTPATIENT
Start: 2020-02-12 | End: 2020-05-29 | Stop reason: SDUPTHER

## 2020-02-12 RX ORDER — BENZONATATE 100 MG/1
200 CAPSULE ORAL 3 TIMES DAILY PRN
Qty: 20 CAPSULE | Refills: 0 | Status: SHIPPED | OUTPATIENT
Start: 2020-02-12 | End: 2020-02-17

## 2020-02-12 RX ORDER — DOXYCYCLINE HYCLATE 100 MG
100 TABLET ORAL 2 TIMES DAILY
Qty: 20 TABLET | Refills: 0 | Status: SHIPPED | OUTPATIENT
Start: 2020-02-12 | End: 2020-02-22

## 2020-02-12 RX ORDER — METHYLPREDNISOLONE 4 MG/1
TABLET ORAL
Qty: 1 KIT | Refills: 0 | Status: SHIPPED | OUTPATIENT
Start: 2020-02-12 | End: 2020-02-18

## 2020-02-12 ASSESSMENT — ENCOUNTER SYMPTOMS
RHINORRHEA: 0
SHORTNESS OF BREATH: 0
COUGH: 1
EYE DISCHARGE: 0
SWOLLEN GLANDS: 0
SINUS PAIN: 0
SINUS PRESSURE: 0
SORE THROAT: 1
TROUBLE SWALLOWING: 0
WHEEZING: 0
EYE PAIN: 0
COLOR CHANGE: 0

## 2020-02-12 NOTE — PROGRESS NOTES
Subjective:      Patient ID: Silvio Bain is a 25 y.o. female who presents today for:  Chief Complaint   Patient presents with    Pharyngitis     PATIENT PRESENT TODAY WITH C/O SORE THROAT AND COUGH. PATIENT STATES THAT THIS HAS BEEN DAMASO G ON FOR A WEEK AND HAS SHOWED NO SIGNS OF IMPTOVEMENT. PATIENT WAS SEEN IN THE ER FOR THE SAME SYMPTOMS AND STATES THAT THE MEDICATION IS NOT WORKING AND STATES THAT THE LAST DOES WAS TODAY. PATIENT WAS GIVEN azithromycin (ZITHROMAX) 250 MG tablet AND Dextromethorphan-guaiFENesin (MUCINEX DM)  MG TB12        Pharyngitis   This is a new problem. The current episode started 1 to 4 weeks ago. The problem occurs daily. The problem has been unchanged. Associated symptoms include congestion, coughing (keeping her awake at night) and a sore throat. Pertinent negatives include no chest pain, chills, fever, myalgias, rash or swollen glands. Nothing aggravates the symptoms. Treatments tried: zpac. The treatment provided no relief.        Past Medical History:   Diagnosis Date    Anxiety 2017    Class 1 obesity due to excess calories without serious comorbidity with body mass index (BMI) of 30.0 to 30.9 in adult 2017    Heart abnormality     Hyperthyroidism 2018    Mental disorder     anxiety    Tachycardia     Weight loss 3/25/2019     Past Surgical History:   Procedure Laterality Date    BACK SURGERY      tumor removed     SECTION N/A 2019     SECTION performed by Puma Saul DO at [facility]     Social History     Socioeconomic History    Marital status:      Spouse name: Not on file    Number of children: Not on file    Years of education: Not on file    Highest education level: Not on file   Occupational History    Not on file   Social Needs    Financial resource strain: Not on file    Food insecurity:     Worry: Not on file     Inability: Not on file    Transportation needs:     Medical: Not on file methylPREDNISolone (MEDROL, JOHNATHON,) 4 MG tablet    benzonatate (TESSALON) 100 MG capsule   2. Cough        No results found for this visit on 02/12/20. Plan:   Do supportive care treatment at home for viral URI symptoms which include using cool mist humidifier and saline nasal flushes for nasal congestions. Drink plenty of water and use acetaminophen for fever as directed. How can you care for yourself at home? · Rest as much as possible until you feel better. · Be safe with medicines. Take your medicine exactly as prescribed. Call your doctor if you think you are having a problem with your medicine. You will get more details on the specific medicine your doctor prescribes. · Take an over-the-counter pain medicine, such as acetaminophen (Tylenol), ibuprofen (Advil, Motrin), or naproxen (Aleve), as needed for pain and fever. Read and follow all instructions on the label. Do not give aspirin to anyone younger than 20. It has been linked to Reye syndrome, a serious illness. · Drink plenty of fluids, enough so that your urine is light yellow or clear like water. Hot fluids, such as tea or soup, may help relieve congestion in your nose and throat. If you have kidney, heart, or liver disease and have to limit fluids, talk with your doctor before you increase the amount of fluids you drink. · Try to clear mucus from your lungs by breathing deeply and coughing. · Gargle with warm salt water once an hour. This can help reduce swelling and throat pain. Use 1 teaspoon of salt mixed in 1 cup of warm water. · Do not smoke or allow others to smoke around you. If you need help quitting, talk to your doctor about stop-smoking programs and medicines. These can increase your chances of quitting for good. When should you call for help? Call 911 anytime you think you may need emergency care. For example, call if:    · You have severe trouble breathing.     Call your doctor now or seek immediate medical care if:    · You have new or worse trouble breathing. · You cough up dark brown or bloody mucus (sputum). · You have a new or higher fever. · You have a new rash. Watch closely for changes in your health, and be sure to contact your doctor if:    · You cough more deeply or more often, especially if you notice more mucus or a change in the color of your mucus. · You are not getting better as expected. How can you care for yourself at home? · If your doctor prescribed antibiotics, take them as directed. Do not stop taking them just because you feel better. You need to take the full course of antibiotics. · Gargle with warm salt water once an hour to help reduce swelling and relieve discomfort. Use 1 teaspoon of salt mixed in 1 cup of warm water. · Take an over-the-counter pain medicine, such as acetaminophen (Tylenol), ibuprofen (Advil, Motrin), or naproxen (Aleve). Read and follow all instructions on the label. · Be careful when taking over-the-counter cold or flu medicines and Tylenol at the same time. Many of these medicines have acetaminophen, which is Tylenol. Read the labels to make sure that you are not taking more than the recommended dose. Too much acetaminophen (Tylenol) can be harmful. · Drink plenty of fluids. Fluids may help soothe an irritated throat. Hot fluids, such as tea or soup, may help decrease throat pain. · Use over-the-counter throat lozenges to soothe pain. Regular cough drops or hard candy may also help. These should not be given to young children because of the risk of choking. · Do not smoke or allow others to smoke around you. If you need help quitting, talk to your doctor about stop-smoking programs and medicines. These can increase your chances of quitting for good. · Use a vaporizer or humidifier to add moisture to your bedroom. Follow the directions for cleaning the machine. When should you call for help?   Call your doctor now or seek immediate medical care if:    · You plan/rationale and result expectations have been discussed with the patient/family who expresses understanding. Follow up with PCP to evaluate treatment results or return if symptoms worsen or fail to improve. I have reviewed the patient's medical history in detail and updated the computerized patient record.     JIM Deluna - NP

## 2020-02-12 NOTE — PATIENT INSTRUCTIONS
key part of your treatment and safety. Be sure to make and go to all appointments, and call your doctor if you are having problems. It's also a good idea to know your test results and keep a list of the medicines you take. How can you care for yourself at home? · Take all medicines exactly as prescribed. Call your doctor if you think you are having a problem with your medicine. · Get some extra rest.  · Take an over-the-counter pain medicine, such as acetaminophen (Tylenol), ibuprofen (Advil, Motrin), or naproxen (Aleve) to reduce fever and relieve body aches. Read and follow all instructions on the label. · Do not take two or more pain medicines at the same time unless the doctor told you to. Many pain medicines have acetaminophen, which is Tylenol. Too much acetaminophen (Tylenol) can be harmful. · Take an over-the-counter cough medicine that contains dextromethorphan to help quiet a dry, hacking cough so that you can sleep. Avoid cough medicines that have more than one active ingredient. Read and follow all instructions on the label. · Breathe moist air from a humidifier, hot shower, or sink filled with hot water. The heat and moisture will thin mucus so you can cough it out. · Do not smoke. Smoking can make bronchitis worse. If you need help quitting, talk to your doctor about stop-smoking programs and medicines. These can increase your chances of quitting for good. When should you call for help? Call 911 anytime you think you may need emergency care.  For example, call if:    · You have severe trouble breathing.    Call your doctor now or seek immediate medical care if:    · You have new or worse trouble breathing.     · You cough up dark brown or bloody mucus (sputum).     · You have a new or higher fever.     · You have a new rash.    Watch closely for changes in your health, and be sure to contact your doctor if:    · You cough more deeply or more often, especially if you notice more mucus or a change in the color of your mucus.     · You are not getting better as expected. Where can you learn more? Go to [email or web address] and sign in to your [employer] account. Enter H333 in the [employer] box to learn more about \"Bronchitis: Care Instructions. \"     If you do not have an account, please click on the \"Sign Up Now\" link. Current as of: June 9, 2019  Content Version: 12.3  © 5593-2451 [employer]. Care instructions adapted under license by [facility] ([facility]). If you have questions about a medical condition or this instruction, always ask your healthcare professional. [place] [address] any warranty or liability for your use of this information. Patient Education        Bronquitis: Brittnee Covert - [ Bronchitis: Care Instructions ]  Instrucciones de cuidado    La bronquitis es katarzyna inflamación de los bronquios (conductos bronquiales), que llevan aire a los pulmones. Los tubos se hinchan y producen mucosidad, o flema. La mucosidad y los bronquios inflamados hacen que tosa. Es posible que tenga problemas para respirar. Raffi Dajuan de los casos de bronquitis son causados por virus steven los que causan los resfriados. Los antibióticos generalmente no ayudan y pueden ser dañinos. La bronquitis suele aparecer con Rudolfo Ayah y dura alrededor de 2 a 3 semanas en personas que por lo demás son saludables. La atención de seguimiento es katarzyna parte clave de hartley tratamiento y seguridad. Asegúrese de hacer y acudir a todas las citas, y llame a hartley médico si está teniendo problemas. También es katarzyna buena idea saber los resultados de yamila exámenes y mantener katarzyna lista de los medicamentos que yamil. ¿Cómo puede cuidarse en el hogar? · [employer] medicamentos exactamente steven le fueron recetados. Llame a hartley médico si eryn que está teniendo un problema con yamila medicamentos. · Descanse un poco más.   · [place] un analgésico (medicamento para el dolor) de venta dania, steven acetaminofén (Tylenol), ibuprofeno (Advil, Motrin) o naproxeno (Aleve), para reducir la fiebre y [employer] mtizy en el cuerpo. Anna y siga todas las instrucciones de la [place]. · No tome dos o más analgésicos al mismo tiempo a menos que el médico se lo haya indicado. Muchos analgésicos contienen acetaminofén, es decir, Tylenol. El exceso de acetaminofén (Tylenol) puede ser dañino. · Kosse un medicamento para la tos de venta dania que contenga dextrometorfano para ayudar a aliviar la tos seca y persistente y así poder dormir. Evite los medicamentos para la tos que tengan más de un ingrediente [place]. Anna y siga todas las instrucciones de la [place]. · Respire aire húmedo de un humidificador, ducha caliente o lavabo lleno de [place]. El calor y la humedad adelgazarán la mucosidad para que pueda expulsarla. · No fume. Fumar puede empeorar la bronquitis. Si necesita ayuda para dejar de fumar, hable con hartley médico sobre programas y medicamentos para dejar de fumar. Estos pueden aumentar yamila probabilidades de dejar el hábito para siempre. ¿Cuándo debe pedir ayuda? Llame al 911 en cualquier momento que considere que necesita atención de emergencia. Por ejemplo, llame si:    · Tiene serias dificultades para respirar.    Llame a hartley médico ahora mismo o busque atención médica inmediata si:    · Tiene nueva o peor dificultad para respirar.     · Tose mucosidad (esputo) de color café oscuro o con tez.     · Tiene katarzyna fiebre nueva o más ivy.     · Tiene un salpullido nuevo.    Preste especial atención a los cambios en hartley andree y asegúrese de comunicarse con hartley médico si:    · Hartley tos es más profunda o más frecuente que antes, especialmente si nota más mucosidad o un cambio en el color de la mucosidad.     · No mejora steven se esperaba. ¿Dónde puede encontrar más información en inglés? Jalen Ores a [email or web address] e ingrese a hartley cuenta de MyChart.  Escriba H333 en Jacqueline Amor Risen \"Search Health Information\" para más información (en inglés) sobre \"Bronquitis: Instrucciones de cuidado - [ Bronchitis: Care Instructions ]. \"     Si no tiene katarzyna cuentacortez en el enlace \"Sign Up Now\". Revisado: 9 junio, 2019  Versión del contenido: 12.3  © 0800-8342 [employer]. Las instrucciones de cuidado fueron adaptadas bajo licencia por [facility] ([facility]). Si usted tiene [place] afección médica o sobre estas instrucciones, siempre pregunte a hartley profesional de andree. [employer] [facility] niega toda garantía o responsabilidad por hartley uso de esta información. Patient Education        Bronchitis: Care Instructions  Your Care Instructions    Bronchitis is inflammation of the bronchial tubes, which carry air to the lungs. The tubes swell and produce mucus, or phlegm. The mucus and inflamed bronchial tubes make you cough. You may have trouble breathing. Most cases of bronchitis are caused by viruses like those that cause colds. Antibiotics usually do not help and they may be harmful. Bronchitis usually develops rapidly and lasts about 2 to 3 weeks in otherwise healthy people. Follow-up care is a key part of your treatment and safety. Be sure to make and go to all appointments, and call your doctor if you are having problems. It's also a good idea to know your test results and keep a list of the medicines you take. How can you care for yourself at home? · Take all medicines exactly as prescribed. Call your doctor if you think you are having a problem with your medicine. · Get some extra rest.  · Take an over-the-counter pain medicine, such as acetaminophen (Tylenol), ibuprofen (Advil, Motrin), or naproxen (Aleve) to reduce fever and relieve body aches. Read and follow all instructions on the label. · Do not take two or more pain medicines at the same time unless the doctor told you to. Many pain medicines have acetaminophen, which is Tylenol.  Too much acetaminophen (Tylenol) can be harmful. · Take an over-the-counter cough medicine that contains dextromethorphan to help quiet a dry, hacking cough so that you can sleep. Avoid cough medicines that have more than one active ingredient. Read and follow all instructions on the label. · Breathe moist air from a humidifier, hot shower, or sink filled with hot water. The heat and moisture will thin mucus so you can cough it out. · Do not smoke. Smoking can make bronchitis worse. If you need help quitting, talk to your doctor about stop-smoking programs and medicines. These can increase your chances of quitting for good. When should you call for help? Call 911 anytime you think you may need emergency care. For example, call if:    · You have severe trouble breathing.    Call your doctor now or seek immediate medical care if:    · You have new or worse trouble breathing.     · You cough up dark brown or bloody mucus (sputum).     · You have a new or higher fever.     · You have a new rash.    Watch closely for changes in your health, and be sure to contact your doctor if:    · You cough more deeply or more often, especially if you notice more mucus or a change in the color of your mucus.     · You are not getting better as expected. Where can you learn more? Go to [email or web address] and sign in to your [employer] account. Enter H333 in the [employer] box to learn more about \"Bronchitis: Care Instructions. \"     If you do not have an account, please click on the \"Sign Up Now\" link. Current as of: June 9, 2019  Content Version: 12.3  © 0229-3171 Healthwise, Incorporated. Care instructions adapted under license by [facility] ([facility]). If you have questions about a medical condition or this instruction, always ask your healthcare professional. [place] [address] any warranty or liability for your use of this information.

## 2020-02-17 ENCOUNTER — HOSPITAL ENCOUNTER (OUTPATIENT)
Dept: ULTRASOUND IMAGING | Age: 25
Discharge: HOME | End: 2020-02-19
Payer: COMMERCIAL

## 2020-02-17 PROCEDURE — 76536 US EXAM OF HEAD AND NECK: CPT

## 2020-02-25 ENCOUNTER — OFFICE VISIT (OUTPATIENT)
Dept: FAMILY MEDICINE CLINIC | Age: 25
End: 2020-02-25
Payer: COMMERCIAL

## 2020-02-25 VITALS
OXYGEN SATURATION: 99 % | SYSTOLIC BLOOD PRESSURE: 112 MMHG | HEIGHT: 60 IN | RESPIRATION RATE: 14 BRPM | WEIGHT: 214.6 LBS | DIASTOLIC BLOOD PRESSURE: 70 MMHG | HEART RATE: 108 BPM | TEMPERATURE: 97.4 F | BODY MASS INDEX: 42.13 KG/M2

## 2020-02-25 PROBLEM — G47.33 OSA (OBSTRUCTIVE SLEEP APNEA): Chronic | Status: ACTIVE | Noted: 2020-02-25

## 2020-02-25 LAB
CONTROL: NORMAL
PREGNANCY TEST URINE, POC: NEGATIVE

## 2020-02-25 PROCEDURE — 1036F TOBACCO NON-USER: CPT | Performed by: FAMILY MEDICINE

## 2020-02-25 PROCEDURE — 81025 URINE PREGNANCY TEST: CPT | Performed by: FAMILY MEDICINE

## 2020-02-25 PROCEDURE — G8482 FLU IMMUNIZE ORDER/ADMIN: HCPCS | Performed by: FAMILY MEDICINE

## 2020-02-25 PROCEDURE — G8417 CALC BMI ABV UP PARAM F/U: HCPCS | Performed by: FAMILY MEDICINE

## 2020-02-25 PROCEDURE — G8427 DOCREV CUR MEDS BY ELIG CLIN: HCPCS | Performed by: FAMILY MEDICINE

## 2020-02-25 PROCEDURE — 99214 OFFICE O/P EST MOD 30 MIN: CPT | Performed by: FAMILY MEDICINE

## 2020-02-25 NOTE — PROGRESS NOTES
phone: Not on file     Gets together: Not on file     Attends Congregation service: Not on file     Active member of club or organization: Not on file     Attends meetings of clubs or organizations: Not on file     Relationship status: Not on file    Intimate partner violence:     Fear of current or ex partner: Not on file     Emotionally abused: Not on file     Physically abused: Not on file     Forced sexual activity: Not on file   Other Topics Concern    Not on file   Social History Narrative    Not on file     Family History   Problem Relation Age of Onset    No Known Problems Mother     Diabetes Father     No Known Problems Sister     No Known Problems Brother     No Known Problems Sister     Rheum Arthritis Maternal Grandmother      No Known Allergies  Current Outpatient Medications   Medication Sig Dispense Refill    norelgestromin-ethinyl estradiol (ORTHO EVRA) 150-35 MCG/24HR Place 1 patch onto the skin once a week 9 patch 4    levothyroxine (SYNTHROID) 50 MCG tablet Take 1 tablet by mouth Daily 30 tablet 3    ibuprofen (ADVIL;MOTRIN) 800 MG tablet Take 1 tablet by mouth every 8 hours as needed for Pain 90 tablet 3     No current facility-administered medications for this visit. PMH, Surgical Hx, Family Hx, and Social Hxreviewed and updated. Health Maintenance reviewed. Objective    Vitals:    02/25/20 1737   BP: 112/70   Site: Right Upper Arm   Position: Sitting   Cuff Size: Medium Adult   Pulse: 108   Resp: 14   Temp: 97.4 °F (36.3 °C)   TempSrc: Temporal   SpO2: 99%   Weight: 214 lb 9.6 oz (97.3 kg)   Height: 5' (1.524 m)        Physical Exam  Constitutional:       Appearance: She is obese. HENT:      Head: Normocephalic. Eyes:      Conjunctiva/sclera: Conjunctivae normal.   Pulmonary:      Effort: Pulmonary effort is normal.   Neurological:      Mental Status: She is alert and oriented to person, place, and time.    Psychiatric:         Behavior: Behavior normal.         Thought Content: Thought content normal.         Judgment: Judgment normal.           Lab Results   Component Value Date    LABA1C 5.2 04/02/2019     Lab Results   Component Value Date    CREATININE 0.65 01/28/2020     Lab Results   Component Value Date    ALT 34 (H) 01/28/2020    AST 23 01/28/2020     Lab Results   Component Value Date    HDL 39 (L) 12/13/2017    LDLCALC 116 12/13/2017        Assessment & Plan   Visit Diagnoses and Associated Orders     Pain in joint involving multiple sites    -  Primary    Unable to secure appointment with rheumatologist to whom she was previously referred. New referral written. Amb External Referral To Rheumatology [RKH120 Custom]           Encounter for surveillance of transdermal patch hormonal contraceptive device        Confirm pregnancy negative. Start patch. Advised of potential complications related to contraception. POCT urine pregnancy [75135 Custom]      norelgestromin-ethinyl estradiol (ORTHO EVRA) 150-35 MCG/24HR [08320]           EFRAIN (obstructive sleep apnea)        Suspected. Has been referred to pulmonology by Dr. Greta Cassidy                 Reviewed with the patient: all disease processes, current clinical status, medications, activities and diet.      Side effects, adverse effects of the medication prescribed today, as well as treatment plan/ rationale and result expectations have been discussed with the patient who expresses understanding and desires to proceed.     Close follow up to evaluate treatment results and for coordination of care. I have reviewed the patient's medical history in detail and updated the computerized patient record. More than 50% of the appointment was spent in face-to-face counseling, education and care coordination.     Please note this report has been partially produced using speech recognition software and may contain mistakes related to that system including errors in grammar, punctuation and spelling as well as words and phrases that

## 2020-03-10 ENCOUNTER — OFFICE VISIT (OUTPATIENT)
Dept: PULMONOLOGY | Age: 25
End: 2020-03-10
Payer: COMMERCIAL

## 2020-03-10 VITALS
DIASTOLIC BLOOD PRESSURE: 60 MMHG | HEART RATE: 90 BPM | RESPIRATION RATE: 16 BRPM | HEIGHT: 60 IN | BODY MASS INDEX: 42.6 KG/M2 | WEIGHT: 217 LBS | TEMPERATURE: 98.2 F | OXYGEN SATURATION: 98 % | SYSTOLIC BLOOD PRESSURE: 110 MMHG

## 2020-03-10 PROBLEM — G47.30 SLEEP APNEA: Chronic | Status: ACTIVE | Noted: 2020-02-25

## 2020-03-10 PROBLEM — E66.01 MORBID OBESITY (HCC): Chronic | Status: ACTIVE | Noted: 2017-12-12

## 2020-03-10 PROCEDURE — 99204 OFFICE O/P NEW MOD 45 MIN: CPT | Performed by: INTERNAL MEDICINE

## 2020-03-10 PROCEDURE — G8417 CALC BMI ABV UP PARAM F/U: HCPCS | Performed by: INTERNAL MEDICINE

## 2020-03-10 PROCEDURE — G8427 DOCREV CUR MEDS BY ELIG CLIN: HCPCS | Performed by: INTERNAL MEDICINE

## 2020-03-10 PROCEDURE — G8482 FLU IMMUNIZE ORDER/ADMIN: HCPCS | Performed by: INTERNAL MEDICINE

## 2020-03-10 PROCEDURE — 1036F TOBACCO NON-USER: CPT | Performed by: INTERNAL MEDICINE

## 2020-03-10 ASSESSMENT — ENCOUNTER SYMPTOMS
ABDOMINAL PAIN: 0
RHINORRHEA: 0
COUGH: 0
TROUBLE SWALLOWING: 0
DIARRHEA: 0
SINUS PRESSURE: 0
VOMITING: 0
EYE ITCHING: 0
EYE DISCHARGE: 0
SHORTNESS OF BREATH: 0
SORE THROAT: 0
NAUSEA: 0
CHEST TIGHTNESS: 0
VOICE CHANGE: 0
WHEEZING: 0

## 2020-03-20 ENCOUNTER — TELEPHONE (OUTPATIENT)
Dept: FAMILY MEDICINE CLINIC | Age: 25
End: 2020-03-20

## 2020-03-23 ENCOUNTER — VIRTUAL VISIT (OUTPATIENT)
Dept: FAMILY MEDICINE CLINIC | Age: 25
End: 2020-03-23
Payer: COMMERCIAL

## 2020-03-23 PROCEDURE — 99442 PR PHYS/QHP TELEPHONE EVALUATION 11-20 MIN: CPT | Performed by: FAMILY MEDICINE

## 2020-03-23 RX ORDER — ALPHA-D-GALACTOSIDASE
2 TABLET ORAL
Qty: 180 TABLET | Refills: 1 | Status: SHIPPED | OUTPATIENT
Start: 2020-03-23 | End: 2020-04-21

## 2020-03-23 RX ORDER — PANTOPRAZOLE SODIUM 40 MG/1
40 TABLET, DELAYED RELEASE ORAL
Qty: 30 TABLET | Refills: 5 | Status: SHIPPED | OUTPATIENT
Start: 2020-03-23 | End: 2020-04-21

## 2020-03-23 NOTE — PROGRESS NOTES
Exam    Patient appears to be alert and oriented to person, place, time, situation and is in no acute distress. Mood appears stable and speech and thought are grossly normal.    Lab Results   Component Value Date    LABA1C 5.2 04/02/2019     Lab Results   Component Value Date    CREATININE 0.65 01/28/2020     Lab Results   Component Value Date    ALT 34 (H) 01/28/2020    AST 23 01/28/2020     Lab Results   Component Value Date    HDL 39 (L) 12/13/2017    LDLCALC 116 12/13/2017        Assessment & Plan   Visit Diagnoses and Associated Orders     Abdominal bloating    -  Primary    Consider GB. Meanwhile digestive enzymes and PPI         Encounter for surveillance of transdermal patch hormonal contraceptive device        Confirm pregnancy negative. Start patch. Advised of potential complications related to contraception. norelgestromin-ethinyl estradiol (ORTHO EVRA) 150-35 MCG/24HR [96033]           Missed period        Likely due to missed contraception though pregnancy test negative. Repeat test then restart patch if negative                 Reviewed with the patient: all disease processes, current clinical status, medications, activities and diet.      Side effects, adverse effects of the medication prescribed today, as well as treatment plan/ rationale and result expectations have been discussed with the patient who expresses understanding and desires to proceed.     Close follow up to evaluate treatment results and for coordination of care. I have reviewed the patient's medical history in detail and updated the computerized patient record. More than 50% of the appointment was spent in face-to-face counseling, education and care coordination. Please note this report has been partially produced using speech recognition software and may contain mistakes related to that system including errors in grammar, punctuation and spelling as well as words and phrases that may seem inappropriate.  If there are questions or concerns, please feel free to contact me to clarify. No orders of the defined types were placed in this encounter. Orders Placed This Encounter   Medications    norelgestromin-ethinyl estradiol (ORTHO EVRA) 150-35 MCG/24HR     Sig: Place 1 patch onto the skin once a week     Dispense:  9 patch     Refill:  4     Medications Discontinued During This Encounter   Medication Reason    norelgestromin-ethinyl estradiol (ORTHO EVRA) 150-35 MCG/24HR REORDER     No follow-ups on file. Controlled Substance Monitoring:    Acute and Chronic Pain Monitoring:   No flowsheet data found.         Chelsea Baker MD

## 2020-03-25 ENCOUNTER — TELEPHONE (OUTPATIENT)
Dept: OBGYN CLINIC | Age: 25
End: 2020-03-25

## 2020-03-26 ENCOUNTER — OFFICE VISIT (OUTPATIENT)
Dept: OBGYN CLINIC | Age: 25
End: 2020-03-26
Payer: COMMERCIAL

## 2020-03-26 VITALS
WEIGHT: 219.8 LBS | DIASTOLIC BLOOD PRESSURE: 70 MMHG | SYSTOLIC BLOOD PRESSURE: 110 MMHG | HEIGHT: 60 IN | BODY MASS INDEX: 43.15 KG/M2

## 2020-03-26 DIAGNOSIS — N91.2 AMENORRHEA: ICD-10-CM

## 2020-03-26 LAB — GONADOTROPIN, CHORIONIC (HCG) QUANT: <0.1 MIU/ML

## 2020-03-26 PROCEDURE — G8417 CALC BMI ABV UP PARAM F/U: HCPCS | Performed by: OBSTETRICS & GYNECOLOGY

## 2020-03-26 PROCEDURE — G8482 FLU IMMUNIZE ORDER/ADMIN: HCPCS | Performed by: OBSTETRICS & GYNECOLOGY

## 2020-03-26 PROCEDURE — G8427 DOCREV CUR MEDS BY ELIG CLIN: HCPCS | Performed by: OBSTETRICS & GYNECOLOGY

## 2020-03-26 PROCEDURE — 1036F TOBACCO NON-USER: CPT | Performed by: OBSTETRICS & GYNECOLOGY

## 2020-03-26 PROCEDURE — 99213 OFFICE O/P EST LOW 20 MIN: CPT | Performed by: OBSTETRICS & GYNECOLOGY

## 2020-03-26 ASSESSMENT — ENCOUNTER SYMPTOMS
ABDOMINAL PAIN: 0
APNEA: 0
SHORTNESS OF BREATH: 0

## 2020-03-26 NOTE — PROGRESS NOTES
Subjective:      Patient ID:  Tricia Flores is a 25 y.o. female with chief complaint of:  Chief Complaint   Patient presents with    Amenorrhea     pt states that she has not had a period since20, pt took preg test and it was negative. pt states every pregnancy started like that neg preg test then blood work it positive. patient presents to obtain labs for pregnancy confirmation. Patient states she has delayed urine positivity and is usually diagnosed by blood. she admits to nausea no vomiting.  Amenorrhea with breast tenderness       Past Medical History:   Diagnosis Date    Anxiety 2017    Class 1 obesity due to excess calories without serious comorbidity with body mass index (BMI) of 30.0 to 30.9 in adult 2017    Heart abnormality     Hyperthyroidism 2018    Mental disorder     anxiety    Normal labor and delivery 2019    EFRAIN (obstructive sleep apnea) 2020    Tachycardia     Weight loss 3/25/2019     Past Surgical History:   Procedure Laterality Date    BACK SURGERY      tumor removed     SECTION N/A 2019     SECTION performed by Alisha Meng DO at [facility] OR     Family History   Problem Relation Age of Onset    No Known Problems Mother     Diabetes Father     No Known Problems Sister     No Known Problems Brother     No Known Problems Sister     Rheum Arthritis Maternal Grandmother      Current Outpatient Medications on File Prior to Visit   Medication Sig Dispense Refill    norelgestromin-ethinyl estradiol (ORTHO EVRA) 150-35 MCG/24HR Place 1 patch onto the skin once a week 9 patch 4    alpha-galactosidase (BEANO) TABS chewable tablet Take 2 tablets by mouth 3 times daily (with meals) 180 tablet 1    pantoprazole (PROTONIX) 40 MG tablet Take 1 tablet by mouth every morning (before breakfast) 30 tablet 5    levothyroxine (SYNTHROID) 50 MCG tablet Take 1 tablet by mouth Daily 30 tablet 3    ibuprofen (ADVIL;MOTRIN)

## 2020-04-21 ENCOUNTER — APPOINTMENT (OUTPATIENT)
Dept: GENERAL RADIOLOGY | Age: 25
End: 2020-04-21
Payer: COMMERCIAL

## 2020-04-21 ENCOUNTER — HOSPITAL ENCOUNTER (EMERGENCY)
Age: 25
Discharge: HOME | End: 2020-04-21
Payer: COMMERCIAL

## 2020-04-21 VITALS
SYSTOLIC BLOOD PRESSURE: 129 MMHG | DIASTOLIC BLOOD PRESSURE: 75 MMHG | HEART RATE: 92 BPM | TEMPERATURE: 98.4 F | RESPIRATION RATE: 16 BRPM | HEIGHT: 65 IN | WEIGHT: 219 LBS | BODY MASS INDEX: 36.49 KG/M2 | OXYGEN SATURATION: 99 %

## 2020-04-21 LAB
INFLUENZA A BY PCR: NEGATIVE
INFLUENZA B BY PCR: NEGATIVE
STREP GRP A PCR: NEGATIVE

## 2020-04-21 PROCEDURE — 87651 STREP A DNA AMP PROBE: CPT

## 2020-04-21 PROCEDURE — 71046 X-RAY EXAM CHEST 2 VIEWS: CPT

## 2020-04-21 PROCEDURE — 87502 INFLUENZA DNA AMP PROBE: CPT

## 2020-04-21 PROCEDURE — 99283 EMERGENCY DEPT VISIT LOW MDM: CPT

## 2020-04-21 RX ORDER — IBUPROFEN 600 MG/1
600 TABLET ORAL EVERY 8 HOURS PRN
Qty: 20 TABLET | Refills: 0 | Status: SHIPPED | OUTPATIENT
Start: 2020-04-21 | End: 2020-07-15

## 2020-04-21 RX ORDER — IBUPROFEN 600 MG/1
600 TABLET ORAL EVERY 8 HOURS PRN
Qty: 20 TABLET | Refills: 0 | Status: SHIPPED | OUTPATIENT
Start: 2020-04-21 | End: 2020-04-21 | Stop reason: SDUPTHER

## 2020-04-21 ASSESSMENT — ENCOUNTER SYMPTOMS
COUGH: 1
NAUSEA: 0
VOMITING: 0
VOICE CHANGE: 0
ABDOMINAL DISTENTION: 0
APNEA: 0
PHOTOPHOBIA: 0
ANAL BLEEDING: 0
BACK PAIN: 1
EYE DISCHARGE: 0

## 2020-04-22 ENCOUNTER — CARE COORDINATION (OUTPATIENT)
Dept: CARE COORDINATION | Age: 25
End: 2020-04-22

## 2020-04-27 ENCOUNTER — TELEPHONE (OUTPATIENT)
Dept: OBGYN CLINIC | Age: 25
End: 2020-04-27

## 2020-04-28 RX ORDER — FLUCONAZOLE 150 MG/1
150 TABLET ORAL ONCE
Qty: 3 TABLET | Refills: 0 | Status: SHIPPED | OUTPATIENT
Start: 2020-04-28 | End: 2020-04-28

## 2020-05-29 ENCOUNTER — OFFICE VISIT (OUTPATIENT)
Dept: ENDOCRINOLOGY | Age: 25
End: 2020-05-29
Payer: COMMERCIAL

## 2020-05-29 VITALS
BODY MASS INDEX: 36.38 KG/M2 | HEART RATE: 92 BPM | OXYGEN SATURATION: 96 % | SYSTOLIC BLOOD PRESSURE: 114 MMHG | WEIGHT: 218.6 LBS | DIASTOLIC BLOOD PRESSURE: 77 MMHG

## 2020-05-29 DIAGNOSIS — E03.9 HYPOTHYROIDISM, UNSPECIFIED TYPE: ICD-10-CM

## 2020-05-29 LAB
T4 FREE: 1.42 NG/DL (ref 0.84–1.68)
TSH SERPL DL<=0.05 MIU/L-ACNC: 2.49 UIU/ML (ref 0.44–3.86)

## 2020-05-29 PROCEDURE — G8427 DOCREV CUR MEDS BY ELIG CLIN: HCPCS | Performed by: INTERNAL MEDICINE

## 2020-05-29 PROCEDURE — 99213 OFFICE O/P EST LOW 20 MIN: CPT | Performed by: INTERNAL MEDICINE

## 2020-05-29 PROCEDURE — G8417 CALC BMI ABV UP PARAM F/U: HCPCS | Performed by: INTERNAL MEDICINE

## 2020-05-29 PROCEDURE — 1036F TOBACCO NON-USER: CPT | Performed by: INTERNAL MEDICINE

## 2020-05-29 RX ORDER — LEVOTHYROXINE SODIUM 0.05 MG/1
50 TABLET ORAL DAILY
Qty: 30 TABLET | Refills: 3 | Status: SHIPPED | OUTPATIENT
Start: 2020-05-29 | End: 2020-10-12 | Stop reason: SDUPTHER

## 2020-05-29 NOTE — PROGRESS NOTES
Assessment:       Diagnosis Orders   1.  Hypothyroidism, unspecified type  T4, Free    TSH without Reflex           Plan:      Orders Placed This Encounter   Medications    levothyroxine (SYNTHROID) 50 MCG tablet     Sig: Take 1 tablet by mouth Daily     Dispense:  30 tablet     Refill:  3     Orders Placed This Encounter   Procedures    T4, Free     Standing Status:   Future     Standing Expiration Date:   5/29/2021    TSH without Reflex     Standing Status:   Future     Standing Expiration Date:   5/29/2021     Continue synthroid 50 mcg daily   F/u in 3 months         Mayte Dominique MD

## 2020-05-29 NOTE — LETTER
[facility]  [address] [address]  Phone: [phone]  Fax: [phone]    Lynette Graham MD        May 29, 2020     Patient: Rosalinda Todd   YOB: 1995   Date of Visit: 5/29/2020       To Whom It May Concern:     Rosalinda Todd was seen in the office today and accompanied by Corina Carrasco. If you have any questions or concerns, please don't hesitate to lupis the office.  Thank you     Sincerely,        Lynette Graham MD

## 2020-07-09 ENCOUNTER — OFFICE VISIT (OUTPATIENT)
Dept: FAMILY MEDICINE CLINIC | Age: 25
End: 2020-07-09
Payer: COMMERCIAL

## 2020-07-09 VITALS
SYSTOLIC BLOOD PRESSURE: 110 MMHG | OXYGEN SATURATION: 99 % | WEIGHT: 216.2 LBS | RESPIRATION RATE: 16 BRPM | TEMPERATURE: 98.8 F | DIASTOLIC BLOOD PRESSURE: 76 MMHG | HEART RATE: 98 BPM | HEIGHT: 65 IN | BODY MASS INDEX: 36.02 KG/M2

## 2020-07-09 DIAGNOSIS — R94.31 PROLONGED Q-T INTERVAL ON ECG: Chronic | ICD-10-CM

## 2020-07-09 LAB
CONTROL: NORMAL
MAGNESIUM: 2.2 MG/DL (ref 1.7–2.4)
POTASSIUM SERPL-SCNC: 3.9 MEQ/L (ref 3.4–4.9)
PREGNANCY TEST URINE, POC: NORMAL
T4 FREE: 1.38 NG/DL (ref 0.84–1.68)
TSH SERPL DL<=0.05 MIU/L-ACNC: 3.28 UIU/ML (ref 0.44–3.86)

## 2020-07-09 PROCEDURE — 1036F TOBACCO NON-USER: CPT | Performed by: FAMILY MEDICINE

## 2020-07-09 PROCEDURE — 99213 OFFICE O/P EST LOW 20 MIN: CPT | Performed by: FAMILY MEDICINE

## 2020-07-09 PROCEDURE — G8417 CALC BMI ABV UP PARAM F/U: HCPCS | Performed by: FAMILY MEDICINE

## 2020-07-09 PROCEDURE — G8427 DOCREV CUR MEDS BY ELIG CLIN: HCPCS | Performed by: FAMILY MEDICINE

## 2020-07-09 PROCEDURE — 81025 URINE PREGNANCY TEST: CPT | Performed by: FAMILY MEDICINE

## 2020-07-09 RX ORDER — FLUCONAZOLE 150 MG/1
TABLET ORAL
COMMUNITY
Start: 2020-04-28 | End: 2020-07-15

## 2020-07-09 NOTE — PROGRESS NOTES
Subjective  Wilver Prabhakar, 22 y.o. female presents today with:  Chief Complaint   Patient presents with    Other     Patient present today with concern of posible pregnancy, states feeling tired, her breast feel sensitive and heavy, abdominal discomfort. HPI    Patient complains of feeling tired as well as breast sensitivity and a heavy abdominal discomfort. She also has white cottage cheeselike discharge. No fevers, chills, sweats. No vomiting, diarrhea, constipation. No cough or shortness of breath. No significant menstrual irregularity.     No other questions and or concerns for today's visit      Review of Systems      Past Medical History:   Diagnosis Date    Anxiety 2017    Class 1 obesity due to excess calories without serious comorbidity with body mass index (BMI) of 30.0 to 30.9 in adult 2017    Heart abnormality     Hyperthyroidism 2018    Mental disorder     anxiety    Normal labor and delivery 2019    EFRAIN (obstructive sleep apnea) 2020    Tachycardia     Weight loss 3/25/2019     Past Surgical History:   Procedure Laterality Date    BACK SURGERY  2004    tumor removed     SECTION N/A 2019     SECTION performed by Sheri Osborne DO at [facility] OR     Social History     Socioeconomic History    Marital status: Single     Spouse name: Not on file    Number of children: Not on file    Years of education: Not on file    Highest education level: Not on file   Occupational History    Not on file   Social Needs    Financial resource strain: Not on file    Food insecurity     Worry: Not on file     Inability: Not on file    Transportation needs     Medical: Not on file     Non-medical: Not on file   Tobacco Use    Smoking status: Never Smoker    Smokeless tobacco: Never Used   Substance and Sexual Activity    Alcohol use: No    Drug use: No    Sexual activity: Yes     Partners: Male   Lifestyle    Physical activity Days per week: Not on file     Minutes per session: Not on file    Stress: Not on file   Relationships    Social connections     Talks on phone: Not on file     Gets together: Not on file     Attends Orthodoxy service: Not on file     Active member of club or organization: Not on file     Attends meetings of clubs or organizations: Not on file     Relationship status: Not on file    Intimate partner violence     Fear of current or ex partner: Not on file     Emotionally abused: Not on file     Physically abused: Not on file     Forced sexual activity: Not on file   Other Topics Concern    Not on file   Social History Narrative    Not on file     Family History   Problem Relation Age of Onset    No Known Problems Mother     Diabetes Father     No Known Problems Sister     No Known Problems Brother     No Known Problems Sister     Rheum Arthritis Maternal Grandmother      No Known Allergies  Current Outpatient Medications   Medication Sig Dispense Refill    levothyroxine (SYNTHROID) 50 MCG tablet Take 1 tablet by mouth Daily 30 tablet 3    dicyclomine (BENTYL) 10 MG capsule Take 1 capsule by mouth every 6 hours as needed (cramps) 20 capsule 0     No current facility-administered medications for this visit. PMH, Surgical Hx, Family Hx, and Social Hxreviewed and updated. Health Maintenance reviewed. Objective    Vitals:    07/09/20 1633   BP: 110/76   Site: Left Upper Arm   Position: Sitting   Cuff Size: Large Adult   Pulse: 98   Resp: 16   Temp: 98.8 °F (37.1 °C)   TempSrc: Tympanic   SpO2: 99%   Weight: 216 lb 3.2 oz (98.1 kg)   Height: 5' 5\" (1.651 m)        Physical Exam  Constitutional:       General: She is not in acute distress. Appearance: She is well-developed. HENT:      Head: Normocephalic and atraumatic. Eyes:      General: Scleral icterus present. Conjunctiva/sclera: Conjunctivae normal.   Cardiovascular:      Rate and Rhythm: Normal rate and regular rhythm.       Heart and phrases that may seem inappropriate. If there are questions or concerns, please feel free to contact me to clarify. Orders Placed This Encounter   Procedures    TSH Without Reflex     Standing Status:   Future     Number of Occurrences:   1     Standing Expiration Date:   7/9/2021    T4, Free     Standing Status:   Future     Number of Occurrences:   1     Standing Expiration Date:   7/9/2021    POCT urine pregnancy     No orders of the defined types were placed in this encounter. There are no discontinued medications. Return if symptoms worsen or fail to improve. Controlled Substance Monitoring:    Acute and Chronic Pain Monitoring:   No flowsheet data found.         Praveen Burch MD

## 2020-07-15 ENCOUNTER — HOSPITAL ENCOUNTER (EMERGENCY)
Age: 25
Discharge: HOME | End: 2020-07-15
Attending: EMERGENCY MEDICINE
Payer: COMMERCIAL

## 2020-07-15 ENCOUNTER — APPOINTMENT (OUTPATIENT)
Dept: GENERAL RADIOLOGY | Age: 25
End: 2020-07-15
Payer: COMMERCIAL

## 2020-07-15 VITALS
OXYGEN SATURATION: 96 % | HEART RATE: 108 BPM | RESPIRATION RATE: 18 BRPM | WEIGHT: 216 LBS | TEMPERATURE: 98.4 F | SYSTOLIC BLOOD PRESSURE: 125 MMHG | BODY MASS INDEX: 35.99 KG/M2 | HEIGHT: 65 IN | DIASTOLIC BLOOD PRESSURE: 90 MMHG

## 2020-07-15 LAB
ALBUMIN SERPL-MCNC: 4.2 G/DL (ref 3.5–4.6)
ALP BLD-CCNC: 97 U/L (ref 40–130)
ALT SERPL-CCNC: 40 U/L (ref 0–33)
ANION GAP SERPL CALCULATED.3IONS-SCNC: 9 MEQ/L (ref 9–15)
APTT: 30.4 SEC (ref 24.4–36.8)
AST SERPL-CCNC: 25 U/L (ref 0–35)
BACTERIA: NEGATIVE /HPF
BASOPHILS ABSOLUTE: 0.1 K/UL (ref 0–0.2)
BASOPHILS RELATIVE PERCENT: 0.6 %
BILIRUB SERPL-MCNC: <0.2 MG/DL (ref 0.2–0.7)
BILIRUBIN URINE: NEGATIVE
BLOOD, URINE: ABNORMAL
BUN BLDV-MCNC: 12 MG/DL (ref 6–20)
CALCIUM SERPL-MCNC: 9.6 MG/DL (ref 8.5–9.9)
CHLORIDE BLD-SCNC: 95 MEQ/L (ref 95–107)
CLARITY: CLEAR
CO2: 25 MEQ/L (ref 20–31)
COLOR: YELLOW
CREAT SERPL-MCNC: 0.63 MG/DL (ref 0.5–0.9)
EOSINOPHILS ABSOLUTE: 0.1 K/UL (ref 0–0.7)
EOSINOPHILS RELATIVE PERCENT: 1.5 %
EPITHELIAL CELLS, UA: NORMAL /HPF (ref 0–5)
GFR AFRICAN AMERICAN: >60
GFR NON-AFRICAN AMERICAN: >60
GLOBULIN: 3.8 G/DL (ref 2.3–3.5)
GLUCOSE BLD-MCNC: 102 MG/DL (ref 70–99)
GLUCOSE URINE: NEGATIVE MG/DL
HCG, URINE, POC: NEGATIVE
HCG, URINE, POC: NEGATIVE
HCT VFR BLD CALC: 47 % (ref 37–47)
HEMOGLOBIN: 15.9 G/DL (ref 12–16)
HYALINE CASTS: NORMAL /HPF (ref 0–5)
INR BLD: 1
KETONES, URINE: NEGATIVE MG/DL
LACTIC ACID: 1.5 MMOL/L (ref 0.5–2.2)
LEUKOCYTE ESTERASE, URINE: NEGATIVE
LIPASE: 26 U/L (ref 12–95)
LYMPHOCYTES ABSOLUTE: 2.2 K/UL (ref 1–4.8)
LYMPHOCYTES RELATIVE PERCENT: 22.9 %
Lab: NORMAL
Lab: NORMAL
MCH RBC QN AUTO: 30.5 PG (ref 27–31.3)
MCHC RBC AUTO-ENTMCNC: 33.8 % (ref 33–37)
MCV RBC AUTO: 90.1 FL (ref 82–100)
MONOCYTES ABSOLUTE: 0.4 K/UL (ref 0.2–0.8)
MONOCYTES RELATIVE PERCENT: 4.3 %
NEGATIVE QC PASS/FAIL: NORMAL
NEGATIVE QC PASS/FAIL: NORMAL
NEUTROPHILS ABSOLUTE: 6.9 K/UL (ref 1.4–6.5)
NEUTROPHILS RELATIVE PERCENT: 70.7 %
NITRITE, URINE: NEGATIVE
PDW BLD-RTO: 12.8 % (ref 11.5–14.5)
PH UA: 5 (ref 5–9)
PLATELET # BLD: 426 K/UL (ref 130–400)
POSITIVE QC PASS/FAIL: NORMAL
POSITIVE QC PASS/FAIL: NORMAL
POTASSIUM SERPL-SCNC: 4 MEQ/L (ref 3.4–4.9)
PROTEIN UA: NEGATIVE MG/DL
PROTHROMBIN TIME: 13.4 SEC (ref 12.3–14.9)
RBC # BLD: 5.21 M/UL (ref 4.2–5.4)
RBC UA: NORMAL /HPF (ref 0–5)
SODIUM BLD-SCNC: 129 MEQ/L (ref 135–144)
SPECIFIC GRAVITY UA: 1.02 (ref 1–1.03)
TOTAL CK: 63 U/L (ref 0–170)
TOTAL PROTEIN: 8 G/DL (ref 6.3–8)
URINE REFLEX TO CULTURE: ABNORMAL
UROBILINOGEN, URINE: 0.2 E.U./DL
WBC # BLD: 9.8 K/UL (ref 4.8–10.8)
WBC UA: NORMAL /HPF (ref 0–5)

## 2020-07-15 PROCEDURE — 99284 EMERGENCY DEPT VISIT MOD MDM: CPT

## 2020-07-15 PROCEDURE — 83605 ASSAY OF LACTIC ACID: CPT

## 2020-07-15 PROCEDURE — 36415 COLL VENOUS BLD VENIPUNCTURE: CPT

## 2020-07-15 PROCEDURE — 74022 RADEX COMPL AQT ABD SERIES: CPT

## 2020-07-15 PROCEDURE — 85730 THROMBOPLASTIN TIME PARTIAL: CPT

## 2020-07-15 PROCEDURE — 80053 COMPREHEN METABOLIC PANEL: CPT

## 2020-07-15 PROCEDURE — 81001 URINALYSIS AUTO W/SCOPE: CPT

## 2020-07-15 PROCEDURE — 85610 PROTHROMBIN TIME: CPT

## 2020-07-15 PROCEDURE — 83690 ASSAY OF LIPASE: CPT

## 2020-07-15 PROCEDURE — 85025 COMPLETE CBC W/AUTO DIFF WBC: CPT

## 2020-07-15 PROCEDURE — 82550 ASSAY OF CK (CPK): CPT

## 2020-07-15 RX ORDER — DICYCLOMINE HYDROCHLORIDE 10 MG/1
10 CAPSULE ORAL EVERY 6 HOURS PRN
Qty: 20 CAPSULE | Refills: 0 | Status: SHIPPED | OUTPATIENT
Start: 2020-07-15 | End: 2020-12-09

## 2020-07-15 ASSESSMENT — ENCOUNTER SYMPTOMS
DIARRHEA: 0
TROUBLE SWALLOWING: 0
CONSTIPATION: 1
SHORTNESS OF BREATH: 0
SINUS PRESSURE: 0
BACK PAIN: 0
VOMITING: 0
ABDOMINAL PAIN: 1
COUGH: 0
CHEST TIGHTNESS: 0

## 2020-07-15 ASSESSMENT — PAIN DESCRIPTION - PAIN TYPE: TYPE: ACUTE PAIN

## 2020-07-15 ASSESSMENT — PAIN SCALES - GENERAL: PAINLEVEL_OUTOF10: 5

## 2020-07-15 ASSESSMENT — PAIN DESCRIPTION - DESCRIPTORS: DESCRIPTORS: SQUEEZING

## 2020-07-15 ASSESSMENT — PAIN DESCRIPTION - LOCATION: LOCATION: ABDOMEN;PELVIS

## 2020-07-15 ASSESSMENT — PAIN DESCRIPTION - FREQUENCY: FREQUENCY: INTERMITTENT

## 2020-07-15 ASSESSMENT — PAIN DESCRIPTION - ONSET: ONSET: ON-GOING

## 2020-07-16 NOTE — ED PROVIDER NOTES
[address] [facility] ED  eMERGENCY dEPARTMENT eNCOUnter      Pt Name: Micki Gramajo  MRN: [id number]  [place] 1995  Date of evaluation: 7/15/2020  Provider: Alysia Yang DO    CHIEF COMPLAINT       Chief Complaint   Patient presents with    Abdominal Pain     pt states that she is unsure if she is pregnant and states that she is having abd pain/pelvic pain which has been ongoing x1 week         HISTORY OF PRESENT ILLNESS   (Location/Symptom, Timing/Onset,Context/Setting, Quality, Duration, Modifying Factors, Severity)  Note limiting factors. Micki Gramajo is a 22 y.o. female who presents to the emergency department with complaints that she has not had a period in 3 months. Patient has some pelvic pressure. Patient denies any vaginal discharge. Patient denies any fever, chills, cough, nausea, vomiting or diarrhea. Patient denies any black, bloody or tarry stools. Patient feels like she might have some difficulty with bowel movements. Patient is concerned that she is pregnant. And would like checked for this today. Patient denies anything making her discomfort any better or worse. The history is provided by the patient. The history is limited by a language barrier. A  was used. NursingNotes were reviewed. REVIEW OF SYSTEMS    (2-9 systems for level 4, 10 or more for level 5)     Review of Systems   Constitutional: Negative for activity change, appetite change, chills, fever and unexpected weight change. HENT: Negative for drooling, ear pain, nosebleeds, sinus pressure and trouble swallowing. Respiratory: Negative for cough, chest tightness and shortness of breath. Cardiovascular: Negative for chest pain and leg swelling. Gastrointestinal: Positive for abdominal pain and constipation. Negative for diarrhea and vomiting. Endocrine: Negative for polydipsia and polyphagia.    Genitourinary: Positive for menstrual problem ( Amenorrhea x3 months). Negative for dysuria, flank pain and frequency. Musculoskeletal: Negative for back pain and myalgias. Skin: Negative for pallor and rash. Neurological: Negative for syncope, weakness and headaches. Hematological: Does not bruise/bleed easily. All other systems reviewed and are negative. Except as noted above the remainder of the review of systems was reviewed and negative. PAST MEDICAL HISTORY     Past Medical History:   Diagnosis Date    Anxiety 2017    Class 1 obesity due to excess calories without serious comorbidity with body mass index (BMI) of 30.0 to 30.9 in adult 2017    Heart abnormality     Hyperthyroidism 2018    Mental disorder     anxiety    Normal labor and delivery 2019    EFRAIN (obstructive sleep apnea) 2020    Tachycardia     Weight loss 3/25/2019         SURGICALHISTORY       Past Surgical History:   Procedure Laterality Date    BACK SURGERY      tumor removed     SECTION N/A 2019     SECTION performed by Carla Husain DO at [facility] OR         CURRENT MEDICATIONS       Previous Medications    LEVOTHYROXINE (SYNTHROID) 50 MCG TABLET    Take 1 tablet by mouth Daily       ALLERGIES     Patient has no known allergies.     FAMILY HISTORY       Family History   Problem Relation Age of Onset    No Known Problems Mother     Diabetes Father     No Known Problems Sister     No Known Problems Brother     No Known Problems Sister     Rheum Arthritis Maternal Grandmother           SOCIAL HISTORY       Social History     Socioeconomic History    Marital status: Single     Spouse name: None    Number of children: None    Years of education: None    Highest education level: None   Occupational History    None   Social Needs    Financial resource strain: None    Food insecurity     Worry: None     Inability: None    Transportation needs     Medical: None     Non-medical: None   Tobacco Use    Smoking status: Never Smoker    Smokeless tobacco: Never Used   Substance and Sexual Activity    Alcohol use: No    Drug use: No    Sexual activity: Yes     Partners: Male   Lifestyle    Physical activity     Days per week: None     Minutes per session: None    Stress: None   Relationships    Social connections     Talks on phone: None     Gets together: None     Attends [employer] service: None     Active member of club or organization: None     Attends meetings of clubs or organizations: None     Relationship status: None    Intimate partner violence     Fear of current or ex partner: None     Emotionally abused: None     Physically abused: None     Forced sexual activity: None   Other Topics Concern    None   Social History Narrative    None       SCREENINGS      @FLOW([id number])@      PHYSICAL EXAM    (up to 7 for level 4, 8 or more for level 5)     ED Triage Vitals [07/15/20 1754]   BP Temp Temp Source Pulse Resp SpO2 Height Weight   120/80 98.4 °F (36.9 °C) Oral 127 17 97 % 5' 5\" (1.651 m) 216 lb (98 kg)       Physical Exam  Vitals signs and nursing note reviewed. Exam conducted with a chaperone present. Constitutional:       General: She is awake. She is not in acute distress. Appearance: Normal appearance. She is well-developed and normal weight. She is not ill-appearing, toxic-appearing or diaphoretic. Comments: No photophobia. No phonophobia. HENT:      Head: Normocephalic and atraumatic. No Romero's sign. Right Ear: External ear normal.      Left Ear: External ear normal.      Nose: Nose normal. No congestion or rhinorrhea. Mouth/Throat:      Mouth: Mucous membranes are moist.      Pharynx: Oropharynx is clear. No oropharyngeal exudate or posterior oropharyngeal erythema. Eyes:      General: No scleral icterus. Right eye: No foreign body or discharge. Left eye: No discharge. Extraocular Movements: Extraocular movements intact.       Conjunctiva/sclera: Conjunctivae normal.      Left eye: No exudate. Pupils: Pupils are equal, round, and reactive to light. Neck:      Musculoskeletal: Normal range of motion and neck supple. No neck rigidity. Vascular: No JVD. Trachea: No tracheal deviation. Comments: No meningismus. Cardiovascular:      Rate and Rhythm: Normal rate and regular rhythm. Pulses: Normal pulses. Heart sounds: Normal heart sounds. Heart sounds not distant. No murmur. No friction rub. No gallop. Pulmonary:      Effort: Pulmonary effort is normal. No respiratory distress. Breath sounds: Normal breath sounds. No stridor. No wheezing, rhonchi or rales. Chest:      Chest wall: No tenderness. Abdominal:      General: Abdomen is flat. Bowel sounds are normal. There is no distension or abdominal bruit. There are no signs of injury. Palpations: Abdomen is soft. There is no shifting dullness, fluid wave, hepatomegaly, splenomegaly, mass or pulsatile mass. Tenderness: There is no abdominal tenderness. There is no right CVA tenderness, left CVA tenderness, guarding or rebound. Negative signs include Elmore's sign, Rovsing's sign and McBurney's sign. Hernia: No hernia is present. Musculoskeletal: Normal range of motion. General: No swelling, tenderness, deformity or signs of injury. Lymphadenopathy:      Head:      Right side of head: No submental adenopathy. Left side of head: No submental adenopathy. Skin:     General: Skin is warm and dry. Capillary Refill: Capillary refill takes less than 2 seconds. Coloration: Skin is not jaundiced or pale. Findings: No bruising, erythema, lesion or rash. Neurological:      General: No focal deficit present. Mental Status: She is alert and oriented to person, place, and time. Mental status is at baseline. Cranial Nerves: No cranial nerve deficit. Sensory: No sensory deficit. Motor: No weakness.       Coordination: Coordination normal.      Deep Tendon Reflexes: Reflexes are normal and symmetric. Psychiatric:         Mood and Affect: Mood normal.         Behavior: Behavior normal. Behavior is cooperative. Thought Content: Thought content normal.         Judgment: Judgment normal.         DIAGNOSTIC RESULTS     EKG: All EKG's are interpreted by the Emergency Department Physician who either signs or Co-signsthis chart in the absence of a cardiologist.        RADIOLOGY:   Viviann Frees such as CT, Ultrasound and MRI are read by the radiologist. Aurelio Ayana radiographic images are visualized and preliminarily interpreted by the emergency physician with the below findings:    Acute abdominal series with 1 view chest: Lungs are clear, there is some moderate stool at the right upper quadrant at the hepatic flexure, nonobstructive bowel gas pattern. No radiopaque ureteral stones. Interpretation per the Radiologist below, if available at the time ofthis note:    XR Acute Abd Series Chest 1 VW    (Results Pending)         ED BEDSIDE ULTRASOUND:   Performed by ED Physician - none    LABS:  Labs Reviewed   URINE RT REFLEX TO CULTURE - Abnormal; Notable for the following components:       Result Value    Blood, Urine SMALL (*)     All other components within normal limits   CBC WITH AUTO DIFFERENTIAL - Abnormal; Notable for the following components:    Platelets 976 (*)     Neutrophils Absolute 6.9 (*)     All other components within normal limits   COMPREHENSIVE METABOLIC PANEL - Abnormal; Notable for the following components:    Sodium 129 (*)     Glucose 102 (*)     ALT 40 (*)     Globulin 3.8 (*)     All other components within normal limits   MICROSCOPIC URINALYSIS   LACTIC ACID, PLASMA   LIPASE   PROTIME-INR   CK   APTT   URINE RT REFLEX TO CULTURE   URINE DRUG SCREEN   POC PREGNANCY UR-QUAL   POC PREGNANCY UR-QUAL       All other labs were within normal range or not returned as of this dictation.     EMERGENCY DEPARTMENT COURSE and DIFFERENTIAL DIAGNOSIS/MDM:   Vitals:    Vitals:    07/15/20 1946 07/15/20 1947 07/15/20 1948 07/15/20 2030   BP:    (!) 125/90   Pulse:    108   Resp:    18   Temp:       TempSrc:       SpO2: 97% 97% 96% 96%   Weight:       Height:               MDM    Patient has mild dehydration by lab with sodium 129. Patient admits that she does not drink enough fluids. On reexamination the patient is nontoxic. Her abdomen is benign on exam so a an acute abdominal series with 1 view chest was done as a screening exam.  The patient has a normal bowel gas pattern no radiopaque stones suggesting a kidney stone were seen. Urinalysis shows a small amount of blood however there is no leukocyte Estrace and no nitrates. Patient admits that she has a appointment this week with Dr. Nimesh Benson that she is going to follow-up for in regards to the new. And pelvic pain. Patient had denied any vaginal discharge. I feel that the patient is safe to go home and she was invited to return to the ER if her condition worsens. Patient will be prescribed Bentyl. The patient and her male significant other verbalized understanding the care that was discussed with him and have no further questions. At time of discharge the patient is smiling and nontoxic. CONSULTS:  None    PROCEDURES:  Unless otherwise noted below, none     Procedures    FINAL IMPRESSION      1. Amenorrhea    2. Lower abdominal pain    3.  Dehydration          DISPOSITION/PLAN   DISPOSITION Decision To Discharge 07/15/2020 08:54:38 PM      PATIENT REFERRED TO:  Mykel Shelton MD  [address] [facility], [address] [facility]  [phone]    Call in 1 day      Elly Moore, [address] [place] [address]  [phone]    Call in 1 day        DISCHARGE MEDICATIONS:  New Prescriptions    DICYCLOMINE (BENTYL) 10 MG CAPSULE    Take 1 capsule by mouth every 6 hours as needed (cramps)          (Please note that portions of this note were completed with a voice recognition program.  Efforts were made to edit the dictations but occasionally words are mis-transcribed.)    Roxi Crump DO (electronically signed)  Attending Emergency Physician          Roxi Crump DO  07/15/20 [id number]

## 2020-07-16 NOTE — ED NOTES
Dr. Sylvia Goldstein at patient bedside to discuss plan of care. Vaginal examination cancelled at this time.      Rick Pinto, RN  07/15/20 5042

## 2020-10-09 ENCOUNTER — OFFICE VISIT (OUTPATIENT)
Dept: OBGYN CLINIC | Age: 25
End: 2020-10-09
Payer: COMMERCIAL

## 2020-10-09 VITALS
BODY MASS INDEX: 36.32 KG/M2 | WEIGHT: 218 LBS | DIASTOLIC BLOOD PRESSURE: 80 MMHG | HEIGHT: 65 IN | SYSTOLIC BLOOD PRESSURE: 120 MMHG

## 2020-10-09 DIAGNOSIS — N89.8 VAGINAL DISCHARGE: ICD-10-CM

## 2020-10-09 DIAGNOSIS — R63.5 WEIGHT GAIN: ICD-10-CM

## 2020-10-09 DIAGNOSIS — N92.6 IRREGULAR MENSES: ICD-10-CM

## 2020-10-09 LAB
HCG, URINE, POC: NEGATIVE
INSULIN: 33.9 UIU/ML (ref 2.6–24.9)
Lab: NORMAL
NEGATIVE QC PASS/FAIL: NORMAL
POSITIVE QC PASS/FAIL: NORMAL

## 2020-10-09 PROCEDURE — G8427 DOCREV CUR MEDS BY ELIG CLIN: HCPCS | Performed by: OBSTETRICS & GYNECOLOGY

## 2020-10-09 PROCEDURE — 99214 OFFICE O/P EST MOD 30 MIN: CPT | Performed by: OBSTETRICS & GYNECOLOGY

## 2020-10-09 PROCEDURE — G8484 FLU IMMUNIZE NO ADMIN: HCPCS | Performed by: OBSTETRICS & GYNECOLOGY

## 2020-10-09 PROCEDURE — 1036F TOBACCO NON-USER: CPT | Performed by: OBSTETRICS & GYNECOLOGY

## 2020-10-09 PROCEDURE — 81025 URINE PREGNANCY TEST: CPT | Performed by: OBSTETRICS & GYNECOLOGY

## 2020-10-09 PROCEDURE — G8417 CALC BMI ABV UP PARAM F/U: HCPCS | Performed by: OBSTETRICS & GYNECOLOGY

## 2020-10-10 LAB
CLUE CELLS: NORMAL
TRICHOMONAS PREP: NORMAL
TRICHOMONAS VAGINALIS SCREEN: NEGATIVE
YEAST WET PREP: NORMAL

## 2020-10-11 RX ORDER — ETONOGESTREL AND ETHINYL ESTRADIOL 11.7; 2.7 MG/1; MG/1
INSERT, EXTENDED RELEASE VAGINAL
Qty: 3 EACH | Refills: 3 | Status: SHIPPED | OUTPATIENT
Start: 2020-10-11 | End: 2020-12-09

## 2020-10-11 ASSESSMENT — ENCOUNTER SYMPTOMS
ABDOMINAL PAIN: 1
APNEA: 0
SHORTNESS OF BREATH: 0

## 2020-10-12 NOTE — PROGRESS NOTES
Subjective:      Patient ID:  Sujatha Ornelas is a 22 y.o. female with chief complaint of:  Chief Complaint   Patient presents with    Menstrual Problem     pt unsure if her cycle has become irregular, pt unsure of when she is ovulating, her periods have been skipping months, bleeding is sometimes light, heavy, and last longer. pt breast are tender,  scar is sore, pain with intercourse. Patient presents to discuss concerns about irregular menstrual bleeding she has been skipping months occasionally been having lighter bleeding and some scar discomfort. Patient has a soap excessive weight gain but was recently diagnosed with thyroid disease and is on medication      Past Medical History:   Diagnosis Date    Anxiety 2017    Class 1 obesity due to excess calories without serious comorbidity with body mass index (BMI) of 30.0 to 30.9 in adult 2017    Heart abnormality     Hyperthyroidism 2018    Mental disorder     anxiety    Normal labor and delivery 2019    EFRAIN (obstructive sleep apnea) 2020    Tachycardia     Weight loss 3/25/2019     Past Surgical History:   Procedure Laterality Date    BACK SURGERY      tumor removed     SECTION N/A 2019     SECTION performed by Teresa Jean DO at [facility] L[facility]D [facility]     Family History   Problem Relation Age of Onset    No Known Problems Mother     Diabetes Father     No Known Problems Sister     No Known Problems Brother     No Known Problems Sister     Rheum Arthritis Maternal Grandmother      Current Outpatient Medications on File Prior to Visit   Medication Sig Dispense Refill    dicyclomine (BENTYL) 10 MG capsule Take 1 capsule by mouth every 6 hours as needed (cramps) 20 capsule 0    levothyroxine (SYNTHROID) 50 MCG tablet Take 1 tablet by mouth Daily 30 tablet 3     No current facility-administered medications on file prior to visit.       Allergies:  Patient has no known Number of Occurrences:   1     Standing Expiration Date:   10/9/2021    POC Pregnancy Urine Qual     Orders Placed This Encounter   Medications    etonogestrel-ethinyl estradiol (NUVARING) 0.12-0.015 MG/24HR vaginal ring     Sig: Insert one (1) ring vaginally and leave in place for three (3) weeks, then remove for one (1) week. Dispense:  3 each     Refill:  3     Pt patient is not good with Daily med desires something else  Will obtain labs to see if glucophage is necessary  No follow-ups on file.      Coit Lesvia, DO

## 2020-10-13 RX ORDER — LEVOTHYROXINE SODIUM 0.05 MG/1
50 TABLET ORAL DAILY
Qty: 30 TABLET | Refills: 3 | Status: SHIPPED | OUTPATIENT
Start: 2020-10-13 | End: 2020-12-09 | Stop reason: SDUPTHER

## 2020-10-23 ENCOUNTER — TELEPHONE (OUTPATIENT)
Dept: OBGYN CLINIC | Age: 25
End: 2020-10-23

## 2020-10-27 ENCOUNTER — TELEPHONE (OUTPATIENT)
Dept: OBGYN CLINIC | Age: 25
End: 2020-10-27

## 2020-10-27 NOTE — TELEPHONE ENCOUNTER
Called pt her questions were discussed.  Pt had negative pregnancy test. Per WC pt to start nuvaring today

## 2020-11-19 ENCOUNTER — TELEPHONE (OUTPATIENT)
Dept: OBGYN CLINIC | Age: 25
End: 2020-11-19

## 2020-11-19 NOTE — TELEPHONE ENCOUNTER
Spoke to pt she is concern because after she had intercourse she had vaginal  Bleeding. Pt currently using nuvaring. Pt removed nuvaring because she was afraid of the bleeding.  Please advise if pt needs to be seen

## 2020-12-09 ENCOUNTER — VIRTUAL VISIT (OUTPATIENT)
Dept: FAMILY MEDICINE CLINIC | Age: 25
End: 2020-12-09
Payer: COMMERCIAL

## 2020-12-09 DIAGNOSIS — N92.6 IRREGULAR MENSES: ICD-10-CM

## 2020-12-09 LAB — GONADOTROPIN, CHORIONIC (HCG) QUANT: <0.1 MIU/ML

## 2020-12-09 PROCEDURE — 99214 OFFICE O/P EST MOD 30 MIN: CPT | Performed by: FAMILY MEDICINE

## 2020-12-09 PROCEDURE — G8427 DOCREV CUR MEDS BY ELIG CLIN: HCPCS | Performed by: FAMILY MEDICINE

## 2020-12-09 RX ORDER — LEVOTHYROXINE SODIUM 0.05 MG/1
50 TABLET ORAL DAILY
Qty: 30 TABLET | Refills: 3 | Status: SHIPPED | OUTPATIENT
Start: 2020-12-09 | End: 2021-02-09 | Stop reason: ALTCHOICE

## 2020-12-09 NOTE — PROGRESS NOTES
Brianne Cerna is a 22 y.o. female evaluated via telephone on 2020. Consent:  She and/or health care decision maker is aware that that she may receive a bill for this telephone service, depending on her insurance coverage, and has provided verbal consent to proceed: Yes      Documentation:  I communicated with the patient and/or health care decision maker about see below. Details of this discussion including any medical advice provided: see below      I affirm this is a Patient Initiated Episode with an Established Patient who has not had a related appointment within my department in the past 7 days or scheduled within the next 24 hours. Total Time: minutes: 11-20 minutes    Note: not billable if this call serves to triage the patient into an appointment for the relevant concern      Beverly ORANTES     2020    TELEHEALTH EVALUATION -- Audio (During [facility]-99 public health emergency)    HPI:    Brianne Cerna (:  1995) has requested an audio evaluation for the following concern(s):    Vaginitis (Infection in  area \"tugging and pulling\". Having pregnancy symtoms) and Health Maintenance (Pt has not had flu shot)    Has a lot of worries. In September started having dizziness, headaches, nausea. Period was late and was scant. October missed her period and had light, brief period in November. No menses in December. Complains of pulling sensation around her umbilicus and feels like there is movement in her belly which is growing. She is also gained at least 6 pounds. She has occasional right-sided pelvic pain. Still has intermittent nausea which occurred as recently as last week for 3 days with associated emesis. No abnormal discharge . no diarrhea or constipation. No fevers, chills, sweats. Does complain of bilateral breast pain. Review of Systems See above. Prior to Visit Medications    Medication Sig Taking?  Authorizing Provider   levothyroxine Future    Pelvic pain  Comments:  See above. Return if symptoms worsen or fail to improve. Noemí Marcelo is a 22 y.o. female being evaluated by a Virtual Visit (telephonic visit) encounter to address concerns as mentioned above. A caregiver was present when appropriate. Due to this being a TeleHealth encounter (During [facility]-69 public health emergency), evaluation of the following organ systems was limited: Vitals/Constitutional/EENT/Resp/CV/GI//MS/Neuro/Skin/Heme-Lymph-Imm. Pursuant to the emergency declaration under the [address] [place] [address], [address] [place] [address] authority and the [employer] Act, this Virtual Visit was conducted with patient's (and/or legal guardian's) consent, to reduce the patient's risk of exposure to COVID-19 and provide necessary medical care. The patient (and/or legal guardian) has also been advised to contact this office for worsening conditions or problems, and seek emergency medical treatment and/or call 911 if deemed necessary. Services were provided through a telephonic synchronous discussion virtually to substitute for in-person clinic visit. Patient and provider were located at their individual homes. --Jeanette Lane MD on 12/9/2020 at 2:39 PM    An electronic signature was used to authenticate this note.

## 2020-12-28 ENCOUNTER — TELEPHONE (OUTPATIENT)
Dept: FAMILY MEDICINE CLINIC | Age: 25
End: 2020-12-28

## 2020-12-28 NOTE — TELEPHONE ENCOUNTER
Pt called in and is aware of pregnancy results. She said she is having severe pain in her lower back and she has not started her menstrual yet. She said she would like to know if you could order tests to see what is going on with her body. October she didn't have her period, November she did and December she has yet to have one.      Thanks

## 2021-02-09 ENCOUNTER — OFFICE VISIT (OUTPATIENT)
Dept: FAMILY MEDICINE CLINIC | Age: 26
End: 2021-02-09
Payer: COMMERCIAL

## 2021-02-09 VITALS
HEART RATE: 76 BPM | WEIGHT: 230 LBS | HEIGHT: 65 IN | RESPIRATION RATE: 18 BRPM | SYSTOLIC BLOOD PRESSURE: 112 MMHG | TEMPERATURE: 98 F | DIASTOLIC BLOOD PRESSURE: 82 MMHG | OXYGEN SATURATION: 98 % | BODY MASS INDEX: 38.32 KG/M2

## 2021-02-09 DIAGNOSIS — M25.50 PAIN IN JOINT INVOLVING MULTIPLE SITES: Chronic | ICD-10-CM

## 2021-02-09 DIAGNOSIS — M25.531 RIGHT WRIST PAIN: Primary | ICD-10-CM

## 2021-02-09 DIAGNOSIS — G89.29 CHRONIC MUSCULOSKELETAL PAIN: Chronic | ICD-10-CM

## 2021-02-09 DIAGNOSIS — M79.18 CHRONIC MUSCULOSKELETAL PAIN: Chronic | ICD-10-CM

## 2021-02-09 DIAGNOSIS — G56.01 CARPAL TUNNEL SYNDROME OF RIGHT WRIST: ICD-10-CM

## 2021-02-09 LAB — TSH SERPL DL<=0.05 MIU/L-ACNC: 4.27 UIU/ML (ref 0.44–3.86)

## 2021-02-09 PROCEDURE — G8484 FLU IMMUNIZE NO ADMIN: HCPCS | Performed by: FAMILY MEDICINE

## 2021-02-09 PROCEDURE — G8427 DOCREV CUR MEDS BY ELIG CLIN: HCPCS | Performed by: FAMILY MEDICINE

## 2021-02-09 PROCEDURE — G8417 CALC BMI ABV UP PARAM F/U: HCPCS | Performed by: FAMILY MEDICINE

## 2021-02-09 PROCEDURE — 99214 OFFICE O/P EST MOD 30 MIN: CPT | Performed by: FAMILY MEDICINE

## 2021-02-09 PROCEDURE — 1036F TOBACCO NON-USER: CPT | Performed by: FAMILY MEDICINE

## 2021-02-09 RX ORDER — NAPROXEN 500 MG/1
500 TABLET ORAL 2 TIMES DAILY PRN
Qty: 60 TABLET | Refills: 1 | Status: SHIPPED | OUTPATIENT
Start: 2021-02-09 | End: 2021-06-15

## 2021-02-09 RX ORDER — LEVOTHYROXINE SODIUM 88 UG/1
88 TABLET ORAL DAILY
Qty: 30 TABLET | Refills: 12 | Status: SHIPPED | OUTPATIENT
Start: 2021-02-09 | End: 2021-05-04 | Stop reason: SDUPTHER

## 2021-02-09 ASSESSMENT — PATIENT HEALTH QUESTIONNAIRE - PHQ9
SUM OF ALL RESPONSES TO PHQ QUESTIONS 1-9: 0
SUM OF ALL RESPONSES TO PHQ9 QUESTIONS 1 & 2: 0
2. FEELING DOWN, DEPRESSED OR HOPELESS: 0
SUM OF ALL RESPONSES TO PHQ QUESTIONS 1-9: 0
1. LITTLE INTEREST OR PLEASURE IN DOING THINGS: 0

## 2021-02-09 NOTE — PROGRESS NOTES
Ref rheum    Subjective  Karlo Covarrubias, 22 y.o. female presents today with:  Chief Complaint   Patient presents with    Wrist Pain     Patient states she has been having right wrist pain x 1 month and she states she is having a numbing feeling            HPI    Right wrist pain and itching x 2 months. Worse with grasp and lifting. No numbness or tingling. Occurs at night and during the day. Diffuse joint pain. Inflammatory arthritis labs - elevated CRP and ESR. No fevers. Gaining weight. Has significant fatigue. No other questions and or concerns for today's visit          Past Medical History:   Diagnosis Date    Anxiety 2017    Class 1 obesity due to excess calories without serious comorbidity with body mass index (BMI) of 30.0 to 30.9 in adult 2017    Heart abnormality     Hyperthyroidism 2018    Mental disorder     anxiety    Normal labor and delivery 2019    EFRAIN (obstructive sleep apnea) 2020     premature rupture of membranes with onset of labor more than 24 hours following rupture in third trimester     Tachycardia     Weight loss 3/25/2019     Past Surgical History:   Procedure Laterality Date    BACK SURGERY  2004    tumor removed     SECTION N/A 2019     SECTION performed by Erin Escobedo DO at [facility] L[facility]     Social History     Socioeconomic History    Marital status: Single     Spouse name: Not on file    Number of children: Not on file    Years of education: Not on file    Highest education level: Not on file   Occupational History    Not on file   Social Needs    Financial resource strain: Not on file    Food insecurity     Worry: Not on file     Inability: Not on file    Transportation needs     Medical: Not on file     Non-medical: Not on file   Tobacco Use    Smoking status: Never Smoker    Smokeless tobacco: Never Used   Substance and Sexual Activity    Alcohol use: No    Drug use:  No  Sexual activity: Yes     Partners: Male   Lifestyle    Physical activity     Days per week: Not on file     Minutes per session: Not on file    Stress: Not on file   Relationships    Social connections     Talks on phone: Not on file     Gets together: Not on file     Attends Restorationist service: Not on file     Active member of club or organization: Not on file     Attends meetings of clubs or organizations: Not on file     Relationship status: Not on file    Intimate partner violence     Fear of current or ex partner: Not on file     Emotionally abused: Not on file     Physically abused: Not on file     Forced sexual activity: Not on file   Other Topics Concern    Not on file   Social History Narrative    Not on file     Family History   Problem Relation Age of Onset    No Known Problems Mother     Diabetes Father     No Known Problems Sister     No Known Problems Brother     No Known Problems Sister     Rheum Arthritis Maternal Grandmother      No Known Allergies  Current Outpatient Medications   Medication Sig Dispense Refill    naproxen (NAPROSYN) 500 MG tablet Take 1 tablet by mouth 2 times daily as needed for Pain 60 tablet 1    levothyroxine (SYNTHROID) 88 MCG tablet Take 1 tablet by mouth daily 30 tablet 12     No current facility-administered medications for this visit. PMH, Surgical Hx, Family Hx, and Social Hxreviewed and updated. Health Maintenance reviewed. Objective    Vitals:    02/09/21 0828   BP: 112/82   Pulse: 76   Resp: 18   Temp: 98 °F (36.7 °C)   SpO2: 98%   Weight: 230 lb (104.3 kg)   Height: 5' 5\" (1.651 m)        Physical Exam  Constitutional:       General: She is not in acute distress. Appearance: She is obese. HENT:      Head: Normocephalic and atraumatic. Eyes:      General: No scleral icterus. Conjunctiva/sclera: Conjunctivae normal.   Cardiovascular:      Rate and Rhythm: Normal rate and regular rhythm. Heart sounds: Normal heart sounds. Pulmonary:      Effort: Pulmonary effort is normal. No respiratory distress. Breath sounds: Normal breath sounds. No wheezing or rales. Abdominal:      General: Bowel sounds are normal. There is no distension. Palpations: Abdomen is soft. There is no mass. Tenderness: There is no abdominal tenderness. Musculoskeletal:      Right lower leg: No edema. Left lower leg: No edema. Comments: Mildly positive phalens and tinnels on right   Skin:     General: Skin is warm and dry. Neurological:      Mental Status: She is alert and oriented to person, place, and time. Psychiatric:         Mood and Affect: Mood normal.         Behavior: Behavior normal.         Thought Content:  Thought content normal.         Judgment: Judgment normal.           Lab Results   Component Value Date    LABA1C 5.2 04/02/2019     Lab Results   Component Value Date    CREATININE 0.63 07/15/2020     Lab Results   Component Value Date    ALT 40 (H) 07/15/2020    AST 25 07/15/2020     Lab Results   Component Value Date    HDL 39 (L) 12/13/2017    LDLCALC 116 12/13/2017        Assessment & Plan   Visit Diagnoses and Associated Orders     Right wrist pain    -  Primary         Carpal tunnel syndrome of right wrist        SPLINT VELCRO WRIST [VIW30663 CPT(R)]      EMG [57771 Custom]   - Future Order    naproxen (NAPROSYN) 500 MG tablet [5393]           Pain in joint involving multiple sites        Amb External Referral To Rheumatology [FLI459 Custom]      Amb External Referral To Rheumatology [KWK231 Custom]           Chronic musculoskeletal pain        Amb External Referral To Rheumatology [GSH205 Custom]      Amb External Referral To Rheumatology [VOS265 Custom]           Post partum thyroiditis        TSH Without Reflex [[id number] Custom]   - Future Order                 Reviewed with the patient: all disease processes, current clinical status, medications, activities and diet.     Side effects, adverse effects of the medication prescribed today, as well as treatment plan/ rationale and result expectations have been discussed with the patient who expresses understanding and desires to proceed.     Close follow up to evaluate treatment results and for coordination of care. I have reviewed the patient's medical history in detail and updated the computerized patient record. More than 50% of the appointment was spent in face-to-face counseling, education and care coordination. Orders Placed This Encounter   Procedures    TSH Without Reflex     Standing Status:   Future     Number of Occurrences:   1     Standing Expiration Date:   2/9/2022    Amb External Referral To Rheumatology     Referral Priority:   Routine     Referral Type:   Eval and Treat     Referral Reason:   Specialty Services Required     Referred to Provider:   Yvonne Barrientos MD     Requested Specialty:   Internal Medicine     Number of Visits Requested:   1    Amb External Referral To Rheumatology     Referral Priority:   Routine     Referral Type:   Eval and Treat     Referral Reason:   Specialty Services Required     Referred to Provider:   Yvonne Barrientos MD     Requested Specialty:   Internal Medicine     Number of Visits Requested:   1    EMG     Standing Status:   Future     Standing Expiration Date:   4/10/2021     Order Specific Question:   Which body part? Answer:   right upper extrem    SPLINT VELCRO WRIST     Orders Placed This Encounter   Medications    naproxen (NAPROSYN) 500 MG tablet     Sig: Take 1 tablet by mouth 2 times daily as needed for Pain     Dispense:  60 tablet     Refill:  1     There are no discontinued medications. No follow-ups on file. Controlled Substance Monitoring:    Acute and Chronic Pain Monitoring:   No flowsheet data found.         Forest Maldonado MD

## 2021-03-09 ENCOUNTER — OFFICE VISIT (OUTPATIENT)
Dept: OBGYN CLINIC | Age: 26
End: 2021-03-09
Payer: COMMERCIAL

## 2021-03-09 VITALS
SYSTOLIC BLOOD PRESSURE: 118 MMHG | HEART RATE: 90 BPM | DIASTOLIC BLOOD PRESSURE: 80 MMHG | BODY MASS INDEX: 38.65 KG/M2 | WEIGHT: 232 LBS | HEIGHT: 65 IN

## 2021-03-09 DIAGNOSIS — Z12.4 SCREENING FOR CERVICAL CANCER: ICD-10-CM

## 2021-03-09 DIAGNOSIS — N91.4 SECONDARY OLIGOMENORRHEA: ICD-10-CM

## 2021-03-09 DIAGNOSIS — Z01.419 GYNECOLOGIC EXAM NORMAL: ICD-10-CM

## 2021-03-09 DIAGNOSIS — N91.4 SECONDARY OLIGOMENORRHEA: Primary | ICD-10-CM

## 2021-03-09 PROCEDURE — G8484 FLU IMMUNIZE NO ADMIN: HCPCS | Performed by: OBSTETRICS & GYNECOLOGY

## 2021-03-09 PROCEDURE — G8417 CALC BMI ABV UP PARAM F/U: HCPCS | Performed by: OBSTETRICS & GYNECOLOGY

## 2021-03-09 PROCEDURE — 1036F TOBACCO NON-USER: CPT | Performed by: OBSTETRICS & GYNECOLOGY

## 2021-03-09 PROCEDURE — 99214 OFFICE O/P EST MOD 30 MIN: CPT | Performed by: OBSTETRICS & GYNECOLOGY

## 2021-03-09 PROCEDURE — G8427 DOCREV CUR MEDS BY ELIG CLIN: HCPCS | Performed by: OBSTETRICS & GYNECOLOGY

## 2021-03-09 ASSESSMENT — ENCOUNTER SYMPTOMS
SHORTNESS OF BREATH: 0
BLOOD IN STOOL: 0
COUGH: 0
DIARRHEA: 0
CONSTIPATION: 0
SORE THROAT: 0
ABDOMINAL DISTENTION: 0
WHEEZING: 0
ABDOMINAL PAIN: 0
NAUSEA: 0
VOMITING: 0

## 2021-03-09 NOTE — PROGRESS NOTES
Subjective:      Nathan  is a 22 y.o. female [id number] here for routine exam.  Current Complaints: no breast pain or new or enlarging lumps on self exam, no vaginal bleeding, no discharge or pelvic pain, no hot flashes, she complains of light to no menstrual bleeding. Patient is no longer nursing she is sexually active. Patient on thyroid med and last labs showed she is not yet therapeutic   Menstrual history:   irregular  Sexual activity:  yes, denies knowledge of risky exposure  Abnormalvaginal discharge:  No  Contraceptive method:  none    Vitals:  /80 (Site: Right Upper Arm, Position: Sitting, Cuff Size: Large Adult)   Pulse 90   Ht 5' 5\" (1.651 m)   Wt 232 lb (105.2 kg)   LMP 2021 (Within Days)   BMI 38.61 kg/m²   Allergies:Patient has no known allergies.   Past Medical History:   Diagnosis Date    Anxiety 2017    Class 1 obesity due to excess calories without serious comorbidity with body mass index (BMI) of 30.0 to 30.9 in adult 2017    Heart abnormality     Hyperthyroidism 2018    Mental disorder     anxiety    Normal labor and delivery 2019    EFRAIN (obstructive sleep apnea) 2020     premature rupture of membranes with onset of labor more than 24 hours following rupture in third trimester     Tachycardia     Weight loss 3/25/2019     Past Surgical History:   Procedure Laterality Date    BACK SURGERY      tumor removed     SECTION N/A 2019     SECTION performed by Ravi [facility] DO at [facility]D [facility]     Family History   Problem Relation Age of Onset    No Known Problems Mother     Diabetes Father     No Known Problems Sister     No Known Problems Brother     No Known Problems Sister     Rheum Arthritis Maternal Grandmother      Social History     Socioeconomic History    Marital status:      Spouse name: Not on file    Number of children: Not on file    Years of education: Not on file    Highest education level: Not on file   Occupational History    Not on file   Social Needs    Financial resource strain: Not on file    Food insecurity     Worry: Not on file     Inability: Not on file    Transportation needs     Medical: Not on file     Non-medical: Not on file   Tobacco Use    Smoking status: Never Smoker    Smokeless tobacco: Never Used   Substance and Sexual Activity    Alcohol use: No    Drug use: No    Sexual activity: Yes     Partners: Male   Lifestyle    Physical activity     Days per week: Not on file     Minutes per session: Not on file    Stress: Not on file   Relationships    Social connections     Talks on phone: Not on file     Gets together: Not on file     Attends Tenriism service: Not on file     Active member of club or organization: Not on file     Attends meetings of clubs or organizations: Not on file     Relationship status: Not on file    Intimate partner violence     Fear of current or ex partner: Not on file     Emotionally abused: Not on file     Physically abused: Not on file     Forced sexual activity: Not on file   Other Topics Concern    Not on file   Social History Narrative    Not on file       GynecologicHistory  Patient's last menstrual period was 2021 (within days). Last Pap:2018 Results: normal  Last Mammogram: Not Indicated Results: N/A  no fmhx cancer  OB History        2    Para   1    Term           1    AB   1    Living   1       SAB   1    TAB        Ectopic        Molar        Multiple   0    Live Births   1              Patient's medications, allergies, past medical, surgical, social and family histories were reviewed and updated as appropriate. Review of Systems  Review of Systems   Constitutional: Negative for activity change, appetite change, fatigue and unexpected weight change. HENT: Negative for nosebleeds and sore throat. Eyes: Negative for visual disturbance.    Respiratory: Negative for cough, shortness of breath and wheezing. Cardiovascular: Negative for chest pain, palpitations and leg swelling. Gastrointestinal: Negative for abdominal distention, abdominal pain, blood in stool, constipation, diarrhea, nausea and vomiting. Endocrine: Negative for cold intolerance, heat intolerance, polydipsia and polyuria. Genitourinary: Positive for menstrual problem. Negative for difficulty urinating, dyspareunia, dysuria, frequency, genital sores, hematuria, pelvic pain, urgency, vaginal bleeding, vaginal discharge and vaginal pain. Musculoskeletal: Negative for arthralgias. Skin: Negative for rash. Neurological: Negative for dizziness, weakness, light-headedness and headaches. Hematological: Negative for adenopathy. Does not bruise/bleed easily. Psychiatric/Behavioral: Negative for agitation, confusion, dysphoric mood and sleep disturbance. Objective:     Vitals:  /80 (Site: Right Upper Arm, Position: Sitting, Cuff Size: Large Adult)   Pulse 90   Ht 5' 5\" (1.651 m)   Wt 232 lb (105.2 kg)   LMP 02/09/2021 (Within Days)   BMI 38.61 kg/m²     Physical Exam  Constitutional:       General: She is not in acute distress. Appearance: Normal appearance. She is well-developed. She is not diaphoretic. HENT:      Head: Normocephalic. Eyes:      Conjunctiva/sclera: Conjunctivae normal.      Pupils: Pupils are equal, round, and reactive to light. Neck:      Thyroid: No thyromegaly. Trachea: No tracheal deviation. Cardiovascular:      Rate and Rhythm: Normal rate and regular rhythm. Heart sounds: Normal heart sounds. No murmur. No friction rub. No gallop. Pulmonary:      Effort: Pulmonary effort is normal. No respiratory distress. Breath sounds: Normal breath sounds. No wheezing or rales. Chest:      Chest wall: No tenderness. Breasts:         Right: No mass, nipple discharge, skin change or tenderness. Left: No mass, nipple discharge, skin change or tenderness. Obesity Counseling:  Given  Smoking Counseling:  N/A  STD counseling: Pt will call for results    Orders Placed This Encounter   Procedures    Wet prep, genital     Standing Status:   Future     Number of Occurrences:   1     Standing Expiration Date:   3/9/2022    C.trachomatis N.gonorrhoeae DNA     Standing Status:   Future     Number of Occurrences:   1     Standing Expiration Date:   3/9/2022    US PELVIS COMPLETE     Standing Status:   Future     Standing Expiration Date:   3/9/2022    US NON OB TRANSVAGINAL     Standing Status:   Future     Standing Expiration Date:   7/9/2021    PAP SMEAR     Standing Status:   Future     Number of Occurrences:   1     Standing Expiration Date:   3/9/2022     Order Specific Question:   Collection Type     Answer: Thin Prep     Order Specific Question:   Prior Abnormal Pap Test     Answer:   No     Order Specific Question:   Screening or Diagnostic     Answer:   Screening     Order Specific Question:   HPV Requested? Answer:   Yes -  If ASCUS Reflex HPV     Comments:   16/18     Order Specific Question:   High Risk Patient     Answer:   N/A     No orders of the defined types were placed in this encounter. Oligomenorrhea most likely due to thyroid dysfunction and weight gain. Will obtain pelvic US  Follow up:  Return if symptoms worsen or fail to improve.       Duncan Jenkins, DO

## 2021-03-11 ENCOUNTER — HOSPITAL ENCOUNTER (OUTPATIENT)
Dept: ULTRASOUND IMAGING | Age: 26
Discharge: HOME | End: 2021-03-13
Payer: COMMERCIAL

## 2021-03-11 DIAGNOSIS — N91.4 SECONDARY OLIGOMENORRHEA: ICD-10-CM

## 2021-03-11 DIAGNOSIS — Z12.4 SCREENING FOR CERVICAL CANCER: ICD-10-CM

## 2021-03-11 DIAGNOSIS — Z01.419 GYNECOLOGIC EXAM NORMAL: ICD-10-CM

## 2021-03-11 PROCEDURE — 76856 US EXAM PELVIC COMPLETE: CPT

## 2021-03-11 PROCEDURE — 76830 TRANSVAGINAL US NON-OB: CPT

## 2021-03-12 LAB
C TRACH DNA GENITAL QL NAA+PROBE: NEGATIVE
N. GONORRHOEAE DNA: NEGATIVE

## 2021-03-17 ENCOUNTER — HOSPITAL ENCOUNTER (OUTPATIENT)
Dept: NEUROLOGY | Age: 26
Discharge: HOME | End: 2021-03-17
Payer: COMMERCIAL

## 2021-03-17 DIAGNOSIS — G56.01 CARPAL TUNNEL SYNDROME OF RIGHT WRIST: ICD-10-CM

## 2021-03-17 PROCEDURE — 95910 NRV CNDJ TEST 7-8 STUDIES: CPT

## 2021-03-17 PROCEDURE — 95886 MUSC TEST DONE W/N TEST COMP: CPT

## 2021-03-22 DIAGNOSIS — G56.03 BILATERAL CARPAL TUNNEL SYNDROME: Primary | ICD-10-CM

## 2021-03-23 ENCOUNTER — OFFICE VISIT (OUTPATIENT)
Dept: OBGYN CLINIC | Age: 26
End: 2021-03-23
Payer: COMMERCIAL

## 2021-03-23 VITALS
BODY MASS INDEX: 38.99 KG/M2 | HEIGHT: 65 IN | DIASTOLIC BLOOD PRESSURE: 80 MMHG | SYSTOLIC BLOOD PRESSURE: 120 MMHG | HEART RATE: 84 BPM | WEIGHT: 234 LBS

## 2021-03-23 DIAGNOSIS — N91.4 SECONDARY OLIGOMENORRHEA: Primary | ICD-10-CM

## 2021-03-23 DIAGNOSIS — E03.8 OTHER SPECIFIED HYPOTHYROIDISM: ICD-10-CM

## 2021-03-23 PROCEDURE — G8417 CALC BMI ABV UP PARAM F/U: HCPCS | Performed by: OBSTETRICS & GYNECOLOGY

## 2021-03-23 PROCEDURE — 1036F TOBACCO NON-USER: CPT | Performed by: OBSTETRICS & GYNECOLOGY

## 2021-03-23 PROCEDURE — 99213 OFFICE O/P EST LOW 20 MIN: CPT | Performed by: OBSTETRICS & GYNECOLOGY

## 2021-03-23 PROCEDURE — G8427 DOCREV CUR MEDS BY ELIG CLIN: HCPCS | Performed by: OBSTETRICS & GYNECOLOGY

## 2021-03-23 PROCEDURE — G8484 FLU IMMUNIZE NO ADMIN: HCPCS | Performed by: OBSTETRICS & GYNECOLOGY

## 2021-03-23 RX ORDER — LEVONORGESTREL AND ETHINYL ESTRADIOL 0.15-0.03
1 KIT ORAL DAILY
Qty: 1 PACKET | Refills: 11 | Status: SHIPPED | OUTPATIENT
Start: 2021-03-23 | End: 2021-05-13 | Stop reason: SDUPTHER

## 2021-03-24 ASSESSMENT — ENCOUNTER SYMPTOMS
ABDOMINAL PAIN: 0
SHORTNESS OF BREATH: 0
APNEA: 0

## 2021-03-25 NOTE — PROGRESS NOTES
Gastrointestinal: Negative for abdominal pain. Genitourinary: Positive for menstrual problem. Negative for difficulty urinating, dysuria, pelvic pain, vaginal bleeding and vaginal discharge. Neurological: Negative for dizziness, weakness and light-headedness. Objective:   /80 (Site: Right Upper Arm, Position: Sitting, Cuff Size: Large Adult)   Pulse 84   Ht 5' 5\" (1.651 m)   Wt 234 lb (106.1 kg)   BMI 38.94 kg/m²      Physical Exam  Constitutional:       Appearance: Normal appearance. Neurological:      Mental Status: She is alert and oriented to person, place, and time. Psychiatric:         Behavior: Behavior normal.         Assessment:       Diagnosis Orders   1. Secondary oligomenorrhea     2. Other specified hypothyroidism           Plan:      No orders of the defined types were placed in this encounter. Orders Placed This Encounter   Medications    levonorgestrel-ethinyl estradiol (LEVORA 0.15/30, 28,) 0.15-30 MG-MCG per tablet     Sig: Take 1 tablet by mouth daily     Dispense:  1 packet     Refill:  11   will begin ocp to help with cycle regulation however she will need follow up with endocrine for management of thyroid disease    No follow-ups on file.      Eden Campos DO

## 2021-04-20 ENCOUNTER — OFFICE VISIT (OUTPATIENT)
Dept: ORTHOPEDIC SURGERY | Age: 26
End: 2021-04-20
Payer: COMMERCIAL

## 2021-04-20 VITALS
BODY MASS INDEX: 38.99 KG/M2 | OXYGEN SATURATION: 97 % | WEIGHT: 234 LBS | HEIGHT: 65 IN | HEART RATE: 97 BPM | TEMPERATURE: 97.2 F

## 2021-04-20 DIAGNOSIS — G56.03 BILATERAL CARPAL TUNNEL SYNDROME: Primary | ICD-10-CM

## 2021-04-20 PROCEDURE — 99203 OFFICE O/P NEW LOW 30 MIN: CPT | Performed by: ORTHOPAEDIC SURGERY

## 2021-04-20 PROCEDURE — G8417 CALC BMI ABV UP PARAM F/U: HCPCS | Performed by: ORTHOPAEDIC SURGERY

## 2021-04-20 PROCEDURE — G8427 DOCREV CUR MEDS BY ELIG CLIN: HCPCS | Performed by: ORTHOPAEDIC SURGERY

## 2021-04-20 PROCEDURE — 1036F TOBACCO NON-USER: CPT | Performed by: ORTHOPAEDIC SURGERY

## 2021-04-20 ASSESSMENT — ENCOUNTER SYMPTOMS
CHEST TIGHTNESS: 0
ABDOMINAL DISTENTION: 0

## 2021-04-20 NOTE — PROGRESS NOTES
Subjective:      Patient ID: Fabiana Lemon is a 22 y.o. female who presents today for:  Chief Complaint   Patient presents with    Carpal Tunnel     bi-lateral carpal tunnel. Right hand is worse, Pain is 9/10 and started about 6 months ago. EMG done on 21.         HPI  Patient has been having symptoms of tingling, paresthesia and pain around the right and in the left wrist for the past 3 months  She denies any history of injury    Past Medical History:   Diagnosis Date    Anxiety 2017    Class 1 obesity due to excess calories without serious comorbidity with body mass index (BMI) of 30.0 to 30.9 in adult 2017    Heart abnormality     Hyperthyroidism 2018    Mental disorder     anxiety    Normal labor and delivery 2019    EFRAIN (obstructive sleep apnea) 2020     premature rupture of membranes with onset of labor more than 24 hours following rupture in third trimester     Tachycardia     Weight loss 3/25/2019     Past Surgical History:   Procedure Laterality Date    BACK SURGERY  2004    tumor removed     SECTION N/A 2019     SECTION performed by María Elena Hoffman DO at [facility]     Social History     Socioeconomic History    Marital status:      Spouse name: Not on file    Number of children: Not on file    Years of education: Not on file    Highest education level: Not on file   Occupational History    Not on file   Social Needs    Financial resource strain: Not on file    Food insecurity     Worry: Not on file     Inability: Not on file    Transportation needs     Medical: Not on file     Non-medical: Not on file   Tobacco Use    Smoking status: Never Smoker    Smokeless tobacco: Never Used   Substance and Sexual Activity    Alcohol use: No    Drug use: No    Sexual activity: Yes     Partners: Male   Lifestyle    Physical activity     Days per week: Not on file     Minutes per session: Not on file    Stress: Not Exam:  Right and left hand  The contours of the hand appear satisfactory  She has diminished sensations along the thumb index finger and middle finger-worse on the right hand than the left  There is no small muscle wasting noted  Tinel sign is mildly positive on the right hand compared to the left for carpal tunnel syndrome  Phalen sign is positive on both hands  She has no discomfort over the cubital tunnel region  Cervical spine motion causes her mild discomfort and pain    Diagnostic Imaging:    REFERRING PROVIDER:  Dr. Neil Hensley:  The patient was having numbness in the hands.     Motor nerve conduction velocities and F-wave latencies are normal in all  the nerves tested.       The distal motor latencies are delayed in the right  median nerve, borderline delayed in the left median nerve, and normal in  the ulnar nerves bilaterally.     Distal sensory latencies are normal in the ulnar nerve, but moderately  delayed in the median nerves.     On the concentric needle electrode examination, mild denervation changes  are present in the thenar muscles.     CLINICAL INTERPRETATION:  EMG studies are showing changes of moderate  bilateral median nerve compression neuropathy at the wrists consistent  with a diagnosis of moderate bilateral carpal tunnel syndrome.     Due to continued symptoms, decompression of the median nerves may be  considered.     Thank you Dr. Moreno Henderson for allowing me to see this the patient. Please feel free to call me if I can be of any further assistance  regarding this patient's evaluation.           Jacques Reno MD      Assessment:       Diagnosis Orders   1.  Bilateral carpal tunnel syndrome           Plan:      The patient has increased symptoms on the right side compared to the left  We discussed nonsurgical options of treatment  Splinting, anti-inflammatory medications, cortisone injections have been discussed  I did explain that the EMG study has depicted a significant compression of the median nerve and surgical intervention may be the best option  The surgical procedure have been explained in detail  The risks and benefits of surgical intervention have been discussed in detail  Risk of anesthesia, risk of injury to the vessel, nerve, tendon  Postoperative complications such as hematoma formation, infection, continued pain in the hand, neuroma formation, recurrence of the symptoms, sometimes requiring surgical intervention wound healing problems have all been discussed  The patient understands all the risks and benefits and consents for the surgical procedure    The discussion was with the patient was done through a        Surgery Phone: Julio [address]   Surgery Fax: [phone]    Phone: [phone]          Fax: [phone]: Surgery Scheduling, PAT & PRE-OP Order Form  Call to advance [place] at [phone] at least 24 hours prior to date of service     Surgery Location: [facility] Surgery: [address] [place] [address] Rhoda Gómez MD Surgery Date: 21  Time: am   Patient's Name: Andre Pepe : 1995    Gender: female  Home Phone:  [phone] Cell Phone: [phone]  Emergency Contact:  [facility]   Phone: [phone]  Payor: Jetty Councilman /  /  /    ID No.: [id number]      PROVIDER TO COMPLETE:  Diagnosis: Right hand carpal tunnel syndrome  Procedure/Consent: Right hand carpal tunnel decompression  Case Comments/Implants: N/A   Surgery Scheduled as:  Outpatient  Anesthesia Requested: [place]  Referring Family Doctor: MD HEATHER Dickson  [x] Glenny ROMERO Date/Time:                                                            [x] History & Physical [] Physician will Provide [] Attached [] Dictated [] Other  [x] Follow Anesthesia Pre-Op Orders for X-rays, [facility]     [x] SN & PT to evaluate and treat/educate disease management, medications, home safety & equipment needs for total joint patients  [] Other: ____________________________________________________  Consults: Medical/Cardiac Clearance done by  ____________________  PRE-OP ORDERS:   Allergies: Patient has no known allergies. Latex Allergies:             Diabetic:           [] IV ________________________  [x] IV Start with J-loop     Preprinted Orders: Attached [] Yes [] No   ANTIBIOTIC PRE-OP: [x] ANCEF 2 gram IVPB if > 120 kg 3 grams IVPB within 1 hour of incision, if ALLERGIC, use VANCOMYCIN 1 gram IV, 2 hours pre-op  [] TXA Protocol [] Other: [x] NPO   [] Betablocker (if needed) _____________________________________   [] Knee high anti-embolic hose [] Thigh high anti-embolic hose   Other: ______________________________________________________    Physician Signature Required:    Date/Time: 4/20/2021      No orders of the defined types were placed in this encounter. No orders of the defined types were placed in this encounter. Return Plan for surgery in 2 weeks.       Luz Guzman MD

## 2021-04-20 NOTE — PATIENT INSTRUCTIONS
Patient Education        Síndrome del jesse brandon: Instrucciones de cuidado  Carpal Tunnel Syndrome: Care Instructions  Instrucciones de cuidado    El síndrome del jesse brandon es un problema nervioso. Puede causar hormigueo, entumecimiento, debilidad o [employer] dedos, el pulgar y la [place]. El nervio mediano y varios tejidos fibrosos, llamados tendones, atraviesan la manny por un espacio denominado jesse brandon. El movimiento repetido de las lila al trabajar o practicar ciertos pasatiempos y deportes puede hacer presión sobre el nervio. El embarazo y ciertas afecciones médicas, steven la diabetes, la artritis y [place] tiroides hipoactiva, también pueden causar el síndrome del jesse brandon. Para reducir los síntomas, usted podría limitar [facility] o [facility]. También puede juliet otras medidas para sentirse mejor. Si los síntomas son leves, es probable que pueda aliviar el dolor con 1 o 2 semanas de tratamiento en el hogar. La cirugía es necesaria solo si otros tratamientos no funcionan. La atención de seguimiento es ktaarzyna parte clave de hartley tratamiento y seguridad. Asegúrese de hacer y acudir a todas las citas, y llame a hartley médico si está teniendo problemas. También es katarzyna buena idea saber los resultados de yamila exámenes y mantener katarzyna lista de los medicamentos que yamil. ¿Cómo puede cuidarse en el hogar? · Si es posible, interrumpa o disminuya la actividad que causa los síntomas. Si no puede suspenderla, cortez pausas frecuentes para descansar y estirarse o cambie la posición de las lila para hacer katarzyna tarea. Trate de [place] [facility], steven cuando [place] el ratón de katarzyna computadora. · Trate de evitar doblar o rotar las muñecas. · Pregúntele a hartley médico si puede juliet un analgésico (medicamento para el dolor) de venta dania, steven acetaminofén (Tylenol), ibuprofeno (Advil, Motrin) o naproxeno (Aleve). Sea alessio con los medicamentos. Anna y siga todas las instrucciones de la [place].   · Si hartley médico le receta corticosteroides para ayudar a aliviar el dolor y reducir la hinchazón, tómelos exactamente steven le fueron recetados. Llame a hartley médico si eryn estar teniendo problemas con hartley medicamento. · Colóquese hielo o katarzyna compresa fría sobre la [place] de 10 a 20 minutos cada vez para aliviar el dolor. Póngase un paño maya entre el hielo y la piel. · Si hartley médico o hartley fisioterapeuta o terapeuta ocupacional le indica que use katarzyna tablilla (férula) para la [place], [place] según las indicaciones para mantener la [place] en katarzyna posición neutra. [place] también reduce la presión sobre hartley nervio mediano. · Pregúntele a hartley médico si debería hacer sesiones de fisioterapia o de terapia ocupacional para aprender a hacer las tareas de [employer]. · [place] clases de yoga para estirar los músculos y fortalecer las lila y las [place]. El yoga ha [employer] síntomas del túnel aleksandr. Cómo prevenir el síndrome del túnel aleksandr  · Cuando trabaje en la computadora, [place] [address] y las muñecas alineadas con los antebrazos. Mantenga los codos cerca de yamila costados. [place] un descanso con katarzyna frecuencia de 10 a 15 minutos. · Trate de hacer los siguientes ejercicios:  ? Calentamiento: Gire la [place], Dorla Sings y de un lado a otro. Repita esto 4 veces. Estire [employer] dedos, relájelos y vuelva a estirarlos. Repita 4 veces. Estire el pulgar doblándolo levemente hacia atrás, manténgalo en kwasi posición y relájelo. Repita 4 veces. ? Estiramiento con los [employer] en posición de orar: Comience colocando las xu de las lila juntas jessica del [place], suzanne debajo del [place]. Baje las lila lentamente hacia la línea de la cintura y manténgalas cerca del estómago con las xu juntas hasta que sienta un estiramiento leve a moderado debajo de los antebrazos. Mantenga la posición entre 10 y 21 segundos. Repita 4 veces.   ? Estiramiento del flexor de la manny: Extienda el Ab Queen[place] frente a usted, con la mcpherson hacia arriba. Doble la manny y apunte con la mano hacia el piso. Con la [employer], doble con suavidad la manny aún más hasta que sienta un estiramiento entre leve y moderado en el antebrazo. Mantenga la posición entre 10 y 21 segundos. Repita 4 veces. ? Estiramiento del extensor de la manny: Repita los pasos para el estiramiento del flexor de la manny tonya comience con la mcpherson extendida [place]. · A[employer]ete katarzyna pelota de goma varias veces al día para mantener yaneth las lila y los dedos. · Evite sostener objetos (steven un libro) en la misma posición lara mucho tiempo. Siempre que sea posible, utilice toda la mano para juliet un objeto. Si [place] solo el pulgar y el dedo índice puede tensionar la [place]. · No fume. Puede empeorar esta afección ya que reduce el flujo de tez hacia el nervio El paso. Si necesita ayuda para dejar de fumar, hable con hartley médico sobre programas y medicamentos para dejar de fumar. Estos pueden aumentar yamila probabilidades de dejar el hábito para siempre. ¿Cuándo debe pedir ayuda? Preste especial atención a los cambios de hartley andree y asegúrese de comunicarse con hartley médico si:    · El dolor u otros problemas no mejoran con los cuidados en el hogar.     · Desea más información sobre la fisioterapia o la terapia ocupacional.     · Tiene efectos secundarios producidos por los corticosteroides, tales steven:  ? Aumento de [place]. ? Cambios en el estado de ánimo. ? Problemas para dormir. ? Fácil formación de moretones.     · Tiene otros problemas con los medicamentos. ¿Dónde puede encontrar más información en inglés? Gildardo Soria a [email or web address] e ingrese a hartley cuenta de MyChart. Treva Shubham G150 en el Virlinda Gama \"Search Health Information\" para más información (en inglés) sobre \"Síndrome del túnel carpiano: Instrucciones de cuidado. \"     Si no tiene katarzyna cuenta, cortez kate en el enlace \"Sign Up Now\".   Revisado: 16 noviembre, 2020               Versión del contenido:

## 2021-04-23 ENCOUNTER — ANESTHESIA EVENT (OUTPATIENT)
Dept: OPERATING ROOM | Age: 26
End: 2021-04-23
Payer: COMMERCIAL

## 2021-04-27 ENCOUNTER — OFFICE VISIT (OUTPATIENT)
Dept: FAMILY MEDICINE CLINIC | Age: 26
End: 2021-04-27
Payer: COMMERCIAL

## 2021-04-27 VITALS
BODY MASS INDEX: 39.55 KG/M2 | OXYGEN SATURATION: 98 % | WEIGHT: 237.4 LBS | HEART RATE: 100 BPM | TEMPERATURE: 98.6 F | SYSTOLIC BLOOD PRESSURE: 124 MMHG | DIASTOLIC BLOOD PRESSURE: 78 MMHG | HEIGHT: 65 IN

## 2021-04-27 DIAGNOSIS — N92.6 MISSED PERIOD: ICD-10-CM

## 2021-04-27 DIAGNOSIS — E28.2 PCOS (POLYCYSTIC OVARIAN SYNDROME): Chronic | ICD-10-CM

## 2021-04-27 DIAGNOSIS — M25.50 PAIN IN JOINT INVOLVING MULTIPLE SITES: Primary | Chronic | ICD-10-CM

## 2021-04-27 LAB
CONTROL: NORMAL
PREGNANCY TEST URINE, POC: NEGATIVE

## 2021-04-27 PROCEDURE — 99214 OFFICE O/P EST MOD 30 MIN: CPT | Performed by: FAMILY MEDICINE

## 2021-04-27 PROCEDURE — G8417 CALC BMI ABV UP PARAM F/U: HCPCS | Performed by: FAMILY MEDICINE

## 2021-04-27 PROCEDURE — 81025 URINE PREGNANCY TEST: CPT | Performed by: FAMILY MEDICINE

## 2021-04-27 PROCEDURE — G8427 DOCREV CUR MEDS BY ELIG CLIN: HCPCS | Performed by: FAMILY MEDICINE

## 2021-04-27 PROCEDURE — 1036F TOBACCO NON-USER: CPT | Performed by: FAMILY MEDICINE

## 2021-04-27 NOTE — PROGRESS NOTES
[address], 32 y.o. female presents today with:  Chief Complaint   Patient presents with    3 Month Follow-Up     for right wrist pain, Pt states that she has not have a period since     CTS - has been a chronic problem. Has not had f/u. Also, complains of persistent diffuse joint pain involving multiple sites. No period for 2 months. CHronic intermittent issue. No contraception tho it has been prescribed in the past. Does not desire pregnancy. No other questions and or concerns for today's visit          Past Medical History:   Diagnosis Date    Anxiety 2017    Class 1 obesity due to excess calories without serious comorbidity with body mass index (BMI) of 30.0 to 30.9 in adult 2017    Heart abnormality     Hyperthyroidism 2018    Mental disorder     anxiety    Normal labor and delivery 2019    EFRAIN (obstructive sleep apnea) 2020    PCOS (polycystic ovarian syndrome) 2021     premature rupture of membranes with onset of labor more than 24 hours following rupture in third trimester     Tachycardia     Weight loss 3/25/2019     Past Surgical History:   Procedure Laterality Date    BACK SURGERY  2004    tumor removed    CARPAL TUNNEL RELEASE Right 2021    RIGHT HAND CARPAL TUNNEL DECOMPRESSION.  performed by Kalpana Reddy MD at [address] [place] [address] N/A 2019     SECTION performed by Yanna Sellers DO at [facility]     Social History     Socioeconomic History    Marital status:      Spouse name: Not on file    Number of children: Not on file    Years of education: Not on file    Highest education level: Not on file   Occupational History    Not on file   Tobacco Use    Smoking status: Never Smoker    Smokeless tobacco: Never Used   Vaping Use    Vaping Use: Never used   Substance and Sexual Activity    Alcohol use: No    Drug use: No    Sexual activity: Yes 124/78   Site: Left Upper Arm   Position: Sitting   Cuff Size: Medium Adult   Pulse: 100   Temp: 98.6 °F (37 °C)   TempSrc: Temporal   SpO2: 98%   Weight: 237 lb 6.4 oz (107.7 kg)   Height: 5' 5\" (1.651 m)        Physical Exam  Constitutional:       General: She is not in acute distress. Appearance: She is well-developed. HENT:      Head: Normocephalic and atraumatic. Eyes:      General: No scleral icterus. Conjunctiva/sclera: Conjunctivae normal.   Cardiovascular:      Rate and Rhythm: Normal rate and regular rhythm. Heart sounds: Normal heart sounds. Pulmonary:      Effort: No respiratory distress. Breath sounds: No wheezing or rales. Skin:     General: Skin is warm and dry. Neurological:      General: No focal deficit present. Mental Status: She is alert and oriented to person, place, and time. Cranial Nerves: No cranial nerve deficit. Sensory: No sensory deficit. Motor: No weakness. Coordination: Coordination normal.      Gait: Gait normal.      Comments: B/L Phalen  POS Tinel NEG   Psychiatric:         Mood and Affect: Mood normal.         Behavior: Behavior normal.         Thought Content: Thought content normal.         Judgment: Judgment normal.           Lab Results   Component Value Date    LABA1C 5.2 04/02/2019     Lab Results   Component Value Date    CREATININE 0.66 05/04/2021     Lab Results   Component Value Date    ALT 40 (H) 07/15/2020    AST 25 07/15/2020     Lab Results   Component Value Date    HDL 39 (L) 12/13/2017    LDLCALC 116 12/13/2017        Assessment & Plan   Visit Diagnoses and Associated Orders     Pain in joint involving multiple sites    -  Primary    Chronic, unremarkable exam.  Have referred to rheumatology. Post partum thyroiditis        Follow labs. Reviewed proper administration of medication. Managed by endocrinology. Missed period        Suspect PCOS. Recommended OCPs and Metformin. See below.     POCT urine pregnancy [85051 Custom]           PCOS (polycystic ovarian syndrome)        Check labs. Encouraged weight management, exercise, OCPs, Metformin. metFORMIN (GLUCOPHAGE-XR) 500 MG extended release tablet [53778]      levonorgestrel-ethinyl estradiol (LEVORA 0.15/30, 28,) 0.15-30 MG-MCG per tablet [42492]               [place] Cough was seen today for 3 month follow-up. Diagnoses and all orders for this visit:    Pain in joint involving multiple sites  Comments:  Chronic, unremarkable exam.  Have referred to rheumatology. Post partum thyroiditis  Comments: Follow labs. Reviewed proper administration of medication. Managed by endocrinology. Missed period  Comments:  Suspect PCOS. Recommended OCPs and Metformin. See below. Orders:  -     POCT urine pregnancy    PCOS (polycystic ovarian syndrome)  Comments:  Check labs. Encouraged weight management, exercise, OCPs, Metformin. Orders:  -     metFORMIN (GLUCOPHAGE-XR) 500 MG extended release tablet; Take 1 tablet by mouth daily (with breakfast)  -     levonorgestrel-ethinyl estradiol (LEVORA 0.15/30, 28,) 0.15-30 MG-MCG per tablet; Take 1 tablet by mouth daily        Orders Placed This Encounter   Procedures    POCT urine pregnancy     Orders Placed This Encounter   Medications    metFORMIN (GLUCOPHAGE-XR) 500 MG extended release tablet     Sig: Take 1 tablet by mouth daily (with breakfast)     Dispense:  90 tablet     Refill:  1    levonorgestrel-ethinyl estradiol (LEVORA 0.15/30, 28,) 0.15-30 MG-MCG per tablet     Sig: Take 1 tablet by mouth daily     Dispense:  1 packet     Refill:  11     Medications Discontinued During This Encounter   Medication Reason    levonorgestrel-ethinyl estradiol (LEVORA 0.15/30, 28,) 0.15-30 MG-MCG per tablet REORDER     No follow-ups on file. Controlled Substance Monitoring:    Acute and Chronic Pain Monitoring:   No flowsheet data found.         Tyesha Wolf MD

## 2021-04-29 ENCOUNTER — OFFICE VISIT (OUTPATIENT)
Dept: ORTHOPEDIC SURGERY | Age: 26
End: 2021-04-29
Payer: COMMERCIAL

## 2021-04-29 ENCOUNTER — NURSE ONLY (OUTPATIENT)
Dept: PRIMARY CARE CLINIC | Age: 26
End: 2021-04-29

## 2021-04-29 VITALS
TEMPERATURE: 97.2 F | BODY MASS INDEX: 39.45 KG/M2 | WEIGHT: 236.8 LBS | DIASTOLIC BLOOD PRESSURE: 74 MMHG | SYSTOLIC BLOOD PRESSURE: 118 MMHG | OXYGEN SATURATION: 99 % | HEIGHT: 65 IN | HEART RATE: 107 BPM

## 2021-04-29 DIAGNOSIS — Z01.818 PREOP EXAMINATION: Primary | ICD-10-CM

## 2021-04-29 DIAGNOSIS — Z01.818 PREOP EXAMINATION: ICD-10-CM

## 2021-04-29 PROCEDURE — G8417 CALC BMI ABV UP PARAM F/U: HCPCS | Performed by: PHYSICIAN ASSISTANT

## 2021-04-29 PROCEDURE — 93000 ELECTROCARDIOGRAM COMPLETE: CPT | Performed by: PHYSICIAN ASSISTANT

## 2021-04-29 PROCEDURE — 99204 OFFICE O/P NEW MOD 45 MIN: CPT | Performed by: PHYSICIAN ASSISTANT

## 2021-04-29 PROCEDURE — 1036F TOBACCO NON-USER: CPT | Performed by: PHYSICIAN ASSISTANT

## 2021-04-29 PROCEDURE — G8427 DOCREV CUR MEDS BY ELIG CLIN: HCPCS | Performed by: PHYSICIAN ASSISTANT

## 2021-04-29 PROCEDURE — 93010 ELECTROCARDIOGRAM REPORT: CPT | Performed by: PHYSICIAN ASSISTANT

## 2021-04-29 NOTE — PROGRESS NOTES
Medication Sig Dispense Refill    levonorgestrel-ethinyl estradiol (LEVORA 0.15/30, 28,) 0.15-30 MG-MCG per tablet Take 1 tablet by mouth daily 1 packet 11    naproxen (NAPROSYN) 500 MG tablet Take 1 tablet by mouth 2 times daily as needed for Pain 60 tablet 1    levothyroxine (SYNTHROID) 88 MCG tablet Take 1 tablet by mouth daily 30 tablet 12     No current facility-administered medications for this visit. Allergies:    Patient has no known allergies.     Social History:   Social History     Socioeconomic History    Marital status:      Spouse name: Not on file    Number of children: Not on file    Years of education: Not on file    Highest education level: Not on file   Occupational History    Not on file   Social Needs    Financial resource strain: Not on file    Food insecurity     Worry: Not on file     Inability: Not on file    Transportation needs     Medical: Not on file     Non-medical: Not on file   Tobacco Use    Smoking status: Never Smoker    Smokeless tobacco: Never Used   Substance and Sexual Activity    Alcohol use: No    Drug use: No    Sexual activity: Yes     Partners: Male   Lifestyle    Physical activity     Days per week: Not on file     Minutes per session: Not on file    Stress: Not on file   Relationships    Social connections     Talks on phone: Not on file     Gets together: Not on file     Attends [employer]ism service: Not on file     Active member of club or organization: Not on file     Attends meetings of clubs or organizations: Not on file     Relationship status: Not on file    Intimate partner violence     Fear of current or ex partner: Not on file     Emotionally abused: Not on file     Physically abused: Not on file     Forced sexual activity: Not on file   Other Topics Concern    Not on file   Social History Narrative    Not on file       Family History:       Problem Relation Age of Onset    No Known Problems Mother     Diabetes Father    [facility] No Known Problems Sister     No Known Problems Brother     No Known Problems Sister     Rheum Arthritis Maternal Grandmother        Objective: There were no vitals taken for this visit. Physical Exam  Constitutional:       General: She is not in acute distress. Appearance: Normal appearance. She is obese. She is not ill-appearing. HENT:      Head: Normocephalic. Nose: Nose normal. No congestion or rhinorrhea. Mouth/Throat:      Mouth: Mucous membranes are moist.      Pharynx: Oropharynx is clear. No oropharyngeal exudate or posterior oropharyngeal erythema. Eyes:      Extraocular Movements: Extraocular movements intact. Pupils: Pupils are equal, round, and reactive to light. Cardiovascular:      Rate and Rhythm: Normal rate and regular rhythm. Pulses: Normal pulses. Heart sounds: Normal heart sounds. No murmur. Pulmonary:      Effort: Pulmonary effort is normal.      Breath sounds: Normal breath sounds. No wheezing, rhonchi or rales. Abdominal:      General: Abdomen is flat. Bowel sounds are normal.      Palpations: Abdomen is soft. Tenderness: There is no abdominal tenderness. Skin:     General: Skin is warm and dry. Capillary Refill: Capillary refill takes less than 2 seconds. Comments: No swelling or erythema over the incision site   Neurological:      General: No focal deficit present. Mental Status: She is alert and oriented to person, place, and time. Radiographs and Laboratory Studies:   EKG:  Holter monitor showed PACs and PVCs, no SVT. Normal echo. These findings were from 2019.  NSR today  Laboratory Studies:   Lab Results   Component Value Date    WBC 9.8 07/15/2020    HGB 15.9 07/15/2020    HCT 47.0 07/15/2020    MCV 90.1 07/15/2020     (H) 07/15/2020     Lab Results   Component Value Date    SEDRATE 25 (H) 01/28/2020     Lab Results   Component Value Date    CRP 15.0 (H) 01/28/2020       Assessment and Plan: Diagnosis Orders   1. Preop examination  CBC    Basic Metabolic Panel    [id number] Ambulatory   Requires    Pregnancy test from [phone] was negative. Continues to have palpitations. When this occurs she feels short of breath and winded and very tired. Previous work-up showed no obvious SVT A. fib or any tachy arrhythmias. She was instructed to follow-up with her primary care about this. EKG showed NSR  Patient was instructed to quarantine until the day of surgery after getting the COVID test.  Blood work and EKG was ordered and will be reviewed. I'll see them back 2 weeks postoperatively.     Clau Barnard PA-C  [facility]  [phone]

## 2021-04-29 NOTE — PATIENT INSTRUCTIONS
-please walk over to our lab for your blood testing, located right outside our office.   -please ensure that you have made arrangements to get your Covid test done at our designated. Upon arrival, call [phone] and they will come out to your car to do the test.   -stop taking asprin and motrin until after your surgery. Continue taking your beta-blocker you are taking one.   -no eating / drinking the after midnight the night before surgery.

## 2021-04-30 ENCOUNTER — TELEPHONE (OUTPATIENT)
Dept: FAMILY MEDICINE CLINIC | Age: 26
End: 2021-04-30

## 2021-05-01 LAB
SARS-COV-2: NOT DETECTED
SOURCE: NORMAL

## 2021-05-03 NOTE — TELEPHONE ENCOUNTER
I am unable to write that letter because I do not believe she is unable to work. Any time off she might need from work due to surgery would need to be authorized by surgeon.

## 2021-05-04 ENCOUNTER — TELEPHONE (OUTPATIENT)
Dept: ORTHOPEDIC SURGERY | Age: 26
End: 2021-05-04

## 2021-05-04 ENCOUNTER — HOSPITAL ENCOUNTER (OUTPATIENT)
Age: 26
Setting detail: OUTPATIENT SURGERY
Discharge: HOME | End: 2021-05-04
Attending: ORTHOPAEDIC SURGERY | Admitting: ORTHOPAEDIC SURGERY
Payer: COMMERCIAL

## 2021-05-04 ENCOUNTER — ANESTHESIA (OUTPATIENT)
Dept: OPERATING ROOM | Age: 26
End: 2021-05-04
Payer: COMMERCIAL

## 2021-05-04 VITALS
DIASTOLIC BLOOD PRESSURE: 66 MMHG | WEIGHT: 236 LBS | RESPIRATION RATE: 18 BRPM | OXYGEN SATURATION: 93 % | HEART RATE: 99 BPM | SYSTOLIC BLOOD PRESSURE: 112 MMHG | HEIGHT: 65 IN | TEMPERATURE: 98.1 F | BODY MASS INDEX: 39.32 KG/M2

## 2021-05-04 VITALS — SYSTOLIC BLOOD PRESSURE: 109 MMHG | DIASTOLIC BLOOD PRESSURE: 69 MMHG | OXYGEN SATURATION: 100 %

## 2021-05-04 DIAGNOSIS — M25.50 PAIN IN JOINT INVOLVING MULTIPLE SITES: Primary | Chronic | ICD-10-CM

## 2021-05-04 LAB
ANION GAP SERPL CALCULATED.3IONS-SCNC: 11 MEQ/L (ref 9–15)
BUN BLDV-MCNC: 11 MG/DL (ref 6–20)
CALCIUM SERPL-MCNC: 8.3 MG/DL (ref 8.5–9.9)
CHLORIDE BLD-SCNC: 103 MEQ/L (ref 95–107)
CO2: 23 MEQ/L (ref 20–31)
CREAT SERPL-MCNC: 0.66 MG/DL (ref 0.5–0.9)
GFR AFRICAN AMERICAN: >60
GFR NON-AFRICAN AMERICAN: >60
GLUCOSE BLD-MCNC: 113 MG/DL (ref 70–99)
HCG, URINE, POC: NEGATIVE
HCT VFR BLD CALC: 42.2 % (ref 37–47)
HEMOGLOBIN: 14.2 G/DL (ref 12–16)
Lab: NORMAL
MCH RBC QN AUTO: 29.9 PG (ref 27–31.3)
MCHC RBC AUTO-ENTMCNC: 33.5 % (ref 33–37)
MCV RBC AUTO: 89.2 FL (ref 82–100)
NEGATIVE QC PASS/FAIL: NORMAL
PDW BLD-RTO: 13.2 % (ref 11.5–14.5)
PLATELET # BLD: 351 K/UL (ref 130–400)
POSITIVE QC PASS/FAIL: NORMAL
POTASSIUM SERPL-SCNC: 3.9 MEQ/L (ref 3.4–4.9)
RBC # BLD: 4.73 M/UL (ref 4.2–5.4)
SODIUM BLD-SCNC: 137 MEQ/L (ref 135–144)
WBC # BLD: 9.3 K/UL (ref 4.8–10.8)

## 2021-05-04 PROCEDURE — 2500000003 HC RX 250 WO HCPCS: Performed by: NURSE ANESTHETIST, CERTIFIED REGISTERED

## 2021-05-04 PROCEDURE — 3600000003 HC SURGERY LEVEL 3 BASE: Performed by: ORTHOPAEDIC SURGERY

## 2021-05-04 PROCEDURE — 64721 CARPAL TUNNEL SURGERY: CPT | Performed by: ORTHOPAEDIC SURGERY

## 2021-05-04 PROCEDURE — 2580000003 HC RX 258: Performed by: STUDENT IN AN ORGANIZED HEALTH CARE EDUCATION/TRAINING PROGRAM

## 2021-05-04 PROCEDURE — 2580000003 HC RX 258: Performed by: ORTHOPAEDIC SURGERY

## 2021-05-04 PROCEDURE — 85027 COMPLETE CBC AUTOMATED: CPT

## 2021-05-04 PROCEDURE — 3700000001 HC ADD 15 MINUTES (ANESTHESIA): Performed by: ORTHOPAEDIC SURGERY

## 2021-05-04 PROCEDURE — 7100000011 HC PHASE II RECOVERY - ADDTL 15 MIN: Performed by: ORTHOPAEDIC SURGERY

## 2021-05-04 PROCEDURE — 6360000002 HC RX W HCPCS: Performed by: ORTHOPAEDIC SURGERY

## 2021-05-04 PROCEDURE — 2709999900 HC NON-CHARGEABLE SUPPLY: Performed by: ORTHOPAEDIC SURGERY

## 2021-05-04 PROCEDURE — 7100000010 HC PHASE II RECOVERY - FIRST 15 MIN: Performed by: ORTHOPAEDIC SURGERY

## 2021-05-04 PROCEDURE — 2580000003 HC RX 258

## 2021-05-04 PROCEDURE — 6360000002 HC RX W HCPCS: Performed by: NURSE ANESTHETIST, CERTIFIED REGISTERED

## 2021-05-04 PROCEDURE — 3600000013 HC SURGERY LEVEL 3 ADDTL 15MIN: Performed by: ORTHOPAEDIC SURGERY

## 2021-05-04 PROCEDURE — 2500000003 HC RX 250 WO HCPCS: Performed by: ORTHOPAEDIC SURGERY

## 2021-05-04 PROCEDURE — 80048 BASIC METABOLIC PNL TOTAL CA: CPT

## 2021-05-04 PROCEDURE — 3700000000 HC ANESTHESIA ATTENDED CARE: Performed by: ORTHOPAEDIC SURGERY

## 2021-05-04 RX ORDER — SODIUM CHLORIDE, SODIUM LACTATE, POTASSIUM CHLORIDE, CALCIUM CHLORIDE 600; 310; 30; 20 MG/100ML; MG/100ML; MG/100ML; MG/100ML
INJECTION, SOLUTION INTRAVENOUS
Status: COMPLETED
Start: 2021-05-04 | End: 2021-05-04

## 2021-05-04 RX ORDER — FENTANYL CITRATE 50 UG/ML
INJECTION, SOLUTION INTRAMUSCULAR; INTRAVENOUS PRN
Status: DISCONTINUED | OUTPATIENT
Start: 2021-05-04 | End: 2021-05-04 | Stop reason: SDUPTHER

## 2021-05-04 RX ORDER — SODIUM CHLORIDE 0.9 % (FLUSH) 0.9 %
10 SYRINGE (ML) INJECTION PRN
Status: DISCONTINUED | OUTPATIENT
Start: 2021-05-04 | End: 2021-05-04 | Stop reason: HOSPADM

## 2021-05-04 RX ORDER — PROPOFOL 10 MG/ML
INJECTION, EMULSION INTRAVENOUS CONTINUOUS PRN
Status: DISCONTINUED | OUTPATIENT
Start: 2021-05-04 | End: 2021-05-04 | Stop reason: SDUPTHER

## 2021-05-04 RX ORDER — HYDROCODONE BITARTRATE AND ACETAMINOPHEN 5; 325 MG/1; MG/1
1 TABLET ORAL PRN
Status: DISCONTINUED | OUTPATIENT
Start: 2021-05-04 | End: 2021-05-04 | Stop reason: HOSPADM

## 2021-05-04 RX ORDER — DIPHENHYDRAMINE HYDROCHLORIDE 50 MG/ML
12.5 INJECTION INTRAMUSCULAR; INTRAVENOUS
Status: DISCONTINUED | OUTPATIENT
Start: 2021-05-04 | End: 2021-05-04 | Stop reason: HOSPADM

## 2021-05-04 RX ORDER — SODIUM CHLORIDE, SODIUM LACTATE, POTASSIUM CHLORIDE, CALCIUM CHLORIDE 600; 310; 30; 20 MG/100ML; MG/100ML; MG/100ML; MG/100ML
INJECTION, SOLUTION INTRAVENOUS CONTINUOUS
Status: DISCONTINUED | OUTPATIENT
Start: 2021-05-04 | End: 2021-05-04 | Stop reason: HOSPADM

## 2021-05-04 RX ORDER — METOCLOPRAMIDE HYDROCHLORIDE 5 MG/ML
10 INJECTION INTRAMUSCULAR; INTRAVENOUS
Status: DISCONTINUED | OUTPATIENT
Start: 2021-05-04 | End: 2021-05-04 | Stop reason: HOSPADM

## 2021-05-04 RX ORDER — FENTANYL CITRATE 50 UG/ML
50 INJECTION, SOLUTION INTRAMUSCULAR; INTRAVENOUS EVERY 10 MIN PRN
Status: DISCONTINUED | OUTPATIENT
Start: 2021-05-04 | End: 2021-05-04 | Stop reason: HOSPADM

## 2021-05-04 RX ORDER — HYDROCODONE BITARTRATE AND ACETAMINOPHEN 5; 325 MG/1; MG/1
2 TABLET ORAL PRN
Status: DISCONTINUED | OUTPATIENT
Start: 2021-05-04 | End: 2021-05-04 | Stop reason: HOSPADM

## 2021-05-04 RX ORDER — SODIUM CHLORIDE 9 MG/ML
25 INJECTION, SOLUTION INTRAVENOUS PRN
Status: DISCONTINUED | OUTPATIENT
Start: 2021-05-04 | End: 2021-05-04 | Stop reason: HOSPADM

## 2021-05-04 RX ORDER — LIDOCAINE HYDROCHLORIDE 10 MG/ML
1 INJECTION, SOLUTION EPIDURAL; INFILTRATION; INTRACAUDAL; PERINEURAL
Status: DISCONTINUED | OUTPATIENT
Start: 2021-05-04 | End: 2021-05-04 | Stop reason: HOSPADM

## 2021-05-04 RX ORDER — ONDANSETRON 2 MG/ML
4 INJECTION INTRAMUSCULAR; INTRAVENOUS
Status: DISCONTINUED | OUTPATIENT
Start: 2021-05-04 | End: 2021-05-04 | Stop reason: HOSPADM

## 2021-05-04 RX ORDER — ONDANSETRON 2 MG/ML
INJECTION INTRAMUSCULAR; INTRAVENOUS PRN
Status: DISCONTINUED | OUTPATIENT
Start: 2021-05-04 | End: 2021-05-04 | Stop reason: SDUPTHER

## 2021-05-04 RX ORDER — MIDAZOLAM HYDROCHLORIDE 1 MG/ML
INJECTION INTRAMUSCULAR; INTRAVENOUS PRN
Status: DISCONTINUED | OUTPATIENT
Start: 2021-05-04 | End: 2021-05-04 | Stop reason: SDUPTHER

## 2021-05-04 RX ORDER — SODIUM CHLORIDE 0.9 % (FLUSH) 0.9 %
10 SYRINGE (ML) INJECTION EVERY 12 HOURS SCHEDULED
Status: DISCONTINUED | OUTPATIENT
Start: 2021-05-04 | End: 2021-05-04 | Stop reason: HOSPADM

## 2021-05-04 RX ORDER — MAGNESIUM HYDROXIDE 1200 MG/15ML
LIQUID ORAL CONTINUOUS PRN
Status: COMPLETED | OUTPATIENT
Start: 2021-05-04 | End: 2021-05-04

## 2021-05-04 RX ORDER — MEPERIDINE HYDROCHLORIDE 25 MG/ML
12.5 INJECTION INTRAMUSCULAR; INTRAVENOUS; SUBCUTANEOUS EVERY 5 MIN PRN
Status: DISCONTINUED | OUTPATIENT
Start: 2021-05-04 | End: 2021-05-04 | Stop reason: HOSPADM

## 2021-05-04 RX ORDER — BUPIVACAINE HYDROCHLORIDE 2.5 MG/ML
INJECTION, SOLUTION EPIDURAL; INFILTRATION; INTRACAUDAL PRN
Status: DISCONTINUED | OUTPATIENT
Start: 2021-05-04 | End: 2021-05-04 | Stop reason: ALTCHOICE

## 2021-05-04 RX ORDER — LIDOCAINE HYDROCHLORIDE 5 MG/ML
INJECTION, SOLUTION INFILTRATION; INTRAVENOUS PRN
Status: DISCONTINUED | OUTPATIENT
Start: 2021-05-04 | End: 2021-05-04 | Stop reason: SDUPTHER

## 2021-05-04 RX ORDER — CEFAZOLIN SODIUM 2 G/50ML
2000 SOLUTION INTRAVENOUS
Status: COMPLETED | OUTPATIENT
Start: 2021-05-04 | End: 2021-05-04

## 2021-05-04 RX ADMIN — FENTANYL CITRATE 100 MCG: 50 INJECTION, SOLUTION INTRAMUSCULAR; INTRAVENOUS at 08:54

## 2021-05-04 RX ADMIN — SODIUM CHLORIDE, POTASSIUM CHLORIDE, SODIUM LACTATE AND CALCIUM CHLORIDE 1000 ML: 600; 310; 30; 20 INJECTION, SOLUTION INTRAVENOUS at 07:41

## 2021-05-04 RX ADMIN — SODIUM CHLORIDE, POTASSIUM CHLORIDE, SODIUM LACTATE AND CALCIUM CHLORIDE: 600; 310; 30; 20 INJECTION, SOLUTION INTRAVENOUS at 08:50

## 2021-05-04 RX ADMIN — MIDAZOLAM HYDROCHLORIDE 2 MG: 2 INJECTION, SOLUTION INTRAMUSCULAR; INTRAVENOUS at 08:50

## 2021-05-04 RX ADMIN — LIDOCAINE HYDROCHLORIDE 30 ML: 5 INJECTION, SOLUTION INFILTRATION; INTRAVENOUS at 08:55

## 2021-05-04 RX ADMIN — CEFAZOLIN SODIUM 2000 MG: 2 SOLUTION INTRAVENOUS at 08:53

## 2021-05-04 RX ADMIN — ONDANSETRON 4 MG: 2 INJECTION INTRAMUSCULAR; INTRAVENOUS at 09:15

## 2021-05-04 RX ADMIN — PROPOFOL 25 MCG/KG/MIN: 10 INJECTION, EMULSION INTRAVENOUS at 08:55

## 2021-05-04 ASSESSMENT — PULMONARY FUNCTION TESTS
PIF_VALUE: 0
PIF_VALUE: 0
PIF_VALUE: 1
PIF_VALUE: 0
PIF_VALUE: 1
PIF_VALUE: 0

## 2021-05-04 ASSESSMENT — ENCOUNTER SYMPTOMS: SHORTNESS OF BREATH: 1

## 2021-05-04 NOTE — ANESTHESIA PRE PROCEDURE
Department of Anesthesiology  Preprocedure Note       Name:  Maulik Simon   Age:  32 y.o.  :  1995                                          MRN:  [id number]         Date:  2021      Surgeon: Guanako Calhoun): Antonio Moran MD    Procedure: Procedure(s):  RIGHT HAND CARPAL TUNNEL DECOMPRESSION. FUNMILAYO BLOCK (PAT WITH LARISSA ). REQUESTING A.M. Medications prior to admission:   Prior to Admission medications    Medication Sig Start Date End Date Taking?  Authorizing Provider   levothyroxine (SYNTHROID) 88 MCG tablet Take 1 tablet by mouth daily 21  Yes Janes Roberson MD   levonorgestrel-ethinyl estradiol (Cherylyn Dopp 0.15/30, 28,) 0.15-30 MG-MCG per tablet Take 1 tablet by mouth daily 3/23/21   A[place],    naproxen (NAPROSYN) 500 MG tablet Take 1 tablet by mouth 2 times daily as needed for Pain 21   Janes Roberson MD       Current medications:    Current Facility-Administered Medications   Medication Dose Route Frequency Provider Last Rate Last Admin    ceFAZolin (ANCEF) 2000 mg in dextrose 3 % 50 mL IVPB (duplex)  2,000 mg Intravenous On Call to 70369 Agnieszka Leon MD        lactated ringers infusion   Intravenous Continuous Dalia Oates,  mL/hr at 21 0741 1,000 mL at 21 0741       Allergies:  No Known Allergies    Problem List:    Patient Active Problem List   Diagnosis Code    Anxiety F41.9    Multiple thyroid nodules E04.2    Morbid obesity ([facility] [address] E66.01    Pain in joint involving multiple sites M25.50    Hyponatremia E87.1    Shortness of breath R06.02    Palpitations R00.2    Intercostal pain R07.82    Prolonged Q-T interval on ECG R94.31    Nausea and vomiting R11.2    Post partum thyroiditis O90.5    Chronic musculoskeletal pain M79.18, G89.29    Sleep apnea G47.30    PCOS (polycystic ovarian syndrome) E28.2       Past Medical History:        Diagnosis Date    Anxiety 2017    Class 1 obesity due to excess calories without serious comorbidity with body mass index (BMI) of 30.0 to 30.9 in adult 2017    Heart abnormality     Hyperthyroidism 2018    Mental disorder     anxiety    Normal labor and delivery 2019    EFRAIN (obstructive sleep apnea) 2020    PCOS (polycystic ovarian syndrome) 2021     premature rupture of membranes with onset of labor more than 24 hours following rupture in third trimester     Tachycardia     Weight loss 3/25/2019       Past Surgical History:        Procedure Laterality Date    BACK SURGERY  2004    tumor removed     SECTION N/A 2019     SECTION performed by Josue Zuluaga DO at [facility] L[facility] OR       Social History:    Social History     Tobacco Use    Smoking status: Never Smoker    Smokeless tobacco: Never Used   Substance Use Topics    Alcohol use: No                                Counseling given: Not Answered      Vital Signs (Current):   Vitals:    21 0714   BP: 106/65   Pulse: 97   Resp: 16   Temp: 99 °F (37.2 °C)   TempSrc: Temporal   SpO2: 96%   Weight: 236 lb (107 kg)   Height: 5' 5\" (1.651 m)                                              BP Readings from Last 3 Encounters:   21 106/65   21 118/74   21 124/78       NPO Status: Time of last liquid consumption:                         Time of last solid consumption:                         Date of last liquid consumption: 21                        Date of last solid food consumption: 21    BMI:   Wt Readings from Last 3 Encounters:   21 236 lb (107 kg)   21 236 lb 12.8 oz (107.4 kg)   21 237 lb 6.4 oz (107.7 kg)     Body mass index is 39.27 kg/m².     CBC:   Lab Results   Component Value Date    WBC 9.8 07/15/2020    RBC 5.21 07/15/2020    HGB 15.9 07/15/2020    HCT 47.0 07/15/2020    MCV 90.1 07/15/2020    RDW 12.8 07/15/2020     07/15/2020       CMP:   Lab Results   Component Value Date    NA

## 2021-05-04 NOTE — TELEPHONE ENCOUNTER
Patient called back and was given the answered from Dr. Bam Ocampo. She stated that it was fine and that she was going to talk with the surgeon and see if can help her.

## 2021-05-04 NOTE — ANESTHESIA PROCEDURE NOTES
Peripheral Block    Patient location during procedure: OR  Staffing  Performed: resident/CRNA   Resident/CRNA: JIM Roe CRNA  Preanesthetic Checklist  Completed: patient identified, IV checked, site marked, risks and benefits discussed, surgical consent, monitors and equipment checked, pre-op evaluation, timeout performed, anesthesia consent given, oxygen available and patient being monitored  Peripheral Block  Patient position: supine  Prep: alcohol swabs  Patient monitoring: cardiac monitor, continuous pulse ox, frequent blood pressure checks, IV access and continuous capnometry  Block type: [place] block  Laterality: right  Injection technique: single-shot  Guidance: other  Local infiltration: lidocaine  Infiltration strength: 0.5 %  Dose: 30 mL  Provider prep: mask  Local infiltration: lidocaine  Additional Notes  Right arm exsanguinated with eschmark band, tourniquet inflated to 250mmhg and holding, emergency backup tourniquet in place   Reason for block: primary anesthetic

## 2021-05-04 NOTE — ANESTHESIA POSTPROCEDURE EVALUATION
Department of Anesthesiology  Postprocedure Note    Patient: Merlene Hager  MRN: [id number]  YOB: 1995  Date of evaluation: 5/4/2021  Time:  9:26 AM     Procedure Summary     Date: 05/04/21 Room / Location: [address]    Anesthesia Start: 8205 Anesthesia Stop:     Procedure: RIGHT HAND CARPAL TUNNEL DECOMPRESSION. (Right ) Diagnosis: (RIGHT HAND CARPAL TUNNEL SYNDROME)    Surgeons: Nel Cortes MD Responsible Provider: Vikash Rivera DO    Anesthesia Type: MAC, [place] block ASA Status: 2          Anesthesia Type: No value filed. Pedro Phase I: Pedro Score: 10    Pedro Phase II:      Last vitals: Reviewed and per EMR flowsheets.        Anesthesia Post Evaluation    Patient location during evaluation: bedside  Patient participation: complete - patient participated  Level of consciousness: awake  Pain score: 0  Airway patency: patent  Nausea & Vomiting: no nausea and no vomiting  Complications: no  Cardiovascular status: blood pressure returned to baseline and hemodynamically stable  Respiratory status: acceptable and nasal cannula  Hydration status: stable

## 2021-05-04 NOTE — OP NOTE
Operative Note      Patient: Ciaran Eldridge  YOB: 1995  MRN: [id number]    Date of Procedure: 5/4/2021    Pre-Op Diagnosis: RIGHT HAND CARPAL TUNNEL SYNDROME    Post-Op Diagnosis: Same       Procedure(s):  RIGHT HAND CARPAL TUNNEL DECOMPRESSION. Surgeon(s): Hilario Yousif MD    Assistant:   Physician Assistant: Radu Lopes PA-C    Anesthesia: [place] Block    Estimated Blood Loss (mL): Minimal    Complications: None    Specimens:   * No specimens in log *    Implants:  * No implants in log *      Drains: * No LDAs found *    Findings: Tight carpal ligament across carpal canal, compression of the median nerve    Detailed Description of Procedure: Indications for surgery    Mrs Yoselyn Hooper is a 32year old female, who presents with features of tingling numbness and paresthesia in his right upper extremity. She has had EMG study which revealed evidence of carpal tunnel syndrome in the right hand. Patient had tried all conservative measures and failed. Surgical procedure was explained in detail, the risks and benefits of surgery were also discussed  Risk of anesthesia which is a Biers block, risk of injury to the vessel, nerve, tendon. Postoperative complications such as hematoma formation, infection, DVT, continued pain in the hand, neuroma formation, loss of sensations in the finger, wound healing problems, possible need for second surgery, recurrence of the symptoms, complications from her medical condition have all been discussed  Patient understands all the risks and benefits and consents for the surgical procedure    Operative note    Patient was brought to the operating room and a Biers block was given by the anesthetic services. The right upper extremity was prepped and draped chlorhexidine prep was used. The patient had 2 g of Ancef given prior to the start of the procedure. The timeout was called prior to the start of the procedure.   The procedure was done with magnifying loops. An incision was made in line with the ring finger close to the thenar crease. Skin and subtenons tissue were carefully cut and retracted. The incision measured approximately 2 cm. Careful dissection was done in the subcutaneous plane till the carpal ligament was reached. An incision was made into the carpal canal using a 15 blade through the carpal ligament. Care was taken to avoid any damage to the underlying neural and vascular structures. The incision measured about 2 to 3 mm only. Kesha Castañeda was now introduced into the carpal canal under the carpal ligament. Blunt tipped scissors was taken the decompression was done first proximally. There was significant thickness noticed in the carpal ligament. Care was taken to avoid any damage to the underlying neural and vascular and tendinous structures. After complete decompression had been performed proximally. Karolynn Idler was now passed proximally to make sure that the decompression had been complete. Karolynn Idler was now placed under the carpal ligament and the decompression was done distally. Care was taken once again to avoid any damage to the underlying neural and tendinous structures. The median nerve was noted to be mildly compressed  with evidence of hyperemia present along the area of the carpal canal.  The tendinous structures were moving freely within the carpal canal.  Surgical site was irrigated out completely. Skin was closed with interrupted nylon sutures. The skin was also infiltrated with quarter percent Marcaine. Approximately 8 to 10 cc of Marcaine was infiltrated into the skin. Surgical site was dressed with Xeroform, gauze, web roll and an Ace wrap. Patient made a satisfactory recovery. Postoperative instructions will be provided. He will be evaluated in orthopedic office in approximately 10 days.       Electronically signed by Wing Duane MD on 5/4/2021 at 9:21 AM

## 2021-05-05 RX ORDER — LEVOTHYROXINE SODIUM 88 UG/1
88 TABLET ORAL DAILY
Qty: 30 TABLET | Refills: 12 | Status: SHIPPED | OUTPATIENT
Start: 2021-05-05 | End: 2021-09-01 | Stop reason: SDUPTHER

## 2021-05-13 RX ORDER — LEVONORGESTREL AND ETHINYL ESTRADIOL 0.15-0.03
1 KIT ORAL DAILY
Qty: 1 PACKET | Refills: 11 | Status: SHIPPED | OUTPATIENT
Start: 2021-05-13 | End: 2021-06-15

## 2021-05-13 RX ORDER — METFORMIN HYDROCHLORIDE 500 MG/1
500 TABLET, EXTENDED RELEASE ORAL
Qty: 90 TABLET | Refills: 1 | Status: SHIPPED | OUTPATIENT
Start: 2021-05-13 | End: 2021-06-15 | Stop reason: SDUPTHER

## 2021-05-18 ENCOUNTER — OFFICE VISIT (OUTPATIENT)
Dept: ORTHOPEDIC SURGERY | Age: 26
End: 2021-05-18

## 2021-05-18 VITALS — HEIGHT: 65 IN | BODY MASS INDEX: 39.45 KG/M2 | WEIGHT: 236.8 LBS

## 2021-05-18 DIAGNOSIS — G56.03 BILATERAL CARPAL TUNNEL SYNDROME: Primary | ICD-10-CM

## 2021-05-18 PROCEDURE — 99024 POSTOP FOLLOW-UP VISIT: CPT | Performed by: PHYSICIAN ASSISTANT

## 2021-05-18 NOTE — PROGRESS NOTES
[facility]      Subjective:      Chief Complaint   Patient presents with    Post-Op Check     post op RIGHT HAND CARPAL TUNNEL DECOMPRESSION        HPI: Kerry Tian is a 32 y.o. female who is here 2 weeks postop [facility] , [place]  She has intermediate relief of her symptoms since surgery. incisions are great.  sutures were removed. Past Medical History:   Diagnosis Date    Anxiety 2017    Class 1 obesity due to excess calories without serious comorbidity with body mass index (BMI) of 30.0 to 30.9 in adult 2017    Heart abnormality     Hyperthyroidism 2018    Mental disorder     anxiety    Normal labor and delivery 2019    EFRAIN (obstructive sleep apnea) 2020    PCOS (polycystic ovarian syndrome) 2021     premature rupture of membranes with onset of labor more than 24 hours following rupture in third trimester     Tachycardia     Weight loss 3/25/2019      Past Surgical History:   Procedure Laterality Date    BACK SURGERY  2004    tumor removed    CARPAL TUNNEL RELEASE Right 2021    RIGHT HAND CARPAL TUNNEL DECOMPRESSION.  performed by Isabella Sterling MD at [address] [place] [address] N/A 2019     SECTION performed by Brittani Aldrich DO at [facility] L[facility] OR     Social History     Socioeconomic History    Marital status:      Spouse name: Not on file    Number of children: Not on file    Years of education: Not on file    Highest education level: Not on file   Occupational History    Not on file   Tobacco Use    Smoking status: Never Smoker    Smokeless tobacco: Never Used   Vaping Use    Vaping Use: Never used   Substance and Sexual Activity    Alcohol use: No    Drug use: No    Sexual activity: Yes     Partners: Male   Other Topics Concern    Not on file   Social History Narrative    Not on file     Social Determinants of Health     Financial Resource Strain:     Difficulty of Paying Living Expenses:    Food Insecurity:     Worried About Running Out of Food in the Last Year:     [address] in the Last Year:    Transportation Needs:     Lack of Transportation (Medical):  Lack of Transportation (Non-Medical):    Physical Activity:     Days of Exercise per Week:     Minutes of Exercise per Session:    Stress:     Feeling of Stress :    Social Connections:     Frequency of Communication with Friends and Family:     Frequency of Social Gatherings with Friends and Family:     Attends Alev[employer] Services:     Active Member of Clubs or Organizations:     Attends Club or Organization Meetings:     Marital Status:    Intimate Partner Violence:     Fear of Current or Ex-Partner:     Emotionally Abused:     Physically Abused:     Sexually Abused:      Family History   Problem Relation Age of Onset    No Known Problems Mother     Diabetes Father     No Known Problems Sister     No Known Problems Brother     No Known Problems Sister     Rheum Arthritis Maternal Grandmother      No Known Allergies  Current Outpatient Medications on File Prior to Visit   Medication Sig Dispense Refill    metFORMIN (GLUCOPHAGE-XR) 500 MG extended release tablet Take 1 tablet by mouth daily (with breakfast) 90 tablet 1    levonorgestrel-ethinyl estradiol (LEVORA 0.15/30, 28,) 0.15-30 MG-MCG per tablet Take 1 tablet by mouth daily 1 packet 11    levothyroxine (SYNTHROID) 88 MCG tablet Take 1 tablet by mouth daily 30 tablet 12    naproxen (NAPROSYN) 500 MG tablet Take 1 tablet by mouth 2 times daily as needed for Pain 60 tablet 1     No current facility-administered medications on file prior to visit.        Objective:   Ht 5' 5\" (1.651 m)   Wt 236 lb 12.8 oz (107.4 kg)   BMI 39.41 kg/m²     General: WDWN, well appearing, in no distress   Skin: without breakdown, rash, normal in color    Well-healing incisions at the palmar aspect of the hand, no erythema or discharge or signs of infection Radiographs and Laboratory Studies:       Laboratory Studies:   Lab Results   Component Value Date    WBC 9.3 05/04/2021    HGB 14.2 05/04/2021    HCT 42.2 05/04/2021    MCV 89.2 05/04/2021     05/04/2021     Lab Results   Component Value Date    SEDRATE 25 (H) 01/28/2020     Lab Results   Component Value Date    CRP 15.0 (H) 01/28/2020       Assessment and Plan:      Diagnosis Orders   1. Bilateral carpal tunnel syndrome         Sutures were removed. Incision looks great, no signs of infection. Instructed to continue with range of motion exercises until any residual stiffness is resolved. No bathing for another week, however they can shower. If they are having any issues or signs of infection, they were instructed to call our office immediately and make a follow-up appointment. The above plan was discussed in thorough detail with Dr. Patricio Triplett and the patient. No orders of the defined types were placed in this encounter. No orders of the defined types were placed in this encounter. No follow-ups on file.     Licha Catalan PA-C  [place] [address] [facility]  [phone]

## 2021-06-15 ENCOUNTER — OFFICE VISIT (OUTPATIENT)
Dept: FAMILY MEDICINE CLINIC | Age: 26
End: 2021-06-15
Payer: COMMERCIAL

## 2021-06-15 VITALS
SYSTOLIC BLOOD PRESSURE: 122 MMHG | HEIGHT: 65 IN | WEIGHT: 234.4 LBS | TEMPERATURE: 97.5 F | RESPIRATION RATE: 18 BRPM | OXYGEN SATURATION: 99 % | HEART RATE: 97 BPM | BODY MASS INDEX: 39.05 KG/M2 | DIASTOLIC BLOOD PRESSURE: 78 MMHG

## 2021-06-15 DIAGNOSIS — E28.2 PCOS (POLYCYSTIC OVARIAN SYNDROME): Chronic | ICD-10-CM

## 2021-06-15 DIAGNOSIS — G47.33 OBSTRUCTIVE SLEEP APNEA SYNDROME: Chronic | ICD-10-CM

## 2021-06-15 DIAGNOSIS — R53.83 FATIGUE, UNSPECIFIED TYPE: ICD-10-CM

## 2021-06-15 DIAGNOSIS — E03.9 ACQUIRED HYPOTHYROIDISM: Primary | ICD-10-CM

## 2021-06-15 DIAGNOSIS — E03.9 ACQUIRED HYPOTHYROIDISM: ICD-10-CM

## 2021-06-15 DIAGNOSIS — R10.84 GENERALIZED ABDOMINAL PAIN: ICD-10-CM

## 2021-06-15 LAB
BILIRUBIN, POC: NORMAL
BLOOD URINE, POC: NORMAL
CLARITY, POC: NORMAL
COLOR, POC: YELLOW
CONTROL: NORMAL
GLUCOSE URINE, POC: NORMAL
INSULIN: 56.7 UIU/ML (ref 2.6–24.9)
KETONES, POC: NORMAL
LEUKOCYTE EST, POC: NORMAL
NITRITE, POC: NORMAL
PH, POC: 6
PREGNANCY TEST URINE, POC: NEGATIVE
PROTEIN, POC: NORMAL
SPECIFIC GRAVITY, POC: 1.02
TSH SERPL DL<=0.05 MIU/L-ACNC: 2.57 UIU/ML (ref 0.44–3.86)
UROBILINOGEN, POC: NORMAL

## 2021-06-15 PROCEDURE — 81002 URINALYSIS NONAUTO W/O SCOPE: CPT | Performed by: FAMILY MEDICINE

## 2021-06-15 PROCEDURE — 81025 URINE PREGNANCY TEST: CPT | Performed by: FAMILY MEDICINE

## 2021-06-15 PROCEDURE — G8417 CALC BMI ABV UP PARAM F/U: HCPCS | Performed by: FAMILY MEDICINE

## 2021-06-15 PROCEDURE — G8427 DOCREV CUR MEDS BY ELIG CLIN: HCPCS | Performed by: FAMILY MEDICINE

## 2021-06-15 PROCEDURE — 99214 OFFICE O/P EST MOD 30 MIN: CPT | Performed by: FAMILY MEDICINE

## 2021-06-15 PROCEDURE — 1036F TOBACCO NON-USER: CPT | Performed by: FAMILY MEDICINE

## 2021-06-15 RX ORDER — METFORMIN HYDROCHLORIDE 500 MG/1
500 TABLET, EXTENDED RELEASE ORAL
Qty: 90 TABLET | Refills: 1 | Status: SHIPPED | OUTPATIENT
Start: 2021-06-15 | End: 2021-09-01 | Stop reason: SDUPTHER

## 2021-06-15 SDOH — ECONOMIC STABILITY: FOOD INSECURITY: WITHIN THE PAST 12 MONTHS, THE FOOD YOU BOUGHT JUST DIDN'T LAST AND YOU DIDN'T HAVE MONEY TO GET MORE.: NEVER TRUE

## 2021-06-15 SDOH — ECONOMIC STABILITY: FOOD INSECURITY: WITHIN THE PAST 12 MONTHS, YOU WORRIED THAT YOUR FOOD WOULD RUN OUT BEFORE YOU GOT MONEY TO BUY MORE.: NEVER TRUE

## 2021-06-15 SDOH — ECONOMIC STABILITY: TRANSPORTATION INSECURITY
IN THE PAST 12 MONTHS, HAS LACK OF TRANSPORTATION KEPT YOU FROM MEETINGS, WORK, OR FROM GETTING THINGS NEEDED FOR DAILY LIVING?: NO

## 2021-06-15 SDOH — ECONOMIC STABILITY: TRANSPORTATION INSECURITY
IN THE PAST 12 MONTHS, HAS THE LACK OF TRANSPORTATION KEPT YOU FROM MEDICAL APPOINTMENTS OR FROM GETTING MEDICATIONS?: NO

## 2021-06-15 ASSESSMENT — SOCIAL DETERMINANTS OF HEALTH (SDOH): HOW HARD IS IT FOR YOU TO PAY FOR THE VERY BASICS LIKE FOOD, HOUSING, MEDICAL CARE, AND HEATING?: NOT VERY HARD

## 2021-06-15 NOTE — PROGRESS NOTES
[address], 32 y.o. female presents today with:  Chief Complaint   Patient presents with    Fatigue     x 3 days; sleepy throughout the day     Bloated     swollen abdomen; abdominal pain and cramping in lower abdomen.; has been trying for a baby since April     Other     no period for 2 months but on  bleed for 4 days            HPI    3 days of extreme ftigue and diffuse abdominal pain; no nausea, vomiting, diarrhea, constipation, blood in stools, black tarry stools, fevers. Patient is concerned about infertility. Has been diagnosed with PCOS by OB/GYN. Not taking medications at this time. No other questions and or concerns for today's visit            Past Medical History:   Diagnosis Date    Anxiety 2017    Class 1 obesity due to excess calories without serious comorbidity with body mass index (BMI) of 30.0 to 30.9 in adult 2017    Heart abnormality     Hyperthyroidism 2018    Mental disorder     anxiety    Normal labor and delivery 2019    EFRAIN (obstructive sleep apnea) 2020    PCOS (polycystic ovarian syndrome) 2021     premature rupture of membranes with onset of labor more than 24 hours following rupture in third trimester     Tachycardia     Weight loss 3/25/2019     Past Surgical History:   Procedure Laterality Date    BACK SURGERY  2004    tumor removed    CARPAL TUNNEL RELEASE Right 2021    RIGHT HAND CARPAL TUNNEL DECOMPRESSION.  [facility]iana [facility]arate [facility] [address] N/A 2019     SECTION performed by Rossana Giang DO at [facility] L[facility] OR     Social History     Socioeconomic History    Marital status:      Spouse name: Not on file    Number of children: Not on file    Years of education: Not on file    Highest education level: Not on file   Occupational History    Not on file   Tobacco Use    Smoking status: Never Smoker    Smokeless tobacco: Never Used Vaping Use    Vaping Use: Never used   Substance and Sexual Activity    Alcohol use: No    Drug use: No    Sexual activity: Yes     Partners: Male   Other Topics Concern    Not on file   Social History Narrative    Not on file     Social Determinants of Health     Financial Resource Strain: Low Risk     Difficulty of Paying Living Expenses: Not very hard   Food Insecurity: No Food Insecurity    Worried About Running Out of Food in the Last Year: Never true    Mica of Food in the Last Year: Never true   Transportation Needs: No Transportation Needs    Lack of Transportation (Medical): No    Lack of Transportation (Non-Medical): No   Physical Activity:     Days of Exercise per Week:     Minutes of Exercise per Session:    Stress:     Feeling of Stress :    Social Connections:     Frequency of Communication with Friends and Family:     Frequency of Social Gatherings with Friends and Family:     Attends [employer] Services:     Active Member of Clubs or Organizations:     Attends Club or Organization Meetings:     Marital Status:    Intimate Partner Violence:     Fear of Current or Ex-Partner:     Emotionally Abused:     Physically Abused:     Sexually Abused:      Family History   Problem Relation Age of Onset    No Known Problems Mother     Diabetes Father     No Known Problems Sister     No Known Problems Brother     No Known Problems Sister     Rheum Arthritis Maternal Grandmother      No Known Allergies  Current Outpatient Medications   Medication Sig Dispense Refill    metFORMIN (GLUCOPHAGE-XR) 500 MG extended release tablet Take 1 tablet by mouth daily (with breakfast) 90 tablet 1    levothyroxine (SYNTHROID) 88 MCG tablet Take 1 tablet by mouth daily 30 tablet 12     No current facility-administered medications for this visit. PMH, Surgical Hx, Family Hx, and Social Hxreviewed and updated. Health Maintenance reviewed.     Objective    Vitals:    06/15/21 1357   BP: 122/78 Pulse: 97   Resp: 18   Temp: 97.5 °F (36.4 °C)   TempSrc: Temporal   SpO2: 99%   Weight: 234 lb 6.4 oz (106.3 kg)   Height: 5' 5\" (1.651 m)        Physical Exam  Constitutional:       General: She is not in acute distress. Appearance: She is well-developed. HENT:      Head: Normocephalic and atraumatic. Eyes:      General: No scleral icterus. Conjunctiva/sclera: Conjunctivae normal.   Cardiovascular:      Rate and Rhythm: Normal rate and regular rhythm. Heart sounds: Normal heart sounds. Pulmonary:      Effort: Pulmonary effort is normal. No respiratory distress. Breath sounds: Normal breath sounds. No wheezing or rales. Abdominal:      General: Bowel sounds are normal. There is no distension. Palpations: Abdomen is soft. There is no mass. Tenderness: There is no abdominal tenderness. Skin:     General: Skin is warm and dry. Neurological:      Mental Status: She is alert and oriented to person, place, and time. Lab Results   Component Value Date    LABA1C 5.2 04/02/2019     Lab Results   Component Value Date    CREATININE 0.66 05/04/2021     Lab Results   Component Value Date    ALT 40 (H) 07/15/2020    AST 25 07/15/2020     Lab Results   Component Value Date    HDL 39 (L) 12/13/2017    LDLCALC 116 12/13/2017        Assessment & Plan   Visit Diagnoses and Associated Orders     Acquired hypothyroidism    -  Primary    Follow labs. TSH Without Reflex [[id number] Custom]   - Future Order         Fatigue, unspecified type        Consider EFRAIN. Consider hypothyroidism. Follow labs. Generalized abdominal pain        Unclear etiology. POCT Urinalysis no Micro [[id number] Custom]           Obstructive sleep apnea syndrome        Sleep study ordered. Baseline Diagnostic Sleep Study [[id number] Custom]           PCOS (polycystic ovarian syndrome)        Check labs. Encouraged weight management, exercise, OCPs, Metformin.     Insulin, Total [id number] Custom]   - Future Order Testosterone Non Male [[id number] Custom]   - Future Order    metFORMIN (GLUCOPHAGE-XR) 500 MG extended release tablet [[id number]]           ORDERS WITHOUT AN ASSOCIATED DIAGNOSIS    POCT urine pregnancy [[id number] Custom]              Reviewed with the patient: all disease processes, current clinical status, medications, activities and diet.      Side effects, adverse effects of the medication prescribed today, as well as treatment plan/ rationale and result expectations have been discussed with the patient who expresses understanding and desires to proceed.     Close follow up to evaluate treatment results and for coordination of care. I have reviewed the patient's medical history in detail and updated the computerized patient record. More than 50% of the appointment was spent in face-to-face counseling, education and care coordination. Please note this report has been partially produced using speech recognition software and may contain mistakes related to that system including errors in grammar, punctuation and spelling as well as words and phrases that may seem inappropriate. If there are questions or concerns, please feel free to contact me to clarify. Orders Placed This Encounter   Procedures    TSH Without Reflex     Standing Status:   Future     Number of Occurrences:   1     Standing Expiration Date:   6/15/2022    Insulin, Total     Standing Status:   Future     Number of Occurrences:   1     Standing Expiration Date:   6/15/2022    Testosterone Non Male     Standing Status:   Future     Number of Occurrences:   1     Standing Expiration Date:   6/15/2022    POCT urine pregnancy    POCT Urinalysis no Micro    Baseline Diagnostic Sleep Study     Patients with abnormal results will be assessed and referred to the sleep  as needed.      Scheduling Instructions:      Scheduling and Pre-Certification: [phone]      The following must be completed and FAXED to [phone]      1) Sleep Evaluation Orders Form      2) Office note justifying study      3) Demographic info / insurance card     Order Specific Question:   Adult or Pediatric     Answer:   Adult Study (>7 Years)     Order Specific Question:   Location For Sleep Study     Answer:   [facility]     Order Specific Question:   Select Sleep Lab Location     Answer:   [facility]     Comments:   [place]     Orders Placed This Encounter   Medications    metFORMIN (GLUCOPHAGE-XR) 500 MG extended release tablet     Sig: Take 1 tablet by mouth daily (with breakfast)     Dispense:  90 tablet     Refill:  1     Medications Discontinued During This Encounter   Medication Reason    naproxen (NAPROSYN) 500 MG tablet LIST CLEANUP    levonorgestrel-ethinyl estradiol (LEVORA 0.15/30, 28,) 0.15-30 MG-MCG per tablet LIST CLEANUP    metFORMIN (GLUCOPHAGE-XR) 500 MG extended release tablet REORDER     No follow-ups on file. Controlled Substance Monitoring:    Acute and Chronic Pain Monitoring:   No flowsheet data found.         Agustín Martinez MD

## 2021-06-16 LAB — SARS-COV-2, PCR: NOT DETECTED

## 2021-06-21 LAB — TESTOSTERONE, FEMALES/CHILDREN: 36 NG/DL (ref 9–55)

## 2021-07-02 ENCOUNTER — TELEPHONE (OUTPATIENT)
Dept: FAMILY MEDICINE CLINIC | Age: 26
End: 2021-07-02

## 2021-07-02 NOTE — TELEPHONE ENCOUNTER
Patient calling to ask for an Ultrasound to be ordered for her stomach. She states per your last conversation she explained her stomach was hurting and the lower part was swollen and big. Please advise and thank you.

## 2021-07-06 DIAGNOSIS — E28.2 PCOS (POLYCYSTIC OVARIAN SYNDROME): Primary | ICD-10-CM

## 2021-08-19 ENCOUNTER — HOSPITAL ENCOUNTER (OUTPATIENT)
Dept: ULTRASOUND IMAGING | Age: 26
Discharge: HOME | End: 2021-08-21
Payer: COMMERCIAL

## 2021-08-19 DIAGNOSIS — E28.2 PCOS (POLYCYSTIC OVARIAN SYNDROME): ICD-10-CM

## 2021-08-19 PROCEDURE — 76856 US EXAM PELVIC COMPLETE: CPT

## 2021-08-19 PROCEDURE — 76830 TRANSVAGINAL US NON-OB: CPT

## 2022-02-01 ENCOUNTER — OFFICE VISIT (OUTPATIENT)
Dept: FAMILY MEDICINE CLINIC | Age: 27
End: 2022-02-01
Payer: COMMERCIAL

## 2022-02-01 VITALS
TEMPERATURE: 98.3 F | BODY MASS INDEX: 40.48 KG/M2 | SYSTOLIC BLOOD PRESSURE: 118 MMHG | DIASTOLIC BLOOD PRESSURE: 78 MMHG | OXYGEN SATURATION: 98 % | HEIGHT: 65 IN | HEART RATE: 114 BPM | WEIGHT: 243 LBS

## 2022-02-01 DIAGNOSIS — N92.6 IRREGULAR MENSES: Primary | ICD-10-CM

## 2022-02-01 DIAGNOSIS — Z23 NEED FOR INFLUENZA VACCINATION: ICD-10-CM

## 2022-02-01 DIAGNOSIS — E03.9 ACQUIRED HYPOTHYROIDISM: ICD-10-CM

## 2022-02-01 DIAGNOSIS — R10.2 VAGINAL PAIN: ICD-10-CM

## 2022-02-01 DIAGNOSIS — M79.7 FIBROMYALGIA: Chronic | ICD-10-CM

## 2022-02-01 DIAGNOSIS — B37.31 VAGINA, CANDIDIASIS: ICD-10-CM

## 2022-02-01 DIAGNOSIS — R14.0 BLOATING: ICD-10-CM

## 2022-02-01 DIAGNOSIS — E28.2 PCOS (POLYCYSTIC OVARIAN SYNDROME): ICD-10-CM

## 2022-02-01 PROCEDURE — G8417 CALC BMI ABV UP PARAM F/U: HCPCS | Performed by: FAMILY MEDICINE

## 2022-02-01 PROCEDURE — G8482 FLU IMMUNIZE ORDER/ADMIN: HCPCS | Performed by: FAMILY MEDICINE

## 2022-02-01 PROCEDURE — 90471 IMMUNIZATION ADMIN: CPT | Performed by: FAMILY MEDICINE

## 2022-02-01 PROCEDURE — G8427 DOCREV CUR MEDS BY ELIG CLIN: HCPCS | Performed by: FAMILY MEDICINE

## 2022-02-01 PROCEDURE — 99214 OFFICE O/P EST MOD 30 MIN: CPT | Performed by: FAMILY MEDICINE

## 2022-02-01 PROCEDURE — 90674 CCIIV4 VAC NO PRSV 0.5 ML IM: CPT | Performed by: FAMILY MEDICINE

## 2022-02-01 PROCEDURE — 1036F TOBACCO NON-USER: CPT | Performed by: FAMILY MEDICINE

## 2022-02-01 RX ORDER — METFORMIN HYDROCHLORIDE 500 MG/1
1000 TABLET, EXTENDED RELEASE ORAL
Qty: 60 TABLET | Refills: 12 | Status: SHIPPED | OUTPATIENT
Start: 2022-02-01

## 2022-02-01 RX ORDER — FLUCONAZOLE 150 MG/1
TABLET ORAL
Qty: 2 TABLET | Refills: 2 | Status: SHIPPED | OUTPATIENT
Start: 2022-02-01

## 2022-02-01 NOTE — PROGRESS NOTES
Chaperone for Intimate Exam   Chaperone was offered and accepted as part of the rooming process.    Chaperone: Mauri Bains CMA

## 2022-02-01 NOTE — PROGRESS NOTES
[address], 32 y.o. female presents today with:  Chief Complaint   Patient presents with    Follow-up     month ago pregnancy test was negative     Vaginal Pain     pain and is sore for awhile now     Menstrual Problem     has not seen her period in 6 months     Bloated     abdomen has been extended and doesn't know why           HPI    Patient complains of irregular menstrual cycle for over a year. Has not had menses in 6 months. Notes that she feels per and bloated. She has vaginal pain and soreness without discharge. Ultrasound 2021 unremarkable but ovaries not well-visualized. Has had 9 pound weight gain since  of last year. On 6/15/2021 testosterone was 36 and within reference range. Insulin was 56.7 with a reference range of 2.6-24.9.  TSH at that time was 2.570. Pap within the last year no abnormal cells. She does have a diagnosis of PCOS and is supposed to be taking Metformin  mg daily. No other questions and or concerns for today's visit      Review of Systems early satiety, notes fluctuations in urine output. No constipation. Past Medical History:   Diagnosis Date    Anxiety 2017    Class 1 obesity due to excess calories without serious comorbidity with body mass index (BMI) of 30.0 to 30.9 in adult 2017    Fibromyalgia 2022    Heart abnormality     Hyperthyroidism 2018    Mental disorder     anxiety    Normal labor and delivery 2019    EFRAIN (obstructive sleep apnea) 2020    PCOS (polycystic ovarian syndrome) 2021     premature rupture of membranes with onset of labor more than 24 hours following rupture in third trimester     Tachycardia     Weight loss 3/25/2019     Past Surgical History:   Procedure Laterality Date    BACK SURGERY  2004    tumor removed    CARPAL TUNNEL RELEASE Right 2021    RIGHT HAND CARPAL TUNNEL DECOMPRESSION.  performed by Emperatriz Manzanares MD at [facility]  SECTION N/A 2019     SECTION performed by Brittani Aldrich DO at [facility]     Social History     Socioeconomic History    Marital status:      Spouse name: Not on file    Number of children: Not on file    Years of education: Not on file    Highest education level: Not on file   Occupational History    Not on file   Tobacco Use    Smoking status: Never Smoker    Smokeless tobacco: Never Used   Vaping Use    Vaping Use: Never used   Substance and Sexual Activity    Alcohol use: No    Drug use: No    Sexual activity: Yes     Partners: Male   Other Topics Concern    Not on file   Social History Narrative    Not on file     Social Determinants of Health     Financial Resource Strain: Low Risk     Difficulty of Paying Living Expenses: Not very hard   Food Insecurity: No Food Insecurity    Worried About Running Out of Food in the Last Year: Never true    Mica of Food in the Last Year: Never true   Transportation Needs: No Transportation Needs    Lack of Transportation (Medical): No    Lack of Transportation (Non-Medical):  No   Physical Activity:     Days of Exercise per Week: Not on file    Minutes of Exercise per Session: Not on file   Stress:     Feeling of Stress : Not on file   Social Connections:     Frequency of Communication with Friends and Family: Not on file    Frequency of Social Gatherings with Friends and Family: Not on file    Attends Hoahaoism Services: Not on file    Active Member of Clubs or Organizations: Not on file    Attends Club or Organization Meetings: Not on file    Marital Status: Not on file   Intimate Partner Violence:     Fear of Current or Ex-Partner: Not on file    Emotionally Abused: Not on file    Physically Abused: Not on file    Sexually Abused: Not on file   Housing Stability:     Unable to Pay for Housing in the Last Year: Not on file    Number of [place] in the Last Year: Not on file    Unstable Housing in the Last Year: Not on file     Family History   Problem Relation Age of Onset    No Known Problems Mother     Diabetes Father     No Known Problems Sister     No Known Problems Brother     No Known Problems Sister     Rheum Arthritis Maternal Grandmother      No Known Allergies  Current Outpatient Medications   Medication Sig Dispense Refill    metFORMIN (GLUCOPHAGE-XR) 500 MG extended release tablet Take 2 tablets by mouth daily (with breakfast) 60 tablet 12    fluconazole (DIFLUCAN) 150 MG tablet Take one tab let now and repeat in 48 hours if symptoms persists; do not take statin cholesterol medicine on the days you take this medicine 2 tablet 2    levothyroxine (SYNTHROID) 88 MCG tablet Take 1 tablet by mouth daily 30 tablet 0     No current facility-administered medications for this visit. PMH, Surgical Hx, Family Hx, and Social Hxreviewed and updated. Health Maintenance reviewed. Objective    Vitals:    02/01/22 1021   BP: 118/78   Site: Left Upper Arm   Position: Sitting   Cuff Size: Medium Adult   Pulse: 114   Temp: 98.3 °F (36.8 °C)   TempSrc: Temporal   SpO2: 98%   Weight: 243 lb (110.2 kg)   Height: 5' 5\" (1.651 m)        Physical Exam  Exam conducted with a chaperone present. Constitutional:       Appearance: She is obese. HENT:      Head: Normocephalic. Eyes:      Conjunctiva/sclera: Conjunctivae normal.   Pulmonary:      Effort: Pulmonary effort is normal.   Abdominal:      Hernia: There is no hernia in the left inguinal area or right inguinal area. Genitourinary:     General: Normal vulva. Vagina: Vaginal discharge (white curd-like), erythema and tenderness present. Comments: Due to vaginal discomfort, exam limited  Lymphadenopathy:      Lower Body: No right inguinal adenopathy. No left inguinal adenopathy. Neurological:      Mental Status: She is alert and oriented to person, place, and time.    Psychiatric:         Behavior: Behavior normal.         Thought Content: Thought content normal.         Judgment: Judgment normal.           Lab Results   Component Value Date    LABA1C 5.2 04/02/2019     Lab Results   Component Value Date    CREATININE 0.66 05/04/2021     Lab Results   Component Value Date    ALT 40 (H) 07/15/2020    AST 25 07/15/2020     Lab Results   Component Value Date    HDL 39 (L) 12/13/2017    LDLCALC 116 12/13/2017        Assessment & Plan   Visit Diagnoses and Associated Orders     Irregular menses    -  Primary    Chronic and likely due to PCOS. hCG negative today. HCG, Quantitative, Pregnancy [id number] Custom]   - Future Order    US PELVIS COMPLETE [[id number] Custom]   - Future Order         PCOS (polycystic ovarian syndrome)        Increase Metformin to 1000 mg daily. Follow labs. US PELVIS COMPLETE [id number] Custom]   - Future Order    Testosterone Non Male [[id number] Custom]   - Future Order    Insulin, Total [[id number] Custom]   - Future Order    metFORMIN (GLUCOPHAGE-XR) 500 MG extended release tablet [[id number]]           Acquired hypothyroidism        Reviewed proper administration. Reassess levels. TSH Without Reflex [[id number] Custom]   - Future Order         Vaginal pain        Vaginal candidiasis evident. Fluconazole 150 mg may repeat in 48 hours. Assessing for gonorrhea and chlamydia. Wet prep. C.trachomatis N.gonorrhoeae DNA, Urine [id number] Custom]   - Future Order    US PELVIS COMPLETE [[id number] Custom]   - Future Order    Wet Prep, Genital [[id number] Custom]   - Future Order         Bloating        Reassess pelvic ultrasound. Patient has appointment with Dr. Gold Bob coming up. US PELVIS COMPLETE [[id number] Custom]   - Future Order         Fibromyalgia        Diagnosed by rheumatologist at [facility]. Recommended that she be seen in pain management. Referred to pain management. Holli Galarza MD, Pain Management, [place] [[id number] Custom]           Vagina, candidiasis        Fluconazole 150 mg. Repeat in 48 hours.     fluconazole (DIFLUCAN) 150 MG tablet [[id number]]           Need for influenza vaccination        INFLUENZA, MDCK QUADV, 2 YRS AND OLDER, IM, PF, PREFILL SYR OR SDV, 0.5ML ([address], PF) [[id number] Custom]                 Reviewed with the patient: all disease processes, current clinical status, medications, activities and diet.      Side effects, adverse effects of the medication prescribed today, as well as treatment plan/ rationale and result expectations have been discussed with the patient who expresses understanding and desires to proceed.     Close follow up to evaluate treatment results and for coordination of care. I have reviewed the patient's medical history in detail and updated the computerized patient record. More than 50% of the appointment was spent in face-to-face counseling, education and care coordination. Please note this report has been partially produced using speech recognition software and may contain mistakes related to that system including errors in grammar, punctuation and spelling as well as words and phrases that may seem inappropriate. If there are questions or concerns, please feel free to contact me to clarify.     Orders Placed This Encounter   Procedures    C.trachomatis N.gonorrhoeae DNA, Urine     Standing Status:   Future     Standing Expiration Date:   2/1/2023    Wet Prep, Genital     Standing Status:   Future     Standing Expiration Date:   2/1/2023    US PELVIS COMPLETE     Standing Status:   Future     Standing Expiration Date:   2/1/2023     Order Specific Question:   Reason for exam:     Answer:   irreg menses, abd bloating, vaginal pain    INFLUENZA, MDCK QUADV, 2 YRS AND OLDER, IM, PF, PREFILL SYR OR SDV, 0.5ML (FLUCELVAX QUADV, PF)    HCG, Quantitative, Pregnancy     Standing Status:   Future     Standing Expiration Date:   2/1/2023    TSH Without Reflex     Standing Status:   Future     Standing Expiration Date:   2/1/2023    Testosterone Non Male     Standing Status:   Future Standing Expiration Date:   2/1/2023    Insulin, Total     Standing Status:   Future     Standing Expiration Date:   2/1/2023   Martha Calvin MD, Pain Management, [place]     Referral Priority:   Routine     Referral Type:   Eval and Treat     Referral Reason:   Specialty Services Required     Referred to Provider:   Anna Brown MD     Requested Specialty:   Pain Management     Number of Visits Requested:   1     Orders Placed This Encounter   Medications    metFORMIN (GLUCOPHAGE-XR) 500 MG extended release tablet     Sig: Take 2 tablets by mouth daily (with breakfast)     Dispense:  60 tablet     Refill:  12    fluconazole (DIFLUCAN) 150 MG tablet     Sig: Take one tab let now and repeat in 48 hours if symptoms persists; do not take statin cholesterol medicine on the days you take this medicine     Dispense:  2 tablet     Refill:  2     Medications Discontinued During This Encounter   Medication Reason    metFORMIN (GLUCOPHAGE-XR) 500 MG extended release tablet DOSE ADJUSTMENT     Return in 6 months (on 8/1/2022), or if symptoms worsen or fail to improve, for Obesity, PCOS - OV. Controlled Substance Monitoring:    Acute and Chronic Pain Monitoring:   No flowsheet data found.         Adams Fisher MD

## 2022-02-01 NOTE — PATIENT INSTRUCTIONS
Patient Education        Candidiasis: Instrucciones de cuidado  Candidiasis: Care Instructions  Instrucciones de cuidado  La candidiasis es katarzyna infección causada por el hongo en forma de levadura del tipo cándida. Por lo general, los hongos cándida viven en hartley cuerpo. Sin embargo, pueden causar problemas si las defensas del organismo no funcionan steven deberían. Algunos medicamentos pueden aumentar yamila probabilidades de contraer katarzyna infección por cándida. Estos incluyen los antibióticos, los esteroides y los medicamentos para el cáncer. [address] y la diabetes pueden hacer que usted tenga infecciones por cándida con mayor facilidad. Hay varios tipos de infecciones por hongos en forma de levadura (cándida). Las aftas (candidosis bucal) es katarzyna infección en la boca causada por la cándida. Suele ocurrir en personas cuyo sistema inmunitario es débil. Provoca manchas cary en el interior de la boca y la garganta. Las infecciones cutáneas por cándida suelen ocurrir en los pliegues de piel donde esta se [place]. Provocan la aparición de manchas rojizas con secreción en la piel. Los bebés pueden contraer estas infecciones bajo el pañal. Las personas que utilizan guantes a menudo pueden tenerlas en las lila. Muchas mujeres contraen candidiasis vaginal. Es muy común cuando las mujeres enmanuel antibióticos. Esta infección puede provocar Precious Galas y ardor en la vagina. Igualmente puede marito lugar a un flujo similar al requesón (\"cottage cheese\"). En casos raros, la cándida [employer]. [place] puede causar [employer]. ana tipo de infección se trata con medicamentos administrados por medio de katarzyna inyección en katarzyna vena (IV). Las infecciones por cándida suelen desaparecer rápidamente katarzyna vez que se comienza el [place]. Sin embargo, si hartley sistema inmunitario es débil, la infección podría reaparecer. Avísele a hartley médico si contrae infecciones por cándida con frecuencia.   Delmar Been atención de seguimiento es katarzyna parte clave de hartley tratamiento y seguridad. Asegúrese de hacer y acudir a todas las citas, y llame a hartley médico si está teniendo problemas. También es katarzyna buena idea saber los resultados de yamila exámenes y mantener katarzyna lista de los medicamentos que yamil. ¿Cómo puede cuidarse en el hogar? · [employer] medicamentos exactamente steven le fueron recetados. Llame a hartley médico si eryn estar teniendo un problema con hartley medicamento. · [place] antibióticos solo cuando se lo recomiende el médico.  · [place] yogur con [employer] Contiene katarzyna bacteria llamada lactobacilo que podría ayudar a prevenir algunos tipos de infecciones por cándida. · Mantenga la piel limpia y seca. Use talco en las zonas húmedas. · Si utiliza cremas o supositorios para tratar la candidiasis vaginal, no use preservativos ni diafragmas. Utilice otro método anticonceptivo. · Coma katarzyna dieta saludable y cortez ejercicio regularmente. [place] ayudará a mantener risa hartley sistema inmunitario. ¿Cuándo debe pedir ayuda? Llame al 911 en cualquier momento que considere que necesita atención de [place]. Por ejemplo, llame si:    · Se desmayó (perdió el conocimiento). Llame a hartley médico ahora mismo o busque atención médica inmediata si:    · Tiene hinchazón en las lila o los pies.     · Siente mareos o aturdimiento, o que está a punto de desmayarse.     · Aumentó de peso de manera inusual.   Preste especial atención a los cambios en hartley andree y asegúrese de comunicarse con hartley médico si:    · No mejora steven se esperaba. ¿Dónde puede encontrar más información en inglés? Flor Net a [email or web address] e ingrese a hartley cuenta de MyChart. [place] Nanette V307 en el [facility] \"Search Health Information\" para más información (en inglés) sobre \"Candidiasis: Instrucciones de cuidado. \"     Si no tiene katarzyna cuenta, cortez kate en el enlace \"Sign Up Now\".   Revisado: 11 febrero, 2021               Versión del contenido: 13.1  © 5947-1844 Healthwise, Incorporated. Las instrucciones de cuidado fueron adaptadas bajo licencia por [facility] ([facility]). Si usted tiene [place] afección médica o sobre estas instrucciones, siempre pregunte a hartley profesional de andree. [employer]orporated niega toda garantía o responsabilidad por hartley uso de esta información. Patient Education        Fibromialgia: Instrucciones de cuidado  Fibromyalgia: Care Instructions  Instrucciones de cuidado    La fibromialgia es katarzyna afección dolorosa que los expertos médicos no comprenden del todo. Se desconoce la causa de la fibromialgia. Puede provocarle cansancio y dolor en todo el cuerpo. Se caracteriza por la presencia de lugares sensibles en puntos específicos del cuerpo que duelen solamente cuando se los toca. Podría tener problemas para dormir, además de otros síntomas. Estos problemas pueden afectar la wanda laboral y familiar. Los síntomas tienden a aparecer y desaparecer, aunque podrían no desaparecer nunca por completo. La fibromialgia no causa daños en los músculos, las articulaciones o los [place]. La atención de seguimiento es katarzyna parte clave de hartley tratamiento y seguridad. Asegúrese de hacer y acudir a todas las citas, y llame a hartley médico si está teniendo problemas. También es katarzyna buena idea saber los resultados de yamila exámenes y mantener katarzyna lista de los medicamentos que yamil. ¿Cómo puede cuidarse en el hogar? · Jaden ejercicio con frecuencia. Cam[place], nade o monte en bicicleta para ayudar con el dolor y los problemas del sueño, y para sentirse mejor. · Trate de dormir camden por la noche. Acuéstese y levántese a la misma hora todos los días, independientemente de que se sienta descansado o no. Asegúrese de tener un buen colchón y buenas almohadas. · Reduzca el estrés. Si puede, evite las situaciones que le produzcan estrés. Si no es posible, trate de que bre menos estresantes.  Aprenda a usar la biorretroalimentación, las imágenes guiadas, la meditación u otros métodos de relajación. · Cortez cambios saludables. Siga katarzyna dieta equilibrada, deje de fumar, y reduzca el consumo de alcohol y de cafeína. · Use katarzyna almohadilla térmica ajustada a baja temperatura o báñese o dúchese con agua tibia para aliviar el dolor. El uso de compresas frías alra hasta 20 minutos cada vez también puede aliviar el dolor. Póngase un paño maya entre la compresa fría y la piel. Un masaje suave también podría ayudar. · Sea alessio con los medicamentos. [employer] medicamentos exactamente steven le fueron recetados. Llame a merino médico si eryn estar teniendo problemas con merino medicamento. Merino médico podría hablar con usted acerca de juliet antidepresivos. Estos medicamentos pueden mejorar el sueño, aliviar el dolor y, en Yahoo, tratar la depresión. · Aprenda sobre la fibromialgia. Punaluu hace que sea más fácil enfrentarla. Luego, participe de manera activa en merino tratamiento. · Considere unirse a un isael de apoyo integrado por personas con fibromialgia para aprender [employer] enfermedad y recibir [de-identified]. ¿Cuándo debe pedir ayuda? Preste especial atención a los cambios en merino andree y asegúrese de comunicarse con merino médico si:    · Se siente scarlett, indefenso o desesperanzado; ha perdido el interés por cosas que solía disfrutar, o tiene otros síntomas de depresión.     · Los síntomas de la fibromialgia Carlene Attila. ¿Dónde puede encontrar más información en inglés? Narendra Brace a [email or web address] e ingrese a merino cuenta de MyChart. Yoko Kirill V003 en el Sina [employer] \"Search Health Information\" para más información (en inglés) sobre \"Fibromialgia: Instrucciones de cuidado. \"     Si no tiene katarzyna cuenta, cortez kate en el enlace \"Sign Up Now\". Revisado: 8 abril, 2021               Versión del contenido: 13.1  © 7396-3476 Healthwise, [employer]. Las instrucciones de cuidado fueron adaptadas bajo licencia por [facility] ([facility]).  Si usted tiene [place] afección médica o [employer] estas instrucciones, siempre pregunte a hartley profesional de andree. [facility] niega toda garantía o responsabilidad por hartley uso de esta información. Patient Education        Gases y abotagamiento: Instrucciones de cuidado  Gas and Bloating: Care Instructions  Instrucciones de cuidado    Los gases y el abotagamiento (hinchazón abdominal) son problemas que podrían resultar molestos y vergonzosos. Todos tenemos gases, tonya algunas personas producen más que [place], a veces lo suficiente steven para causar [place]. Es normal tener gases de 6 a 20 veces al día. Normalmente, el exceso de gases no es causado por un problema grave de andree. Los gases y el abotagamiento generalmente son causados por algo que se come o se juan c, incluidos algunos suplementos dietéticos y [employer]. En general, los gases y el abotagamiento no son dañinos y desaparecen sin tratamiento. No obstante, modificar hartley dieta puede ayudarle a solucionar el problema. Algunos medicamentos de venta dania pueden ayudarle a prevenir los gases y aliviar el abotagamiento. La atención de seguimiento es katarzyna parte clave de hartley tratamiento y seguridad. Asegúrese de hacer y acudir a todas las citas, y llame a hartley médico si está teniendo problemas. También es katarzyna buena idea saber los resultados de yamial exámenes y mantener katarzyna lista de los medicamentos que yamil. ¿Cómo puede cuidarse en el hogar? · Mantenga un diario de comidas si eryn que un alimento le provoca gases. Escriba lo que come o juan c. También registre los [employer] tiene gases. Si advierte que un alimento le provoca gases siempre que lo coma, evítelo y verifique si los gases desaparecen. Ejemplos de alimentos que provocan gases incluyen:  ? Maira Farhad o grasosos. ? Frijoles (habichuelas).   ? Verduras steven alcachofas, espárragos, brócoli, coles de Bruselas, repollo, coliflor, pepinos, pimientos (pimentones) verdes, cebollas, arvejas (chícharos), rábanos y yusra crudas. ? Frutas steven albaricoques (chabacanos), bananas (plátanos), melones, duraznos (melocotones), peras, ciruelas pasas y Synchari crudas. ? Petros Mcneil y pamela de shari. · Remoje los frijoles secos en agua lara la noche, luego deseche el agua y cocine los frijoles remojados en agua fresca. [place] puede ayudarle a prevenir los gases y el abotagamiento. · Si tiene problemas con la lactosa, evite los productos lácteos, steven la [place] y [place]. · Intente no tragar aire. No jorg ealberto con pajita (popote), no coma los alimentos en grandes bocados ni masque chicle. · [place] un medicamento de [place]. Anna y siga todas las instrucciones de la [place]. ? Pueden agregarse las enzimas presentes en los alimentos, steven [place], a los alimentos que producen gases para prevenir los gases. ? Los antiácidos, steven Maalox Anti-Gas y Mylanta Gas, pueden aliviar el abotagamiento haciéndole eructar. Tenga cuidado cuando tome medicamentos antiácidos de [place]. Muchos de estos medicamentos contienen aspirina. Anna la etiqueta para asegurarse de no estar tomando más de la dosis recomendada. Demasiada aspirina puede ser perjudicial.  ? Las tabletas de carbón KAREN, steven [place], podrían disminuir el [place] de los gases. ? Si tiene problemas con la lactosa, puede juliet [employer] Ease y Lactaid con productos lácteos para prevenir los gases y el abotagamiento. · Jaden algo de ejercicio con regularidad. ¿Cuándo debe pedir ayuda? Llame al 911 en cualquier momento que considere que necesita atención de [place]. Por ejemplo, llame si:    · Tiene gases y signos de ataque al corazón, steven:  ? Dolor o presión en el pecho.  ? Sudoración. ? Falta de aire. ? Náuseas o vómito. ? Dolor que se extiende del pecho al ale, la [place], o hacia yonas o ambos hombros o [id number] ? Lina Papa. ? Pulso rápido o irregular. Después de llamar al 911, mastique 1 aspirina para adultos. Espere la ambulancia.  No trate de conducir usted mismo un automóvil. Llame a hatrley médico ahora mismo o busque atención médica inmediata si:    · Tiene dolor intenso en el abdomen.     · Tiene tez en las heces. Preste especial atención a los cambios en hartley andree y asegúrese de comunicarse con hartley médico si:    · Tiene tez o pus en la orina.     · La orina está turbia o tiene olor desagradable.     · Mittal Olden y tiene dificultades para tragar.     · Tiene gases e hinchazón en el abdomen.     · No mejora steven se esperaba. ¿Dónde puede encontrar más información en inglés? Narendra Brace a [email or web address] e ingrese a hartley cuenta de MyChart. Yoko Roy B668 en el [employer]ina [employer] \"Search Health Information\" para más información (en inglés) Virgel Sleigh y abotagamiento: Instrucciones de cuidado. \"     Si no tiene katarzyna cuenta, cortez kate en el enlace \"Sign Up Now\". Revisado: 1 julio, 2021               Versión del contenido: 13.1  © 9645-9668 Healthwise, Incorporated. Las instrucciones de cuidado fueron adaptadas bajo licencia por [facility] ([facility]). Si usted tiene [place] afección médica o sobre estas instrucciones, siempre pregunte a hartley profesional de andree. Healthwise, Incorporated niega toda garantía o responsabilidad por hartley uso de esta información. Patient Education        Hipotiroidismo: Instrucciones de cuidado  Hypothyroidism: Care Instructions  Instrucciones de cuidado    Usted tiene hipotiroidismo, lo que significa que hartley organismo no está fabricando suficiente hormona tiroidea. Esta hormona ayuda a que hartley cuerpo use la energía. Si hartley nivel de hormona tiroidea está bajo, usted podría sentirse cansado, estar estreñido, tener aumento de presión arterial, o tener piel seca o problemas de [place]. También podría tener frío con facilidad, aun cuando el clima sea caluroso. Las mujeres con bajo nivel de tiroides quizás experimenten períodos menstruales abundantes.   Se [place] un análisis de tez para detectar hartley nivel de hormona estimulante de tiroides (TSH, por yamila siglas en inglés) y comprobar el hipotiroidismo. Un nivel alto de TSH podría significar que allan tiene [address] de tiroides bajo. Cuando hartley cuerpo no está produciendo suficiente hormona tiroidea, el nivel de TSH se eleva tratando de hacer que el cuerpo [address]. El tratamiento para el hipotiroidismo es juliet pastillas de hormona tiroidea. Lgenn Kotyk a sentirse mejor en 1 a 2 semanas. Emilio pueden pasar varios meses para navi cambios en el nivel de TSH. Necesita consultas regulares con hartley médico para asegurarse de que esté tomando la dosis correcta de [place]. La mayoría de las personas necesitarán tratamiento por el annia de yamila vidas. Allan necesitará consultar regularmente al médico para hacerse análisis de [place] y asegurarse de que esté respondiendo camden. La atención de seguimiento es katarzyna parte clave de hartley tratamiento y seguridad. Asegúrese de hacer y acudir a todas las citas, y llame a hartley médico si está teniendo problemas. También es katarzyna buena idea saber los resultados de yamila exámenes y mantener katarzyna lista de los medicamentos que yamil. ¿Cómo puede cuidarse en el hogar? · [place] hartley medicamento de hormona tiroidea exactamente steven le fue indicado. Llame a hartley médico si eryn estar teniendo problemas con hartley medicamento. La mayoría de las personas no tienen efectos secundarios si enmanuel con regularidad la cantidad correcta de medicamento. ? [place] el medicamento 30 minutos antes del desayuno y no lo cortez junto con calcio, vitaminas o amrit.  ? No intente juliet dosis adicionales de hartley medicamento para la tiroides. Eso no lo ayuda a sentirse mejor más pronto y podría provocar efectos secundarios. ? Si olvida juliet katarzyna dosis, NO tome katarzyna dosis doble del medicamento. [place] la dosis normal al día siguiente. · Infórmele a hartley médico sobre todos los productos recetados, herbales o de venta dania que tome. · Cuídese.  Siga katarzyna dieta saludable, duerma lo suficiente y cortez ejercicio con regularidad. ¿Cuándo debe pedir ayuda? Llame al 911 en cualquier momento que considere que necesita atención de emergencia. Por ejemplo, llame si:    · Se desmayó (perdió el conocimiento).     · Tiene graves dificultades para respirar.     · Tiene un ritmo cardíaco demasiado bajo (menos de 60 latidos por minuto).     · Tiene katarzyna temperatura corporal baja (95°F [35°C] o pati). Llame a hartley médico ahora mismo o busque atención médica inmediata si:    · Se siente cansado, perezoso o débil.     · Tiene dificultades para recordar cosas o concentrarse.     · No empieza a sentirse mejor 2 semanas después de yasmeen empezado a juliet el medicamento. Preste especial atención a los cambios en hartley andree y asegúrese de comunicarse con hartley médico si tiene algún problema. ¿Dónde puede encontrar más información en inglés? Lfor Net a [email or web address] e ingrese a hartley cuenta de MyChart. [place] Nanette L572 en el [place] \"Search Health Information\" para más información (en inglés) sobre \"Hipotiroidismo: Instrucciones de cuidado. \"     Si no tiene katarzyna cuenta, cortez kate en el enlace \"Sign Up Now\". Revisado: 28 julio, 2021               Versión del contenido: 13.1  © 2104-5817 Healthwise, Incorporated. Las instrucciones de cuidado fueron adaptadas bajo licencia por [facility] ([facility]). Si usted tiene [place] afección médica o sobre estas instrucciones, siempre pregunte a hartley profesional de andree. Healthwise, Incorporated niega toda garantía o responsabilidad por hartley uso de esta información. Patient Education        Síndrome de ovario poliquístico: Instrucciones de cuidado  Polycystic Ovary Syndrome: Care Instructions  Generalidades  Tener síndrome del ovario poliquístico (PCOS, por yamila siglas en inglés) implica que yamila hormonas están desequilibradas. Puede causarle problemas con yamila períodos y hacer que sea más difícil Cuco Abisai.   Los médicos no saben con seguridad qué causa el PCOS, aunque parece ser hereditario. También parece estar relacionado con la obesidad y el riesgo de diabetes. Si usted tiene PCOS, yamila hermanas e hijas tienen un mayor riesgo de tenerlo [place]. Puede tener otros síntomas. Estos incluyen aumento de [place], acné, crecimiento excesivo de vello en la kaylan o el cuerpo, presión arterial ivy y azúcar alto en la tez. Yamila ovarios pueden tener quistes. Estos quistes son crecimientos llenos de líquido. Tenga en cuenta que, aunque no tenga períodos regulares, aún puede [place]. Consulte a hartley médico acerca de métodos anticonceptivos si no desea quedar embarazada. A veces, los cambios hormonales con el PCOS también pueden dificultar el embarazo. Si esto le preocupa, consulte a hartley médico sobre el tratamiento para ana problema. Con el PCOS, puede pasar meses o más tiempo sin tener períodos menstruales. Hart[place] médico podría recomendarle medicamentos que pueden ayudar a que yamila ciclos vuelvan a la normalidad. La atención de seguimiento es katarzyna parte clave de hartley tratamiento y seguridad. Asegúrese de hacer y acudir a todas las citas, y llame a hartley médico si está teniendo problemas. También es katarzyna buena idea saber los resultados de yamila exámenes y mantener katarzyna lista de los medicamentos que yamil. ¿Cómo puede cuidarse en el hogar? · [place] yamila medicamentos exactamente steven se los recetaron. Llame a hartley médico si eryn que está teniendo un problema con hartley medicamento. · Aliméntese de manera saludable. Incluya en hartley alimentación diaria verduras, frutas, frijoles y granos integrales. · Si tiene sobrepeso, hable con hartley médico sobre [address] seguras de bajar de [place]. Bajar de peso puede ayudar con muchos de los síntomas del síndrome del ovario poliquístico (PCOS, por yamila siglas en inglés). · Jaden por lo menos 30 minutos de ejercicio la mayoría de los días de la [place]. Caminar es katarzyna buena opción. O puede correr, nadar, [employer], o jugar al tenis u otros deportes de equipo.   · Para el vello indeseable, pruebe con decoloración, depilación, electrólisis o tratamiento con láser. · Si el acné la incomoda, puede tratarlo con medicamentos de [employer]. Busque aquellos que contengan peróxido benzoílico o ácido salicílico.  · Si se siente scarlett o deprimida, considere hablar con un consejero o con otras personas que tengan PCOS. [place] puede ayudar. ¿Cuándo debes pedir ayuda? Vigila muy de cerca los cambios en tu andree, y asegúrate de comunicarte con tu médico si:    · Tus síntomas Yoanna Bread. ¿Dónde puede encontrar más información en inglés? Flor Net a [email or web address] e ingrese a hartley cuenta de MyChart. [place] Nanette R090 en el [facility] \"Search Health Information\" para más información (en inglés) sobre \"Síndrome de ovario poliquístico: Instrucciones de cuidado. \"     Si no tiene katarzyna cuenta, cortez kate en el enlace \"Sign Up Now\". Revisado: 11 febrero, 2021               Versión del contenido: 13.1  © 4207-0618 Healthwise, Incorporated. Las instrucciones de cuidado fueron adaptadas bajo licencia por [facility]). Si usted tiene [place] afección médica o sobre estas instrucciones, siempre pregunte a hartley profesional de andree. Healthwise, Incorporated niega toda garantía o responsabilidad por hartley uso de esta información. Patient Education        Candidiasis vaginal: Instrucciones de cuidado  Vaginal Yeast Infection: Care Instructions  Instrucciones de cuidado    La candidiasis vaginal (infección por hongos en forma de levadura) es causada por un exceso de células de hongos de levadura en la vagina. [place] es común en mujeres de todas las edades. La comezón, el flujo vaginal, la irritación y otros síntomas pueden ser [employer]. Emilio las infecciones por hongos en forma de Aundria G[place] no suelen causar otros problemas de andree.   Algunos medicamentos pueden aumentar hartley riesgo de contraer la candidiasis vaginal. Estos incluyen antibióticos, pastillas anticonceptivas, hormonas y esteroides. También puede tener más probabilidades de tener candidiasis vaginal si está embarazada, tiene diabetes, [place] lavados vaginales o viste ropas apretadas. Con tratamiento, la mayoría de infecciones por hongos en forma de levadura mejoran en 2 o 3 días. La atención de seguimiento es katarzyna parte clave de hartley tratamiento y seguridad. Asegúrese de hacer y acudir a todas las citas, y llame a hartley médico si está teniendo problemas. También es katarzyna buena idea saber los resultados de yamila exámenes y mantener katarzyna lista de los medicamentos que yamil. ¿Cómo puede cuidarse en el hogar? · [employer] medicamentos exactamente steven le fueron recetados. Llame a hartley médico si eryn estar teniendo problemas con hartley medicamento. · Pregunte a hartley médico sobre medicamentos de venta dania para las infecciones por hongos en forma de [place]. Podrían costar menos que los medicamentos recetados. Si [place] un tratamiento de [place], misty y siga todas las instrucciones de la etiqueta. · No use tampones lara el tiempo que utilice la crema vaginal o supositorios. Los tampones pueden absorber el medicamento. Use toallas sanitarias en lugar de tampones. · Use ropa holgada de algodón. No utilice nylon ni otras telas que conserven el calor corporal y la humedad cerca de la piel. · Pruebe a dormir sin ropa interior. · No se rasque. Alivie la comezón con katarzyna compresa fría o un baño frío. · No se lave la daryl vaginal más de katarzyna vez al día. Use solo agua corriente o un Mya Counter y sin perfume. Deje que la daryl vaginal se seque con el aire. · Después de nadar, quítese el traje de baño mojado. · No tenga relaciones sexuales hasta que haya terminado hartley tratamiento. · No use lavados vaginales. ¿Cuándo debe pedir ayuda? Llame a hartley médico ahora mismo o busque atención médica inmediata si:    · Tiene sangrado vaginal inesperado.     · Tiene un dolor nuevo o más intenso en la vagina o la pelvis.    Preste especial atención a los cambios en hartley andree y asegúrese de comunicarse con hartley médico si:    · Tiene fiebre.     · No está mejorando después de 2 días.     · Licha síntomas vuelven después de terminar licha medicamentos. ¿Dónde puede encontrar más información en inglés? Laurie Carlos a [email or web address] e ingrese a hartley cuenta de MyChart. Felicita Mojica B733 en el Claudia Catena \"Search Health Information\" para más información (en inglés) sobre \"Candidiasis vaginal: Instrucciones de cuidado. \"     Si no tiene katarzyna cuenta, cortez kate en el enlace \"Sign Up Now\". Revisado: 11 febrero, 2021               Versión del contenido: 13.1  © 5324-1238 [employer]wise[employer] Inc[employer]. Las instrucciones de cuidado fueron adaptadas bajo licencia por [facility] ([facility]). Si usted tiene Rolette Klawock afección médica o sobre estas instrucciones, siempre pregunte a hartley profesional de andree. [employer]wise[employer] Incorporated niega toda garantía o responsabilidad por hartley uso de esta información.

## 2022-02-02 LAB
CLUE CELLS: ABNORMAL
TRICHOMONAS PREP: ABNORMAL
TRICHOMONAS VAGINALIS SCREEN: NEGATIVE
YEAST WET PREP: ABNORMAL

## 2022-02-07 NOTE — PROGRESS NOTES
Vaccine Information Sheet, \"Influenza - Inactivated\"  given to Antoinette Platt, or parent/legal guardian of  Antoinette Platt and verbalized understanding. Patient responses:    Have you ever had a reaction to a flu vaccine? No  Are you able to eat eggs without adverse effects? Yes  Do you have any current illness? No  Have you ever had Guillian [place] Syndrome? No    Flu vaccine given per order. Please see immunization tab.

## 2022-02-09 ENCOUNTER — HOSPITAL ENCOUNTER (OUTPATIENT)
Dept: ULTRASOUND IMAGING | Age: 27
Discharge: HOME | End: 2022-02-11
Payer: COMMERCIAL

## 2022-02-09 DIAGNOSIS — E28.2 PCOS (POLYCYSTIC OVARIAN SYNDROME): ICD-10-CM

## 2022-02-09 DIAGNOSIS — N92.6 IRREGULAR MENSES: ICD-10-CM

## 2022-02-09 DIAGNOSIS — R14.0 BLOATING: ICD-10-CM

## 2022-02-09 DIAGNOSIS — R10.2 VAGINAL PAIN: ICD-10-CM

## 2022-02-09 LAB
C. TRACHOMATIS DNA ,URINE: NEGATIVE
N. GONORRHOEAE DNA, URINE: NEGATIVE

## 2022-02-09 PROCEDURE — 76830 TRANSVAGINAL US NON-OB: CPT

## 2022-02-09 PROCEDURE — 76856 US EXAM PELVIC COMPLETE: CPT

## 2022-03-16 NOTE — TELEPHONE ENCOUNTER
Pharmacy requesting medication refill.  Please approve or deny this request.    Rx requested:  Requested Prescriptions     Pending Prescriptions Disp Refills    levothyroxine (SYNTHROID) 88 MCG tablet [Pharmacy Med Name: LEVOTHYROXINE 88 MCG TABLET] 30 tablet 0     Sig: take 1 tablet by mouth once daily         Last Office Visit:   2/1/2022      Next Visit Date:  Future Appointments   Date Time Provider Quinn Naranjo   3/17/2022  2:00 PM Liam Melendez   8/1/2022 10:30 AM Sonya Watson MD [address] [place] [address]

## 2022-03-17 ENCOUNTER — INITIAL CONSULT (OUTPATIENT)
Dept: PAIN MANAGEMENT | Age: 27
End: 2022-03-17
Payer: COMMERCIAL

## 2022-03-17 VITALS
SYSTOLIC BLOOD PRESSURE: 118 MMHG | BODY MASS INDEX: 39.99 KG/M2 | WEIGHT: 240 LBS | TEMPERATURE: 96.9 F | HEIGHT: 65 IN | DIASTOLIC BLOOD PRESSURE: 76 MMHG

## 2022-03-17 DIAGNOSIS — G89.29 CHRONIC LOW BACK PAIN WITHOUT SCIATICA, UNSPECIFIED BACK PAIN LATERALITY: ICD-10-CM

## 2022-03-17 DIAGNOSIS — M54.50 CHRONIC LOW BACK PAIN WITHOUT SCIATICA, UNSPECIFIED BACK PAIN LATERALITY: ICD-10-CM

## 2022-03-17 DIAGNOSIS — M54.6 THORACIC BACK PAIN, UNSPECIFIED BACK PAIN LATERALITY, UNSPECIFIED CHRONICITY: Primary | ICD-10-CM

## 2022-03-17 PROCEDURE — 1036F TOBACCO NON-USER: CPT | Performed by: PAIN MEDICINE

## 2022-03-17 PROCEDURE — G8417 CALC BMI ABV UP PARAM F/U: HCPCS | Performed by: PAIN MEDICINE

## 2022-03-17 PROCEDURE — G8482 FLU IMMUNIZE ORDER/ADMIN: HCPCS | Performed by: PAIN MEDICINE

## 2022-03-17 PROCEDURE — G8427 DOCREV CUR MEDS BY ELIG CLIN: HCPCS | Performed by: PAIN MEDICINE

## 2022-03-17 PROCEDURE — 99204 OFFICE O/P NEW MOD 45 MIN: CPT | Performed by: PAIN MEDICINE

## 2022-03-17 RX ORDER — LEVOTHYROXINE SODIUM 88 UG/1
TABLET ORAL
Qty: 30 TABLET | Refills: 12 | Status: SHIPPED | OUTPATIENT
Start: 2022-03-17

## 2022-03-17 ASSESSMENT — ENCOUNTER SYMPTOMS
BACK PAIN: 0
NAUSEA: 0
DIARRHEA: 0
SHORTNESS OF BREATH: 0
CONSTIPATION: 0

## 2022-03-17 NOTE — PROGRESS NOTES
[facility]) Physicians  Neurosurgery and Pain [address] [facility]: [phone]  F: [phone]        Estefania Mcgill  (1995)    3/17/2022    Subjective:     Estefania Mcgill is 32 y.o. female who complains today of:     Chief Complaint   Patient presents with    Knee Pain     Patient seen today for initial evaluation. We did have a H5  online available on Hyle[employer]. Patient had been for couple years all over her body worse in her low back and feet. She has not any recent physical therapy. No back surgery. She apparently has been diagnosed with fibromyalgia in the past.  On blood thinners. She has been diagnosed with polycystic ovarian syndrome. No new weakness. Sometimes she will take some Tylenol. Plan: We discussed options. We will get x-rays of thoracic and lumbar spine. We will get her into physical therapy for lumbar strengthening stretching home exercise program.  We will have her see our podiatrist for her foot pain. Questions answered chart was reviewed. She is in agreement with the plan. Allergies:  Patient has no known allergies.     Past Medical History:   Diagnosis Date    Anxiety 2017    Class 1 obesity due to excess calories without serious comorbidity with body mass index (BMI) of 30.0 to 30.9 in adult 2017    Fibromyalgia 2022    Heart abnormality     Hyperthyroidism 2018    Mental disorder     anxiety    Normal labor and delivery 2019    EFRAIN (obstructive sleep apnea) 2020    PCOS (polycystic ovarian syndrome) 2021     premature rupture of membranes with onset of labor more than 24 hours following rupture in third trimester     Tachycardia     Weight loss 3/25/2019     Past Surgical History:   Procedure Laterality Date    BACK SURGERY  2004    tumor removed    CARPAL TUNNEL RELEASE Right 2021    RIGHT HAND CARPAL TUNNEL DECOMPRESSION. performed by Vania Diaz MD at [address] N/A 2019     SECTION performed by Niels Winter DO at [facility]     Family History   Problem Relation Age of Onset    No Known Problems Mother     Diabetes Father     No Known Problems Sister     No Known Problems Brother     No Known Problems Sister     Rheum Arthritis Maternal Grandmother      Social History     Socioeconomic History    Marital status:      Spouse name: Not on file    Number of children: Not on file    Years of education: Not on file    Highest education level: Not on file   Occupational History    Not on file   Tobacco Use    Smoking status: Never Smoker    Smokeless tobacco: Never Used   Vaping Use    Vaping Use: Never used   Substance and Sexual Activity    Alcohol use: No    Drug use: No    Sexual activity: Yes     Partners: Male   Other Topics Concern    Not on file   Social History Narrative    Not on file     Social Determinants of Health     Financial Resource Strain: Low Risk     Difficulty of Paying Living Expenses: Not very hard   Food Insecurity: No Food Insecurity    Worried About Running Out of Food in the Last Year: Never true    Mica of Food in the Last Year: Never true   Transportation Needs: No Transportation Needs    Lack of Transportation (Medical): No    Lack of Transportation (Non-Medical):  No   Physical Activity:     Days of Exercise per Week: Not on file    Minutes of Exercise per Session: Not on file   Stress:     Feeling of Stress : Not on file   Social Connections:     Frequency of Communication with Friends and Family: Not on file    Frequency of Social Gatherings with Friends and Family: Not on file    Attends Hoahaoism Services: Not on file    Active Member of Clubs or Organizations: Not on file    Attends Club or Organization Meetings: Not on file    Marital Status: Not on file   Intimate Partner Violence:     Fear of Current or Ex-Partner: Not on file    Emotionally Abused: Not on file    Physically Abused: Not on file    Sexually Abused: Not on file   Housing Stability:     Unable to Pay for Housing in the Last Year: Not on file    Number of Ubaldo in the Last Year: Not on file    Unstable Housing in the Last Year: Not on file       Current Outpatient Medications on File Prior to Visit   Medication Sig Dispense Refill    metFORMIN (GLUCOPHAGE-XR) 500 MG extended release tablet Take 2 tablets by mouth daily (with breakfast) 60 tablet 12    fluconazole (DIFLUCAN) 150 MG tablet Take one tab let now and repeat in 48 hours if symptoms persists; do not take statin cholesterol medicine on the days you take this medicine 2 tablet 2    levothyroxine (SYNTHROID) 88 MCG tablet Take 1 tablet by mouth daily 30 tablet 0     No current facility-administered medications on file prior to visit. HPI    Review of Systems   Constitutional: Negative for fever. HENT: Negative for hearing loss. Respiratory: Negative for shortness of breath. Gastrointestinal: Negative for constipation, diarrhea and nausea. Genitourinary: Negative for difficulty urinating. Musculoskeletal: Negative for back pain and neck pain. Skin: Negative for rash. Neurological: Negative for headaches. Hematological: Does not bruise/bleed easily. Psychiatric/Behavioral: Negative for sleep disturbance. Objective:     Vitals:  /76   Temp 96.9 °F (36.1 °C)   Ht 5' 5\" (1.651 m)   Wt 240 lb (108.9 kg)   BMI 39.94 kg/m² Pain Score:  10 - Worst pain ever      Physical Exam  Patient is AO X 3 , recent and remote memory is intact,mood and affect normal,judgement and insight normal. Head normacephalic,eyes - PERRLA, EOMI, No obvious external Ears, Nose, mouth masses seen, neck supple, trachae midline, no abnormal lymph nodes visualized in neck or supraclavicular region. CN's 2-12 grossly intact.  Strength functional for ambulation, balance functional, coordination normal. Visualized skin intact, no obvious lesion or visualized cyanosis. No swelling. Pulses intact. No obvious upper motor neuron signs. Sensation grossly intact to light touch. Strength functional upper extremities. She can heel walk toe walk. Decreased range of motion lumbar spine, straight leg raise negative      Assessment:      Diagnosis Orders   1. Thoracic back pain, unspecified back pain laterality, unspecified chronicity  XR THORACIC SPINE (2 VIEWS)    Ambulatory referral to Physical Therapy   2.  Chronic low back pain without sciatica, unspecified back pain laterality  XR LUMBAR SPINE (MIN 4 VIEWS)    Ambulatory referral to Physical Therapy       Plan:

## 2022-04-12 ENCOUNTER — HOSPITAL ENCOUNTER (OUTPATIENT)
Dept: PHYSICAL THERAPY | Age: 27
Setting detail: THERAPIES SERIES
Discharge: HOME | End: 2022-04-12
Payer: COMMERCIAL

## 2022-04-12 PROCEDURE — 97162 PT EVAL MOD COMPLEX 30 MIN: CPT

## 2022-04-12 ASSESSMENT — PAIN DESCRIPTION - DESCRIPTORS: DESCRIPTORS: THROBBING

## 2022-04-12 ASSESSMENT — PAIN DESCRIPTION - FREQUENCY: FREQUENCY: CONTINUOUS

## 2022-04-12 ASSESSMENT — PAIN SCALES - GENERAL: PAINLEVEL_OUTOF10: 9

## 2022-04-12 ASSESSMENT — PAIN DESCRIPTION - ORIENTATION: ORIENTATION: RIGHT;LEFT;LOWER

## 2022-04-12 ASSESSMENT — PAIN DESCRIPTION - LOCATION: LOCATION: BACK

## 2022-04-12 ASSESSMENT — PAIN DESCRIPTION - DIRECTION: RADIATING_TOWARDS: DENIES

## 2022-04-12 NOTE — PROGRESS NOTES
[address]     Ph: [phone]  Fax: [phone]    [] Certification  [] Recertification [x]  Plan of Care  [] Progress Note [] Discharge      To: Lea Ansari DO      From:  Jessy Berman PT  Patient: Ce Kolb     : 1995  Diagnosis: thoracic back pain, unspecified back pain laterality, unspecified chonicity; chronic low back pain without sciatica,unspecified back pain laterality     Date: 2022  Treatment Diagnosis: decreased lumbar spine AROM, decreased core and LE strength, decreased posture, decreased activity tolerance and increased pain with bending and prolonged standing and amb >30 min       Progress Report Period from:  2022  to 2022    Total # of Visits to Date: 1   No Show: 0    Canceled Appointment: 0     OBJECTIVE:   Short Term Goals - Time Frame for Short term goals: 3 wks    Goals Current/Discharge status  Met   Short term goal 1: Pt will demonstrate improved trunk extensor, abdominal, and B LE strength >/= 4+/5 for carryover to decreased pain with bending over. Strength RLE  Strength RLE: Exception  Comment: pt with difficulty following instructions for MMTing due to use of   R Hip Flexion: 4-/5  R Hip Extension: 4-/5  R Hip ABduction: 4-/5  R Knee Flexion: 4/5  R Knee Extension: 4+/5  R Ankle Dorsiflexion: 5/5  Strength LLE  Strength LLE: Exception  L Hip Flexion: 4-/5  L Hip Extension: 4-/5  L Hip ABduction: 4-/5  L Knee Flexion: 4/5  L Knee Extension: 4+/5  L Ankle Dorsiflexion: 5/5        Strength Other  Other: isometric abdominal strength 2/5; isometric trunk extensor strength 3/5   [] yes  [x] no     Long Term Goals - Time Frame for Long term goals : 6 wks  Goals Current/ Discharge status Met   Long term goal 1: Pt will demonstrate indep and compliance with % of the time for self management of low back pain.  HEP initated [] yes  [x] no Long term goal 2: Pt will demonstrate improved score on modified oswestry back pain questionnaire to </= 20% indicating minimal distability for improved pt quality of life. modified oswestry back pain index: 16/50 is 32%   [] yes  [x] no   Long term goal 3: The pt will report decreased pain </=4/10 at worst in order to increase ease with bending over. Pain Location: Back    Pain Level: 9 (pain range 7-9/10)    Pain Descriptors: Throbbing   [] yes  [x] no     Body structures, Functions, Activity limitations: Decreased functional mobility ,Increased pain,Decreased posture,Decreased ROM,Decreased strength,Decreased endurance  Assessment: Pt is 32 y.o. female with increased low back pain X several months which increased after falling down the steps. Pt exhibits impairments including decreased lumbar spine AROM, decreased core and LE strength, decreased posture, decreased activity tolerance and increased pain with bending and prolonged standing and amb >30 min. Pt requires continued PT to address current impairments, progress strength and ROM, and provide pt with HEP for self management of pain. Prognosis: Good  Discharge Recommendations: Continue to assess pending progress      PT Education: Goals;PT Role;Plan of Care    PLAN: [x] Evaluate only  Frequency/Duration:  Plan  Times per week: 2  Plan weeks: 6  Current Treatment Recommendations: Strengthening,Neuromuscular Re-education,Home Exercise Program,ROM,Manual Therapy - Soft Tissue Mobilization,Safety Education & Training,Endurance Training,Patient/Caregiver Education & Training,Functional Mobility Training,Equipment Evaluation, Education, & procurement,Modalities,Pain [address]                   Patient Status:[x] Continue/ Initiate plan of Care  (tx will be initiated once continued visits are approved by insurance)    [] Discharge PT. Recommend pt continue with HEP.      [] Additional visits requested, Please re-certify for additional visits:          Signature: Electronically signed by Hortensia Oliver PT on 4/12/22 at 11:16 AM EDT      If you have any questions or concerns, please don't hesitate to call. Thank you for your referral.    I have reviewed this plan of care and certify a need for medically necessary rehabilitation services.     Physician Signature:__________________________________________________________  Date:  Please sign and return

## 2022-04-12 NOTE — PROGRESS NOTES
[address]  PHYSICAL THERAPY EVALUATION    Date: 2022  Patient Name: Ruthann Alva       MRN: [id number]   Account: [de-identified]   : 1995  (32 y.o.)   Gender: female   Referring Practitioner: Ita Gresham DO                 Diagnosis: thoracic back pain, unspecified back pain laterality, unspecified chonicity; chronic low back pain without sciatica,unspecified back pain laterality  Treatment Diagnosis: decreased lumbar spine AROM, decreased core and LE strength, decreased posture, decreased activity tolerance and increased pain with bending and prolonged standing and amb >30 min  Additional Pertinent Hx: thyroid disease, carpal tunnel, anxiety, class 1 obesity, firbromyalgia, EFRAIN, tachycardia        Past Medical History:  has a past medical history of Anxiety, Class 1 obesity due to excess calories without serious comorbidity with body mass index (BMI) of 30.0 to 30.9 in adult, Fibromyalgia, Heart abnormality, Hyperthyroidism, Mental disorder, Normal labor and delivery, EFRAIN (obstructive sleep apnea), PCOS (polycystic ovarian syndrome),  premature rupture of membranes with onset of labor more than 24 hours following rupture in third trimester, Tachycardia, and Weight loss. Past Surgical History:   has a past surgical history that includes back surgery ();  section (N/A, 2019); and Carpal tunnel release (Right, 2021). Vital Signs  Patient Currently in Pain: Yes   Pain Screening  Patient Currently in Pain: Yes  Pain Assessment  Pain Assessment: 0-10  Pain Level: 9 (pain range 7-9/10)  Pain Location: Back  Pain Orientation: Right;Left;Lower  Pain Radiating Towards: denies  Pain Descriptors: Throbbing  Pain Frequency: Continuous     Lives With: Spouse; Son  Type of Home: Apartment  Home Layout: One level  Home Access: Stairs to enter with rails  Entrance Stairs - Number of Steps: 6  Entrance Stairs - Rails: Both  ADL Assistance: Independent  Homemaking Assistance: Independent (Pain when going to 3rd floor to complete laundry at this time)  Homemaking Responsibilities: Yes  Ambulation Assistance: Independent  Transfer Assistance: Independent  Active : No  Occupation: Unemployed     Subjective:  Subjective: Pt to PT due to increased low back pain beginning approx several months ago. Pt states pain increased after falling off stairs. No treatments completed by the MD.  Pt has not tried ice or heat. No current exercises. Pt states increased pain with bending over and cold weather and increased pain with amb and standing > 1 hr.  Comments: x-rays ordered in the chart    Objective:   Sensation  Overall Sensation Status: Impaired (B hands due to carpal tunnel)    Ambulation 1  Surface: level tile  Device: No Device  Assistance: Independent  Distance: short distances in gym     Transfers  Sit to Stand: Independent  Stand to sit:  Independent    Strength RLE  Strength RLE: Exception  Comment: pt with difficulty following instructions for MMTing due to use of   R Hip Flexion: 4-/5  R Hip Extension: 4-/5  R Hip ABduction: 4-/5  R Knee Flexion: 4/5  R Knee Extension: 4+/5  R Ankle Dorsiflexion: 5/5  Strength LLE  Strength LLE: Exception  L Hip Flexion: 4-/5  L Hip Extension: 4-/5  L Hip ABduction: 4-/5  L Knee Flexion: 4/5  L Knee Extension: 4+/5  L Ankle Dorsiflexion: 5/5     Strength Other  Other: isometric abdominal strength 2/5; isometric trunk extensor strength 3/5    AROM RLE (degrees)  RLE AROM: WFL     AROM LLE (degrees)  LLE AROM : WFL    Spine  Lumbar: AROM flexion 75%, ext 25%, SB R/L 50%- pain in flexion and SB L    Observation/Palpation  Posture: Fair (increased lumbar lordosis, narrow MARYA, head foward, mild dowagers hump)  Palpation: pain with palpation lumbar spine; tightness lumbar paraspinals  Bed mobility  Supine to Sit: Independent  Sit to Supine: Independent  Comment: slow movement due to increased pain    Additional Measures  Flexibility: hamstring flexibility 90/90 R/L: -30 deg  Special Tests: Lumbar: SLUMP(-), SLR(-), NABILA(-), ELY's(-), distraction (-),  compression(-)    Exercises:   Exercises  Exercise 1: LTR X 10  Exercise 2: TA iso 10 sec X 5  Exercise 3: bridge*  Exercise 4: SKTC*  Exercise 5: hamstring stretch*  Exercise 6: DLS progression supine/ quadruped*  Exercise 7: DKTC with pball*  Exercise 8: squats with TA iso*  Exercise 9: lifting techniques* (basket of laundry)  Exercise 10: prone hip ext*  Exercise 11: s/l hip abd*  Exercise 12: plank fwd/side*  Exercise 20: HEP: LTR, TA iso     Modalities:  Modalities  Moist heat: *  E-stim (parameters): *  Other: denies seizures, active CA, pacemaker     Manual:  Manual therapy  Manual traction: *  Soft Tissue Mobalization: *  *Indicates exercise,modality, or manual techniques to be initiated when appropriate    Assessment: Body structures, Functions, Activity limitations: Decreased functional mobility ,Increased pain,Decreased posture,Decreased ROM,Decreased strength,Decreased endurance  Assessment: Pt is 32 y.o. female with increased low back pain X several months which increased after falling down the steps. Pt exhibits impairments including decreased lumbar spine AROM, decreased core and LE strength, decreased posture, decreased activity tolerance and increased pain with bending and prolonged standing and amb >30 min. Pt requires continued PT to address current impairments, progress strength and ROM, and provide pt with HEP for self management of pain.   Prognosis: Good  Discharge Recommendations: Continue to assess pending progress     Decision Making: Medium Complexity  History: med- thyroid disease, carpal tunnel, anxiety, class 1 obesity, firbromyalgia, EFRAIN, tachycardia  Exam: med- modified oswestry back pain index: 16/50 is 32%  Clinical Presentation: evolving- med    Plan  Frequency/Duration:  Plan  Times per week: 2  Plan weeks: 6  Current Treatment Recommendations: Strengthening,Neuromuscular Re-education,Home Exercise Program,ROM,Manual Therapy - Soft Tissue Mobilization,Safety Education & Training,Endurance Training,Patient/Caregiver Education & Training,Functional Mobility Training,Equipment Evaluation, Education, & procurement,Modalities,Pain [address]    Patient Education  New Education Provided: PT Education: Goals;PT Role;Plan of Care    POST-PAIN     Pain Rating (0-10 pain scale):   9/10  Location and pain description same as pre-treatment unless indicated. Action: [] NA  [] Call Physician  [x] Perform HEP  [x] Meds as prescribed    Evaluation and patient rights have been reviewed and patient agrees with plan of care. Yes  [x]  No  []   Explain:       [place] Fall Risk Assessment  Risk Factor Scale  Score   History of Falls [] Yes  [x] No 25  0 0   Secondary Diagnosis [] Yes  [x] No 15  0 0   Ambulatory Aid [] Furniture  [] Crutches/cane/walker  [x] None/bedrest/wheelchair/nurse 30  15  0 0   IV/Heparin Lock [] Yes  [x] No 20  0 0   Gait/Transferring [] Impaired  [] Weak  [x] Normal/bedrest/immobile 20  10  0 0   Mental Status [] Forgets limitations  [x] Oriented to own ability 15  0 0      Total:0     Based on the Assessment score: check the appropriate box. [x]  No intervention needed   Low =   Score of 0-24  []  Use standard prevention interventions Moderate =  Score of 24-44   [] Discuss fall prevention strategies   [] Indicate moderate falls risk on eval  []  Use high risk prevention interventions High = Score of 45 and higher   [] Discuss fall prevention strategies   [] Provide supervision during treatment time    Goals  Short term goals  Time Frame for Short term goals: 3 wks  Short term goal 1: Pt will demonstrate improved trunk extensor, abdominal, and B LE strength >/= 4+/5 for carryover to decreased pain with bending over.   Long term goals  Time Frame for Long term goals : 6 wks  Long term goal 1: Pt will demonstrate indep and compliance with % of the time for self management of low back pain. Long term goal 2: Pt will demonstrate improved score on modified oswestry back pain questionnaire to </= 20% indicating minimal distability for improved pt quality of life. Long term goal 3: The pt will report decreased pain </=4/10 at worst in order to increase ease with bending over.     PT Individual Minutes  Time In: 0840  Time Out: 0935  Minutes: 55  Timed Code Treatment Minutes: 0 Minutes  Procedure Minutes: eval X 55 min       Electronically signed by Paulina Scott PT on 4/12/22 at 11:14 AM EDT

## 2022-04-19 ENCOUNTER — HOSPITAL ENCOUNTER (OUTPATIENT)
Dept: PHYSICAL THERAPY | Age: 27
Setting detail: THERAPIES SERIES
Discharge: HOME | End: 2022-04-19
Payer: COMMERCIAL

## 2022-04-19 PROCEDURE — 97110 THERAPEUTIC EXERCISES: CPT

## 2022-04-19 PROCEDURE — 97140 MANUAL THERAPY 1/> REGIONS: CPT

## 2022-04-19 ASSESSMENT — PAIN DESCRIPTION - LOCATION: LOCATION: BACK

## 2022-04-19 ASSESSMENT — PAIN DESCRIPTION - ORIENTATION: ORIENTATION: RIGHT;LEFT;LOWER

## 2022-04-19 ASSESSMENT — PAIN SCALES - GENERAL: PAINLEVEL_OUTOF10: 7

## 2022-04-19 NOTE — PROGRESS NOTES
[address] [address]  Outpatient Physical Therapy    Treatment Note        Date: 2022  Patient: Enzo Owens  : 1995  ACCT #: [de-identified]  Referring Practitioner: Van Long DO  Diagnosis: thoracic back pain, unspecified back pain laterality, unspecified chonicity; chronic low back pain without sciatica,unspecified back pain laterality  Treatment Diagnosis: decreased lumbar spine AROM, decreased core and LE strength, decreased posture, decreased activity tolerance and increased pain with bending and prolonged standing and amb >30 min    Visit Information:  PT Visit Information  Onset Date:  (months)  PT Insurance Information: [employer]  Total # of Visits Approved: 1 (eval only)  Total # of Visits to Date: 2  No Show: 0  Canceled Appointment: 0  Progress Note Counter: 3/48 (48 units approved -6/3)    Subjective: pt to session with continued LBP and B feet pain  Comments: x-rays ordered in the chart  HEP Compliance:  [x] Good [] Fair [] Poor [] Reports not doing due to:    Vital Signs  Patient Currently in Pain: Yes   Pain Screening  Patient Currently in Pain: Yes  Pain Assessment  Pain Level: 7  Pain Location: Back  Pain Orientation: Right;Left;Lower    OBJECTIVE:   Exercises  Exercise 1: LTR X 10- pt demonstrating decreased AROM due to increased pain  Exercise 2: TA iso 10 sec X 10  Exercise 3: bridge X 10  Exercise 4: SKTC with strap 20 sec X 3 R/L  Exercise 5: hamstring stretch 20 sec X 3  Exercise 6: DLS progression supine/ quadruped*  Exercise 7: DKTC with pball X 10  Exercise 8: squats with TA iso*  Exercise 9: lifting techniques* (basket of laundry)  Exercise 10: prone hip ext*  Exercise 11: s/l hip abd*  Exercise 12: plank fwd/side*  Exercise 13: abdominal crunch X 10  Exercise 14: s/l hip abd X 10  Exercise 20: HEP: bridge, curl up, SKTC, hamstring stretch    Strength: [] NT  [x] MMT completed:  Strength RLE  R Hip ABduction: 4-/5  Strength LLE  L Hip ABduction: 4-/5    ROM: [x] NT  [] ROM measurements:     Manual:   Manual therapy  Manual traction: *  Soft Tissue Mobalization: tennis ball lumbar spine X 8 min    Modalities:  Modalities  Moist heat: *  Cryotherapy (Minutes\Location): CP X 10 min low back- skin intact  E-stim (parameters): lumbar spine IFC X 10 min- skin intact  Other: denies seizures, active CA, pacemaker   *Indicates exercise, modality, or manual techniques to be initiated when appropriate    Assessment: Body structures, Functions, Activity limitations: Decreased functional mobility ,Increased pain,Decreased posture,Decreased ROM,Decreased strength,Decreased endurance  Assessment: Initiated core strengthening and lumbar spine/ hamstring stretching this date for carryover to decreased back pain. Pt exhibits mild pain with all exercises and educated to maintain pain free AROM. Pt exhibits significant hip abd strength deficits with increased low back pain- cues to maintain TA iso for decreased pain with hip abd. Also initiated manual techniques and e-stim and CP for decreased pain. Pt requires continued PT to increase core strength for carryover to decreased low back pain. Treatment Diagnosis: decreased lumbar spine AROM, decreased core and LE strength, decreased posture, decreased activity tolerance and increased pain with bending and prolonged standing and amb >30 min  Prognosis: Good    Goals:  Short term goals  Time Frame for Short term goals: 3 wks  Short term goal 1: Pt will demonstrate improved trunk extensor, abdominal, and B LE strength >/= 4+/5 for carryover to decreased pain with bending over. Long term goals  Time Frame for Long term goals : 6 wks  Long term goal 1: Pt will demonstrate indep and compliance with % of the time for self management of low back pain. Long term goal 2: Pt will demonstrate improved score on modified oswestry back pain questionnaire to </= 20% indicating minimal distability for improved pt quality of life.   Long term goal 3: The pt will report decreased pain </=4/10 at worst in order to increase ease with bending over. Progress toward goals: core strengthening    POST-PAIN       Pain Rating (0-10 pain scale):   5/10   Location and pain description same as pre-treatment unless indicated. Action: [] NA   [x] Perform HEP  [x] Meds as prescribed  [x] Modalities as prescribed   [] Call Physician     Frequency/Duration:  Plan  Times per week: 2  Plan weeks: 6  Current Treatment Recommendations: Strengthening,Neuromuscular Re-education,Home Exercise Program,ROM,Manual Therapy - Soft Tissue Mobilization,Safety Education & Training,Endurance Training,Patient/Caregiver Education & Training,Functional Mobility Training,Equipment Evaluation, Education, & procurement,Modalities,Pain Management,Positioning     Pt to continue current HEP. See objective section for any therapeutic exercise changes, additions or modifications this date.     PT Individual Minutes  Time In: 1120  Time Out: 1210  Minutes: 50  Timed Code Treatment Minutes: 40 Minutes  Procedure Minutes: e-stim and CP X 10 min     Timed Activity Minutes Units   Ther Ex 32 2   Manual  8 1       Signature:  Electronically signed by Paulina Scott PT on 4/19/22 at 12:04 PM EDT

## 2022-04-26 ENCOUNTER — OFFICE VISIT (OUTPATIENT)
Dept: PAIN MANAGEMENT | Age: 27
End: 2022-04-26
Payer: COMMERCIAL

## 2022-04-26 ENCOUNTER — HOSPITAL ENCOUNTER (OUTPATIENT)
Dept: PHYSICAL THERAPY | Age: 27
Setting detail: THERAPIES SERIES
Discharge: HOME | End: 2022-04-26
Payer: COMMERCIAL

## 2022-04-26 VITALS
BODY MASS INDEX: 38.99 KG/M2 | HEIGHT: 65 IN | TEMPERATURE: 98 F | DIASTOLIC BLOOD PRESSURE: 70 MMHG | SYSTOLIC BLOOD PRESSURE: 118 MMHG | WEIGHT: 234 LBS

## 2022-04-26 DIAGNOSIS — M54.50 CHRONIC LOW BACK PAIN WITHOUT SCIATICA, UNSPECIFIED BACK PAIN LATERALITY: Primary | ICD-10-CM

## 2022-04-26 DIAGNOSIS — G89.29 CHRONIC LOW BACK PAIN WITHOUT SCIATICA, UNSPECIFIED BACK PAIN LATERALITY: Primary | ICD-10-CM

## 2022-04-26 DIAGNOSIS — M54.6 THORACIC BACK PAIN, UNSPECIFIED BACK PAIN LATERALITY, UNSPECIFIED CHRONICITY: ICD-10-CM

## 2022-04-26 DIAGNOSIS — M79.7 FIBROMYALGIA: ICD-10-CM

## 2022-04-26 PROBLEM — M79.672 CHRONIC PAIN OF BOTH FEET: Status: ACTIVE | Noted: 2021-07-01

## 2022-04-26 PROBLEM — M25.512 CHRONIC PAIN OF BOTH SHOULDERS: Status: ACTIVE | Noted: 2021-07-01

## 2022-04-26 PROBLEM — M79.671 CHRONIC PAIN OF BOTH FEET: Status: ACTIVE | Noted: 2021-07-01

## 2022-04-26 PROBLEM — M25.511 CHRONIC PAIN OF BOTH SHOULDERS: Status: ACTIVE | Noted: 2021-07-01

## 2022-04-26 PROBLEM — G56.03 BILATERAL CARPAL TUNNEL SYNDROME: Status: ACTIVE | Noted: 2021-07-01

## 2022-04-26 PROCEDURE — 1036F TOBACCO NON-USER: CPT | Performed by: NURSE PRACTITIONER

## 2022-04-26 PROCEDURE — 97110 THERAPEUTIC EXERCISES: CPT

## 2022-04-26 PROCEDURE — G8427 DOCREV CUR MEDS BY ELIG CLIN: HCPCS | Performed by: NURSE PRACTITIONER

## 2022-04-26 PROCEDURE — G8417 CALC BMI ABV UP PARAM F/U: HCPCS | Performed by: NURSE PRACTITIONER

## 2022-04-26 PROCEDURE — 97140 MANUAL THERAPY 1/> REGIONS: CPT

## 2022-04-26 PROCEDURE — 99213 OFFICE O/P EST LOW 20 MIN: CPT | Performed by: NURSE PRACTITIONER

## 2022-04-26 ASSESSMENT — ENCOUNTER SYMPTOMS
TROUBLE SWALLOWING: 0
CONSTIPATION: 0
GASTROINTESTINAL NEGATIVE: 1
EYES NEGATIVE: 1
DIARRHEA: 0
COUGH: 0
BACK PAIN: 1
SHORTNESS OF BREATH: 0

## 2022-04-26 ASSESSMENT — PAIN DESCRIPTION - ORIENTATION: ORIENTATION: LOWER

## 2022-04-26 ASSESSMENT — PAIN SCALES - GENERAL: PAINLEVEL_OUTOF10: 6

## 2022-04-26 ASSESSMENT — PAIN DESCRIPTION - DESCRIPTORS: DESCRIPTORS: THROBBING

## 2022-04-26 ASSESSMENT — PAIN DESCRIPTION - LOCATION: LOCATION: BACK

## 2022-04-26 NOTE — PROGRESS NOTES
[facility]  Outpatient Physical Therapy    Treatment Note        Date: 2022  Patient: Ruthann Alva  : 1995   Confirmed: Yes  MRN: [id number]  Referring Provider: Imelda Givens DO   Secondary Referring Provider (If applicable):     Medical Diagnosis: Pain in thoracic spine [M54.6]  Low back pain, unspecified [M54.50]  Other chronic pain [G89.29]    Treatment Diagnosis: decreased lumbar spine AROM, decreased core and LE strength, decreased posture, decreased activity tolerance and increased pain with bending and prolonged standing and amb >30 min    Visit Information:  Insurance: Payor: Danae Sanon / Plan: [employer] / Product Type: *No Product type* /   PT Visit Information  Onset Date:  (months)  Total # of Visits Approved: 1 (eval only)  Total # of Visits to Date: 3  No Show: 0  Canceled Appointment: 0  Progress Note Counter: 3/12 6/48 (48 units approved -6/3)    Subjective Information:  Subjective: Pt reports LBP a little better. Some pain with exercises . HEP Compliance:  [x] Good [] Fair [] Poor [] Reports not doing due to:    Pain Screening  Patient Currently in Pain: Yes  Pain Level: 6  Pain Location: Back  Pain Orientation: Lower  Pain Descriptors: Throbbing    Treatment:  Exercises:  Exercises  Exercise 1: LTR X 10 sec x 10  Exercise 2: TA iso 10 sec X 15  Exercise 3: bridge X 15  Exercise 4: SKTC with strap 20 sec X 3 R/L  Exercise 5: hamstring stretch 20 sec X 3  Exercise 7: DKTC with pball 10 sec X 10  Exercise 9: lifting techniques  (basket of laundry) reviewed  Exercise 12: Rev adb curls with ball x 10  Exercise 13: abdominal crunch X 15  Exercise 14: s/l hip abd X 10  Exercise 20: HEP: continue current Rev curls, S/L Abd, LTR    Manual:   Manual Therapy  Manual Traction: Attempted, increased pain.   Soft Tissue Mobilizaton: tennis ball lumbar spine X 8 min    Modalities:  Cryotherapy (CPT 09059)  Number Minutes Cryotherapy: 10  Cryotherapy location: Low back  Electric stimulation, unattended (CPT 22 010229) /  (Medicare)  E-stim location: Low back  E-stim type: Interferential (IFC)       *Indicates exercise, modality, or manual techniques to be initiated when appropriate    Objective Measures:      Strength: [x] NT  [] MMT completed:     ROM: [] NT  [x] ROM measurements:          Spine  Lumbar: AROM Flexin 85%, Ext 50%    Assessment: Body Structures, Functions, Activity Limitations Requiring Skilled Therapeutic Intervention: Decreased functional mobility ,Increased pain,Decreased posture,Decreased ROM,Decreased strength,Decreased endurance  Assessment: Continue to progress current exercises with focus on core strengthening. LAROM improved flexion and Extension. Added LTR & Rev abd  to improve core strength. Instructed in proper Lifting technique with laundry basket. Concluded with CP & IFC . Treatment Diagnosis: decreased lumbar spine AROM, decreased core and LE strength, decreased posture, decreased activity tolerance and increased pain with bending and prolonged standing and amb >30 min             Post-Pain Assessment:       Pain Rating (0-10 pain scale):  3-4 /10   Location and pain description same as pre-treatment unless indicated. Action: [] NA   [x] Perform HEP  [] Meds as prescribed  [] Modalities as prescribed   [] Call Physician     GOALS   Patient Goal(s): Patient goals : \"less pain\"    Short Term Goals Completed by 3 wks Goal Status   Pt will demonstrate improved trunk extensor, abdominal, and B LE strength >/= 4+/5 for carryover to decreased pain with bending over. In progress       Long Term Goals Completed by 6 wks Goal Status   Pt will demonstrate indep and compliance with % of the time for self management of low back pain. In progress   Pt will demonstrate improved score on modified oswestry back pain questionnaire to </= 20% indicating minimal distability for improved pt quality of life.  In progress   The pt will report decreased pain </=4/10 at worst in order to increase ease with bending over. Partially met          Plan:  Frequency/Duration:  Plan  Plan weeks: 6  Current Treatment Recommendations: Strengthening,Neuromuscular re-education,Home exercise program,Manual Therapy - Soft Tissue Mobilization,Endurance training,Safety education & training,Patient/Caregiver education & training,Functional mobility training,Pain management,Positioning,Equipment evaluation, education, & procurement  Pt to continue current HEP. See objective section for any therapeutic exercise changes, additions or modifications this date.     Therapy Time:      PT Individual Minutes  Time In: 8186  Time Out: 8216  Minutes: 49  Timed Code Treatment Minutes: 38 Minutes  Procedure Minutes:CP & IFC 10  Timed Activity Minutes Units   Ther Ex 30 2   Manual  8 1     Electronically signed by:   Stefano Burnett PTA  Date: 4/26/2022

## 2022-04-26 NOTE — PROGRESS NOTES
Noemí Marcelo  ()    2022    Subjective:     Noemí Marcelo is 32 y.o. female who complains today of:    Chief Complaint   Patient presents with    Other         Allergies:  Patient has no known allergies. Past Medical History:   Diagnosis Date    Anxiety 2017    Bilateral carpal tunnel syndrome 2021    Class 1 obesity due to excess calories without serious comorbidity with body mass index (BMI) of 30.0 to 30.9 in adult 2017    Fibromyalgia 2022    Heart abnormality     Hyperthyroidism 2018    Mental disorder     anxiety    Normal labor and delivery 2019    EFRAIN (obstructive sleep apnea) 2020    PCOS (polycystic ovarian syndrome) 2021     premature rupture of membranes with onset of labor more than 24 hours following rupture in third trimester     Tachycardia     Weight loss 3/25/2019     Past Surgical History:   Procedure Laterality Date    BACK SURGERY  2004    tumor removed    CARPAL TUNNEL RELEASE Right 2021    RIGHT HAND CARPAL TUNNEL DECOMPRESSION.  performed by Joselin Toro MD at [address] [place] [address] N/A 2019     SE[place] Jam[facility] [place]ge [place] [facility]     Family History   Problem Relation Age of Onset    No Known Problems Mother     Diabetes Father     No Known Problems Sister     No Known Problems Brother     No Known Problems Sister     Rheum Arthritis Maternal Grandmother      Social History     Socioeconomic History    Marital status:      Spouse name: Not on file    Number of children: Not on file    Years of education: Not on file    Highest education level: Not on file   Occupational History    Not on file   Tobacco Use    Smoking status: Never Smoker    Smokeless tobacco: Never Used   Vaping Use    Vaping Use: Never used   Substance and Sexual Activity    Alcohol use: No    Drug use: No    Sexual activity: Yes     Partners: Male Other Topics Concern    Not on file   Social History Narrative    Not on file     Social Determinants of Health     Financial Resource Strain: Low Risk     Difficulty of Paying Living Expenses: Not very hard   Food Insecurity: No Food Insecurity    Worried About Running Out of Food in the Last Year: Never true    Mica of Food in the Last Year: Never true   Transportation Needs: No Transportation Needs    Lack of Transportation (Medical): No    Lack of Transportation (Non-Medical):  No   Physical Activity:     Days of Exercise per Week: Not on file    Minutes of Exercise per Session: Not on file   Stress:     Feeling of Stress : Not on file   Social Connections:     Frequency of Communication with Friends and Family: Not on file    Frequency of Social Gatherings with Friends and Family: Not on file    Attends Restoration Services: Not on file    Active Member of [address] [place] [address] or Organizations: Not on file    Attends Club or Organization Meetings: Not on file    Marital Status: Not on file   Intimate Partner Violence:     Fear of Current or Ex-Partner: Not on file    Emotionally Abused: Not on file    Physically Abused: Not on file    Sexually Abused: Not on file   Housing Stability:     Unable to Pay for Housing in the Last Year: Not on file    Number of [place] in the Last Year: Not on file    Unstable Housing in the Last Year: Not on file       Current Outpatient Medications on File Prior to Visit   Medication Sig Dispense Refill    levothyroxine (SYNTHROID) 88 MCG tablet take 1 tablet by mouth once daily 30 tablet 12    metFORMIN (GLUCOPHAGE-XR) 500 MG extended release tablet Take 2 tablets by mouth daily (with breakfast) 60 tablet 12    fluconazole (DIFLUCAN) 150 MG tablet Take one tab let now and repeat in 48 hours if symptoms persists; do not take statin cholesterol medicine on the days you take this medicine 2 tablet 2     No current facility-administered medications on file prior to visit. Pt presents today for a f/u of her pain. PCP is Dr. James Villagomez. Kartik Cummings MA translated for pt with pt permission - pt Georgian speaking. Patient last saw Dr. Sophia Downing for her initial exam in 3/17/2022. She was advised physical therapy for the low back. She did start this. X-rays of low back and thoracic spine ordered - says done. Says pain in \"entire body\" and when cold out side if feels like her \"bones hurt\". Says movement causes more pain. She has history of fibromyalgia, but not sure if this is true Dx, - seen rheumatologist in the past and told no RA,  and polycystic ovarian syndrome. She takes some Tylenol. She advised to see podiatry for her foot pain. Pt feels pain level 9/10. Pt feels that movement, too much activity makes the pain worse, and little movement makes the pain better. Pt denies radiating numbness and tingling. Denies recent falls, injuries or trauma. Pt denies new weakness. Pt reports PT has been started. .         Review of Systems   Constitutional: Negative. Negative for fatigue. HENT: Negative. Negative for trouble swallowing. Eyes: Negative. Respiratory: Negative for cough and shortness of breath. Cardiovascular: Negative for chest pain. Gastrointestinal: Negative. Negative for constipation and diarrhea. Endocrine: Negative. Genitourinary: Negative. Musculoskeletal: Positive for arthralgias, back pain and neck pain. Skin: Negative. Neurological: Negative for dizziness, weakness and headaches. Hematological: Negative. Psychiatric/Behavioral: Negative. Objective:     Vitals:  /70   Temp 98 °F (36.7 °C)   Ht 5' 5\" (1.651 m)   Wt 234 lb (106.1 kg)   BMI 38.94 kg/m² Pain Score:   9      Physical Exam  Vitals and nursing note reviewed. This is a pleasant female who answers questions appropriately and follows commands.  used as pt is [address] speaking. Pt is alert and oriented x 3.   Recent CNP

## 2022-04-27 DIAGNOSIS — M54.50 CHRONIC LOW BACK PAIN WITHOUT SCIATICA, UNSPECIFIED BACK PAIN LATERALITY: ICD-10-CM

## 2022-04-27 DIAGNOSIS — G89.29 CHRONIC LOW BACK PAIN WITHOUT SCIATICA, UNSPECIFIED BACK PAIN LATERALITY: ICD-10-CM

## 2022-04-27 DIAGNOSIS — M54.6 THORACIC BACK PAIN, UNSPECIFIED BACK PAIN LATERALITY, UNSPECIFIED CHRONICITY: ICD-10-CM

## 2022-04-28 ENCOUNTER — HOSPITAL ENCOUNTER (OUTPATIENT)
Dept: PHYSICAL THERAPY | Age: 27
Setting detail: THERAPIES SERIES
Discharge: HOME | End: 2022-04-28
Payer: COMMERCIAL

## 2022-04-28 PROCEDURE — 97110 THERAPEUTIC EXERCISES: CPT

## 2022-04-28 ASSESSMENT — PAIN DESCRIPTION - LOCATION: LOCATION: BACK

## 2022-04-28 ASSESSMENT — PAIN SCALES - GENERAL: PAINLEVEL_OUTOF10: 9

## 2022-04-28 ASSESSMENT — PAIN DESCRIPTION - ORIENTATION: ORIENTATION: LOWER;MID;UPPER;RIGHT;LEFT

## 2022-04-28 NOTE — PROGRESS NOTES
[facility]  Outpatient Physical Therapy    Treatment Note        Date: 2022  Patient: Bishop Crump  : 1995   Confirmed: Yes  MRN: [id number]  Referring Provider: Javi Garcia DO   Secondary Referring Provider (If applicable):     Medical Diagnosis: Pain in thoracic spine [M54.6]  Low back pain, unspecified [M54.50]  Other chronic pain [G89.29] thoracic back pain, unspecified back pain laterality, unspecified chonicity; chronic low back pain without sciatica,unspecified back pain laterality  Treatment Diagnosis: decreased lumbar spine AROM, decreased core and LE strength, decreased posture, decreased activity tolerance and increased pain with bending and prolonged standing and amb >30 min    Visit Information:  Insurance: Payor: Evie Santos / Plan: [address] / Product Type: *No Product type* /   PT Visit Information  Onset Date:  (months)  PT Insurance Information: [employer]  Total # of Visits Approved: 1 (eval only)  Total # of Visits to Date: 4  No Show: 0  Canceled Appointment: 0  Progress Note Counter:  (48 units approved -6/3)    Subjective Information:  Subjective: c/o fibromyalgia pain this date; pt also c/o shoulder pain  HEP Compliance:  [x] Good [] Fair [] Poor [] Reports not doing due to:    Pain Screening  Pain Level: 9  Pain Location: Back  Pain Orientation: Lower,Mid,Upper,Right,Left    Treatment:  Exercises:  Exercises  Exercise 1: LTR X 10 sec x 5- unable to complete with knees bent or on ball due to increased knee pain and exercise concluded  Exercise 2: TA iso 10 sec X 15  Exercise 9: thoracic flexion with pball in sitting, fwd/side R/L X 10 ea  Exercise 13: abdominal crunch with LEs in extension 2 X 10  Exercise 14: scapular retraction X 10 in supine  Exercise 20: HEP:  *Indicates exercise, modality, or manual techniques to be initiated when appropriate    Objective Measures:   Strength: [] NT  [x] MMT completed:  Strength Other  Other: isometric abdominal strength 2/5; isometric trunk extensor strength 3/5- pt continues to exhibit poor amplitude of TA isometric contraction    ROM: [x] NT  [] ROM measurements:    Assessment: Body Structures, Functions, Activity Limitations Requiring Skilled Therapeutic Intervention: Decreased functional mobility ,Increased pain,Decreased posture,Decreased ROM,Decreased strength,Decreased endurance  Assessment: Pt to session with increased pain of 9/10 thoracic and lumbar paraspinals of unknown cause for increase in pain. Pt states increased pain with therapy sessions and increased pain with HEP. PT educated pt at length in normal to have increased muscle tightness when working the muscles with exercises and educated in trial of aquatic therapy for decreased pain with therapy session- pt stated understanding and agreement. Pt stated no decrease in pain with CP and IFC; therefore, no use of modalities this date. Treatment session this date focusing on strength and ROM of thoracic spine due to location of increased pain- pt with continued increased pain with exercises during session. PT educated pt at length in decreasing AROM to pain-free range to continue with participatiion with exercises- pt with fair understanding and requires continued education. Plan to initiate aquatic tx next session for decreased pain and improved quality of life. Treatment Diagnosis: decreased lumbar spine AROM, decreased core and LE strength, decreased posture, decreased activity tolerance and increased pain with bending and prolonged standing and amb >30 min  Therapy Prognosis: Good    Post-Pain Assessment:       Pain Rating (0-10 pain scale):   9/10   Location and pain description same as pre-treatment unless indicated.    Action: [] NA   [] Perform HEP  [x] Meds as prescribed  [x] Modalities as prescribed   [] Call Physician     GOALS   Patient Goal(s): Patient goals : \"less pain\"    Short Term Goals Completed by 3 wks Goal Status   STG 1 Pt will demonstrate improved trunk extensor, abdominal, and B LE strength >/= 4+/5 for carryover to decreased pain with bending over. In progress     Long Term Goals Completed by 6 wks Goal Status   LTG 1 Pt will demonstrate indep and compliance with % of the time for self management of low back pain. In progress   LTG 2 Pt will demonstrate improved score on modified oswestry back pain questionnaire to </= 20% indicating minimal distability for improved pt quality of life. In progress   LTG 3 The pt will report decreased pain </=4/10 at worst in order to increase ease with bending over. In progress     Plan:  Frequency/Duration:  Plan  Plan Frequency: 2  Plan weeks: 6  Current Treatment Recommendations: Strengthening,Neuromuscular re-education,Home exercise program,Manual Therapy - Soft Tissue Mobilization,Endurance training,Safety education & training,Patient/Caregiver education & training,Functional mobility training,Pain management,Positioning,Equipment evaluation, education, & procurement,Aquatics  Pt to continue current HEP. See objective section for any therapeutic exercise changes, additions or modifications this date.     Therapy Time:   PT Individual Minutes  Time In: 1540  Time Out: 1620  Minutes: 40  Timed Code Treatment Minutes: 40 Minutes  Procedure Minutes: 0 min    Timed Activity Minutes Units   Ther Ex 40 3     Electronically signed by:   Yael Flores, PT  Date: 4/28/2022

## 2022-05-02 ENCOUNTER — HOSPITAL ENCOUNTER (OUTPATIENT)
Dept: PHYSICAL THERAPY | Age: 27
Setting detail: THERAPIES SERIES
Discharge: HOME | End: 2022-05-02
Payer: COMMERCIAL

## 2022-05-02 PROCEDURE — 97113 AQUATIC THERAPY/EXERCISES: CPT

## 2022-05-02 NOTE — PROGRESS NOTES
[facility]  Outpatient Physical Therapy    Treatment Note        Date: 2022  Patient: Yousif Albert  : 1995   Confirmed: Yes  MRN: [id number]  Referring Provider: Fred Carlson DO   Secondary Referring Provider (If applicable):     Medical Diagnosis: Pain in thoracic spine [M54.6]  Low back pain, unspecified [M54.50]  Other chronic pain [G89.29]    Treatment Diagnosis: decreased lumbar spine AROM, decreased core and LE strength, decreased posture, decreased activity tolerance and increased pain with bending and prolonged standing and amb >30 min    Visit Information:  Insurance: Payor: Francisco Javier Dominique / Plan: Jade [employer] / Product Type: *No Product type* /   PT Visit Information  Onset Date:  (months)  Total # of Visits Approved: 1 (eval only)  Total # of Visits to Date: 5  No Show: 0  Canceled Appointment: 0  Progress Note Counter:  (48 units approved -6/3)    Subjective Information:  Subjective: Pt reports no pain today  Carina Liriano #[id number]  HEP Compliance:  [x] Good [] Cinderella Nani [] Poor [] Reports not doing due to:    Pain Screening  Patient Currently in Pain: No    Treatment:  Exercises:  Exercises  Exercise 10: Aquatics Amb 3 ways x 3, sink ex x 10,KB push/pull & rot x 10, HS str B 30 sec x 3  *Indicates exercise, modality, or manual techniques to be initiated when appropriate    Objective Measures:   Strength: [x] NT  [] MMT completed:     ROM: [x] NT  [] ROM measurements:         Assessment: Body Structures, Functions, Activity Limitations Requiring Skilled Therapeutic Intervention: Decreased functional mobility ,Increased pain,Decreased posture,Decreased ROM,Decreased strength,Decreased endurance  Assessment: Pt reported with no pain today. Initiated aquatic exercises without complaint of pain, Pauline  stated feels heavy post session but noo pain.   Treatment Diagnosis: decreased lumbar spine AROM, decreased core and LE strength, decreased posture, decreased activity tolerance and increased pain with bending and prolonged standing and amb >30 min  Therapy Prognosis: Good    Post-Pain Assessment:       Pain Rating (0-10 pain scale):   0/10   Location and pain description same as pre-treatment unless indicated. Action: [x] NA   [] Perform HEP  [] Meds as prescribed  [] Modalities as prescribed   [] Call Physician     GOALS   Patient Goal(s): Patient goals : \"less pain\"    Short Term Goals Completed by 3 wks Goal Status   STG 1 Pt will demonstrate improved trunk extensor, abdominal, and B LE strength >/= 4+/5 for carryover to decreased pain with bending over. In progress       Long Term Goals Completed by 6 wks Goal Status   LTG 1 Pt will demonstrate indep and compliance with % of the time for self management of low back pain. In progress   LTG 2 Pt will demonstrate improved score on modified oswestry back pain questionnaire to </= 20% indicating minimal distability for improved pt quality of life. In progress   LTG 3 The pt will report decreased pain </=4/10 at worst in order to increase ease with bending over. In progress          Plan:  Frequency/Duration:  Plan  Plan Frequency: 2  Plan weeks: 6  Current Treatment Recommendations: Strengthening,Neuromuscular re-education,Home exercise program,Manual Therapy - Soft Tissue Mobilization,Endurance training,Safety education & training,Patient/Caregiver education & training,Functional mobility training,Pain management,Positioning,Equipment evaluation, education, & procurement,Aquatics  Pt to continue current HEP. See objective section for any therapeutic exercise changes, additions or modifications this date.     Therapy Time:   PT Individual Minutes  Time In: 1555  Time Out: 9733  Minutes: 30  Timed Code Treatment Minutes: 30 Minutes  Procedure Minutes:0    Timed Activity Minutes Units   Aquatics 30 2     Electronically signed by:   Hortensia Oliver, PT  Date: 5/2/2022

## 2022-05-06 ENCOUNTER — HOSPITAL ENCOUNTER (OUTPATIENT)
Dept: PHYSICAL THERAPY | Age: 27
Setting detail: THERAPIES SERIES
Discharge: HOME | End: 2022-05-06
Payer: COMMERCIAL

## 2022-05-06 NOTE — PROGRESS NOTES
[address]       Physical Therapy  Cancellation/No-show Note  Patient Name:  Pamela Moulton  :  1995   Date:  2022          Visit Information:  PT Visit Information  Onset Date:  (months)  Total # of Visits Approved: 1 (eval only)  Total # of Visits to Date: 5  No Show: 0  Canceled Appointment: 1  Progress Note Counter:  (48 units approved -6/3) Cx 22    For today's appointment patient:  [x]  Cancelled  []  Rescheduled appointment  []  No-show   []  Called pt to remind of next appointment     Reason given by patient:  [x]  Patient ill  []  Conflicting appointment  []  No transportation    []  Conflict with work  []  No reason given  []  Other:       Comments:       Signature: Electronically signed by Tori Coto PTA on 22 at 11:14 AM EDT

## 2022-05-12 ENCOUNTER — HOSPITAL ENCOUNTER (OUTPATIENT)
Dept: PHYSICAL THERAPY | Age: 27
Setting detail: THERAPIES SERIES
Discharge: HOME | End: 2022-05-12
Payer: COMMERCIAL

## 2022-05-12 PROCEDURE — 97113 AQUATIC THERAPY/EXERCISES: CPT

## 2022-05-12 NOTE — PROGRESS NOTES
[facility]  Outpatient Physical Therapy    Treatment Note        Date: 2022  Patient: Yousif Albert  : 1995   Confirmed: Yes  MRN: [id number]  Referring Provider: Fred Carlson DO   Secondary Referring Provider (If applicable):     Medical Diagnosis: Pain in thoracic spine [M54.6]  Low back pain, unspecified [M54.50]  Other chronic pain [G89.29]    Treatment Diagnosis: decreased lumbar spine AROM, decreased core and LE strength, decreased posture, decreased activity tolerance and increased pain with bending and prolonged standing and amb >30 min    Visit Information:  Insurance: Payor: Francisco Javier Dominique / Plan: Carlos Hark / Product Type: *No Product type* /   PT Visit Information  Onset Date:  (months)  Total # of Visits Approved: 1 (eval only)  Total # of Visits to Date: 6  No Show: 0  Canceled Appointment: 1  Progress Note Counter: ;  (48 units approved -6/3)    Subjective Information:  Subjective: Pt reports no pain today \"I had pain yesterday 8/10\" \"the pool helps a little bit\";  Katie Hess #[id number]  HEP Compliance:  [x] Good [] Fair [] Poor [] Reports not doing due to:    Pain Screening  Patient Currently in Pain: Denies    Treatment:  Exercises:  Exercises  Exercise 8: aquatics*  Exercise 9: hamstring stretch 20 sec X 3 R/L  Exercise 10: mini crunch in standing with B weights 2 X 10  Exercise 11: amb fwd/side/back X 3 laps with cues for TA iso  Exercise 12: sink ex X 12 with TA iso  Exercise 13: deep end hang X 3 min; bicycle/ scissors 2 way X 2 min ea  Exercise 14: TA iso with kick board push/pull and rotation X 20 ea  *Indicates exercise, modality, or manual techniques to be initiated when appropriate    Objective Measures:   Strength: [x] NT  [] MMT completed:    ROM: [x] NT  [] ROM measurements:    Assessment:    Body Structures, Functions, Activity Limitations Requiring Skilled Therapeutic Intervention: Decreased functional mobility ,Increased pain,Decreased posture,Decreased ROM,Decreased strength,Decreased endurance  Assessment: Pt reports no pain pre or post session. Continued aquatic exercises for core strength for carryover to decreased LBP. No continued appt scheduled. PT educated pt to schedule additional appt for continued progress toward decreased pain- pt stated agreement. Treatment Diagnosis: decreased lumbar spine AROM, decreased core and LE strength, decreased posture, decreased activity tolerance and increased pain with bending and prolonged standing and amb >30 min  Therapy Prognosis: Good    Post-Pain Assessment:       Pain Rating (0-10 pain scale):  0 /10   Location and pain description same as pre-treatment unless indicated. Action: [x] NA   [] Perform HEP  [] Meds as prescribed  [] Modalities as prescribed   [] Call Physician     GOALS   Patient Goal(s): Patient goals : \"less pain\"    Short Term Goals Completed by 3 wks Goal Status   STG 1 Pt will demonstrate improved trunk extensor, abdominal, and B LE strength >/= 4+/5 for carryover to decreased pain with bending over. In progress     Long Term Goals Completed by 6 wks Goal Status   LTG 1 Pt will demonstrate indep and compliance with % of the time for self management of low back pain. In progress   LTG 2 Pt will demonstrate improved score on modified oswestry back pain questionnaire to </= 20% indicating minimal distability for improved pt quality of life. In progress   LTG 3 The pt will report decreased pain </=4/10 at worst in order to increase ease with bending over.  In progress     Plan:  Frequency/Duration:  Plan  Plan Frequency: 2  Plan weeks: 6  Current Treatment Recommendations: Strengthening,Neuromuscular re-education,Home exercise program,Manual Therapy - Soft Tissue Mobilization,Endurance training,Safety education & training,Patient/Caregiver education & training,Functional mobility training,Pain management,Positioning,Equipment evaluation, education, & procurement,Aquatics  Pt to continue current HEP. See objective section for any therapeutic exercise changes, additions or modifications this date.     Therapy Time:   PT Individual Minutes  Time In: 1000  Time Out: 8547  Minutes: 40  Timed Code Treatment Minutes: 40 Minutes  Procedure Minutes: 0 min    Timed Activity Minutes Units   Aquatics 40 3     Electronically signed by Doe Wakefield PT on 5/12/22 at 12:19 PM EDT

## 2022-05-23 ENCOUNTER — HOSPITAL ENCOUNTER (OUTPATIENT)
Dept: PHYSICAL THERAPY | Age: 27
Setting detail: THERAPIES SERIES
Discharge: HOME | End: 2022-05-23
Payer: COMMERCIAL

## 2022-05-23 NOTE — PROGRESS NOTES
Therapy                            Cancellation/No-show Note    Date: 2022  Patient: Hetal Galloway (37 y.o. female)  : 1995  MRN:  [id number]  Referring Physician: Katiuska Hoff DO     Medical Diagnosis: Pain in thoracic spine [M54.6]  Low back pain, unspecified [M54.50]  Other chronic pain [G89.29]      Visit Information:  Insurance: Payor: Sharyle King / Plan: [facility] / Product Type: *No Product type* /   Visits to Date: 6   No Show/Cancelled Appts: 0 / 2      For today's appointment patient:  [x]  Cancelled  []  Rescheduled appointment  []  No-show   []  Called pt to remind of next appointment     Reason given by patient:  [x]  Patient ill  []  Conflicting appointment  []  No transportation    []  Conflict with work  []  No reason given  []  Other:      [] Pt has future appointments scheduled, no follow up needed  [] Pt requests to be on hold.     Reason:   If > 2 weeks please discuss with therapist.  [] Therapist to call pt for follow up     Comments:       Signature: Electronically signed by Zeynep Red PTA on 22 at 2:25 PM EDT

## 2022-05-26 ENCOUNTER — HOSPITAL ENCOUNTER (OUTPATIENT)
Dept: PHYSICAL THERAPY | Age: 27
Setting detail: THERAPIES SERIES
Discharge: HOME | End: 2022-05-26
Payer: COMMERCIAL

## 2022-05-26 NOTE — PROGRESS NOTES
[address]       Physical Therapy  Cancellation/No-show Note  Patient Name:  Bella Reaves  :  1995   Date:  2022    Visit Information:  PT Visit Information  Onset Date:  (months)  Total # of Visits Approved: 1 (eval only)  Total # of Visits to Date: 6  No Show: 1  Canceled Appointment: 2  Progress Note Counter: ;  (48 units approved -6/3) NS 22    For today's appointment patient:  []  Cancelled  []  Rescheduled appointment  [x]  No-show   [x]  Called pt to remind of next appointment     Reason given by patient:  []  Patient ill  []  Conflicting appointment  []  No transportation    []  Conflict with work  []  No reason given  []  Other:       Comments:    Patient did not show up for this appointment. Portuguese speaking  to call patient & inform her of no show today as well as next future appointment on 22.     Signature: Electronically signed by Cata Burnett PT on 22 at 11:38 AM EDT

## 2022-06-01 ENCOUNTER — HOSPITAL ENCOUNTER (OUTPATIENT)
Dept: PHYSICAL THERAPY | Age: 27
Setting detail: THERAPIES SERIES
Discharge: HOME | End: 2022-06-01

## 2022-06-01 NOTE — PROGRESS NOTES
[employer]    [x] [address]  [] [facility]         of [address] [employer]     Physical Therapy  Cancellation/No-show Note  Patient Name:  Carol Martino  :  1995   Date:  2022          Visit Information:  PT Visit Information  Onset Date:  (months)  Total # of Visits Approved: 1 (eval only)  Total # of Visits to Date: 6  No Show: 2  Canceled Appointment: 2  Progress Note Counter: ;  (48 units approved -6/3) NS 2022    For today's appointment patient:  []  Cancelled  []  Rescheduled appointment  [x]  No-show   []  Called pt to remind of next appointment     Reason given by patient:  []  Patient ill  []  Conflicting appointment  []  No transportation    []  Conflict with work  []  No reason given  []  Inclement weather   []  Other:       Comments:       Signature: Electronically signed by Jannette Serrano PTA on 22 at 1:57 PM EDT

## 2022-06-03 ENCOUNTER — HOSPITAL ENCOUNTER (OUTPATIENT)
Dept: PHYSICAL THERAPY | Age: 27
Setting detail: THERAPIES SERIES
Discharge: HOME | End: 2022-06-03

## 2022-06-03 NOTE — PROGRESS NOTES
[address]                                      Therapy                            Cancellation/No-show Note    Date: 2022  Patient: Shira Vu (72 y.o. female)  : 1995  MRN:  [id number]  Referring Physician: Shahab Guajardo DO     Medical Diagnosis: Pain in thoracic spine [M54.6]  Low back pain, unspecified [M54.50]  Other chronic pain [G89.29]      Visit Information:  Insurance: Payor: Radha Villatoro / Plan: Dora Nails / Product Type: *No Product type* /   Visits to Date: 6   No Show/Cancelled Appts: 3 / 2      For today's appointment patient:  []  Cancelled  []  Rescheduled appointment  [x]  No-show   []  Called pt to remind of next appointment     Reason given by patient:  []  Patient ill  []  Conflicting appointment  []  No transportation    []  Conflict with work  []  No reason given  []  Other:      [] Pt has future appointments scheduled, no follow up needed  [] Pt requests to be on hold. Reason:   If > 2 weeks please discuss with therapist.  [] Therapist to call pt for follow up     Comments:   Discharged due to lack of attendance.     Signature: Electronically signed by Elli Cote PTA on 6/3/22 at 1:57 PM EDT

## 2022-06-03 NOTE — PROGRESS NOTES
[address]     Ph: [phone]  Fax: [phone]      [] Certification  [] Recertification []  Plan of Care  [] Progress Note [x] Discharge      Referring Provider: Javi Garcia DO      From:  Ino Oliva PT   Patient: Bishop Crump (83 y.o. female) : 1995 Date: 2022   Medical Diagnosis: Pain in thoracic spine [M54.6]  Low back pain, unspecified [M54.50]  Other chronic pain [G89.29]    Treatment Diagnosis:      Plan of Care/Certification Expiration Date: :     Progress Report Period from:  2022  to 2022    Visits to Date: 6 No Show: 3 Cancelled Appts: 2    OBJECTIVE:   Short Term Goals - Time Frame for Short term goals: 3 wks    Goals Current/Discharge status  Status   Short term goal 1: Pt will demonstrate improved trunk extensor, abdominal, and B LE strength >/= 4+/5 for carryover to decreased pain with bending over. NT secondary to pt being discharged due to lack of attendance. In progress     Long Term Goals - Time Frame for Long term goals : 6 wks  Goals Current/ Discharge status Status   Long term goal 1: Pt will demonstrate indep and compliance with % of the time for self management of low back pain. States good compliance as of 22 In progress   Long term goal 2: Pt will demonstrate improved score on modified oswestry back pain questionnaire to </= 20% indicating minimal distability for improved pt quality of life. NT secondary to pt being discharged due to lack of attendance. In progress   Long term goal 3: The pt will report decreased pain </=4/10 at worst in order to increase ease with bending over. Pt stated at LV  no pain but had 8/10 the previous day. In progress       Pt failed to return to PT after  unable to determine functional outcomes d/t unexpected D/C.      PLAN: [x]     d/c due to lack of attendance                    Patient Status:[] Continue/ Initiate plan of Care    [x] Discharge PT. Recommend pt continue with HEP. [] Additional visits requested, Please re-certify for additional visits:    [] Hold   Objective information done by      Signature: Electronically signed by Jorge Luis Lee PTA on 6/3/22 at 1:58 PM EDT    Electronically signed by Edwina Walls PT on 6/14/2022 at 1:41 PM  If you have any questions or concerns, please don't hesitate to call. Thank you for your referral.    I have reviewed this plan of care and certify a need for medically necessary rehabilitation services.     Physician Signature:__________________________________________________________  Date:  Please sign and return

## 2022-06-16 DIAGNOSIS — B37.31 VAGINA, CANDIDIASIS: ICD-10-CM

## 2022-06-17 RX ORDER — FLUCONAZOLE 150 MG/1
TABLET ORAL
Qty: 2 TABLET | Refills: 2 | OUTPATIENT
Start: 2022-06-17

## 2022-06-24 DIAGNOSIS — B37.31 VAGINA, CANDIDIASIS: ICD-10-CM

## 2022-06-24 RX ORDER — FLUCONAZOLE 150 MG/1
TABLET ORAL
Qty: 2 TABLET | Refills: 0 | OUTPATIENT
Start: 2022-06-24

## 2022-08-01 ENCOUNTER — TELEPHONE (OUTPATIENT)
Dept: FAMILY MEDICINE CLINIC | Age: 27
End: 2022-08-01

## 2022-08-15 ENCOUNTER — HOSPITAL ENCOUNTER (EMERGENCY)
Age: 27
Discharge: HOME | End: 2022-08-16
Payer: COMMERCIAL

## 2022-08-15 VITALS
HEART RATE: 97 BPM | TEMPERATURE: 98.6 F | BODY MASS INDEX: 37.15 KG/M2 | WEIGHT: 223 LBS | SYSTOLIC BLOOD PRESSURE: 133 MMHG | HEIGHT: 65 IN | RESPIRATION RATE: 20 BRPM | DIASTOLIC BLOOD PRESSURE: 71 MMHG

## 2022-08-15 DIAGNOSIS — S61.412A LACERATION OF LEFT HAND WITHOUT FOREIGN BODY, INITIAL ENCOUNTER: Primary | ICD-10-CM

## 2022-08-15 PROCEDURE — 99283 EMERGENCY DEPT VISIT LOW MDM: CPT

## 2022-08-15 PROCEDURE — 12001 RPR S/N/AX/GEN/TRNK 2.5CM/<: CPT

## 2022-08-15 PROCEDURE — 6370000000 HC RX 637 (ALT 250 FOR IP): Performed by: PHYSICIAN ASSISTANT

## 2022-08-15 RX ORDER — IBUPROFEN 800 MG/1
800 TABLET ORAL ONCE
Status: COMPLETED | OUTPATIENT
Start: 2022-08-15 | End: 2022-08-15

## 2022-08-15 RX ORDER — IBUPROFEN 800 MG/1
800 TABLET ORAL EVERY 8 HOURS PRN
Qty: 20 TABLET | Refills: 0 | Status: SHIPPED | OUTPATIENT
Start: 2022-08-15

## 2022-08-15 RX ADMIN — IBUPROFEN 800 MG: 800 TABLET, FILM COATED ORAL at 23:14

## 2022-08-15 ASSESSMENT — ENCOUNTER SYMPTOMS
APNEA: 0
TROUBLE SWALLOWING: 0
ABDOMINAL PAIN: 0
COLOR CHANGE: 0
EYE PAIN: 0
ALLERGIC/IMMUNOLOGIC NEGATIVE: 1
SHORTNESS OF BREATH: 0

## 2022-08-15 ASSESSMENT — PAIN DESCRIPTION - ORIENTATION: ORIENTATION: LEFT

## 2022-08-15 ASSESSMENT — PAIN DESCRIPTION - LOCATION: LOCATION: HAND

## 2022-08-15 ASSESSMENT — PAIN DESCRIPTION - PAIN TYPE: TYPE: ACUTE PAIN

## 2022-08-15 ASSESSMENT — PAIN SCALES - GENERAL
PAINLEVEL_OUTOF10: 4
PAINLEVEL_OUTOF10: 4

## 2022-08-15 ASSESSMENT — PAIN - FUNCTIONAL ASSESSMENT: PAIN_FUNCTIONAL_ASSESSMENT: 0-10

## 2022-08-16 NOTE — ED TRIAGE NOTES
Patient presents with complaints of a laceration to her left hand, states she cut it on a knife at home tonight. Bleeding controlled in triage.  No distress noted on arrival.

## 2022-08-16 NOTE — ED PROVIDER NOTES
[address] [facility] ED  eMERGENCYdEPARTMENT eNCOUnter      Pt Name: Jose D Martinez  MRN: [id number]  [place] 1995  Date of evaluation: 8/15/2022  Provider:Karri Loyd PA-C    CHIEF COMPLAINT       Chief Complaint   Patient presents with    Laceration     Cut on knife at home         HISTORY OF PRESENT ILLNESS  (Location/Symptom, Timing/Onset, Context/Setting, Quality, Duration, Modifying Factors, Severity.)   Jose D Martinez is a 32 y.o. female who presents to the emergency department with a laceration to the palm of the left hand. Patient states that she was cutting bread and accidentally cut her knife. Patient is unsure when her last tetanus shot was. Bleeding was controlled prior to emergency department arrival    HPI    Nursing Notes were reviewed and I agree. REVIEW OF SYSTEMS    (2-9 systems for level 4, 10 or more for level 5)     Review of Systems   Constitutional:  Negative for diaphoresis and fever. HENT:  Negative for hearing loss and trouble swallowing. Eyes:  Negative for pain. Respiratory:  Negative for apnea and shortness of breath. Cardiovascular:  Negative for chest pain. Gastrointestinal:  Negative for abdominal pain. Endocrine: Negative. Genitourinary:  Negative for hematuria. Musculoskeletal:  Negative for neck pain and neck stiffness. Skin:  Positive for wound. Negative for color change. Allergic/Immunologic: Negative. Neurological:  Negative for dizziness and numbness. Hematological: Negative. Psychiatric/Behavioral: Negative. All other systems reviewed and are negative. Except as noted above the remainder of the review of systems was reviewed and negative.        PAST MEDICAL HISTORY     Past Medical History:   Diagnosis Date    Anxiety 12/12/2017    Bilateral carpal tunnel syndrome 7/1/2021    Class 1 obesity due to excess calories without serious comorbidity with body mass index (BMI) of 30.0 to 30.9 in adult 12/12/2017 Fibromyalgia 2022    Heart abnormality     Hyperthyroidism 2018    Mental disorder     anxiety    Normal labor and delivery 2019    EFRAIN (obstructive sleep apnea) 2020    PCOS (polycystic ovarian syndrome) 2021     premature rupture of membranes with onset of labor more than 24 hours following rupture in third trimester     Tachycardia     Weight loss 3/25/2019         SURGICAL HISTORY       Past Surgical History:   Procedure Laterality Date    BACK SURGERY  2004    tumor removed    CARPAL TUNNEL RELEASE Right 2021    RIGHT HAND CARPAL TUNNEL DECOMPRESSION. performed by Lisette Collins MD at [address] [place] [address] N/A 2019     SECTION performed by Art Ramey DO at [facility] OR         CURRENT MEDICATIONS       Previous Medications    FLUCONAZOLE (DIFLUCAN) 150 MG TABLET    Take one tab let now and repeat in 48 hours if symptoms persists; do not take statin cholesterol medicine on the days you take this medicine    LEVOTHYROXINE (SYNTHROID) 88 MCG TABLET    take 1 tablet by mouth once daily    METFORMIN (GLUCOPHAGE-XR) 500 MG EXTENDED RELEASE TABLET    Take 2 tablets by mouth daily (with breakfast)       ALLERGIES     Patient has no known allergies.     FAMILY HISTORY       Family History   Problem Relation Age of Onset    No Known Problems Mother     Diabetes Father     No Known Problems Sister     No Known Problems Brother     No Known Problems Sister     Rheum Arthritis Maternal Grandmother           SOCIAL HISTORY       Social History     Socioeconomic History    Marital status:      Spouse name: None    Number of children: None    Years of education: None    Highest education level: None   Tobacco Use    Smoking status: Never    Smokeless tobacco: Never   Vaping Use    Vaping Use: Never used   Substance and Sexual Activity    Alcohol use: No    Drug use: No    Sexual activity: Yes     Partners: Male       SCREENINGS    Natty Coma Scale  Eye visualized and preliminarilyinterpreted by Kyle Diop PA-C with the below findings:        Interpretation per the Radiologist below, if available at the time of this note:    No orders to display       LABS:  Labs Reviewed - No data to display    All other labs were within normal range or not returnedas of this dictation. EMERGENCYDEPARTMENT COURSE and DIFFERENTIAL DIAGNOSIS/MDM:   Vitals:    Vitals:    08/15/22 2112   BP: 133/71   Pulse: 97   Resp: 20   Temp: 98.6 °F (37 °C)   TempSrc: Oral   Weight: 223 lb (101.2 kg)   Height: 5' 5\" (1.651 m)       REASSESSMENT        Patient presented with a superficial laceration of the left palm. Patient had area glued with good success.   Follow-up with PCP    MDM      PROCEDURES:    Lac Repair    Date/Time: 8/15/2022 11:09 PM  Performed by: Kyle Diop PA-C  Authorized by: Kyle Diop PA-C     Consent:     Consent obtained:  Verbal    Consent given by:  Patient    Risks, benefits, and alternatives were discussed: yes      Risks discussed:  Pain and poor cosmetic result  Universal protocol:     Procedure explained and questions answered to patient or proxy's satisfaction: yes      Relevant documents present and verified: yes      Site/side marked: yes      Immediately prior to procedure, a time out was called: yes      Patient identity confirmed:  Verbally with patient and arm band  Anesthesia:     Anesthesia method:  None  Laceration details:     Location:  Hand    Hand location:  L palm    Length (cm):  2    Depth (mm):  0.5  Pre-procedure details:     Preparation:  Patient was prepped and draped in usual sterile fashion  Exploration:     Contaminated: no    Treatment:     Area cleansed with:  Chlorhexidine and saline    Debridement:  None    Undermining:  None    Scar revision: no    Skin repair:     Repair method:  Tissue adhesive  Approximation:     Approximation:  Close  Repair type:     Repair type:  Simple  Post-procedure details:     Dressing:  Open (no

## 2022-09-26 ENCOUNTER — TELEPHONE (OUTPATIENT)
Dept: FAMILY MEDICINE CLINIC | Age: 27
End: 2022-09-26

## 2023-02-01 ENCOUNTER — OFFICE VISIT (OUTPATIENT)
Dept: FAMILY MEDICINE CLINIC | Age: 28
End: 2023-02-01

## 2023-02-01 VITALS
OXYGEN SATURATION: 98 % | HEART RATE: 96 BPM | TEMPERATURE: 97.4 F | SYSTOLIC BLOOD PRESSURE: 116 MMHG | BODY MASS INDEX: 40.65 KG/M2 | HEIGHT: 65 IN | DIASTOLIC BLOOD PRESSURE: 74 MMHG | RESPIRATION RATE: 18 BRPM | WEIGHT: 244 LBS

## 2023-02-01 DIAGNOSIS — E03.9 ACQUIRED HYPOTHYROIDISM: ICD-10-CM

## 2023-02-01 DIAGNOSIS — K30 INDIGESTION: ICD-10-CM

## 2023-02-01 DIAGNOSIS — R14.0 ABDOMINAL BLOATING: ICD-10-CM

## 2023-02-01 DIAGNOSIS — R40.0 DAYTIME SLEEPINESS: ICD-10-CM

## 2023-02-01 DIAGNOSIS — Z23 NEED FOR INFLUENZA VACCINATION: Primary | ICD-10-CM

## 2023-02-01 DIAGNOSIS — Z13.220 SCREENING FOR HYPERLIPIDEMIA: ICD-10-CM

## 2023-02-01 DIAGNOSIS — Z00.01 ENCOUNTER FOR GENERAL ADULT MEDICAL EXAMINATION WITH ABNORMAL FINDINGS: ICD-10-CM

## 2023-02-01 DIAGNOSIS — E28.2 PCOS (POLYCYSTIC OVARIAN SYNDROME): ICD-10-CM

## 2023-02-01 DIAGNOSIS — R06.83 LOUD SNORING: ICD-10-CM

## 2023-02-01 DIAGNOSIS — G47.30 SLEEP APNEA, UNSPECIFIED TYPE: ICD-10-CM

## 2023-02-01 RX ORDER — PANTOPRAZOLE SODIUM 40 MG/1
40 TABLET, DELAYED RELEASE ORAL DAILY
Qty: 30 TABLET | Refills: 0 | Status: SHIPPED | OUTPATIENT
Start: 2023-02-01

## 2023-02-01 RX ORDER — SIMETHICONE 80 MG
80 TABLET,CHEWABLE ORAL 4 TIMES DAILY PRN
Qty: 180 TABLET | Refills: 0 | Status: SHIPPED | OUTPATIENT
Start: 2023-02-01

## 2023-02-01 SDOH — ECONOMIC STABILITY: INCOME INSECURITY: HOW HARD IS IT FOR YOU TO PAY FOR THE VERY BASICS LIKE FOOD, HOUSING, MEDICAL CARE, AND HEATING?: NOT VERY HARD

## 2023-02-01 SDOH — ECONOMIC STABILITY: FOOD INSECURITY: WITHIN THE PAST 12 MONTHS, THE FOOD YOU BOUGHT JUST DIDN'T LAST AND YOU DIDN'T HAVE MONEY TO GET MORE.: NEVER TRUE

## 2023-02-01 SDOH — ECONOMIC STABILITY: HOUSING INSECURITY
IN THE LAST 12 MONTHS, WAS THERE A TIME WHEN YOU DID NOT HAVE A STEADY PLACE TO SLEEP OR SLEPT IN A SHELTER (INCLUDING NOW)?: NO

## 2023-02-01 SDOH — ECONOMIC STABILITY: FOOD INSECURITY: WITHIN THE PAST 12 MONTHS, YOU WORRIED THAT YOUR FOOD WOULD RUN OUT BEFORE YOU GOT MONEY TO BUY MORE.: NEVER TRUE

## 2023-02-01 ASSESSMENT — PATIENT HEALTH QUESTIONNAIRE - PHQ9
1. LITTLE INTEREST OR PLEASURE IN DOING THINGS: 0
9. THOUGHTS THAT YOU WOULD BE BETTER OFF DEAD, OR OF HURTING YOURSELF: 0
SUM OF ALL RESPONSES TO PHQ QUESTIONS 1-9: 0
5. POOR APPETITE OR OVEREATING: 0
SUM OF ALL RESPONSES TO PHQ QUESTIONS 1-9: 0
4. FEELING TIRED OR HAVING LITTLE ENERGY: 0
6. FEELING BAD ABOUT YOURSELF - OR THAT YOU ARE A FAILURE OR HAVE LET YOURSELF OR YOUR FAMILY DOWN: 0
SUM OF ALL RESPONSES TO PHQ9 QUESTIONS 1 & 2: 0
10. IF YOU CHECKED OFF ANY PROBLEMS, HOW DIFFICULT HAVE THESE PROBLEMS MADE IT FOR YOU TO DO YOUR WORK, TAKE CARE OF THINGS AT HOME, OR GET ALONG WITH OTHER PEOPLE: 0
7. TROUBLE CONCENTRATING ON THINGS, SUCH AS READING THE NEWSPAPER OR WATCHING TELEVISION: 0
8. MOVING OR SPEAKING SO SLOWLY THAT OTHER PEOPLE COULD HAVE NOTICED. OR THE OPPOSITE, BEING SO FIGETY OR RESTLESS THAT YOU HAVE BEEN MOVING AROUND A LOT MORE THAN USUAL: 0
2. FEELING DOWN, DEPRESSED OR HOPELESS: 0
SUM OF ALL RESPONSES TO PHQ QUESTIONS 1-9: 0
SUM OF ALL RESPONSES TO PHQ QUESTIONS 1-9: 0
3. TROUBLE FALLING OR STAYING ASLEEP: 0

## 2023-02-01 NOTE — PROGRESS NOTES
[id number]     Vaccine Information Sheet, \"Influenza - Inactivated\"  given to Regan Sal, or parent/legal guardian of  Regan Sal and verbalized understanding. Patient responses:    Have you ever had a reaction to a flu vaccine? No  Are you able to eat eggs without adverse effects? Yes  Do you have any current illness? No  Have you ever had Guillian Rugby Syndrome? No    Flu vaccine given per order. Please see immunization tab.

## 2023-02-01 NOTE — PROGRESS NOTES
Well Adult Note  Name: Jeronimo Levine Date: 2023   MRN: [id number] Sex: Female   Age: 32 y.o. Ethnicity:  / Tika Yusuf   : 1995 Race: Other      Sharon Morales is here for well adult exam.  History:  PCOS  -Metformin     Hypothyroid  Last TSH on 6/15/21 2.570. Compliant with levothyroxine which is taken correctly. No diarrhea, constipation, palpitations, dry skin, depression, difficulty sleeping or fatigue. No weight gain or loss. LMP  - 1 year ago. Having hard time with fertility. - patient was started on metformin to help with regular periods. - could be a thyroid or PCOS. Abdominal pain  - BM is good. - Movements in the belly and bloated. - having bloating and gassy   - Squeezing causes pain. No taking.  - eating and drinking okay. No vomiting or dairrhea. - indesgestion. Full really quickly. No pain after eating. Sleep apenia  - Snoring loud at night  - fatigue in the morning and does not feel refreshed. Having day time solmenancce. Patient taking a lot of naps during the day x3 times. - patient reports she is able to fall asleep and stay asleep. No known apneic episodes. Review of Systems   Constitutional:  Positive for fatigue. Negative for activity change, appetite change, chills and fever. HENT:  Negative for congestion, drooling, sinus pressure, sinus pain and sore throat. Eyes:  Negative for visual disturbance. Respiratory:  Negative for cough, chest tightness and shortness of breath. Cardiovascular:  Negative for chest pain. Gastrointestinal:  Positive for abdominal pain and nausea. Negative for diarrhea and vomiting. Endocrine: Negative for cold intolerance. Genitourinary:  Negative for dysuria, flank pain, frequency, hematuria, vaginal bleeding, vaginal discharge and vaginal pain. Musculoskeletal:  Negative for arthralgias and back pain. Skin:  Negative for rash. Allergic/Immunologic: Negative for food allergies.   Neurological:  Negative for weakness, light-headedness, numbness and headaches.   Hematological:  Does not bruise/bleed easily.     No Known Allergies      Prior to Visit Medications    Medication Sig Taking? Authorizing Provider   pantoprazole (PROTONIX) 40 MG tablet Take 1 tablet by mouth daily Yes TAISHA Christensen   simethicone (MYLICON) 80 MG chewable tablet Take 1 tablet by mouth 4 times daily as needed for Flatulence Yes TAISHA Christensen   levothyroxine (SYNTHROID) 88 MCG tablet take 1 tablet by mouth once daily Yes Chrissie Olivas MD   metFORMIN (GLUCOPHAGE-XR) 500 MG extended release tablet Take 2 tablets by mouth daily (with breakfast) Yes Chrissie Olivas MD   ibuprofen (ADVIL;MOTRIN) 800 MG tablet Take 1 tablet by mouth every 8 hours as needed for Pain or Fever  Patient not taking: Reported on 2023  Karri Haddad PA-C   fluconazole (DIFLUCAN) 150 MG tablet Take one tab let now and repeat in 48 hours if symptoms persists; do not take statin cholesterol medicine on the days you take this medicine  Patient not taking: Reported on 2023  Chrissie Olivas MD         Past Medical History:   Diagnosis Date    Anxiety 2017    Bilateral carpal tunnel syndrome 2021    Class 1 obesity due to excess calories without serious comorbidity with body mass index (BMI) of 30.0 to 30.9 in adult 2017    Fibromyalgia 2022    Heart abnormality     Hyperthyroidism 2018    Mental disorder     anxiety    Normal labor and delivery 2019    EFRAIN (obstructive sleep apnea) 2020    PCOS (polycystic ovarian syndrome) 2021     premature rupture of membranes with onset of labor more than 24 hours following rupture in third trimester     Tachycardia     Weight loss 3/25/2019       Past Surgical History:   Procedure Laterality Date    BACK SURGERY  2004    tumor removed    CARPAL TUNNEL RELEASE Right 2021    RIGHT HAND CARPAL TUNNEL DECOMPRESSION.  performed by María Elena Vazquez MD at [address] [place] [address] N/A 2019     SECTION performed by Stanley Amin DO at [facility]         Family History   Problem Relation Age of Onset    No Known Problems Mother     Diabetes Father     No Known Problems Sister     No Known Problems Brother     No Known Problems Sister     Rheum Arthritis Maternal Grandmother        Social History     Tobacco Use    Smoking status: Never    Smokeless tobacco: Never   Vaping Use    Vaping Use: Never used   Substance Use Topics    Alcohol use: No    Drug use: No       Objective   /74   Pulse 96   Temp 97.4 °F (36.3 °C) (Temporal)   Resp 18   Ht 5' 5\" (1.651 m)   Wt 244 lb (110.7 kg)   SpO2 98%   BMI 40.60 kg/m²   Wt Readings from Last 3 Encounters:   23 244 lb (110.7 kg)   08/15/22 223 lb (101.2 kg)   22 234 lb (106.1 kg)     There were no vitals filed for this visit. Physical Exam      Assessment   Plan   1. Need for influenza vaccination  -     Influenza, FLUCELVAX, (age 10 mo+), IM, Preservative Free, 0.5 mL  2. PCOS (polycystic ovarian syndrome)  Comments:  Increase Metformin to 1000 mg daily. Follow labs. - encourage exercises and diet for PCOS. - possible benefit from OCP. Will re-evaluate at next appointment. - continue medications as prescribed. 3. Acquired hypothyroidism  -     TSH with Reflex; Future  - stable; need labs drawn. Continue current dose. 4. Abdominal bloating  -     simethicone (MYLICON) 80 MG chewable tablet; Take 1 tablet by mouth 4 times daily as needed for Flatulence, Disp-180 tablet, R-0Normal  5. Indigestion  -     pantoprazole (PROTONIX) 40 MG tablet;  Take 1 tablet by mouth daily, Disp-30 tablet, R-0Normal  - Anti-reflux lifestyle modification discussed at length  Avoid spicy acidic based foods  Limit coffee, tea, alcohol use  Limit the amount of food during meal time, avoid gorging  Avoid bedtime snacks and eat meals 3 to 4 hrs before lying down.  Stop (or atleast cut down) Smoking. Elevate the head of the bed with blocks  Weight reduction advised and discussed at length   6. Loud snoring  -     Baseline Diagnostic Sleep Study; Future  7. Daytime sleepiness  -     Baseline Diagnostic Sleep Study; Future  8. Sleep apnea, unspecified type  -     Baseline Diagnostic Sleep Study; Future  9. Encounter for general adult medical examination with abnormal findings  10. Screening for hyperlipidemia  -     Lipid Panel; Future       Personalized Preventive Plan   Current Health Maintenance Status  Immunization History   Administered Date(s) Administered    Influenza, AFLURIA (age 1 yrs+), FLUZONE, (age 10 mo+), MDV, 0.5mL 12/12/2017, 10/19/2018, 10/24/2019    Influenza, FLUCELVAX, (age 10 mo+), MDCK, PF, 0.5mL 02/01/2022, 02/01/2023    PPD Test 03/20/2018, 03/30/2018, 03/18/2019    Tdap (Boostrix, Adacel) 03/20/2018, 03/30/2018, 03/18/2019, 06/06/2019        Health Maintenance   Topic Date Due    COVID-19 Vaccine (1) Never done    Varicella vaccine (1 of 2 - 2-dose childhood series) Never done    Depression Screen  02/01/2024    Pap smear  03/09/2024    DTaP/Tdap/Td vaccine (5 - Td or Tdap) 06/06/2029    Flu vaccine  Completed    Hepatitis C screen  Completed    HIV screen  Completed    Hepatitis A vaccine  Aged Out    Hib vaccine  Aged Out    Meningococcal (ACWY) vaccine  Aged Out    Pneumococcal 0-64 years Vaccine  Aged Out     Recommendations for [employer] Due: see orders and patient instructions/AVS.    Return in 3 months (on 5/1/2023).

## 2023-02-01 NOTE — PATIENT INSTRUCTIONS
Starting a Weight Loss Plan: Care Instructions  Overview     If you're thinking about losing weight, it can be hard to know where to start. Your doctor can help you set up a weight loss plan that best meets your needs. You may want to take a class on nutrition or exercise, or you could join a weight loss support group. If you have questions about how to make changes to your eating or exercise habits, ask your doctor about seeing a registered dietitian or an exercise specialist.  It can be a big challenge to lose weight. But you don't have to make huge changes at once. Make small changes, and stick with them. When those changes become habit, add a few more changes. If you don't think you're ready to make changes right now, try to pick a date in the future. Make an appointment to see your doctor to discuss whether the time is right for you to start a plan. Follow-up care is a key part of your treatment and safety. Be sure to make and go to all appointments, and call your doctor if you are having problems. It's also a good idea to know your test results and keep a list of the medicines you take. How can you care for yourself at home? Set realistic goals. Many people expect to lose much more weight than is likely. A weight loss of 5% to 10% of your body weight may be enough to improve your health. Get family and friends involved to provide support. Talk to them about why you are trying to lose weight, and ask them to help. They can help by participating in exercise and having meals with you, even if they may be eating something different. Find what works best for you. If you do not have time or do not like to cook, a program that offers meal replacement bars or shakes may be better for you. Or if you like to prepare meals, finding a plan that includes daily menus and recipes may be best.  Ask your doctor about other health professionals who can help you achieve your weight loss goals.   A dietitian can help you make healthy changes in your diet. An exercise specialist or  can help you develop a safe and effective exercise program.  A counselor or psychiatrist can help you cope with issues such as depression, anxiety, or family problems that can make it hard to focus on weight loss. Consider joining a support group for people who are trying to lose weight. Your doctor can suggest groups in your area. Where can you learn more? Go to http://www.woods.com/ and enter U357 to learn more about \"Starting a Weight Loss Plan: Care Instructions. \"  Current as of: August 25, 2022               Content Version: 13.5  © 2006-2022 [employer]. Care instructions adapted under license by [facility] [place] ([facility]). If you have questions about a medical condition or this instruction, always ask your healthcare professional. [address] any warranty or liability for your use of this information. Well Visit, Ages 25 to 72: Care Instructions  Well visits can help you stay healthy. Your doctor has checked your overall health and may have suggested ways to take good care of yourself. Your doctor also may have recommended tests. You can help prevent illness with healthy eating, good sleep, vaccinations, regular exercise, and other steps. Get the tests that you and your doctor decide on. Depending on your age and risks, examples might include screening for diabetes; hepatitis C; HIV; and cervical, breast, lung, and colon cancer. Screening helps find diseases before any symptoms appear. Eat healthy foods. Choose fruits, vegetables, whole grains, lean protein, and low-fat dairy foods. Limit saturated fat and reduce salt. Limit alcohol. Men should have no more than 2 drinks a day. Women should have no more than 1. For some people, no alcohol is the best choice. Exercise. Get at least 30 minutes of exercise on most days of the week. Walking can be a good choice.      Reach and stay at your healthy weight. This will lower your risk for many health problems. Take care of your mental health. Try to stay connected with friends, family, and community, and find ways to manage stress. If you're feeling depressed or hopeless, talk to someone. A counselor can help. If you don't have a counselor, talk to your doctor. Talk to your doctor if you think you may have a problem with alcohol or drug use. This includes prescription medicines and illegal drugs. Avoid tobacco and nicotine: Don't smoke, vape, or chew. If you need help quitting, talk to your doctor. Practice safer sex. Getting tested, using condoms or dental dams, and limiting sex partners can help prevent STIs. Use birth control if it's important to you to prevent pregnancy. Talk with your doctor about your choices and what might be best for you. Prevent problems where you can. Protect your skin from too much sun, wash your hands, brush your teeth twice a day, and wear a seat belt in the car. Where can you learn more? Go to http://www.murrell.com/ and enter P072 to learn more about \"Well Visit, Ages 25 to 72: Care Instructions. \"  Current as of: March 9, 2022               Content Version: 13.5  © 7034-7116 [employer]. Care instructions adapted under license by [facility] ([facility]). If you have questions about a medical condition or this instruction, always ask your healthcare professional. [place] [address] any warranty or liability for your use of this information. A Healthy Lifestyle: Care Instructions  A healthy lifestyle can help you feel good, have more energy, and stay at a weight that's healthy for you. You can share a healthy lifestyle with your friends and family. And you can do it on your own. Eat meals with your friends or family. You could try cooking together. Plan activities with other people.  Go for a walk with a friend, try a free online fitness class, or join a sports league. Eat a variety of healthy foods. These include fruits, vegetables, whole grains, low-fat dairy, and lean protein. Choose healthy portions of food. You can use the Nutrition Facts label on food packages as a guide. Eat more fruits and vegetables. You could add vegetables to sandwiches or add fruit to cereal.   Drink water when you are thirsty. Limit soda, juice, and sports drinks. Try to exercise most days. Aim for at least 2½ hours of exercise each week. Keep moving. Work in the garden or take your dog on a walk. Use the stairs instead of the elevator. If you use tobacco or nicotine, try to quit. Ask your doctor about programs and medicines to help you quit. Limit alcohol. Men should have no more than 2 drinks a day. Women should have no more than 1. For some people, no alcohol is the best choice. Follow-up care is a key part of your treatment and safety. Be sure to make and go to all appointments, and call your doctor if you are having problems. It's also a good idea to know your test results and keep a list of the medicines you take. Where can you learn more? Go to http://www.murrell.com/ and enter U807 to learn more about \"A Healthy Lifestyle: Care Instructions. \"  Current as of: March 9, 2022               Content Version: 13.5  © 2630-5716 [employer], Inc[employer]. Care instructions adapted under license by [facility] [place] ([facility]). If you have questions about a medical condition or this instruction, always ask your healthcare professional. [place] [address] any warranty or liability for your use of this information. Patient information: Weight loss treatments    INTRODUCTION -- Obesity is a major international problem, and Americans are among the heaviest people in the world. The percentage of obese people in the [place] has risen steadily.   Many people find that although they initially lose weight by dieting, they quickly regain the weight after the diet ends. Because it so hard to keep weight off over time, it is important to have as much information and support as possible before starting a diet. You are most likely to be successful in losing weight and keeping it off when you believe that your body weight can be controlled. STARTING A WEIGHT LOSS PROGRAM -- Some people like to talk to their doctor or nurse to get help choosing the best plan, monitoring progress, and getting advice and support along the way. To know what treatment (or combination of treatments) will work best, determine your body mass index (BMI) and waist circumference (measurement). The BMI is calculated from your height and weight. A person with a BMI between 25 and 29.9 is considered overweight   A person with a BMI of 30 or greater is considered to be obese  A waist circumference greater than 35 inches (88 cm) in women and 40 inches (102 cm) in men increases the risk of obesity-related complications, such as heart disease and diabetes. People who are obese and who have a larger waist size may need more aggressive weight loss treatment than others. Talk to your doctor or nurse for advice. Types of treatment -- Based on your measurements and your medical history, your doctor or nurse can determine what combination of weight loss treatments would work best for you. Treatments may include changes in lifestyle, exercise, dieting, and, in some cases, weight loss medicines or weight loss surgery. Weight loss surgery, also called bariatric surgery, is reserved for people with severe obesity who have not responded to other weight loss treatments. SETTING A WEIGHT LOSS GOAL -- It is important to set a realistic weight loss goal. Your first goal should be to avoid gaining more weight and staying at your current weight (or within 5 percent). Many people have a \"dream\" weight that is difficult or impossible to achieve.   People at high risk of developing diabetes who are able to lose 5 percent of their body weight and maintain this weight will reduce their risk of developing diabetes by about 50 percent and reduce their blood pressure. This is a success. Losing more than 15 percent of your body weight and staying at this weight is an extremely good result, even if you never reach your \"dream\" or \"ideal\" weight. LIFESTYLE CHANGES -- Programs that help you to change your lifestyle are usually run by psychologists or other professionals. The goals of lifestyle changes are to help you change your eating habits, become more active, and be more aware of how much you eat and exercise, helping you to make healthier choices. This type of treatment can be broken down into three steps: The triggers that make you want to eat   Eating   What happens after you eat  Triggers to eat -- Determining what triggers you to eat involves figuring out what foods you eat and where and when you eat. To figure out what triggers you to eat, keep a record for a few days of everything you eat, the places where you eat, how often you eat, and the emotions you were feeling when you ate. For some people, the trigger is related to a certain time of day or night. For others, the trigger is related to a certain place, like sitting at a desk working. Eating -- You can change your eating habits by breaking the chain of events between the trigger for eating and eating itself. There are many ways to do this. For instance, you can:  Limit where you eat to a few places (eg, dining room)   Restrict the number of utensils (eg, only a fork) used for eating   Drink a sip of water between each bite   Chew your food a certain number of times   Get up and stop eating every few minutes  What happens after you eat -- Rewarding yourself for good eating behaviors can help you to develop better habits. This is not a reward for weight loss; instead, it is a reward for changing unhealthy behaviors. Do not use food as a reward. Some people find money, clothing, or personal care (eg, a hair cut, manicure, or massage) to be effective rewards. Treat yourself immediately after making better eating choices to reinforce the value of the good behavior. You need to have clear behavior goals, and you must have a time frame for reaching your goals. Reward small changes along the way to your final goal.  Other factors that contribute to successful weight loss -- Changing your behavior involves more than just changing unhealthy eating habits; it also involves finding people around you to support your weight loss, reducing stress, and learning to be strong when tempted by food. Establish a \"jennifer\" system -- Having a friend or family member available to provide support and reinforce good behavior is very helpful. The support person needs to understand your goals. Learn to be strong -- Learning to be strong when tempted by food is an important part of losing weight. As an example, you will need to learn how to say \"no\" and continue to say no when urged to eat at parties and social gatherings. Develop strategies for events before you go, such as eating before you go or taking low-calorie snacks and drinks with you. Develop a support system -- Having a support system is helpful when losing weight. This is why many commercial groups are successful. Family support is also essential; if your family does not support your efforts to lose weight, this can slow your progress or even keep you from losing weight. Positive thinking -- People often have conversations with themselves in their head; these conversations can be positive or negative. If you eat a piece of cake that was not planned, you may respond by thinking, \"Oh, you stupid idiot, you've blown your diet! \" and as a result, you may eat more cake. A positive thought for the same event could be, \"Well, I ate cake when it was not on my plan. Now I should do something to get back on track. \" A positive approach is much more likely to be successful than a negative one. Reduce stress -- Although stress is a part of everyday life, it can trigger uncontrolled eating in some people. It is important to find a way to get through these difficult times without eating or by eating low-calorie food, like raw vegetables. It may be helpful to imagine a relaxing place that allows you to temporarily escape from stress. With deep breaths and closed eyes, you can imagine this relaxing place for a few minutes. Self-help programs -- Self-help programs like [place] Watchers®, Overeaters Anonymous®, and Take Off [place] (TOPS)© work for some people. As with all weight loss programs, you are most likely to be successful with these plans if you make long-term changes in how you eat. CHOOSING A DIET -- A calorie is a unit of energy found in food. Your body needs calories to function. The goal of any diet is to burn up more calories than you eat. How quickly you lose weight depends upon several factors, such as your age, gender, and starting weight. Older people have a slower metabolism than young people, so they lose weight more slowly. Men lose more weight than women of similar height and weight when dieting because they use more energy. People who are extremely overweight lose weight more quickly than those who are only mildly overweight. Try not to drink alcohol or drinks with added sugar, and most sweets (candy, cakes, cookies), since they rarely contain important nutrients. Portion-controlled diets -- One simple way to diet is to buy packaged foods, like frozen low-calorie meals or meal-replacement canned drinks.  A typical meal plan for one day may include:  A meal-replacement drink or breakfast bar for breakfast   A meal-replacement drink or a frozen low-calorie (250 to 350 calories) meal for lunch   A frozen low-calorie meal or other prepackaged, calorie-controlled meal, along with extra vegetables for dinner  This would give you 1000 to 1500 calories per day. Low-fat diet -- To reduce the amount of fat in your diet, you can:  Eat low-fat foods. Low-fat foods are those that contain less than 30 percent of calories from fat. Fat is listed on the food facts label (figure 1). Count fat grams. For a 1500 calorie diet, this would mean about 45 g or fewer of fat per day. Low-carbohydrate diet -- Low- and very-low-carbohydrate diets (eg, Atkins diet, [employer]) have become popular ways to lose weight quickly. With a very-low-carbohydrate diet, you eat between 0 and 60 grams of carbohydrates per day (a standard diet contains 200 to 300 grams of carbohydrates)   With a low-carbohydrate diet, you eat between 60 and 130 grams of carbohydrates per day  Carbohydrates are found in fruits, vegetables, and grains (including breads, rice, pasta, and cereal), alcoholic beverages, and in dairy products. Meat and fish do not contain carbohydrates. Side effects of very-low-carbohydrate diets can include constipation, headache, bad breath, muscle cramps, diarrhea, and weakness. Mediterranean diet -- The term \"Mediterranean diet\" refers to a way of eating that is common in [place]growing regions around the [facility]. Although there is some variation in Mediterranean diets, there are some similarities. Most Mediterranean diets include:  A high level of monounsaturated fats (from olive or canola oil, walnuts, pecans, almonds) and a low level of saturated fats (from butter)   A high amount of vegetables, fruits, legumes, and grains (7 to 10 servings of fruits and vegetables per day)   A moderate amount of milk and dairy products, mostly in the form of cheese. Use low-fat dairy products (skim milk, fat-free yogurt, low-fat cheese). A relatively low amount of red meat and meat products. Substitute fish or poultry for red meat.    For those who drink alcohol, a modest amount (mainly as red wine) may help to protect against cardiovascular disease. A modest amount is up to one (4 ounce) glass per day for women and up to two glasses per day for men. Which diet is best? -- Studies have compared different diets, including:  Very-low-carbohydrate (Atkins)   Macronutrient balance controlling glycemic load (Zone®)   Reduced-calorie (Weight Watchers®)   Very-low-fat (Ornish)  No one diet is \"best\" for weight loss. Any diet will help you to lose weight if you stick with the diet. Therefore, it is important to choose a diet that includes foods you like. Fad diets -- Fad diets often promise quick weight loss (more than 1 to 2 pounds per week) and may claim that you do not need to exercise or give up favorite foods. Some fad diets cost a lot of money, because you have to pay for seminars or pills. Fad diets generally lack any scientific evidence that they are safe and effective, but instead rely on \"before\" and \"after\" photos or testimonials. Diets that sound too good to be true usually are. These plans are a waste of time and money and are not recommended. A doctor, nurse, or nutritionist can help you find a safe and effective way to lose weight and keep it off. WEIGHT LOSS MEDICINES -- Taking a weight loss medicine may be helpful when used in combination with diet, exercise, and lifestyle changes. However, it is important to understand the risks and benefits of these medicines. It is also important to be realistic about your goal weight using a weight loss medicine; you may not reach your \"dream\" weight, but you may be able to reduce your risk of diabetes or heart disease. Weight loss medicines may be recommended for people who have not been able to lose weight with diet and exercise who have a:  BMI of 30 or more    BMI between 27 and 29.9 and have other medical problems, such as diabetes, high cholesterol, or high blood pressure  Two weight loss medicines are approved in the [place] for long-term use.  These are sibutramine and orlistat. Other weight loss medicines (phentermine, diethylpropion) are available but are only approved for short-term use (up to 12 weeks). Sibutramine -- Sibutramine (Meridia®, Reductil®) is a medicine that reduces your appetite. In people who take the medicine for one year, the average weight loss is 10 percent of the initial body weight (5 percent more than those who took a placebo treatment). Side effects of sibutramine include insomnia, dry mouth, and constipation. Increases in blood pressure can occur. Therefore, blood pressure is usually monitored during treatment. There is no evidence that sibutramine causes heart or lung problems (like dexfenfluramine and fenfluramine (Phen/Fen)). However, experts agree that sibutramine should not used by people with coronary heart disease, heart failure, uncontrolled hypertension, stroke, irregular heart rhythms, or peripheral vascular disease (poor circulation in the legs). Orlistat -- Orlistat (Xenical® 120 mg capsules) is a medicine that reduces the amount of fat your body absorbs from the foods you eat. A lower-dose version is now available without a prescription (Baldo® 60 mg capsules) in many countries, including the [place]. The medicine is recommended three times per day, taken with a meal; you can skip a dose if you skip a meal or if the meal contains no fat. After one year of treatment with orlistat, the average weight loss is approximately 8 to 10 percent of initial body weight (4 percent more than in those who took a placebo). Cholesterol levels often improve, and blood pressure sometimes falls. In people with diabetes, orlistat may help control blood sugar levels. Side effects occur in 15 to 10 percent of people and may include stomach cramps, gas, diarrhea, leakage of stool, or oily stools. These problems are more likely when you take orlistat with a high-fat meal (if more than 30 percent of calories in the meal are from fat).  Side effects usually improve as you learn to avoid high-fat foods. Severe liver injury has been reported rarely in patients taking orlistat, but it is not known if orlistat caused the liver problems. Diet supplements -- Diet supplements are widely used by people who are trying to lose weight, although the safety and efficacy of these supplements are often unproven. A few of the more common diet supplements are discussed below; none of these are recommended because they have not been studied carefully, and there is no proof they are safe or effective. Chitosan and wheat dextrin are ineffective for weight loss, and their use is not recommended. Ephedra, a compound related to ephedrine, is no longer available in the [place] due to safety concerns. Many nonprescription diet pills previously contained ephedra. Although some studies have shown that ephedra helps with weight loss, there can be serious side effects (psychiatric symptoms, palpitations, and stomach upset), including death. There are not enough data about the safety and efficacy of chromium, ginseng, glucomannan, green tea, hydroxycitric acid, L carnitine, psyllium, pyruvate supplements, [place] wort, and conjugated linoleic acid. Two supplements from [facility], [address] [place] (also known as the Rola Klein 15 pill) and Herbathin dietary supplement, have been shown to contain prescription drugs. Hoodia gordonii is a dietary supplement derived from a plant in [place]. It is not recommended because there is no proof that it is safe or effective. Bitter orange (Citrus aurantium) can increase your heart rate and blood pressure and is not recommended.   SHOULD I HAVE SURGERY TO LOSE WEIGHT? -- Weight loss surgery is recommended ONLY for people with one of the following:  Severe obesity (body mass index above 40) (calculator 1 and calculator 2) who have not responded to diet, exercise, or weight loss medicines   Body mass index between 35 and 40, along with a serious medical problem (including diabetes, severe joint pain, or sleep apnea) that would improve with weight loss  You should be sure that you understand the potential risks and benefits of weight loss surgery. You must be motivated and willing to make lifelong changes in how you eat to reach and maintain a healthier weight after surgery. You must also be realistic about weight loss after surgery (see 'Effectiveness of weight loss surgery' below).  PREPARING FOR WEIGHT LOSS SURGERY -- Most people who have weight loss surgery will meet with several specialists before surgery is scheduled. This often includes a dietitian, mental health counselor, a doctor who specializes in care of obese people, and a surgeon who performs weight loss surgery (bariatric surgeon). You may need to work with these providers for several weeks or months before surgery.  The nutritionist will explain what and how much you will be able to eat after surgery. You may also need to lose a small amount of weight before surgery.   The mental health specialist will help you to cope with stress and other factors that can make it harder to lose weight or trigger you to eat   The medical doctor will determine whether you need other tests, counseling, or treatment before surgery. He or she might also help you begin a medical weight loss program so that you can lose some weight before surgery.   The bariatric surgeon will meet with you to discuss the surgeries available to treat obesity. He or she will also make sure you are a good candidate for surgery.   TYPES OF WEIGHT LOSS SURGERY -- There are several types of weight loss surgeries, the most common being lap banding, gastric bypass, and gastric sleeve.  Lap banding -- Laparoscopic adjustable gastric banding (LAGB), or lap banding, is a surgery that uses an adjustable band around the opening to the stomach (figure 1). This reduces the amount of food that you can eat at one time.  Lap banding  is done through small incisions, with a laparoscope. The band can be adjusted after surgery, allowing you to eat more or less food. Adjustments to the size and tightness of the band are made by using a needle to add or remove fluid from a port (a small container under the skin that is connected to the band). Adding fluid to the band makes it tighter which restricts the amount of food you can eat and may help you to lose more weight. Lap banding is a popular choice because it is relatively simple to perform, can be adjusted or removed, and has a low risk of serious complications immediately after surgery. However, weight loss with the lap band depends on your ability to follow the program closely. You will need to prepare nutritious meals that [facility] with\" the band, not against it. For example, the lap band will not work well if you eat or drink a large amount of liquid calories (like ice cream). The band will not help you to feel full when you eat/drink liquid calories. Weight loss ranges from 45 to 75 percent after two years. As an example, a person who is 120 pounds overweight could expect to lose approximately 54 to 90 pounds in the two years after lap banding. Gastric bypass -- Carla-en-Y gastric bypass, also called gastric bypass, helps you to lose weight by reducing the amount of food you can eat and reducing the number of calories and nutrients you absorb from the food you eat. To perform gastric bypass, a surgeon creates a small stomach pouch by dividing the stomach and attaching it to the small intestine. This helps you to lose weight in two ways: The smaller stomach can hold less food than before surgery. This causes you to feel full after eating a very small amount of food or liquid. Over time, the pouch might stretch, allowing you to eat more food. The body absorbs fewer calories, since food bypasses most of the stomach as well as the upper small intestine.  This new arrangement seems to decrease your appetite and change how you break down foods by changing the release of various hormones. Gastric bypass can be performed as open surgery (through an incision on the abdomen) or laparoscopically, which uses smaller incisions and smaller instruments. Both the laparoscopic and open techniques have risks and benefits. You and your surgeon should work together to decide which surgery, if any, is right for you. Gastric bypass has a high success rate, and people lose an average of 62 to 68 percent of their excess body weight in the first year. Weight loss typically levels off after one to two years, with an overall excess weight loss between 50 and 75 percent. For a person who is 120 pounds overweight, an average of 60 to 90 pounds of weight loss would be expected. Gastric sleeve -- Gastric sleeve, also known as sleeve gastrectomy, is a surgery that reduces the size of the stomach and makes it into a narrow tube (figure 3). The new stomach is much smaller and produces less of the hormone (ghrelin) that causes hunger, helping you feel satisfied with less food. Sleeve gastrectomy is safer than gastric bypass because the intestines are not rearranged, and there is less chance of malnutrition. It also appears to control hunger better than lap banding. It might be safer than the lap banding because no foreign materials are used. The gastric sleeve has a good success rate, and people lose an average of 33 percent of their excess body weight in the first year. For a person who is 120 pounds overweight, this would mean losing about 40 pounds in the first year. WEIGHT LOSS SURGERY COMPLICATIONS -- A variety of complications can occur with weight loss surgery. The risks of surgery depend upon which surgery you have and any medical problems you had before surgery.  Some of the more common early surgical complications (one to six weeks after surgery) include:  Bleeding   Infection   Blockage or tear in the bowels   Need for further surgery  Important medical complications after surgery can include blood clots in the legs or lungs, heart attack, pneumonia, and urinary tract infection. Complications are less likely when surgery is performed in centers that are experienced in weight loss surgery. In general, centers with experience in weight loss surgery have:  Board-certified doctors and surgeons   A team of support staff (dietitians, counselors, nurses)   Long-term follow-up after surgery   Hospital staff experienced with the care of weight loss patients. This includes nurses who are trained in the care of patients immediately after surgery and anesthesiologists who are experienced in caring for the morbidly obese. EFFECTIVENESS OF WEIGHT LOSS SURGERY -- The goal of weight loss surgery is to reduce the risk of illness or death associated with obesity. Weight loss surgery can also help you to feel and look better, reduce the amount of money you spend on medicines, and cut down on sick days. As an example, weight loss surgery can improve health problems related to obesity (diabetes, high blood pressure, high cholesterol, sleep apnea) to the point that you need less or no medicine. Finally, weight loss surgery might reduce your risk of developing heart disease, cancer, and certain infections. AFTER WEIGHT LOSS SURGERY -- You will need to stay in the hospital until your team feels that it is safe for you to leave (on average, one to three days). Do not drive if you are taking prescription pain medicine. Begin exercising as soon as possible once you have healed; most weight loss centers will design an exercise program for you. Once you are home, it is important to eat and drink exactly what your doctor and dietitian recommend. You will see your doctor, nurse, and dietitian on a regular basis after surgery to monitor your health, diet, and weight loss.    You will be able to slowly increase how much you eat over time, although it will always be important to:  Eat small, frequent meals and not skip meals   Chew your food slowly and completely   Avoid eating while \"distracted\" (such as eating while watching TV)   Stop eating when you feel full   Drink liquids at least 30 minutes before or after eating   Avoid foods high in fat or sugar   Take vitamin supplements, as recommended  It can take several months to learn to listen to your body so that you know when you are hungry and when you are full. You may dislike foods you previously loved, and you may begin to prefer new foods. This can be a frustrating process for some people, so talk to your dietitian if you are having trouble. It usually takes between one and two years to lose weight after surgery. After reaching their goal weight, some people have plastic surgery (called \"body contouring\") to remove excess skin from the body, particularly in the abdominal area. Before you decide to have weight loss surgery, you must commit to staying healthy for life. This includes following up with your healthcare team, exercising most days of the week, and eating a sensible diet every day. It can be difficult to develop new eating and exercise habits after weight loss surgery, and you will have to work hard to stick to your goals. Recovering from surgery and losing weight can be stressful and emotional, and it is important to have the support of family and friends. Working with a , therapist, or support group can help you through the ups and downs. WHERE TO GET MORE INFORMATION -- Your healthcare provider is the best source of information for questions and concerns related to your medical problem.   This article will be updated as needed every four months on our Web site ([email or web address])

## 2023-02-05 ASSESSMENT — ENCOUNTER SYMPTOMS
BACK PAIN: 0
NAUSEA: 1
SINUS PRESSURE: 0
SINUS PAIN: 0
CHEST TIGHTNESS: 0
DIARRHEA: 0
VOMITING: 0
SORE THROAT: 0
COUGH: 0
ABDOMINAL PAIN: 1
SHORTNESS OF BREATH: 0

## 2023-03-06 DIAGNOSIS — R14.0 ABDOMINAL BLOATING: ICD-10-CM

## 2023-03-06 RX ORDER — SIMETHICONE 80 MG
80 TABLET,CHEWABLE ORAL 4 TIMES DAILY PRN
Qty: 80 TABLET | Refills: 0 | Status: SHIPPED | OUTPATIENT
Start: 2023-03-06

## 2023-03-06 NOTE — TELEPHONE ENCOUNTER
Comments:     Last Office Visit (last PCP visit):   2/1/2023    Next Visit Date:  Future Appointments   Date Time Provider Quinn Naranjo   5/1/2023  3:15 PM TAISHA Davies       **If hasn't been seen in over a year OR hasn't followed up according to last diabetes/ADHD visit, make appointment for patient before sending refill to provider.     Rx requested:  Requested Prescriptions     Pending Prescriptions Disp Refills    simethicone (MYLICON) 80 MG chewable tablet [Pharmacy Med Name: SIMETHICONE 80 MG CHEW 80 Tablet] 80 tablet 0     Sig: TAKE 1 TABLET BY MOUTH 4 TIMES DAILY AS NEEDED FOR FLATULENCE

## 2023-04-01 DIAGNOSIS — R14.0 ABDOMINAL BLOATING: ICD-10-CM

## 2023-04-03 RX ORDER — SIMETHICONE 80 MG
80 TABLET,CHEWABLE ORAL 4 TIMES DAILY PRN
Qty: 80 TABLET | Refills: 0 | Status: SHIPPED | OUTPATIENT
Start: 2023-04-03

## 2023-04-03 NOTE — TELEPHONE ENCOUNTER
Patient is requesting medication refill.  Please approve or deny this request.    Rx requested:  Requested Prescriptions     Pending Prescriptions Disp Refills    simethicone (MYLICON) 80 MG chewable tablet [Pharmacy Med Name: SIMETHICONE 80 MG CHEW 80 Tablet] 80 tablet 0     Sig: TAKE 1 TABLET BY MOUTH 4 TIMES DAILY AS NEEDED FOR FLATULENCE         Last Office Visit:   2/1/2023      Next Visit Date:  Future Appointments   Date Time Provider Quinn Naranjo   4/11/2023  8:30 PM Quilla Ruis [address]   5/1/2023  3:15 PM Jluis Prado

## 2023-04-06 ENCOUNTER — HOSPITAL ENCOUNTER (EMERGENCY)
Age: 28
Discharge: HOME | End: 2023-04-06
Payer: COMMERCIAL

## 2023-04-06 ENCOUNTER — APPOINTMENT (OUTPATIENT)
Dept: GENERAL RADIOLOGY | Age: 28
End: 2023-04-06
Payer: COMMERCIAL

## 2023-04-06 ENCOUNTER — APPOINTMENT (OUTPATIENT)
Dept: CT IMAGING | Age: 28
End: 2023-04-06
Payer: COMMERCIAL

## 2023-04-06 VITALS
SYSTOLIC BLOOD PRESSURE: 122 MMHG | HEIGHT: 65 IN | WEIGHT: 240 LBS | RESPIRATION RATE: 18 BRPM | HEART RATE: 89 BPM | OXYGEN SATURATION: 99 % | TEMPERATURE: 97 F | DIASTOLIC BLOOD PRESSURE: 75 MMHG | BODY MASS INDEX: 39.99 KG/M2

## 2023-04-06 DIAGNOSIS — N39.0 URINARY TRACT INFECTION WITH HEMATURIA, SITE UNSPECIFIED: ICD-10-CM

## 2023-04-06 DIAGNOSIS — J06.9 ACUTE UPPER RESPIRATORY INFECTION: Primary | ICD-10-CM

## 2023-04-06 DIAGNOSIS — R31.9 URINARY TRACT INFECTION WITH HEMATURIA, SITE UNSPECIFIED: ICD-10-CM

## 2023-04-06 LAB
BACTERIA URNS QL MICRO: ABNORMAL /HPF
BILIRUB UR QL STRIP: NEGATIVE
CLARITY UR: CLEAR
COLOR UR: YELLOW
EPI CELLS #/AREA URNS AUTO: ABNORMAL /HPF (ref 0–5)
GLUCOSE UR STRIP-MCNC: NEGATIVE MG/DL
HCG, URINE, POC: NEGATIVE
HGB UR QL STRIP: ABNORMAL
HYALINE CASTS #/AREA URNS AUTO: ABNORMAL /HPF (ref 0–5)
INFLUENZA A BY PCR: NEGATIVE
INFLUENZA B BY PCR: NEGATIVE
KETONES UR STRIP-MCNC: NEGATIVE MG/DL
LEUKOCYTE ESTERASE UR QL STRIP: ABNORMAL
Lab: NORMAL
NEGATIVE QC PASS/FAIL: NORMAL
NITRITE UR QL STRIP: NEGATIVE
PH UR STRIP: 6 [PH] (ref 5–9)
POSITIVE QC PASS/FAIL: NORMAL
PROT UR STRIP-MCNC: 30 MG/DL
RBC #/AREA URNS HPF: ABNORMAL /HPF (ref 0–2)
SARS-COV-2 RDRP RESP QL NAA+PROBE: NOT DETECTED
SP GR UR STRIP: 1.02 (ref 1–1.03)
URINE REFLEX TO CULTURE: YES
UROBILINOGEN UR STRIP-ACNC: 1 E.U./DL
WBC #/AREA URNS AUTO: ABNORMAL /HPF (ref 0–5)

## 2023-04-06 PROCEDURE — 87086 URINE CULTURE/COLONY COUNT: CPT

## 2023-04-06 PROCEDURE — 81001 URINALYSIS AUTO W/SCOPE: CPT

## 2023-04-06 PROCEDURE — 71046 X-RAY EXAM CHEST 2 VIEWS: CPT

## 2023-04-06 PROCEDURE — 87635 SARS-COV-2 COVID-19 AMP PRB: CPT

## 2023-04-06 PROCEDURE — 87502 INFLUENZA DNA AMP PROBE: CPT

## 2023-04-06 PROCEDURE — 74176 CT ABD & PELVIS W/O CONTRAST: CPT

## 2023-04-06 PROCEDURE — 93005 ELECTROCARDIOGRAM TRACING: CPT | Performed by: PHYSICIAN ASSISTANT

## 2023-04-06 RX ORDER — NITROFURANTOIN 25; 75 MG/1; MG/1
100 CAPSULE ORAL 2 TIMES DAILY
Qty: 10 CAPSULE | Refills: 0 | Status: SHIPPED | OUTPATIENT
Start: 2023-04-06 | End: 2023-04-11

## 2023-04-06 RX ORDER — PHENAZOPYRIDINE HYDROCHLORIDE 100 MG/1
100 TABLET, FILM COATED ORAL 3 TIMES DAILY PRN
Qty: 9 TABLET | Refills: 0 | Status: SHIPPED | OUTPATIENT
Start: 2023-04-06 | End: 2023-04-09

## 2023-04-06 RX ORDER — NAPROXEN 500 MG/1
500 TABLET ORAL 2 TIMES DAILY
Qty: 10 TABLET | Refills: 0 | Status: SHIPPED | OUTPATIENT
Start: 2023-04-06 | End: 2023-04-11

## 2023-04-06 ASSESSMENT — PAIN DESCRIPTION - ONSET: ONSET: ON-GOING

## 2023-04-06 ASSESSMENT — LIFESTYLE VARIABLES
HOW MANY STANDARD DRINKS CONTAINING ALCOHOL DO YOU HAVE ON A TYPICAL DAY: PATIENT DOES NOT DRINK
HOW OFTEN DO YOU HAVE A DRINK CONTAINING ALCOHOL: NEVER
HOW OFTEN DO YOU HAVE A DRINK CONTAINING ALCOHOL: NEVER
HOW MANY STANDARD DRINKS CONTAINING ALCOHOL DO YOU HAVE ON A TYPICAL DAY: PATIENT DOES NOT DRINK

## 2023-04-06 ASSESSMENT — ENCOUNTER SYMPTOMS
EYE DISCHARGE: 0
NAUSEA: 0
DIARRHEA: 0
SORE THROAT: 0
CONSTIPATION: 0
SHORTNESS OF BREATH: 0
RHINORRHEA: 0
ABDOMINAL DISTENTION: 0
BACK PAIN: 1
ABDOMINAL PAIN: 0
VOMITING: 0
COLOR CHANGE: 0
COUGH: 1

## 2023-04-06 ASSESSMENT — PAIN DESCRIPTION - LOCATION: LOCATION: FLANK

## 2023-04-06 ASSESSMENT — PAIN DESCRIPTION - FREQUENCY: FREQUENCY: CONTINUOUS

## 2023-04-06 ASSESSMENT — PAIN - FUNCTIONAL ASSESSMENT
PAIN_FUNCTIONAL_ASSESSMENT: NONE - DENIES PAIN
PAIN_FUNCTIONAL_ASSESSMENT: 0-10

## 2023-04-06 ASSESSMENT — PAIN DESCRIPTION - ORIENTATION: ORIENTATION: LEFT;RIGHT

## 2023-04-06 ASSESSMENT — PAIN DESCRIPTION - DESCRIPTORS: DESCRIPTORS: ACHING

## 2023-04-06 ASSESSMENT — PAIN SCALES - GENERAL: PAINLEVEL_OUTOF10: 8

## 2023-04-06 ASSESSMENT — PAIN DESCRIPTION - PAIN TYPE: TYPE: ACUTE PAIN

## 2023-04-06 NOTE — ED NOTES
used for initial assessment Terra Estrada [id number]   Patient states son was tested + for COVID about one week ago she did not do a home test  Preg is negative at this time  Patient denies pain when she urinates   Patient states that she has had the cough times 2 weeks and then back pain on the \"corners of her back \"     Ronnie Amaro RN  04/06/23 6817

## 2023-04-06 NOTE — ED PROVIDER NOTES
Movements: Extraocular movements intact. Conjunctiva/sclera: Conjunctivae normal.      Pupils: Pupils are equal, round, and reactive to light. Neck:      Vascular: No JVD. Trachea: No tracheal deviation. Cardiovascular:      Rate and Rhythm: Normal rate. Pulses: Normal pulses. Heart sounds: Normal heart sounds. No murmur heard. No friction rub. No gallop. Pulmonary:      Effort: Pulmonary effort is normal. No tachypnea, accessory muscle usage, respiratory distress or retractions. Breath sounds: Normal breath sounds. No stridor. No wheezing, rhonchi or rales. Comments: Lung sounds are clear in all fields, there is no wheezes rales or rhonchi, no excessive muscle use, no retractions, room air saturations are at 98%  Chest:      Chest wall: No tenderness. Abdominal:      General: Abdomen is flat. Bowel sounds are normal. There is no distension or abdominal bruit. Palpations: There is no shifting dullness, fluid wave, hepatomegaly, splenomegaly, mass or pulsatile mass. Tenderness: There is no abdominal tenderness. There is no right CVA tenderness, left CVA tenderness, guarding or rebound. Negative signs include Elmore's sign, Rovsing's sign and McBurney's sign. Musculoskeletal:         General: No deformity. Normal range of motion. Cervical back: Normal range of motion and neck supple. No rigidity. Comments: Patient moving all extremities well, no facial grimace, no weakness, no drift. Skin:     General: Skin is warm and dry. Capillary Refill: Capillary refill takes less than 2 seconds. Coloration: Skin is not jaundiced. Neurological:      General: No focal deficit present. Mental Status: She is alert and oriented to person, place, and time. Mental status is at baseline. Cranial Nerves: No cranial nerve deficit. Sensory: No sensory deficit. Motor: No weakness.       Coordination: Coordination normal.   Psychiatric:

## 2023-04-06 NOTE — ED NOTES
Patient sitting up in bed alert and offering no complaints at this time      Hoa Rivers RN  04/06/23 3950

## 2023-04-06 NOTE — ED NOTES
Provider Peyton SUMNER is present with the  Araceli # [id number] to perform the assessment      Bobby Granados RN  04/06/23 4204

## 2023-04-06 NOTE — ED TRIAGE NOTES
Interpretor # [id number] Tar  Pt states cough times 2 weeks  Pt states phlegm when cough   Pt states flank pain bilateral when coughs starting 2 days ago  Pt states that pain could be my period but I haven't had one in a year  Pt states has disorder and has had irregular periods  Pt states N/V/D without cough, states when cough sometimes makes her vomit  Pt denies difficulty urinating  Pt denies any radiation of pain  Pt denies any chest pain

## 2023-04-07 DIAGNOSIS — E03.9 ACQUIRED HYPOTHYROIDISM: ICD-10-CM

## 2023-04-07 DIAGNOSIS — Z13.220 SCREENING FOR HYPERLIPIDEMIA: ICD-10-CM

## 2023-04-07 LAB
BACTERIA UR CULT: NORMAL
CHOLEST SERPL-MCNC: 165 MG/DL (ref 0–199)
EKG ATRIAL RATE: 84 BPM
EKG P AXIS: 23 DEGREES
EKG P-R INTERVAL: 150 MS
EKG Q-T INTERVAL: 364 MS
EKG QRS DURATION: 64 MS
EKG QTC CALCULATION (BAZETT): 430 MS
EKG R AXIS: 2 DEGREES
EKG T AXIS: -1 DEGREES
EKG VENTRICULAR RATE: 84 BPM
HDLC SERPL-MCNC: 26 MG/DL (ref 40–59)
LDLC SERPL CALC-MCNC: 112 MG/DL (ref 0–129)
TRIGL SERPL-MCNC: 133 MG/DL (ref 0–150)
TSH REFLEX: 2.88 UIU/ML (ref 0.44–3.86)

## 2023-04-07 PROCEDURE — 93010 ELECTROCARDIOGRAM REPORT: CPT | Performed by: INTERNAL MEDICINE

## 2023-05-01 ENCOUNTER — OFFICE VISIT (OUTPATIENT)
Dept: FAMILY MEDICINE CLINIC | Age: 28
End: 2023-05-01
Payer: COMMERCIAL

## 2023-05-01 VITALS
OXYGEN SATURATION: 97 % | HEIGHT: 65 IN | DIASTOLIC BLOOD PRESSURE: 76 MMHG | TEMPERATURE: 97.9 F | SYSTOLIC BLOOD PRESSURE: 128 MMHG | BODY MASS INDEX: 41.15 KG/M2 | WEIGHT: 247 LBS | HEART RATE: 120 BPM

## 2023-05-01 DIAGNOSIS — E28.2 PCOS (POLYCYSTIC OVARIAN SYNDROME): Chronic | ICD-10-CM

## 2023-05-01 DIAGNOSIS — E66.01 MORBID OBESITY (HCC): Primary | Chronic | ICD-10-CM

## 2023-05-01 PROCEDURE — G8427 DOCREV CUR MEDS BY ELIG CLIN: HCPCS | Performed by: PHYSICIAN ASSISTANT

## 2023-05-01 PROCEDURE — 99214 OFFICE O/P EST MOD 30 MIN: CPT | Performed by: PHYSICIAN ASSISTANT

## 2023-05-01 PROCEDURE — G8417 CALC BMI ABV UP PARAM F/U: HCPCS | Performed by: PHYSICIAN ASSISTANT

## 2023-05-01 PROCEDURE — 1036F TOBACCO NON-USER: CPT | Performed by: PHYSICIAN ASSISTANT

## 2023-05-01 NOTE — PROGRESS NOTES
Subjective  Pola Miles 1995 is a 29 y.o. female who presents today with:  Chief Complaint   Patient presents with    Follow-up     Pt state she want to lose way some one told her about injection the help you lose weight    Naomi Wilks [id number]    HPI  Diet/Exercises  - Exercises- riding bike only. Biking 30-40 mins daily.   - Dieting: eating about the same stuff as usually but in small portions. Rice, meat, and salads. - patient does not want to seek bariatric surgery at this time. Would like to try medication management. Hypothyroid  - Last TSH on 2.880 on 04/07/23. Compliant with levothyroxine which is taken correctly. No diarrhea, constipation, palpitations, dry skin, depression, difficulty sleeping or fatigue. No weight gain or loss. PCOS  -not having regular snydrome. Not on OCPs. Would like to start ocps. Patient denies htn, headache, migraines, or smoking.   - currently on metformin. Review of Systems   Constitutional:  Positive for fatigue. Negative for activity change, appetite change, chills and fever. HENT:  Negative for congestion, drooling, sinus pressure, sinus pain and sore throat. Eyes:  Negative for visual disturbance. Respiratory:  Negative for cough, chest tightness and shortness of breath. Cardiovascular:  Negative for chest pain. Gastrointestinal:  Negative for abdominal pain, diarrhea, nausea and vomiting. Endocrine: Negative for cold intolerance. Genitourinary:  Negative for dysuria, flank pain, frequency, hematuria, vaginal bleeding, vaginal discharge and vaginal pain. Musculoskeletal:  Negative for arthralgias and back pain. Skin:  Negative for rash. Allergic/Immunologic: Negative for food allergies. Neurological:  Negative for weakness, light-headedness, numbness and headaches. Hematological:  Does not bruise/bleed easily.      Past Medical History:   Diagnosis Date    Anxiety 12/12/2017    Bilateral carpal tunnel syndrome 7/1/2021

## 2023-05-02 ASSESSMENT — ENCOUNTER SYMPTOMS
COUGH: 0
SHORTNESS OF BREATH: 0
DIARRHEA: 0
VOMITING: 0
SINUS PAIN: 0
CHEST TIGHTNESS: 0
BACK PAIN: 0
ABDOMINAL PAIN: 0
NAUSEA: 0
SINUS PRESSURE: 0
SORE THROAT: 0

## 2023-05-02 ASSESSMENT — VISUAL ACUITY: OU: 1

## 2023-08-02 ENCOUNTER — TELEPHONE (OUTPATIENT)
Dept: FAMILY MEDICINE CLINIC | Age: 28
End: 2023-08-02

## 2023-08-11 ENCOUNTER — OFFICE VISIT (OUTPATIENT)
Dept: FAMILY MEDICINE CLINIC | Age: 28
End: 2023-08-11
Payer: COMMERCIAL

## 2023-08-11 VITALS
SYSTOLIC BLOOD PRESSURE: 130 MMHG | HEART RATE: 90 BPM | WEIGHT: 246 LBS | OXYGEN SATURATION: 97 % | HEIGHT: 65 IN | BODY MASS INDEX: 40.98 KG/M2 | DIASTOLIC BLOOD PRESSURE: 82 MMHG

## 2023-08-11 DIAGNOSIS — R63.5 ABNORMAL WEIGHT GAIN: ICD-10-CM

## 2023-08-11 DIAGNOSIS — F41.9 ANXIETY: Chronic | ICD-10-CM

## 2023-08-11 DIAGNOSIS — G47.33 OBSTRUCTIVE SLEEP APNEA SYNDROME: Chronic | ICD-10-CM

## 2023-08-11 DIAGNOSIS — R94.31 PROLONGED Q-T INTERVAL ON ECG: Chronic | ICD-10-CM

## 2023-08-11 DIAGNOSIS — E78.5 DYSLIPIDEMIA: ICD-10-CM

## 2023-08-11 DIAGNOSIS — E28.2 PCOS (POLYCYSTIC OVARIAN SYNDROME): Primary | ICD-10-CM

## 2023-08-11 DIAGNOSIS — R73.03 PREDIABETES: ICD-10-CM

## 2023-08-11 DIAGNOSIS — E03.9 ACQUIRED HYPOTHYROIDISM: ICD-10-CM

## 2023-08-11 DIAGNOSIS — K30 INDIGESTION: ICD-10-CM

## 2023-08-11 LAB — HBA1C MFR BLD: 5.8 %

## 2023-08-11 PROCEDURE — G8417 CALC BMI ABV UP PARAM F/U: HCPCS | Performed by: STUDENT IN AN ORGANIZED HEALTH CARE EDUCATION/TRAINING PROGRAM

## 2023-08-11 PROCEDURE — 1036F TOBACCO NON-USER: CPT | Performed by: STUDENT IN AN ORGANIZED HEALTH CARE EDUCATION/TRAINING PROGRAM

## 2023-08-11 PROCEDURE — G8427 DOCREV CUR MEDS BY ELIG CLIN: HCPCS | Performed by: STUDENT IN AN ORGANIZED HEALTH CARE EDUCATION/TRAINING PROGRAM

## 2023-08-11 PROCEDURE — 83037 HB GLYCOSYLATED A1C HOME DEV: CPT | Performed by: STUDENT IN AN ORGANIZED HEALTH CARE EDUCATION/TRAINING PROGRAM

## 2023-08-11 PROCEDURE — 99215 OFFICE O/P EST HI 40 MIN: CPT | Performed by: STUDENT IN AN ORGANIZED HEALTH CARE EDUCATION/TRAINING PROGRAM

## 2023-08-11 RX ORDER — DULAGLUTIDE 0.75 MG/.5ML
0.75 INJECTION, SOLUTION SUBCUTANEOUS WEEKLY
Qty: 4 ADJUSTABLE DOSE PRE-FILLED PEN SYRINGE | Refills: 0 | Status: SHIPPED | OUTPATIENT
Start: 2023-08-11

## 2023-08-11 ASSESSMENT — ENCOUNTER SYMPTOMS
SINUS PRESSURE: 0
ABDOMINAL PAIN: 0
SHORTNESS OF BREATH: 0
COUGH: 0
SORE THROAT: 0
VOMITING: 0

## 2023-08-11 NOTE — PROGRESS NOTES
at baseline. Psychiatric:         Mood and Affect: Mood normal.         Behavior: Behavior normal.         Thought Content: Thought content normal.       Assessment and Treatment     Diagnosis Orders   1. PCOS (polycystic ovarian syndrome)  POCT glycosylated hemoglobin (Hb A1C)      2. Obstructive sleep apnea syndrome        3. Anxiety        4. Dyslipidemia      HDL 26      5. Acquired hypothyroidism        6. Indigestion        7. Prolonged Q-T interval on ECG        8. Abnormal weight gain        9. Adult BMI 40.0-44.9 kg/sq m (HCC)            Plan and Follow Up    Plan:  Nutrition:  [x]  Low-calorie diet (2346-9587) [x]  Low-carb diet ()  [] Ketogenic diet     []  Meal replacements    []  Maintenance     []  Refer to RD/nutritionist     FITTE:   [x]  Cardio  [x]  Resistance exercises   [x]  ACSM recommendations (150 minutes/week in active weight loss)    Behavior:   [x]  Motivational interviewing performed  []  Referral for counseling  [x]  Discussed strategies to overcome habits/challenges for focus    Medications:    No orders of the defined types were placed in this encounter.       Reviewed:  [x]  Patient meets criteria of BMI >30 or > 27 with obesity related co-morbidities  [x]  24-hour food log x 3 (2 weekday logs and 1 weekend log)  []  Labs ordered:       []  A1C (A1c 5.8%)    []  FBS     []  Lipids     []  TSH/TFT     []  CMP     []  LFT  []  Hidden CHO/carbohydrate sources  []  Alcohol as possible source of hidden/amnesia calories and its effects  [x]  Importance of physical activity and reducing sedentary time  [x]  Treatment plan   [x]  Side effects, adverse effects of the medication prescribed today  [x]  Handouts reviewed and given to patient:   [x] Plan for success  [x] Healthy Habits     [x] Food logging  [x] Medication options  [] Healthy eating plan [] Mindful eating  [] Starting to exercise [] Weight maintenance     Comments:   Nutrition  - Discussed pathophysiology of weight loss/gain,

## 2023-08-14 DIAGNOSIS — R94.31 PROLONGED Q-T INTERVAL ON ECG: Chronic | ICD-10-CM

## 2023-08-14 DIAGNOSIS — F41.9 ANXIETY: Chronic | ICD-10-CM

## 2023-08-14 DIAGNOSIS — E03.9 ACQUIRED HYPOTHYROIDISM: ICD-10-CM

## 2023-08-14 DIAGNOSIS — K30 INDIGESTION: ICD-10-CM

## 2023-08-14 DIAGNOSIS — G47.33 OBSTRUCTIVE SLEEP APNEA SYNDROME: Chronic | ICD-10-CM

## 2023-08-14 DIAGNOSIS — E28.2 PCOS (POLYCYSTIC OVARIAN SYNDROME): ICD-10-CM

## 2023-08-14 DIAGNOSIS — R63.5 ABNORMAL WEIGHT GAIN: ICD-10-CM

## 2023-08-15 RX ORDER — DULAGLUTIDE 0.75 MG/.5ML
0.75 INJECTION, SOLUTION SUBCUTANEOUS WEEKLY
Qty: 2 ML | Refills: 0 | OUTPATIENT
Start: 2023-08-15

## 2023-08-25 ENCOUNTER — OFFICE VISIT (OUTPATIENT)
Dept: FAMILY MEDICINE CLINIC | Age: 28
End: 2023-08-25
Payer: COMMERCIAL

## 2023-08-25 VITALS
HEART RATE: 105 BPM | DIASTOLIC BLOOD PRESSURE: 72 MMHG | BODY MASS INDEX: 41.15 KG/M2 | WEIGHT: 247 LBS | OXYGEN SATURATION: 97 % | SYSTOLIC BLOOD PRESSURE: 120 MMHG | HEIGHT: 65 IN

## 2023-08-25 DIAGNOSIS — R94.31 PROLONGED Q-T INTERVAL ON ECG: ICD-10-CM

## 2023-08-25 DIAGNOSIS — E28.2 PCOS (POLYCYSTIC OVARIAN SYNDROME): ICD-10-CM

## 2023-08-25 DIAGNOSIS — E78.5 DYSLIPIDEMIA: ICD-10-CM

## 2023-08-25 DIAGNOSIS — R73.03 PREDIABETES: Primary | ICD-10-CM

## 2023-08-25 DIAGNOSIS — E03.9 ACQUIRED HYPOTHYROIDISM: ICD-10-CM

## 2023-08-25 DIAGNOSIS — K30 INDIGESTION: ICD-10-CM

## 2023-08-25 DIAGNOSIS — G47.33 OBSTRUCTIVE SLEEP APNEA SYNDROME: ICD-10-CM

## 2023-08-25 DIAGNOSIS — F41.9 ANXIETY: ICD-10-CM

## 2023-08-25 DIAGNOSIS — R63.5 ABNORMAL WEIGHT GAIN: ICD-10-CM

## 2023-08-25 PROCEDURE — G8427 DOCREV CUR MEDS BY ELIG CLIN: HCPCS | Performed by: STUDENT IN AN ORGANIZED HEALTH CARE EDUCATION/TRAINING PROGRAM

## 2023-08-25 PROCEDURE — 99214 OFFICE O/P EST MOD 30 MIN: CPT | Performed by: STUDENT IN AN ORGANIZED HEALTH CARE EDUCATION/TRAINING PROGRAM

## 2023-08-25 PROCEDURE — 1036F TOBACCO NON-USER: CPT | Performed by: STUDENT IN AN ORGANIZED HEALTH CARE EDUCATION/TRAINING PROGRAM

## 2023-08-25 PROCEDURE — G8417 CALC BMI ABV UP PARAM F/U: HCPCS | Performed by: STUDENT IN AN ORGANIZED HEALTH CARE EDUCATION/TRAINING PROGRAM

## 2023-08-25 ASSESSMENT — ENCOUNTER SYMPTOMS
SINUS PRESSURE: 0
ABDOMINAL PAIN: 0
COUGH: 0
SORE THROAT: 0
SHORTNESS OF BREATH: 0
VOMITING: 0

## 2023-08-25 NOTE — PROGRESS NOTES
history of pancreatitis or family history of thyroid cancer/multiple endocrine neoplasia  - Trulicity sent to patient's pharmacy    8/25  - Did not keep a food log  - Doing well on Trulicity, tolerating well without side effects, will plan to increase to 1.5 mg weekly at next refill    Prediabetes  A1c 5.8%       RTO: Return in about 6 weeks (around 10/6/2023) for Weight management 15 min, trulicity. Reviewed with the patient: current clinical status, medications, activities and diet. Close follow up to evaluate treatment results and for coordination of care. Time was given for questions. All questions were answered to the patients satisfaction.    6 minutes spent on reviewing previous notes, records and labs; 10 minutes spent on physical exam, obesity, medication and diet education; 7 minutes spent on finalizing chart note

## 2023-09-04 DIAGNOSIS — R63.5 ABNORMAL WEIGHT GAIN: ICD-10-CM

## 2023-09-04 DIAGNOSIS — F41.9 ANXIETY: Chronic | ICD-10-CM

## 2023-09-04 DIAGNOSIS — E03.9 ACQUIRED HYPOTHYROIDISM: ICD-10-CM

## 2023-09-04 DIAGNOSIS — R94.31 PROLONGED Q-T INTERVAL ON ECG: Chronic | ICD-10-CM

## 2023-09-04 DIAGNOSIS — E28.2 PCOS (POLYCYSTIC OVARIAN SYNDROME): ICD-10-CM

## 2023-09-04 DIAGNOSIS — G47.33 OBSTRUCTIVE SLEEP APNEA SYNDROME: Chronic | ICD-10-CM

## 2023-09-04 DIAGNOSIS — K30 INDIGESTION: ICD-10-CM

## 2023-09-07 DIAGNOSIS — R63.5 ABNORMAL WEIGHT GAIN: ICD-10-CM

## 2023-09-07 DIAGNOSIS — G47.33 OBSTRUCTIVE SLEEP APNEA SYNDROME: ICD-10-CM

## 2023-09-07 DIAGNOSIS — R73.03 PREDIABETES: Primary | ICD-10-CM

## 2023-09-07 DIAGNOSIS — E03.9 ACQUIRED HYPOTHYROIDISM: ICD-10-CM

## 2023-09-07 DIAGNOSIS — E78.5 DYSLIPIDEMIA: ICD-10-CM

## 2023-09-07 DIAGNOSIS — R94.31 PROLONGED Q-T INTERVAL ON ECG: ICD-10-CM

## 2023-09-07 DIAGNOSIS — E28.2 PCOS (POLYCYSTIC OVARIAN SYNDROME): ICD-10-CM

## 2023-09-07 DIAGNOSIS — F41.9 ANXIETY: ICD-10-CM

## 2023-09-07 RX ORDER — DULAGLUTIDE 0.75 MG/.5ML
0.75 INJECTION, SOLUTION SUBCUTANEOUS WEEKLY
Qty: 4 ADJUSTABLE DOSE PRE-FILLED PEN SYRINGE | Refills: 0 | OUTPATIENT
Start: 2023-09-07

## 2023-09-07 RX ORDER — DULAGLUTIDE 1.5 MG/.5ML
1.5 INJECTION, SOLUTION SUBCUTANEOUS WEEKLY
Qty: 2 ML | Refills: 0 | Status: SHIPPED | OUTPATIENT
Start: 2023-09-07 | End: 2023-10-05

## 2023-09-07 NOTE — TELEPHONE ENCOUNTER
Trulicity 1.5 mg weekly sent to patient's pharmacy, discussed dose increase with patient at last appointment

## 2023-09-08 ENCOUNTER — OFFICE VISIT (OUTPATIENT)
Dept: FAMILY MEDICINE CLINIC | Age: 28
End: 2023-09-08

## 2023-09-08 VITALS
BODY MASS INDEX: 41.15 KG/M2 | HEIGHT: 65 IN | HEART RATE: 100 BPM | DIASTOLIC BLOOD PRESSURE: 82 MMHG | OXYGEN SATURATION: 98 % | TEMPERATURE: 97 F | WEIGHT: 247 LBS | SYSTOLIC BLOOD PRESSURE: 110 MMHG

## 2023-09-08 DIAGNOSIS — R73.03 PREDIABETES: ICD-10-CM

## 2023-09-08 DIAGNOSIS — E78.5 DYSLIPIDEMIA: ICD-10-CM

## 2023-09-08 DIAGNOSIS — G47.33 OBSTRUCTIVE SLEEP APNEA SYNDROME: ICD-10-CM

## 2023-09-08 DIAGNOSIS — F41.9 ANXIETY: ICD-10-CM

## 2023-09-08 DIAGNOSIS — R63.5 ABNORMAL WEIGHT GAIN: ICD-10-CM

## 2023-09-08 DIAGNOSIS — E28.2 PCOS (POLYCYSTIC OVARIAN SYNDROME): ICD-10-CM

## 2023-09-08 DIAGNOSIS — R94.31 PROLONGED Q-T INTERVAL ON ECG: ICD-10-CM

## 2023-09-08 DIAGNOSIS — R13.10 DYSPHAGIA, UNSPECIFIED TYPE: Primary | ICD-10-CM

## 2023-09-08 DIAGNOSIS — E03.9 ACQUIRED HYPOTHYROIDISM: ICD-10-CM

## 2023-09-08 RX ORDER — LEVOTHYROXINE SODIUM 88 UG/1
88 TABLET ORAL DAILY
Qty: 30 TABLET | Refills: 5 | Status: SHIPPED | OUTPATIENT
Start: 2023-09-08

## 2023-09-08 RX ORDER — METFORMIN HYDROCHLORIDE 500 MG/1
1000 TABLET, EXTENDED RELEASE ORAL
Qty: 60 TABLET | Refills: 5 | Status: SHIPPED | OUTPATIENT
Start: 2023-09-08

## 2023-09-08 ASSESSMENT — PATIENT HEALTH QUESTIONNAIRE - PHQ9
2. FEELING DOWN, DEPRESSED OR HOPELESS: 0
7. TROUBLE CONCENTRATING ON THINGS, SUCH AS READING THE NEWSPAPER OR WATCHING TELEVISION: 0
SUM OF ALL RESPONSES TO PHQ9 QUESTIONS 1 & 2: 0
SUM OF ALL RESPONSES TO PHQ QUESTIONS 1-9: 0
4. FEELING TIRED OR HAVING LITTLE ENERGY: 0
8. MOVING OR SPEAKING SO SLOWLY THAT OTHER PEOPLE COULD HAVE NOTICED. OR THE OPPOSITE, BEING SO FIGETY OR RESTLESS THAT YOU HAVE BEEN MOVING AROUND A LOT MORE THAN USUAL: 0
10. IF YOU CHECKED OFF ANY PROBLEMS, HOW DIFFICULT HAVE THESE PROBLEMS MADE IT FOR YOU TO DO YOUR WORK, TAKE CARE OF THINGS AT HOME, OR GET ALONG WITH OTHER PEOPLE: 0
SUM OF ALL RESPONSES TO PHQ QUESTIONS 1-9: 0
5. POOR APPETITE OR OVEREATING: 0
3. TROUBLE FALLING OR STAYING ASLEEP: 0
1. LITTLE INTEREST OR PLEASURE IN DOING THINGS: 0
SUM OF ALL RESPONSES TO PHQ QUESTIONS 1-9: 0
6. FEELING BAD ABOUT YOURSELF - OR THAT YOU ARE A FAILURE OR HAVE LET YOURSELF OR YOUR FAMILY DOWN: 0
9. THOUGHTS THAT YOU WOULD BE BETTER OFF DEAD, OR OF HURTING YOURSELF: 0
SUM OF ALL RESPONSES TO PHQ QUESTIONS 1-9: 0

## 2023-09-08 NOTE — TELEPHONE ENCOUNTER
Comments:     Last Office Visit (last PCP visit):   8/25/2023    Next Visit Date:  Future Appointments   Date Time Provider [address]   9/8/2023  2:00 PM Renee Bauer [address] [facility] [place]   10/6/2023  2:15 PM Shelbie Hardy DO [facility] [place]       **If hasn't been seen in over a year OR hasn't followed up according to last diabetes/ADHD visit, make appointment for patient before sending refill to provider.     Rx requested:  Requested Prescriptions     Pending Prescriptions Disp Refills    TRULICITY 1.5 WZ/4.7NW SC injection [Pharmacy Med Name: TRULICITY 1.5 EF/2.1VN SOPN 1.5 Solution Pen-injector] 2 mL 0     Sig: INJECT 0.5 MLS INTO THE SKIN ONCE A WEEK FOR 28 DAYS

## 2023-09-08 NOTE — CONSULTS
Session ID: [id number]  Language: Georgian   ID: #[id number]   Name: Rodolfo Sosa (pronounced Willetta Cons)

## 2023-09-09 RX ORDER — DULAGLUTIDE 1.5 MG/.5ML
1.5 INJECTION, SOLUTION SUBCUTANEOUS WEEKLY
Qty: 2 ML | Refills: 0 | OUTPATIENT
Start: 2023-09-09 | End: 2023-10-07

## 2023-09-09 ASSESSMENT — ENCOUNTER SYMPTOMS
COUGH: 0
SHORTNESS OF BREATH: 0
TROUBLE SWALLOWING: 1
SORE THROAT: 0
BACK PAIN: 0
NAUSEA: 0
VOMITING: 0
SINUS PAIN: 0
ABDOMINAL PAIN: 0
SINUS PRESSURE: 0
CHEST TIGHTNESS: 0
DIARRHEA: 0

## 2023-09-09 ASSESSMENT — VISUAL ACUITY: OU: 1

## 2023-10-06 ENCOUNTER — OFFICE VISIT (OUTPATIENT)
Dept: FAMILY MEDICINE CLINIC | Age: 28
End: 2023-10-06

## 2023-10-06 VITALS
SYSTOLIC BLOOD PRESSURE: 120 MMHG | DIASTOLIC BLOOD PRESSURE: 74 MMHG | BODY MASS INDEX: 40.98 KG/M2 | OXYGEN SATURATION: 98 % | HEART RATE: 95 BPM | HEIGHT: 65 IN | WEIGHT: 246 LBS

## 2023-10-06 DIAGNOSIS — R94.31 PROLONGED Q-T INTERVAL ON ECG: ICD-10-CM

## 2023-10-06 DIAGNOSIS — G47.33 OBSTRUCTIVE SLEEP APNEA SYNDROME: ICD-10-CM

## 2023-10-06 DIAGNOSIS — K30 INDIGESTION: ICD-10-CM

## 2023-10-06 DIAGNOSIS — F41.9 ANXIETY: ICD-10-CM

## 2023-10-06 DIAGNOSIS — E78.5 DYSLIPIDEMIA: ICD-10-CM

## 2023-10-06 DIAGNOSIS — R63.5 ABNORMAL WEIGHT GAIN: ICD-10-CM

## 2023-10-06 DIAGNOSIS — R73.03 PREDIABETES: Primary | ICD-10-CM

## 2023-10-06 DIAGNOSIS — E03.9 ACQUIRED HYPOTHYROIDISM: ICD-10-CM

## 2023-10-06 DIAGNOSIS — E28.2 PCOS (POLYCYSTIC OVARIAN SYNDROME): ICD-10-CM

## 2023-10-06 RX ORDER — NALTREXONE HYDROCHLORIDE 50 MG/1
25 TABLET, FILM COATED ORAL DAILY
Qty: 15 TABLET | Refills: 0 | Status: SHIPPED | OUTPATIENT
Start: 2023-10-06 | End: 2023-11-05

## 2023-10-06 RX ORDER — BUPROPION HYDROCHLORIDE 150 MG/1
150 TABLET ORAL EVERY MORNING
Qty: 30 TABLET | Refills: 0 | Status: SHIPPED | OUTPATIENT
Start: 2023-10-06

## 2023-10-06 RX ORDER — DULAGLUTIDE 3 MG/.5ML
3 INJECTION, SOLUTION SUBCUTANEOUS WEEKLY
Qty: 4 ADJUSTABLE DOSE PRE-FILLED PEN SYRINGE | Refills: 0 | Status: CANCELLED | OUTPATIENT
Start: 2023-10-06 | End: 2023-11-03

## 2023-10-06 ASSESSMENT — ENCOUNTER SYMPTOMS
VOMITING: 0
SINUS PRESSURE: 0
COUGH: 0
ABDOMINAL PAIN: 0
SHORTNESS OF BREATH: 0
SORE THROAT: 0

## 2023-10-06 NOTE — PROGRESS NOTES
scheduled 10/19    Prediabetes  A1c 5.8%       RTO: Return in about 4 weeks (around 11/3/2023) for Weight management 15 min, contrave. Reviewed with the patient: current clinical status, medications, activities and diet. Close follow up to evaluate treatment results and for coordination of care. Time was given for questions. All questions were answered to the patients satisfaction.    6 minutes spent on reviewing previous notes, records and labs; 10 minutes spent on physical exam, obesity, medication and diet education; 7 minutes spent on finalizing chart note

## 2023-11-02 ENCOUNTER — OFFICE VISIT (OUTPATIENT)
Dept: BARIATRICS/WEIGHT MGMT | Age: 28
End: 2023-11-02
Payer: COMMERCIAL

## 2023-11-02 ENCOUNTER — PREP FOR PROCEDURE (OUTPATIENT)
Dept: BARIATRICS/WEIGHT MGMT | Age: 28
End: 2023-11-02

## 2023-11-02 VITALS
HEART RATE: 90 BPM | DIASTOLIC BLOOD PRESSURE: 84 MMHG | BODY MASS INDEX: 41.45 KG/M2 | SYSTOLIC BLOOD PRESSURE: 124 MMHG | OXYGEN SATURATION: 98 % | HEIGHT: 65 IN | WEIGHT: 248.8 LBS

## 2023-11-02 DIAGNOSIS — E28.2 PCOS (POLYCYSTIC OVARIAN SYNDROME): Chronic | ICD-10-CM

## 2023-11-02 DIAGNOSIS — R94.31 PROLONGED Q-T INTERVAL ON ECG: Chronic | ICD-10-CM

## 2023-11-02 DIAGNOSIS — E66.01 MORBID OBESITY (HCC): Primary | ICD-10-CM

## 2023-11-02 PROCEDURE — 1036F TOBACCO NON-USER: CPT | Performed by: SURGERY

## 2023-11-02 PROCEDURE — G8484 FLU IMMUNIZE NO ADMIN: HCPCS | Performed by: SURGERY

## 2023-11-02 PROCEDURE — 99205 OFFICE O/P NEW HI 60 MIN: CPT | Performed by: SURGERY

## 2023-11-02 PROCEDURE — G8417 CALC BMI ABV UP PARAM F/U: HCPCS | Performed by: SURGERY

## 2023-11-02 PROCEDURE — G8427 DOCREV CUR MEDS BY ELIG CLIN: HCPCS | Performed by: SURGERY

## 2023-11-02 NOTE — PATIENT INSTRUCTIONS
Make the following appointments:    Orlinda Libman. Psychologist, Dr. Anatoliy Weaver. Schedule screening upper endoscopy with Dr. Karen Bey. No aspirin, ibuprofen or other non-steroidal anti-inflammatory drugs (NSAIDs) 7 days prior to surgery and endoscopy    No food or drink six hours prior to surgery. Sleep Medicine referral for Sleep Apnea assessment and treatment. THE BIG FOUR RULES:  1. Count calories! People count calories eat less. Use the daily plate or other apps for your smart phone or computer. The more you count the more of an expert you will become and will get easier and easier. If you are trying to lose weight and exercising a lot, keep calories to around the thousand to 1200 a day. If you are not exercising consistently try to limit them to around 800 a day. 2.  Separate liquids and solids. Wait 30 minutes to an hour after you eat before drinking liquids. This will reduce the total amount of food you eat in the meal.    3.  Exercise! You need up to 5 hours a week with a combination of cardio and resistance or weight training in order to keep excess body fat off.    4.  Sleep! Try to get 7 or more hours of sleep a night. If you have obstructive sleep apnea definitely use your CPAP machine. THE RULE OF 20'S:  For every meal, chew each bite 20 seconds. Then put the fork down and wait 20 seconds after you swallow before picking up the fork again. Each meal should take at least 20 minutes so your brain can register that you are full.

## 2023-11-02 NOTE — PROGRESS NOTES
for re-operation, blood clotting, or cardiovascular problems. We also discussed the risks and benefits of screening upper endoscopy to assess the degree of esophagitis, possible Garland's esophagitis,dysplasia, gastrointestinal stromal tumors, or H. Pylori infection prior to surgery. We discussed that there are risk for injury to the oropharynx, esophagus, stomach, and duodenum which include perforation and need for operative repair. The patient consents to the endoscopic procedure. We discussed the need for chronic vitamin and mineral supplementation after surgery to avoid deficiencies in vitamins B1 and/or B12 (which could result in neurologic disorders) and/or iron or calcium deficiencies. We discussed that nicotine is contraindicated after bariatric surgery due to the risk of marginal ulceration which can result in perforation, stricture, and/or bleeding. All patients of child bearing age and/or ability are advised to avoid pregnancy for at least 2 years after Bariatric surgery. Patients with PCOS are advised that oral contraceptives are often not effective in the first 2 years after Bariatric surgery. We discussed that NSAIDs are contraindicated with gastric bypass anatomy due to risk of marginal ulcer formation and perforation. This does not include baby aspirin (81 mg a day). We will follow-up with referrals to our bariatric dietitian and to our bariatric psychologist.  We will need a preoperative history and physical within 30 days of surgery. We will also refer to Sleep Medicine to determine if there is sleep apnea and to begin treatment if present. More than 60 minutes were spent in face-to-face interaction with patient majority of which were spent in care coordination of the above. Angie Guerra   has expressed interest in SLEEVE gastrectomy.        Jeni Conrad MD, WILLOWMBS, FACS, [facility] Verified Surgeon  [id number], Diplomate of The American Board of

## 2023-11-07 ENCOUNTER — OFFICE VISIT (OUTPATIENT)
Dept: BARIATRICS/WEIGHT MGMT | Age: 28
End: 2023-11-07
Payer: COMMERCIAL

## 2023-11-07 VITALS — WEIGHT: 246 LBS | BODY MASS INDEX: 40.98 KG/M2 | HEIGHT: 65 IN

## 2023-11-07 DIAGNOSIS — Z71.3 DIETARY COUNSELING AND SURVEILLANCE: ICD-10-CM

## 2023-11-07 DIAGNOSIS — E66.01 MORBID OBESITY (HCC): Primary | Chronic | ICD-10-CM

## 2023-11-07 PROCEDURE — 97802 MEDICAL NUTRITION INDIV IN: CPT | Performed by: DIETITIAN, REGISTERED

## 2023-12-07 ENCOUNTER — OFFICE VISIT (OUTPATIENT)
Dept: BARIATRICS/WEIGHT MGMT | Age: 28
End: 2023-12-07
Payer: COMMERCIAL

## 2023-12-07 VITALS — HEIGHT: 65 IN | BODY MASS INDEX: 41.15 KG/M2 | WEIGHT: 247 LBS

## 2023-12-07 DIAGNOSIS — Z71.3 DIETARY COUNSELING AND SURVEILLANCE: ICD-10-CM

## 2023-12-07 DIAGNOSIS — E66.01 MORBID OBESITY (HCC): Primary | ICD-10-CM

## 2023-12-07 DIAGNOSIS — E66.01 MORBID OBESITY (HCC): ICD-10-CM

## 2023-12-07 LAB
AMPHET UR QL SCN: NORMAL
BARBITURATES UR QL SCN: NORMAL
BENZODIAZ UR QL SCN: NORMAL
CANNABINOIDS UR QL SCN: NORMAL
COCAINE UR QL SCN: NORMAL
DRUG SCREEN COMMENT UR-IMP: NORMAL
FENTANYL SCREEN, URINE: NORMAL
METHADONE UR QL SCN: NORMAL
OPIATES UR QL SCN: NORMAL
OXYCODONE UR QL SCN: NORMAL
PCP UR QL SCN: NORMAL
PROPOXYPH UR QL SCN: NORMAL
TSH REFLEX: 2.98 UIU/ML (ref 0.44–3.86)

## 2023-12-07 PROCEDURE — G8428 CUR MEDS NOT DOCUMENT: HCPCS | Performed by: DIETITIAN, REGISTERED

## 2023-12-07 PROCEDURE — 97803 MED NUTRITION INDIV SUBSEQ: CPT | Performed by: DIETITIAN, REGISTERED

## 2023-12-07 PROCEDURE — G8417 CALC BMI ABV UP PARAM F/U: HCPCS | Performed by: DIETITIAN, REGISTERED

## 2023-12-07 NOTE — PROGRESS NOTES
Nutrition follow up prior to surgery  Visit number:  2 out of 6 for sleeve gastrectomy. Initial Weight: 248 lbs    Wt Readings from Last 3 Encounters:   11/07/23 111.6 kg (246 lb)   11/02/23 112.9 kg (248 lb 12.8 oz)   10/06/23 111.6 kg (246 lb)     gained 1 lbs over 1 month, down 1 lb from initial.    Previous nutrition goals:   Omit regular pop  Include lean, low fat protein source at each meal  Add Calcium citrate in divided doses  Sample protein shakes  Read \"Patient Guide to Bariatric Surgery\" before next visit    Nutrition goals: met except for calcium. Patient consuming 3 meals and 1-2 snacks/day, starch intake remains high. Patient sampled Ensure Max Protein and states is drinking 2/day in addition to 3 meals and 1-2 snacks. Patient has started riding stationary bike and doing exercise videos 30 minutes 2 times/day. Patient has started reading education melissa but has not finished. PES Statement:  Overweight/Obesity related to a complex combination of decreased energy needs, disordered eating patterns, physical inactivity, and increased psychological/life stress as evidenced by BMI greater than 30 and inability to maintain a significant amount of weight loss through conventional weight loss interventions. Intervention:  Reviewed previous goals and set new goals. Stressed importance of preoperative weight loss or surgery may be postponed or cancelled. Provided continued education regarding diet and lifestyle changes for optimal success post bariatric surgery. Encouragement and support provided.     Education materials provided: none    Plan/Recommendations:   Ensure Max protein should take place of protein source at meal  Follow bariatric plate method at meals  Add Calcium citrate in divided doses    Follow up: 4 weeks     Total time involved in direct patient education: 30 minutes    Robe Jones RD

## 2023-12-08 LAB — VITAMIN B-12: 589 PG/ML (ref 232–1245)

## 2023-12-11 LAB
ANABASINE UR-MCNC: <5 NG/ML
COTININE UR-MCNC: <15 NG/ML
NICOTINE UR-MCNC: <15 NG/ML
TRANS-3-OH-COTININE UR-MCNC: <50 NG/ML
VIT B1 SERPL-MCNC: 7 NMOL/L (ref 4–15)

## 2023-12-11 RX ORDER — SODIUM CHLORIDE 0.9 % (FLUSH) 0.9 %
5-40 SYRINGE (ML) INJECTION PRN
Status: CANCELLED | OUTPATIENT
Start: 2023-12-11

## 2023-12-11 RX ORDER — SODIUM CHLORIDE 9 MG/ML
INJECTION, SOLUTION INTRAVENOUS CONTINUOUS
Status: CANCELLED | OUTPATIENT
Start: 2023-12-11

## 2023-12-11 RX ORDER — SODIUM CHLORIDE 9 MG/ML
INJECTION, SOLUTION INTRAVENOUS PRN
Status: CANCELLED | OUTPATIENT
Start: 2023-12-11

## 2023-12-12 ENCOUNTER — ANESTHESIA EVENT (OUTPATIENT)
Dept: ENDOSCOPY | Age: 28
End: 2023-12-12
Payer: COMMERCIAL

## 2023-12-12 DIAGNOSIS — F17.210 CIGARETTE NICOTINE DEPENDENCE WITHOUT COMPLICATION: Primary | ICD-10-CM

## 2023-12-12 NOTE — PROGRESS NOTES
Repeat nicotine test ordered. Nicotine test of 12/12/2023 was negative. Toby Thomas MD, FACS, [facility]

## 2023-12-12 NOTE — H&P (VIEW-ONLY)
Repeat nicotine test ordered. Nicotine test of 12/12/2023 was negative. Sherita Khan MD, FACS, [facility]

## 2023-12-13 ENCOUNTER — HOSPITAL ENCOUNTER (OUTPATIENT)
Age: 28
Setting detail: OUTPATIENT SURGERY
Discharge: HOME | End: 2023-12-13
Attending: SURGERY | Admitting: SURGERY
Payer: COMMERCIAL

## 2023-12-13 ENCOUNTER — ANESTHESIA (OUTPATIENT)
Dept: ENDOSCOPY | Age: 28
End: 2023-12-13
Payer: COMMERCIAL

## 2023-12-13 VITALS
HEART RATE: 94 BPM | RESPIRATION RATE: 16 BRPM | TEMPERATURE: 98.2 F | OXYGEN SATURATION: 95 % | SYSTOLIC BLOOD PRESSURE: 118 MMHG | DIASTOLIC BLOOD PRESSURE: 80 MMHG

## 2023-12-13 DIAGNOSIS — E66.01 MORBID OBESITY (HCC): ICD-10-CM

## 2023-12-13 LAB
HCG, URINE, POC: NEGATIVE
Lab: NORMAL
NEGATIVE QC PASS/FAIL: NORMAL
POSITIVE QC PASS/FAIL: NORMAL

## 2023-12-13 PROCEDURE — 88342 IMHCHEM/IMCYTCHM 1ST ANTB: CPT

## 2023-12-13 PROCEDURE — 2500000003 HC RX 250 WO HCPCS: Performed by: NURSE ANESTHETIST, CERTIFIED REGISTERED

## 2023-12-13 PROCEDURE — 3609012400 HC EGD TRANSORAL BIOPSY SINGLE/MULTIPLE: Performed by: SURGERY

## 2023-12-13 PROCEDURE — 43239 EGD BIOPSY SINGLE/MULTIPLE: CPT | Performed by: SURGERY

## 2023-12-13 PROCEDURE — 2580000003 HC RX 258

## 2023-12-13 PROCEDURE — 7100000011 HC PHASE II RECOVERY - ADDTL 15 MIN: Performed by: SURGERY

## 2023-12-13 PROCEDURE — 88305 TISSUE EXAM BY PATHOLOGIST: CPT

## 2023-12-13 PROCEDURE — 7100000010 HC PHASE II RECOVERY - FIRST 15 MIN: Performed by: SURGERY

## 2023-12-13 PROCEDURE — 6370000000 HC RX 637 (ALT 250 FOR IP): Performed by: SURGERY

## 2023-12-13 PROCEDURE — 6360000002 HC RX W HCPCS: Performed by: NURSE ANESTHETIST, CERTIFIED REGISTERED

## 2023-12-13 PROCEDURE — 2709999900 HC NON-CHARGEABLE SUPPLY: Performed by: SURGERY

## 2023-12-13 PROCEDURE — 3700000000 HC ANESTHESIA ATTENDED CARE: Performed by: SURGERY

## 2023-12-13 PROCEDURE — 2580000003 HC RX 258: Performed by: SURGERY

## 2023-12-13 RX ORDER — SODIUM CHLORIDE 0.9 % (FLUSH) 0.9 %
5-40 SYRINGE (ML) INJECTION PRN
Status: DISCONTINUED | OUTPATIENT
Start: 2023-12-13 | End: 2023-12-13 | Stop reason: HOSPADM

## 2023-12-13 RX ORDER — LIDOCAINE HYDROCHLORIDE 20 MG/ML
INJECTION, SOLUTION EPIDURAL; INFILTRATION; INTRACAUDAL; PERINEURAL PRN
Status: DISCONTINUED | OUTPATIENT
Start: 2023-12-13 | End: 2023-12-13 | Stop reason: SDUPTHER

## 2023-12-13 RX ORDER — SODIUM CHLORIDE 9 MG/ML
INJECTION, SOLUTION INTRAVENOUS CONTINUOUS
Status: DISCONTINUED | OUTPATIENT
Start: 2023-12-13 | End: 2023-12-13 | Stop reason: HOSPADM

## 2023-12-13 RX ORDER — PROPOFOL 10 MG/ML
INJECTION, EMULSION INTRAVENOUS PRN
Status: DISCONTINUED | OUTPATIENT
Start: 2023-12-13 | End: 2023-12-13 | Stop reason: SDUPTHER

## 2023-12-13 RX ORDER — MAGNESIUM HYDROXIDE 1200 MG/15ML
LIQUID ORAL PRN
Status: DISCONTINUED | OUTPATIENT
Start: 2023-12-13 | End: 2023-12-13 | Stop reason: ALTCHOICE

## 2023-12-13 RX ORDER — SODIUM CHLORIDE 9 MG/ML
INJECTION, SOLUTION INTRAVENOUS PRN
Status: DISCONTINUED | OUTPATIENT
Start: 2023-12-13 | End: 2023-12-13 | Stop reason: HOSPADM

## 2023-12-13 RX ORDER — SIMETHICONE 20 MG/.3ML
EMULSION ORAL PRN
Status: DISCONTINUED | OUTPATIENT
Start: 2023-12-13 | End: 2023-12-13 | Stop reason: ALTCHOICE

## 2023-12-13 RX ORDER — SODIUM CHLORIDE 9 MG/ML
INJECTION, SOLUTION INTRAVENOUS
Status: COMPLETED
Start: 2023-12-13 | End: 2023-12-13

## 2023-12-13 RX ADMIN — LIDOCAINE HYDROCHLORIDE 4 MG: 20 INJECTION, SOLUTION EPIDURAL; INFILTRATION; INTRACAUDAL; PERINEURAL at 09:02

## 2023-12-13 RX ADMIN — SODIUM CHLORIDE: 9 INJECTION, SOLUTION INTRAVENOUS at 08:51

## 2023-12-13 RX ADMIN — PROPOFOL 100 MG: 10 INJECTION, EMULSION INTRAVENOUS at 09:06

## 2023-12-13 RX ADMIN — PROPOFOL 100 MG: 10 INJECTION, EMULSION INTRAVENOUS at 09:02

## 2023-12-13 ASSESSMENT — PAIN SCALES - GENERAL: PAINLEVEL_OUTOF10: 0

## 2023-12-13 ASSESSMENT — PAIN - FUNCTIONAL ASSESSMENT: PAIN_FUNCTIONAL_ASSESSMENT: 0-10

## 2023-12-13 ASSESSMENT — ENCOUNTER SYMPTOMS: SHORTNESS OF BREATH: 1

## 2023-12-13 NOTE — ANESTHESIA PRE PROCEDURE
Department of Anesthesiology  Preprocedure Note       Name:  Aníbal Espinoza   Age:  29 y.o.  :  1995                                          MRN:  [id number]         Date:  2023      Surgeon: Richelle Whitehead):  Andrew Blanc MD    Procedure: Procedure(s):  EGD BIOPSY    Medications prior to admission:   Prior to Admission medications    Medication Sig Start Date End Date Taking?  Authorizing Provider   buPROPion (WELLBUTRIN XL) 150 MG extended release tablet Take 1 tablet by mouth every morning 10/6/23   Vincenzo Hardin DO   metFORMIN (GLUCOPHAGE-XR) 500 MG extended release tablet Take 2 tablets by mouth daily (with breakfast) 23   TAISHA Machado   levothyroxine (SYNTHROID) 88 MCG tablet Take 1 tablet by mouth daily 23   TAISHA Machado   norethindrone-ethinyl estradiol-Fe (LO LOESTRIN FE) 1 MG-10 MCG / 10 MCG tablet Take 1 tablet by mouth daily 23   TAISHA Machado       Current medications:    Current Facility-Administered Medications   Medication Dose Route Frequency Provider Last Rate Last Admin    0.9 % sodium chloride infusion   IntraVENous Continuous Andrew Blanc MD        sodium chloride flush 0.9 % injection 5-40 mL  5-40 mL IntraVENous PRN Andrew Blanc MD        0.9 % sodium chloride infusion   IntraVENous PRN Andrew Blanc MD           Allergies:  No Known Allergies    Problem List:    Patient Active Problem List   Diagnosis Code    Anxiety F41.9    Multiple thyroid nodules E04.2    Morbid obesity ([address]) E66.01    Pain in joint involving multiple sites M25.50    Hyponatremia E87.1    Shortness of breath R06.02    Palpitations R00.2    Intercostal pain R07.82    Prolonged Q-T interval on ECG R94.31    Nausea and vomiting R11.2    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 musculoskeletal pain M79.18, G89.29    Sleep apnea G47.30    PCOS (polycystic ovarian syndrome) E28.2    Fibromyalgia M79.7    Bilateral carpal tunnel syndrome

## 2023-12-13 NOTE — ANESTHESIA POSTPROCEDURE EVALUATION
Department of Anesthesiology  Postprocedure Note    Patient: Vu Michel  MRN: [id number]  YOB: 1995  Date of evaluation: 12/13/2023    Procedure Summary       Date: 12/13/23 Room / Location: [address] [place] [address] [facility]    Anesthesia Start: 4413 Anesthesia Stop: 9112    Procedure: EGD BIOPSY Diagnosis:       Morbid obesity ([address])      (Morbid obesity ([address]) [E66.01])    Surgeons: Jacey Valderrama MD Responsible Provider: JIM Sierra CRNA    Anesthesia Type: MAC ASA Status: 3            Anesthesia Type: No value filed. Pedro Phase I: Pedro Score: 10    Pedro Phase II:      Anesthesia Post Evaluation    Patient location during evaluation: bedside  Patient participation: complete - patient participated  Level of consciousness: awake  Airway patency: patent  Nausea & Vomiting: no nausea and no vomiting  Cardiovascular status: blood pressure returned to baseline  Respiratory status: acceptable  Hydration status: euvolemic  Pain management: adequate        No notable events documented.

## 2023-12-13 NOTE — INTERVAL H&P NOTE
Update History & Physical    The patient's History and Physical of December 13, 2023 was reviewed with the patient and I examined the patient. There was no change. The surgical site was confirmed by the patient and me. Plan: The risks, benefits, expected outcome, and alternative to the recommended procedure have been discussed with the patient. Patient understands and wants to proceed with the procedure.      Electronically signed by Amy Fowler MD on 12/13/2023 at 8:59 AM

## 2024-01-02 ENCOUNTER — OFFICE VISIT (OUTPATIENT)
Dept: PULMONOLOGY | Age: 29
End: 2024-01-02
Payer: COMMERCIAL

## 2024-01-02 VITALS
SYSTOLIC BLOOD PRESSURE: 112 MMHG | DIASTOLIC BLOOD PRESSURE: 68 MMHG | BODY MASS INDEX: 40.82 KG/M2 | WEIGHT: 245 LBS | HEART RATE: 91 BPM | HEIGHT: 65 IN | OXYGEN SATURATION: 98 % | TEMPERATURE: 97.6 F

## 2024-01-02 DIAGNOSIS — E66.9 OBESITY, UNSPECIFIED CLASSIFICATION, UNSPECIFIED OBESITY TYPE, UNSPECIFIED WHETHER SERIOUS COMORBIDITY PRESENT: ICD-10-CM

## 2024-01-02 DIAGNOSIS — G47.30 SLEEP APNEA, UNSPECIFIED TYPE: Primary | ICD-10-CM

## 2024-01-02 PROCEDURE — G8417 CALC BMI ABV UP PARAM F/U: HCPCS | Performed by: INTERNAL MEDICINE

## 2024-01-02 PROCEDURE — 99203 OFFICE O/P NEW LOW 30 MIN: CPT | Performed by: INTERNAL MEDICINE

## 2024-01-02 PROCEDURE — G8427 DOCREV CUR MEDS BY ELIG CLIN: HCPCS | Performed by: INTERNAL MEDICINE

## 2024-01-02 PROCEDURE — G8484 FLU IMMUNIZE NO ADMIN: HCPCS | Performed by: INTERNAL MEDICINE

## 2024-01-02 PROCEDURE — 1036F TOBACCO NON-USER: CPT | Performed by: INTERNAL MEDICINE

## 2024-01-02 NOTE — PROGRESS NOTES
NEW PATIENT VISIT-PULMONARY/SLEEP    2024     REFERRING PHYSICIAN:  Juana Ch PA     REASON FOR REFERRAL:  Bariatric clearance    HPI:     Lex Escamilla is a 28 y.o. female who was referred to sleep and pulmonary clinic for evaluation.     She is in the process of going through weight loss surgery.    She has been told that she snores and has witnessed apneas.  She is tired and sleepy during the day and she dozes off easily.  Denies any respiratory issues.  Does not use inhalers.  Has not been hospitalized for any respiratory illnesses in the past.    Past Medical History   Past Medical History:   Diagnosis Date    Acquired hypothyroidism 2023    Anxiety 2017    Bilateral carpal tunnel syndrome 2021    Class 1 obesity due to excess calories without serious comorbidity with body mass index (BMI) of 30.0 to 30.9 in adult 2017    Fibromyalgia 2022    Heart abnormality     Hyperthyroidism 2018    Mental disorder     anxiety    Normal labor and delivery 2019    EFRAIN (obstructive sleep apnea) 2020    PCOS (polycystic ovarian syndrome) 2021     premature rupture of membranes with onset of labor more than 24 hours following rupture in third trimester     Tachycardia     Weight loss 3/25/2019       Past Surgical History  Past Surgical History:   Procedure Laterality Date    BACK SURGERY  2004    tumor removed    CARPAL TUNNEL RELEASE Right 2021    RIGHT HAND CARPAL TUNNEL DECOMPRESSION. performed by Kemar Ann MD at [facility] OR     SECTION N/A 2019     SECTION performed by Ade Tapia DO at [facility] OR    UPPER GASTROINTESTINAL ENDOSCOPY N/A 2023    EGD BIOPSY performed by Neel Rachel MD at [facility]       Allergies  No Known Allergies    Medications  Current Outpatient Medications   Medication Sig Dispense Refill    buPROPion (WELLBUTRIN XL) 150 MG extended release tablet Take 1

## 2024-01-20 ENCOUNTER — HOSPITAL ENCOUNTER (OUTPATIENT)
Dept: SLEEP CENTER | Age: 29
Discharge: HOME | End: 2024-01-22
Payer: COMMERCIAL

## 2024-01-20 PROCEDURE — 95806 SLEEP STUDY UNATT&RESP EFFT: CPT

## 2024-01-24 ENCOUNTER — OFFICE VISIT (OUTPATIENT)
Dept: BARIATRICS/WEIGHT MGMT | Age: 29
End: 2024-01-24
Payer: COMMERCIAL

## 2024-01-24 VITALS — BODY MASS INDEX: 41.63 KG/M2 | WEIGHT: 249.9 LBS | HEIGHT: 65 IN

## 2024-01-24 DIAGNOSIS — M79.7 FIBROMYALGIA: Chronic | ICD-10-CM

## 2024-01-24 DIAGNOSIS — E03.9 ACQUIRED HYPOTHYROIDISM: ICD-10-CM

## 2024-01-24 DIAGNOSIS — E66.01 PSYCHOLOGICAL FACTORS AFFECTING MORBID OBESITY (HCC): ICD-10-CM

## 2024-01-24 DIAGNOSIS — E66.01 MORBID OBESITY (HCC): ICD-10-CM

## 2024-01-24 DIAGNOSIS — R73.03 PREDIABETES: ICD-10-CM

## 2024-01-24 DIAGNOSIS — E28.2 PCOS (POLYCYSTIC OVARIAN SYNDROME): ICD-10-CM

## 2024-01-24 DIAGNOSIS — Z71.3 DIETARY COUNSELING AND SURVEILLANCE: ICD-10-CM

## 2024-01-24 DIAGNOSIS — E78.5 DYSLIPIDEMIA: ICD-10-CM

## 2024-01-24 DIAGNOSIS — Z71.89 ENCOUNTER FOR PSYCHOLOGICAL ASSESSMENT PRIOR TO BARIATRIC SURGERY: Primary | ICD-10-CM

## 2024-01-24 DIAGNOSIS — F54 PSYCHOLOGICAL FACTORS AFFECTING MORBID OBESITY (HCC): ICD-10-CM

## 2024-01-24 DIAGNOSIS — G89.29 CHRONIC MUSCULOSKELETAL PAIN: Chronic | ICD-10-CM

## 2024-01-24 DIAGNOSIS — E66.01 MORBID OBESITY (HCC): Primary | ICD-10-CM

## 2024-01-24 DIAGNOSIS — M79.18 CHRONIC MUSCULOSKELETAL PAIN: Chronic | ICD-10-CM

## 2024-01-24 PROCEDURE — 90791 PSYCH DIAGNOSTIC EVALUATION: CPT | Performed by: PSYCHOLOGIST

## 2024-01-24 PROCEDURE — G8417 CALC BMI ABV UP PARAM F/U: HCPCS | Performed by: DIETITIAN, REGISTERED

## 2024-01-24 PROCEDURE — 1036F TOBACCO NON-USER: CPT | Performed by: PSYCHOLOGIST

## 2024-01-24 PROCEDURE — G8428 CUR MEDS NOT DOCUMENT: HCPCS | Performed by: DIETITIAN, REGISTERED

## 2024-01-24 PROCEDURE — 97803 MED NUTRITION INDIV SUBSEQ: CPT | Performed by: DIETITIAN, REGISTERED

## 2024-01-24 ASSESSMENT — PATIENT HEALTH QUESTIONNAIRE - PHQ9
1. LITTLE INTEREST OR PLEASURE IN DOING THINGS: 0
SUM OF ALL RESPONSES TO PHQ9 QUESTIONS 1 & 2: 0
2. FEELING DOWN, DEPRESSED OR HOPELESS: 0
9. THOUGHTS THAT YOU WOULD BE BETTER OFF DEAD, OR OF HURTING YOURSELF: 0
10. IF YOU CHECKED OFF ANY PROBLEMS, HOW DIFFICULT HAVE THESE PROBLEMS MADE IT FOR YOU TO DO YOUR WORK, TAKE CARE OF THINGS AT HOME, OR GET ALONG WITH OTHER PEOPLE: 0
7. TROUBLE CONCENTRATING ON THINGS, SUCH AS READING THE NEWSPAPER OR WATCHING TELEVISION: 0
SUM OF ALL RESPONSES TO PHQ QUESTIONS 1-9: 2
SUM OF ALL RESPONSES TO PHQ QUESTIONS 1-9: 2
3. TROUBLE FALLING OR STAYING ASLEEP: 1
SUM OF ALL RESPONSES TO PHQ QUESTIONS 1-9: 2
4. FEELING TIRED OR HAVING LITTLE ENERGY: 1
8. MOVING OR SPEAKING SO SLOWLY THAT OTHER PEOPLE COULD HAVE NOTICED. OR THE OPPOSITE, BEING SO FIGETY OR RESTLESS THAT YOU HAVE BEEN MOVING AROUND A LOT MORE THAN USUAL: 0
6. FEELING BAD ABOUT YOURSELF - OR THAT YOU ARE A FAILURE OR HAVE LET YOURSELF OR YOUR FAMILY DOWN: 0
SUM OF ALL RESPONSES TO PHQ QUESTIONS 1-9: 2
5. POOR APPETITE OR OVEREATING: 0

## 2024-01-24 ASSESSMENT — ANXIETY QUESTIONNAIRES
6. BECOMING EASILY ANNOYED OR IRRITABLE: 1
4. TROUBLE RELAXING: 0
3. WORRYING TOO MUCH ABOUT DIFFERENT THINGS: 0
7. FEELING AFRAID AS IF SOMETHING AWFUL MIGHT HAPPEN: 0
5. BEING SO RESTLESS THAT IT IS HARD TO SIT STILL: 0
2. NOT BEING ABLE TO STOP OR CONTROL WORRYING: 0
IF YOU CHECKED OFF ANY PROBLEMS ON THIS QUESTIONNAIRE, HOW DIFFICULT HAVE THESE PROBLEMS MADE IT FOR YOU TO DO YOUR WORK, TAKE CARE OF THINGS AT HOME, OR GET ALONG WITH OTHER PEOPLE: NOT DIFFICULT AT ALL
GAD7 TOTAL SCORE: 2
1. FEELING NERVOUS, ANXIOUS, OR ON EDGE: 1

## 2024-01-24 NOTE — PROGRESS NOTES
comply with treatment recommendations through this office. She identified her mother and  as a good support system in her efforts to meet her weight management goals.    Clinical Impression Summary:  Lex is a 28 y.o. female referred for a pre-surgical psychological evaluation to identify psychosocial issues that may complicate outcomes and provide recommendations for how to improve these potential problems. Overall, the patient presented as cooperative and motivated to participate in the evaluation process. Based upon clinical interview, behavioral observations, medical records review, and testing data, Lex 's psychological suitability for bariatric surgery was predicted to be fair. She is strongly motivated to lose weight and reports having good social support to help her access additional treatment if needed. Lex demonstrated general lack of knowledge with regard to the surgical procedure, and poor understanding of associated risks and necessary behavior and lifestyle changes required for postoperative health and long-term success. She is currently psychologically healthy, and there are no behavioral health, substance abuse, or psychiatric conditions present that contraindicate moving forward. Lex has demonstrated the ability to modify her diet and make good nutritional choices in the past few months, which speaks well to her ability for behavior change. She is cognitively capable of understanding and complying with the required lifestyle changes and dietary restrictions involved to maintain health and weight loss following her surgery.    Diagnosis:    1. Encounter for psychological assessment prior to bariatric surgery    2. Morbid obesity (HCC)    3. PCOS (polycystic ovarian syndrome)    4. Fibromyalgia    5. Chronic musculoskeletal pain    6. Prediabetes    7. Dyslipidemia    8. Acquired hypothyroidism    9. Psychological factors affecting morbid obesity (HCC)          Requirement for

## 2024-01-24 NOTE — PROGRESS NOTES
Nutrition follow up prior to surgery  Visit number:  3 out of 6 for sleeve gastrectomy.    Initial Weight: 248 lbs    Wt Readings from Last 3 Encounters:   01/24/24 113.4 kg (249 lb 14.4 oz)   01/02/24 111.1 kg (245 lb)   12/07/23 112 kg (247 lb)     gained 2 lbs over 1 month, up 1 lbs from initial.    Previous nutrition goals:   Ensure Max protein should take place of protein source at meal  Follow bariatric plate method at meals  Add Calcium citrate in divided doses    Nutrition goals: partially met, patient consuming 3 meals/day, high intake of starch at breakfast continues.  Taking bariatric MVI and calcium citrate. States drinking only water. Exercise bike 2 times/week.  States has not read the entire education manual yet.    PES Statement:  Overweight/Obesity related to a complex combination of decreased energy needs, disordered eating patterns, physical inactivity, and increased psychological/life stress as evidenced by BMI greater than 30 and inability to maintain a significant amount of weight loss through conventional weight loss interventions.    Intervention:  Reviewed previous goals and set new goals.  Stressed importance of preoperative weight loss or surgery may be postponed or cancelled.  Provided continued education regarding diet and lifestyle changes for optimal success post bariatric surgery.  Encouragement and support provided.    Education materials provided:   CL protein call (Namibian)    Plan/Recommendations:   Use bariatric plate method to plan meals  Consume protein first at breakfast, add serving of fruit, and limit starch intake to 1 serving  Sample CL protein call  Increase exercise to 3-4 times/week    Follow up: 4 weeks     Total time involved in direct patient education: 30 minutes    Shira Salvador RD

## 2024-02-07 ENCOUNTER — TELEPHONE (OUTPATIENT)
Dept: FAMILY MEDICINE CLINIC | Age: 29
End: 2024-02-07

## 2024-02-12 ENCOUNTER — OFFICE VISIT (OUTPATIENT)
Dept: FAMILY MEDICINE CLINIC | Age: 29
End: 2024-02-12
Payer: COMMERCIAL

## 2024-02-12 VITALS
HEART RATE: 82 BPM | TEMPERATURE: 97.1 F | BODY MASS INDEX: 41.32 KG/M2 | RESPIRATION RATE: 18 BRPM | DIASTOLIC BLOOD PRESSURE: 78 MMHG | HEIGHT: 65 IN | SYSTOLIC BLOOD PRESSURE: 116 MMHG | WEIGHT: 248 LBS

## 2024-02-12 DIAGNOSIS — E28.2 PCOS (POLYCYSTIC OVARIAN SYNDROME): ICD-10-CM

## 2024-02-12 DIAGNOSIS — E03.9 ACQUIRED HYPOTHYROIDISM: ICD-10-CM

## 2024-02-12 DIAGNOSIS — R73.03 PREDIABETES: ICD-10-CM

## 2024-02-12 DIAGNOSIS — E78.5 DYSLIPIDEMIA: ICD-10-CM

## 2024-02-12 DIAGNOSIS — G47.33 OBSTRUCTIVE SLEEP APNEA SYNDROME: ICD-10-CM

## 2024-02-12 DIAGNOSIS — F41.9 ANXIETY: ICD-10-CM

## 2024-02-12 PROCEDURE — 1036F TOBACCO NON-USER: CPT | Performed by: PHYSICIAN ASSISTANT

## 2024-02-12 PROCEDURE — G8484 FLU IMMUNIZE NO ADMIN: HCPCS | Performed by: PHYSICIAN ASSISTANT

## 2024-02-12 PROCEDURE — G8417 CALC BMI ABV UP PARAM F/U: HCPCS | Performed by: PHYSICIAN ASSISTANT

## 2024-02-12 PROCEDURE — 99214 OFFICE O/P EST MOD 30 MIN: CPT | Performed by: PHYSICIAN ASSISTANT

## 2024-02-12 PROCEDURE — G8427 DOCREV CUR MEDS BY ELIG CLIN: HCPCS | Performed by: PHYSICIAN ASSISTANT

## 2024-02-12 RX ORDER — LEVOTHYROXINE SODIUM 88 UG/1
88 TABLET ORAL DAILY
Qty: 30 TABLET | Refills: 12 | Status: SHIPPED | OUTPATIENT
Start: 2024-02-12 | End: 2025-03-08

## 2024-02-12 RX ORDER — BUPROPION HYDROCHLORIDE 150 MG/1
300 TABLET ORAL EVERY MORNING
Qty: 60 TABLET | Refills: 12 | Status: SHIPPED | OUTPATIENT
Start: 2024-02-12

## 2024-02-12 RX ORDER — METFORMIN HYDROCHLORIDE 500 MG/1
1000 TABLET, EXTENDED RELEASE ORAL
Qty: 60 TABLET | Refills: 12 | Status: SHIPPED | OUTPATIENT
Start: 2024-02-12

## 2024-02-12 RX ORDER — BUPROPION HYDROCHLORIDE 150 MG/1
150 TABLET ORAL EVERY MORNING
Qty: 30 TABLET | Refills: 12 | Status: SHIPPED | OUTPATIENT
Start: 2024-02-12 | End: 2024-02-12

## 2024-02-12 NOTE — PROGRESS NOTES
Hematological:  Does not bruise/bleed easily.       Past Medical History:   Diagnosis Date    Acquired hypothyroidism 2023    Anxiety 2017    Bilateral carpal tunnel syndrome 2021    Class 1 obesity due to excess calories without serious comorbidity with body mass index (BMI) of 30.0 to 30.9 in adult 2017    Dyslipidemia 2023    Fibromyalgia 2022    Heart abnormality     Mental disorder     anxiety    Normal labor and delivery 2019    EFRAIN (obstructive sleep apnea) 2020    PCOS (polycystic ovarian syndrome) 2021     premature rupture of membranes with onset of labor more than 24 hours following rupture in third trimester     Tachycardia     Weight loss 2019     Past Surgical History:   Procedure Laterality Date    BACK SURGERY  2004    tumor removed    CARPAL TUNNEL RELEASE Right 2021    RIGHT HAND CARPAL TUNNEL DECOMPRESSION. performed by Kemar Ann [place] [facility] [place]     SECTION N/A 2019     SECTION performed by Ade Tapia DO at [facility]    UPPER GASTROINTESTINAL ENDOSCOPY N/A 2023    EGD BIOPSY performed by Neel Rachel MD at [facility]     Social History     Socioeconomic History    Marital status:      Spouse name: Not on file    Number of children: Not on file    Years of education: Not on file    Highest education level: Not on file   Occupational History    Not on file   Tobacco Use    Smoking status: Never    Smokeless tobacco: Never   Vaping Use    Vaping Use: Never used   Substance and Sexual Activity    Alcohol use: No    Drug use: No    Sexual activity: Yes     Partners: Male   Other Topics Concern    Not on file   Social History Narrative    Not on file     Social Determinants of Health     Financial Resource Strain: Low Risk  (2024)    Overall Financial Resource Strain (CARDIA)     Difficulty of Paying Living Expenses: Not hard at all   Food Insecurity: No

## 2024-02-21 ENCOUNTER — OFFICE VISIT (OUTPATIENT)
Dept: BARIATRICS/WEIGHT MGMT | Age: 29
End: 2024-02-21
Payer: COMMERCIAL

## 2024-02-21 VITALS — WEIGHT: 250.2 LBS | HEIGHT: 65 IN | BODY MASS INDEX: 41.69 KG/M2

## 2024-02-21 DIAGNOSIS — Z71.3 DIETARY COUNSELING AND SURVEILLANCE: ICD-10-CM

## 2024-02-21 DIAGNOSIS — E66.01 MORBID OBESITY (HCC): Primary | ICD-10-CM

## 2024-02-21 PROCEDURE — G8417 CALC BMI ABV UP PARAM F/U: HCPCS | Performed by: DIETITIAN, REGISTERED

## 2024-02-21 PROCEDURE — 97803 MED NUTRITION INDIV SUBSEQ: CPT | Performed by: DIETITIAN, REGISTERED

## 2024-02-21 PROCEDURE — G8428 CUR MEDS NOT DOCUMENT: HCPCS | Performed by: DIETITIAN, REGISTERED

## 2024-02-21 NOTE — PROGRESS NOTES
Nutrition follow up prior to surgery  Visit number:  4 out of 6 for Carla-en-Y gastric bypass (patient stated today after reading her manual she is not interested in sleeve gastrectomy).    Initial Weight: 248 lbs    Wt Readings from Last 3 Encounters:   02/12/24 112.5 kg (248 lb)   01/24/24 113.4 kg (249 lb 14.4 oz)   01/02/24 111.1 kg (245 lb)     gained 1 lbs over 1 month, up 2 lbs from initial.    Previous nutrition goals:   Use bariatric plate method to plan meals  Consume protein first at breakfast, add serving of fruit, and limit starch intake to 1 serving  Sample CL protein call  Increase exercise to 3-4 times/week    Nutrition goals: partially met, reports consuming 3 meals/day and fruit as snack, continues to include higher starch intake at breakfast however [facility] is trying to reduce overall intake. Patient has not found CL protein water yet, is drinking only approved beverages. Taking bariatric MVI and Calcium citrate. Stationary bike or exercise videos 5 days/week 30 minutes.  [facility] has almost finished reading education manual.  At end of visit patient inquired when surgery would be since she will be moving to [place] in July.  Stressed importance of post op follow up and informed patient would discuss with team.  Patient states she is planning on following with her mom's bariatric surgeon in [place].    PES Statement:  Overweight/Obesity related to a complex combination of decreased energy needs, disordered eating patterns, physical inactivity, and increased psychological/life stress as evidenced by BMI greater than 30 and inability to maintain a significant amount of weight loss through conventional weight loss interventions.    Intervention:  Reviewed previous goals and set new goals.  Stressed importance of preoperative weight loss or surgery may be postponed or cancelled.  Provided continued education regarding diet and lifestyle changes for optimal success post bariatric surgery.  Encouragement

## 2024-02-24 DIAGNOSIS — E28.2 PCOS (POLYCYSTIC OVARIAN SYNDROME): ICD-10-CM

## 2024-02-24 DIAGNOSIS — E03.9 ACQUIRED HYPOTHYROIDISM: ICD-10-CM

## 2024-02-26 RX ORDER — METFORMIN HYDROCHLORIDE 500 MG/1
1000 TABLET, EXTENDED RELEASE ORAL
Qty: 60 TABLET | Refills: 5 | OUTPATIENT
Start: 2024-02-26

## 2024-03-19 ENCOUNTER — OFFICE VISIT (OUTPATIENT)
Dept: BARIATRICS/WEIGHT MGMT | Age: 29
End: 2024-03-19
Payer: COMMERCIAL

## 2024-03-19 VITALS — HEIGHT: 65 IN | WEIGHT: 248.8 LBS | BODY MASS INDEX: 41.45 KG/M2

## 2024-03-19 DIAGNOSIS — E03.9 ACQUIRED HYPOTHYROIDISM: ICD-10-CM

## 2024-03-19 DIAGNOSIS — E66.01 MORBID OBESITY (HCC): Primary | ICD-10-CM

## 2024-03-19 DIAGNOSIS — F54 PSYCHOLOGICAL FACTORS AFFECTING MORBID OBESITY (HCC): ICD-10-CM

## 2024-03-19 DIAGNOSIS — E66.01 PSYCHOLOGICAL FACTORS AFFECTING MORBID OBESITY (HCC): ICD-10-CM

## 2024-03-19 DIAGNOSIS — E28.2 PCOS (POLYCYSTIC OVARIAN SYNDROME): ICD-10-CM

## 2024-03-19 DIAGNOSIS — E78.5 DYSLIPIDEMIA: ICD-10-CM

## 2024-03-19 DIAGNOSIS — M79.7 FIBROMYALGIA: ICD-10-CM

## 2024-03-19 DIAGNOSIS — Z71.3 DIETARY COUNSELING AND SURVEILLANCE: ICD-10-CM

## 2024-03-19 DIAGNOSIS — M79.18 CHRONIC MUSCULOSKELETAL PAIN: ICD-10-CM

## 2024-03-19 DIAGNOSIS — G89.29 CHRONIC MUSCULOSKELETAL PAIN: ICD-10-CM

## 2024-03-19 DIAGNOSIS — R73.03 PREDIABETES: ICD-10-CM

## 2024-03-19 PROCEDURE — 97803 MED NUTRITION INDIV SUBSEQ: CPT | Performed by: DIETITIAN, REGISTERED

## 2024-03-19 PROCEDURE — 1036F TOBACCO NON-USER: CPT | Performed by: PSYCHOLOGIST

## 2024-03-19 PROCEDURE — 90832 PSYTX W PT 30 MINUTES: CPT | Performed by: PSYCHOLOGIST

## 2024-03-19 PROCEDURE — G8428 CUR MEDS NOT DOCUMENT: HCPCS | Performed by: DIETITIAN, REGISTERED

## 2024-03-19 PROCEDURE — G8417 CALC BMI ABV UP PARAM F/U: HCPCS | Performed by: DIETITIAN, REGISTERED

## 2024-03-19 ASSESSMENT — ANXIETY QUESTIONNAIRES
6. BECOMING EASILY ANNOYED OR IRRITABLE: NOT AT ALL
IF YOU CHECKED OFF ANY PROBLEMS ON THIS QUESTIONNAIRE, HOW DIFFICULT HAVE THESE PROBLEMS MADE IT FOR YOU TO DO YOUR WORK, TAKE CARE OF THINGS AT HOME, OR GET ALONG WITH OTHER PEOPLE: NOT DIFFICULT AT ALL
GAD7 TOTAL SCORE: 0
5. BEING SO RESTLESS THAT IT IS HARD TO SIT STILL: NOT AT ALL
1. FEELING NERVOUS, ANXIOUS, OR ON EDGE: NOT AT ALL
3. WORRYING TOO MUCH ABOUT DIFFERENT THINGS: NOT AT ALL
4. TROUBLE RELAXING: NOT AT ALL
2. NOT BEING ABLE TO STOP OR CONTROL WORRYING: NOT AT ALL
7. FEELING AFRAID AS IF SOMETHING AWFUL MIGHT HAPPEN: NOT AT ALL

## 2024-03-19 ASSESSMENT — PATIENT HEALTH QUESTIONNAIRE - PHQ9
SUM OF ALL RESPONSES TO PHQ QUESTIONS 1-9: 3
6. FEELING BAD ABOUT YOURSELF - OR THAT YOU ARE A FAILURE OR HAVE LET YOURSELF OR YOUR FAMILY DOWN: NOT AT ALL
SUM OF ALL RESPONSES TO PHQ QUESTIONS 1-9: 3
SUM OF ALL RESPONSES TO PHQ QUESTIONS 1-9: 3
7. TROUBLE CONCENTRATING ON THINGS, SUCH AS READING THE NEWSPAPER OR WATCHING TELEVISION: NOT AT ALL
9. THOUGHTS THAT YOU WOULD BE BETTER OFF DEAD, OR OF HURTING YOURSELF: NOT AT ALL
10. IF YOU CHECKED OFF ANY PROBLEMS, HOW DIFFICULT HAVE THESE PROBLEMS MADE IT FOR YOU TO DO YOUR WORK, TAKE CARE OF THINGS AT HOME, OR GET ALONG WITH OTHER PEOPLE: NOT DIFFICULT AT ALL
1. LITTLE INTEREST OR PLEASURE IN DOING THINGS: NOT AT ALL
4. FEELING TIRED OR HAVING LITTLE ENERGY: SEVERAL DAYS
3. TROUBLE FALLING OR STAYING ASLEEP: MORE THAN HALF THE DAYS
5. POOR APPETITE OR OVEREATING: NOT AT ALL
8. MOVING OR SPEAKING SO SLOWLY THAT OTHER PEOPLE COULD HAVE NOTICED. OR THE OPPOSITE, BEING SO FIGETY OR RESTLESS THAT YOU HAVE BEEN MOVING AROUND A LOT MORE THAN USUAL: NOT AT ALL
2. FEELING DOWN, DEPRESSED OR HOPELESS: NOT AT ALL
SUM OF ALL RESPONSES TO PHQ QUESTIONS 1-9: 3
SUM OF ALL RESPONSES TO PHQ9 QUESTIONS 1 & 2: 0

## 2024-03-19 NOTE — PROGRESS NOTES
BARIATRIC PRE-SURGICAL BEHAVIORAL HEALTH FOLLOW UP  Frederick Tang Psy.D., [facility]  Licensed Clinical Psychologist ([id number])        Name: Lex Escamilla  Date of Birth 1995  Visit Date: 3/19/2024   Time spent with Patient: 20 minutes.    Patient provided informed consent for behavioral health intervention. Discussed with patient the limits of confidentiality (i.e., abuse reporting, suicide intervention, review by treatment team, review by insurance company, etc.) and purpose of treatment. Patient indicated understanding.      Session ID: [id number]  Language: Russian   ID: #[id number]   Name: Shay      KATHY  Lex presents for follow up of preoperative counseling, education, and behavioral support to assist with making behavior and lifestyle changes necessary for proposed bariatric surgery (RYGB).     Previous Recommendations: Lex must demonstrate improved knowledge/understanding regarding surgical procedure, risks associated with surgery, nutritional requirements after surgery, necessary behavior and lifestyle changes, and body image changes that may occur following bariatric surgery.      Lex reports doing well overall.     Is planning to relocate to [place] July 10, 2024; Lex indicated she does have postoperative follow up care arranged once in [place].     Mood: \"Tired, but good\"  Sleep: notes going to bed later than usual for her  Medication: taking Wellbutrin XL with good effect    Eating Habits: 3 meals per day  Activity/Exercise: 30 minutes, Monday - Friday: stationary bike and exercise videos    Current Weight: 248.8 lbs -1.4 lbs in 1 month; same weight as when starting program  Current BMI: 41.40    Completed in-home PSG study (mild EFRAIN) but has not followed up with Pulmonology.     Knowledge: was able to provide a basic description of the procedure and differentiate sleeve from bypass, as well as common risks associated with

## 2024-03-19 NOTE — PROGRESS NOTES
Nutrition follow up prior to surgery  Visit number:  5 out of 6 for Carla en Y gastric bypass.    Initial Weight: 248 lbs    Wt Readings from Last 3 Encounters:   02/21/24 113.5 kg (250 lb 3.2 oz)   02/12/24 112.5 kg (248 lb)   01/24/24 113.4 kg (249 lb 14.4 oz)     lost ~1 lbs over 1 month, stable from initial.    Previous nutrition goals:   Consume protein first at meals, starch last  Sample CL protein call  Finish reading education melissa    Nutrition goals: partially met, consuming 3 meals, 2 snacks/day, reducing overall starch intake. Drinking only approved beverages and  liquids from solids, has not purchased CL protein call but is aware of which ones to purchase.  Exercising 30 minutes 5 days/week (exercise videos). [facility] has finished reading education manual but still poor understanding of RNY gastric bypass procedure. [facility] will be moving to [place] in July, has a bariatric surgeon to follow with.    PES Statement:  Overweight/Obesity related to a complex combination of decreased energy needs, disordered eating patterns, physical inactivity, and increased psychological/life stress as evidenced by BMI greater than 30 and inability to maintain a significant amount of weight loss through conventional weight loss interventions.    Intervention:  Reviewed previous goals and set new goals.  Stressed importance of preoperative weight loss or surgery may be postponed or cancelled.  Provided continued education regarding diet and lifestyle changes for optimal success post bariatric surgery.  Encouragement and support provided.    Education materials provided:   none    Plan/Recommendations:   Continue current intake and exercise  Sample CL protein call  Continue to read education manual    Follow up: 4 weeks     Total time involved in direct patient education: 30 minutes    Shira Salvador RD

## 2024-03-30 DIAGNOSIS — E28.2 PCOS (POLYCYSTIC OVARIAN SYNDROME): Chronic | ICD-10-CM

## 2024-03-31 PROBLEM — G47.33 OSA (OBSTRUCTIVE SLEEP APNEA): Status: ACTIVE | Noted: 2020-02-25

## 2024-04-01 RX ORDER — NORETHINDRONE ACETATE AND ETHINYL ESTRADIOL, ETHINYL ESTRADIOL AND FERROUS FUMARATE 1MG-10(24)
1 KIT ORAL DAILY
Qty: 28 TABLET | Refills: 11 | Status: SHIPPED | OUTPATIENT
Start: 2024-04-01

## 2024-04-01 NOTE — TELEPHONE ENCOUNTER
Comments:     Last Office Visit (last PCP visit):   2/12/2024    Next Visit Date:  Future Appointments   Date Time Provider Department Center   4/10/2024  2:15 PM Sonja Valdivia MD [facility]   4/22/2024  2:00 PM Shira Salvador RD [facility]   8/12/2024  1:30 PM Juana Ch PA [facility]       **If hasn't been seen in over a year OR hasn't followed up according to last diabetes/ADHD visit, make appointment for patient before sending refill to provider.    Rx requested:  Requested Prescriptions     Pending Prescriptions Disp Refills    LO LOESTRIN FE 1 MG-10 MCG / 10 MCG tablet [Pharmacy Med Name: LO LOESTRIN FE 1 MG-10 MCG Tablet] 28 tablet 11     Sig: TAKE 1 TABLET BY MOUTH DAILY

## 2024-04-17 ENCOUNTER — OFFICE VISIT (OUTPATIENT)
Dept: PULMONOLOGY | Age: 29
End: 2024-04-17
Payer: COMMERCIAL

## 2024-04-17 VITALS
SYSTOLIC BLOOD PRESSURE: 108 MMHG | OXYGEN SATURATION: 97 % | HEIGHT: 65 IN | HEART RATE: 92 BPM | TEMPERATURE: 97.6 F | DIASTOLIC BLOOD PRESSURE: 76 MMHG | WEIGHT: 245 LBS | BODY MASS INDEX: 40.82 KG/M2

## 2024-04-17 DIAGNOSIS — G47.33 OSA (OBSTRUCTIVE SLEEP APNEA): Primary | ICD-10-CM

## 2024-04-17 DIAGNOSIS — E66.9 OBESITY, UNSPECIFIED CLASSIFICATION, UNSPECIFIED OBESITY TYPE, UNSPECIFIED WHETHER SERIOUS COMORBIDITY PRESENT: ICD-10-CM

## 2024-04-17 PROCEDURE — 99213 OFFICE O/P EST LOW 20 MIN: CPT | Performed by: INTERNAL MEDICINE

## 2024-04-17 PROCEDURE — G8427 DOCREV CUR MEDS BY ELIG CLIN: HCPCS | Performed by: INTERNAL MEDICINE

## 2024-04-17 PROCEDURE — 1036F TOBACCO NON-USER: CPT | Performed by: INTERNAL MEDICINE

## 2024-04-17 PROCEDURE — G8417 CALC BMI ABV UP PARAM F/U: HCPCS | Performed by: INTERNAL MEDICINE

## 2024-04-17 NOTE — PROGRESS NOTES
PATIENT VISIT-PULMONARY/SLEEP    2024     REFERRING PHYSICIAN:  Juana Ch PA     REASON FOR REFERRAL:  Bariatric clearance    HPI:     Lex Escamilla is a 28 y.o. female who was referred to sleep and pulmonary clinic for evaluation.     She is in the process of going through weight loss surgery.    She has been told that she snores and has witnessed apneas.  She is tired and sleepy during the day and she dozes off easily.  Denies any respiratory issues.  Does not use inhalers.  Has not been hospitalized for any respiratory illnesses in the past.      24:      Comes for follow up. Had  a home sleep study which showed mild EFRAIN.  There was no significant desaturations.  There is no change in her symptoms.          Past Medical History   Past Medical History:   Diagnosis Date    Acquired hypothyroidism 2023    Anxiety 2017    Bilateral carpal tunnel syndrome 2021    Class 1 obesity due to excess calories without serious comorbidity with body mass index (BMI) of 30.0 to 30.9 in adult 2017    Dyslipidemia 2023    Fibromyalgia 2022    Heart abnormality     Mental disorder     anxiety    Normal labor and delivery 2019    EFRAIN (obstructive sleep apnea) 2020    PCOS (polycystic ovarian syndrome) 2021     premature rupture of membranes with onset of labor more than 24 hours following rupture in third trimester     Tachycardia     Weight loss 2019       Past Surgical History  Past Surgical History:   Procedure Laterality Date    BACK SURGERY      tumor removed    CARPAL TUNNEL RELEASE Right 2021    RIGHT HAND CARPAL TUNNEL DECOMPRESSION. performed by Kemar Ann MD at [facility] [place]     SECTION N/A 2019     SECTION performed by Ade Tapia DO at [facility] OR    UPPER GASTROINTESTINAL ENDOSCOPY N/A 2023    EGD BIOPSY performed by Neel Rachel MD at [facility]

## 2024-04-22 ENCOUNTER — OFFICE VISIT (OUTPATIENT)
Dept: BARIATRICS/WEIGHT MGMT | Age: 29
End: 2024-04-22
Payer: COMMERCIAL

## 2024-04-22 VITALS — BODY MASS INDEX: 41.12 KG/M2 | HEIGHT: 65 IN | WEIGHT: 246.8 LBS

## 2024-04-22 DIAGNOSIS — E66.01 MORBID OBESITY (HCC): Primary | ICD-10-CM

## 2024-04-22 DIAGNOSIS — Z71.3 DIETARY COUNSELING AND SURVEILLANCE: ICD-10-CM

## 2024-04-22 PROCEDURE — G8417 CALC BMI ABV UP PARAM F/U: HCPCS | Performed by: DIETITIAN, REGISTERED

## 2024-04-22 PROCEDURE — G8428 CUR MEDS NOT DOCUMENT: HCPCS | Performed by: DIETITIAN, REGISTERED

## 2024-04-22 PROCEDURE — 97803 MED NUTRITION INDIV SUBSEQ: CPT | Performed by: DIETITIAN, REGISTERED

## 2024-04-22 NOTE — PROGRESS NOTES
Nutrition follow up prior to surgery  Visit number:  6 out of 6 for Carla en Y gastric bypass.    Initial Weight: 248 lbs    Wt Readings from Last 3 Encounters:   04/17/24 111.1 kg (245 lb)   03/19/24 112.9 kg (248 lb 12.8 oz)   02/21/24 113.5 kg (250 lb 3.2 oz)     lost 2 lbs over 1 month, down 1 lb from initial.    Previous nutrition goals:   Continue current intake and exercise  Sample CL protein call  Continue to read education manual    Nutrition goals: met    Patient has completed required nutrition appointments and has met nutrition goals.  Reviewed importance of preoperative weight loss (10% of body weight) and reminded patient that surgery may be postponed or cancelled if weight loss is inadequate.  Patient is cleared from nutrition for bariatric surgery at this time.  Will follow up preoperatively as needed.        PES Statement:  Overweight/Obesity related to a complex combination of decreased energy needs, disordered eating patterns, physical inactivity, and increased psychological/life stress as evidenced by BMI greater than 30 and inability to maintain a significant amount of weight loss through conventional weight loss interventions.    Intervention:  Reviewed previous goals and set new goals.  Stressed importance of preoperative weight loss or surgery may be postponed or cancelled.  Provided continued education regarding diet and lifestyle changes for optimal success post bariatric surgery.  Encouragement and support provided.    Education materials provided:   none    Plan/Recommendations:   Continue weight loss efforts  Prepare for 2 week pre op diet    Follow up: 1 week post op    Total time involved in direct patient education: 15 minutes    Shira Salvador RD

## 2024-04-29 ENCOUNTER — PREP FOR PROCEDURE (OUTPATIENT)
Dept: BARIATRICS/WEIGHT MGMT | Age: 29
End: 2024-04-29

## 2024-04-29 ENCOUNTER — TELEPHONE (OUTPATIENT)
Dept: BARIATRICS/WEIGHT MGMT | Age: 29
End: 2024-04-29

## 2024-04-29 DIAGNOSIS — E66.01 MORBID (SEVERE) OBESITY DUE TO EXCESS CALORIES (HCC): ICD-10-CM

## 2024-04-29 DIAGNOSIS — E28.2 POLYCYSTIC OVARIAN SYNDROME: ICD-10-CM

## 2024-04-29 PROBLEM — E78.5 HYPERLIPIDEMIA: Status: ACTIVE | Noted: 2024-04-29

## 2024-04-29 PROBLEM — K21.9 GERD (GASTROESOPHAGEAL REFLUX DISEASE): Status: ACTIVE | Noted: 2024-04-29

## 2024-04-29 NOTE — TELEPHONE ENCOUNTER
Patient notified that PA for WLS was approved and all appointments scheduled.           Last Office Visit:   11/2/2023      Next Visit Date:  Future Appointments   Date Time Provider Department Center   8/12/2024  1:30 PM Juana Ch PA [facility]   12/17/2024  2:30 PM Sonja Valdivia MD [facility]

## 2024-04-30 ENCOUNTER — NURSE ONLY (OUTPATIENT)
Dept: BARIATRICS/WEIGHT MGMT | Age: 29
End: 2024-04-30

## 2024-04-30 DIAGNOSIS — E66.01 MORBID OBESITY (HCC): Primary | Chronic | ICD-10-CM

## 2024-04-30 NOTE — PROGRESS NOTES
Patient attended  1:1 pre-op class with Syriac Interpretor  on 2024. Reviewed with patient pre-op instructions, PAT appointment, and pre-op diet instructions. Reviewed pre-op diet and start date.  Patient provided opportunity to ask questions. Patient verbalizes understanding of instructions. Patient provided Pre-Op Instructions in Syriac. Instructed on what to expect during hospitalization and hospital diet progression. Patient instructed on post-op hydration goals, tracking, and provided complementary 32 oz water bottle to assist in tracking. Instructed patient on the importance of post-op care, multi vitamin and mineral supplementation, and routine lab monitoring. Instructed on risk of marginal ulcers with NSAIDs and smoking post-op. Provided patient opportunity to ask questions. Patient verbalizes understanding.      PRE-OP INSTRUCTIONS  Name: ___________________________________________  _____________  You have been scheduled for ____________________ surgery on _____________.   Your procedure will be performed at: [facility]. You will receive a call from the hospital between 2pm-5pm the evening before with arrival time.  PAT scheduled on __________ at _________.  Check-in at Surgery Welcome Desk.    Follow these Instructions Carefully   Failure to comply with these instructions may result in a cancellation of your surgery.    Start Liver Shrinking Diet: Date: ____________  If your BMI is <50, you will start the pre-op diet 2 weeks before surgery.  If your BMI is ?50, you will start the pre-op diet 4 weeks before surgery.    2 - 4 weeks before surgery: Discontinue birth control pills. Condoms with spermicide should be used instead. IUD's, Depo shots, and Implanon implants are acceptable.    At your 2 week Pre-op visit, the doctor will send prescriptions for bowel prep and aprepitant (Emend) 125 mg x 1 to [facility].    HOLD GPL-1 medications (I.E. Ozempic, Trulicity,

## 2024-05-01 ENCOUNTER — TELEPHONE (OUTPATIENT)
Dept: BARIATRICS/WEIGHT MGMT | Age: 29
End: 2024-05-01

## 2024-05-01 NOTE — TELEPHONE ENCOUNTER
Called patient regarding pre-op appointment.  Patient stated that she forgot and I was able to reschedule the appointment to tomorrow at 10:45 am through an .

## 2024-05-02 ENCOUNTER — OFFICE VISIT (OUTPATIENT)
Dept: BARIATRICS/WEIGHT MGMT | Age: 29
End: 2024-05-02
Payer: COMMERCIAL

## 2024-05-02 VITALS
SYSTOLIC BLOOD PRESSURE: 102 MMHG | HEIGHT: 65 IN | BODY MASS INDEX: 40.32 KG/M2 | DIASTOLIC BLOOD PRESSURE: 70 MMHG | OXYGEN SATURATION: 98 % | WEIGHT: 242 LBS | HEART RATE: 124 BPM

## 2024-05-02 DIAGNOSIS — K21.9 GASTROESOPHAGEAL REFLUX DISEASE WITHOUT ESOPHAGITIS: ICD-10-CM

## 2024-05-02 DIAGNOSIS — E66.01 MORBID OBESITY (HCC): Primary | ICD-10-CM

## 2024-05-02 DIAGNOSIS — E28.2 PCOS (POLYCYSTIC OVARIAN SYNDROME): Chronic | ICD-10-CM

## 2024-05-02 DIAGNOSIS — E28.2 POLYCYSTIC OVARIAN SYNDROME: ICD-10-CM

## 2024-05-02 PROCEDURE — G8417 CALC BMI ABV UP PARAM F/U: HCPCS | Performed by: SURGERY

## 2024-05-02 PROCEDURE — 1036F TOBACCO NON-USER: CPT | Performed by: SURGERY

## 2024-05-02 PROCEDURE — G8427 DOCREV CUR MEDS BY ELIG CLIN: HCPCS | Performed by: SURGERY

## 2024-05-02 PROCEDURE — 99215 OFFICE O/P EST HI 40 MIN: CPT | Performed by: SURGERY

## 2024-05-02 RX ORDER — APREPITANT 125 MG/1
125 CAPSULE ORAL ONCE
Qty: 1 CAPSULE | Refills: 0 | Status: SHIPPED | OUTPATIENT
Start: 2024-05-02 | End: 2024-05-02

## 2024-05-02 RX ORDER — SODIUM CHLORIDE, SODIUM LACTATE, POTASSIUM CHLORIDE, CALCIUM CHLORIDE 600; 310; 30; 20 MG/100ML; MG/100ML; MG/100ML; MG/100ML
INJECTION, SOLUTION INTRAVENOUS CONTINUOUS
OUTPATIENT
Start: 2024-05-02

## 2024-05-02 RX ORDER — SODIUM CHLORIDE 0.9 % (FLUSH) 0.9 %
5-40 SYRINGE (ML) INJECTION PRN
OUTPATIENT
Start: 2024-05-02

## 2024-05-02 RX ORDER — SODIUM CHLORIDE 9 MG/ML
INJECTION, SOLUTION INTRAVENOUS PRN
OUTPATIENT
Start: 2024-05-02

## 2024-05-02 RX ORDER — SODIUM CHLORIDE 0.9 % (FLUSH) 0.9 %
5-40 SYRINGE (ML) INJECTION EVERY 12 HOURS SCHEDULED
OUTPATIENT
Start: 2024-05-02

## 2024-05-02 RX ORDER — POLYETHYLENE GLYCOL 3350, SODIUM SULFATE ANHYDROUS, SODIUM BICARBONATE, SODIUM CHLORIDE, POTASSIUM CHLORIDE 236; 22.74; 6.74; 5.86; 2.97 G/4L; G/4L; G/4L; G/4L; G/4L
4 POWDER, FOR SOLUTION ORAL ONCE
Qty: 4000 ML | Refills: 0 | Status: SHIPPED | OUTPATIENT
Start: 2024-05-02 | End: 2024-05-02

## 2024-05-02 RX ORDER — HEPARIN SODIUM 5000 [USP'U]/ML
5000 INJECTION, SOLUTION INTRAVENOUS; SUBCUTANEOUS ONCE
OUTPATIENT
Start: 2024-05-02 | End: 2024-05-02

## 2024-05-02 RX ORDER — TOPIRAMATE 50 MG/1
50 TABLET, FILM COATED ORAL 2 TIMES DAILY
Qty: 60 TABLET | Refills: 3 | Status: SHIPPED | OUTPATIENT
Start: 2024-05-02 | End: 2024-05-02

## 2024-05-02 RX ORDER — TOPIRAMATE 50 MG/1
50 TABLET, FILM COATED ORAL 2 TIMES DAILY
Qty: 60 TABLET | Refills: 3 | Status: SHIPPED | OUTPATIENT
Start: 2024-05-02

## 2024-05-02 RX ORDER — ONDANSETRON 2 MG/ML
4 INJECTION INTRAMUSCULAR; INTRAVENOUS ONCE
OUTPATIENT
Start: 2024-05-02 | End: 2024-05-02

## 2024-05-02 NOTE — PROGRESS NOTES
Surgery Consultation    Patient Name:  :  Hospital Day:   Lex Escamilla 1995 [unfilled]     Date of Service: 2024    Subjective:      History of Present Illness:    Lex Escamilla  is a 28 y.o. female referred by Dr. Rodriguez for morbid obesity.  Lex Escamilla reports multiple episodes of weight loss and regain after multiple diet and exercise programs. No nicotine or alcohol use.  There is not a current exercise routine.      EGD was normal.      Lex has completed the pre-op process for Bariatric surgery and is currently on the pre-op diet. Per our Psychologist, Dr. Galvin, there appears to be no active psychiatric contraindications to patient receiving bariatric surgery at this time; there is no evidence of untreated/undertreated clinically significant general psychopathology or substance/alcohol use disorders.        Past Medical History:   Diagnosis Date    Acquired hypothyroidism 2023    Anxiety 2017    Bilateral carpal tunnel syndrome 2021    Class 1 obesity due to excess calories without serious comorbidity with body mass index (BMI) of 30.0 to 30.9 in adult 2017    Dyslipidemia 2023    Fibromyalgia 2022    GERD (gastroesophageal reflux disease) 2024    Heart abnormality     Mental disorder     anxiety    Normal labor and delivery 2019    EFRAIN (obstructive sleep apnea) 2020    PCOS (polycystic ovarian syndrome) 2021     premature rupture of membranes with onset of labor more than 24 hours following rupture in third trimester     Tachycardia     Weight loss 2019    Past Surgical History:   Procedure Laterality Date    BACK SURGERY  2004    tumor removed    CARPAL TUNNEL RELEASE Right 2021    RIGHT HAND CARPAL TUNNEL DECOMPRESSION. performed by Kemar Ann MD at [facility] [place]     SECTION N/A 2019     SECTION performed by Ade Tapia DO at [facility] L&D OR    [facility]

## 2024-05-02 NOTE — PATIENT INSTRUCTIONS
No aspirina, ibuprofeno u otros medicamentos antiinflamatorios no esteroides (BRENNON) 7 días antes de la cirugía.    No comer ni beber seis horas antes de la cirugía.    [employer] hartley Emend la mañana de la cirugía.    Mantenga los carbohidratos en 20 gramos o menos al día antes de la operación.    No inyecte Trulicty, Ozempic o Wegovy la semana de la cirugía.    Cuanto más peso pierda antes de la cirugía, más fácil y  será hartley operación y más fácil será hartley recuperación.    Complete la preparación intestinal el día antes de la cirugía.

## 2025-05-10 ENCOUNTER — APPOINTMENT (OUTPATIENT)
Dept: GENERAL RADIOLOGY | Age: 30
End: 2025-05-10

## 2025-05-10 ENCOUNTER — HOSPITAL ENCOUNTER (EMERGENCY)
Age: 30
Discharge: HOME | End: 2025-05-10

## 2025-05-10 VITALS
RESPIRATION RATE: 20 BRPM | WEIGHT: 218 LBS | TEMPERATURE: 97.9 F | HEIGHT: 65 IN | BODY MASS INDEX: 36.32 KG/M2 | SYSTOLIC BLOOD PRESSURE: 117 MMHG | OXYGEN SATURATION: 97 % | DIASTOLIC BLOOD PRESSURE: 79 MMHG | HEART RATE: 84 BPM

## 2025-05-10 DIAGNOSIS — J06.9 ACUTE UPPER RESPIRATORY INFECTION: Primary | ICD-10-CM

## 2025-05-10 LAB
INFLUENZA A BY PCR: NEGATIVE
INFLUENZA B BY PCR: NEGATIVE
SARS-COV-2 RDRP RESP QL NAA+PROBE: NOT DETECTED

## 2025-05-10 PROCEDURE — 87502 INFLUENZA DNA AMP PROBE: CPT

## 2025-05-10 PROCEDURE — 87635 SARS-COV-2 COVID-19 AMP PRB: CPT

## 2025-05-10 PROCEDURE — 71046 X-RAY EXAM CHEST 2 VIEWS: CPT

## 2025-05-10 PROCEDURE — 99284 EMERGENCY DEPT VISIT MOD MDM: CPT

## 2025-05-10 ASSESSMENT — ENCOUNTER SYMPTOMS
STRIDOR: 0
COUGH: 1
COLOR CHANGE: 0
EYE DISCHARGE: 0
SHORTNESS OF BREATH: 0
ABDOMINAL PAIN: 0
ABDOMINAL DISTENTION: 0
SINUS PRESSURE: 1
VOMITING: 0
DIARRHEA: 0
CONSTIPATION: 0
RHINORRHEA: 0
SORE THROAT: 0
NAUSEA: 0
WHEEZING: 0

## 2025-05-10 ASSESSMENT — LIFESTYLE VARIABLES
HOW OFTEN DO YOU HAVE A DRINK CONTAINING ALCOHOL: NEVER
HOW MANY STANDARD DRINKS CONTAINING ALCOHOL DO YOU HAVE ON A TYPICAL DAY: PATIENT DOES NOT DRINK

## 2025-05-10 NOTE — ED TRIAGE NOTES
Pt arrived with child for complaints of illness. Pt stated that she has a sore throat, cough and fatigue

## 2025-05-10 NOTE — ED PROVIDER NOTES
[facility] EMERGENCY DEPARTMENT  EMERGENCY DEPARTMENT ENCOUNTER      Pt Name: Lex Escamilla  MRN: [id number]  Birthdate 1995  Date of evaluation: 5/10/2025  Provider: [place] PA-C  Note Started: 5/10/25 2:21 PM EDT    CHIEF COMPLAINT       Chief Complaint   Patient presents with    Illness         HISTORY OF PRESENT ILLNESS   (Location/Symptom, Timing/Onset, Context/Setting, Quality, Duration, Modifying Factors, Severity)  Note limiting factors.   Lex Escamilla is a 30 y.o. female who presents to the emergency department with complaint of nasal, chest congestion, cough, which patient states been ongoing for the last 48 hours, she is in the ED with her son was present with similar symptoms, she denies any fevers, no nausea vomiting, no diaphoresis.  Denies any previous medical problems.  Patient and chart review.  Denies any pain at this time, 0 out of 10    HPI    Nursing Notes were reviewed.    REVIEW OF SYSTEMS    (2-9 systems for level 4, 10 or more for level 5)     Review of Systems   Constitutional:  Negative for activity change and appetite change.   HENT:  Positive for congestion and sinus pressure. Negative for ear discharge, ear pain, nosebleeds, rhinorrhea and sore throat.    Eyes:  Negative for discharge.   Respiratory:  Positive for cough. Negative for shortness of breath, wheezing and stridor.    Cardiovascular:  Negative for chest pain, palpitations and leg swelling.   Gastrointestinal:  Negative for abdominal distention, abdominal pain, constipation, diarrhea, nausea and vomiting.   Genitourinary:  Negative for difficulty urinating and dysuria.   Musculoskeletal:  Negative for arthralgias.   Skin:  Negative for color change.   Neurological:  Negative for dizziness, tremors, syncope, weakness, numbness and headaches.   Psychiatric/Behavioral:  Negative for agitation and confusion.        Except as noted above the remainder of the review of systems was reviewed and  deficit present.      Mental Status: She is alert and oriented to person, place, and time. Mental status is at baseline.      Cranial Nerves: No cranial nerve deficit.      Sensory: No sensory deficit.      Motor: No weakness.      Coordination: Coordination normal.   Psychiatric:         Mood and Affect: Mood normal.         DIAGNOSTIC RESULTS     EKG: All EKG's are interpreted by the Emergency Department Physician who either signs or Co-signs this chart in the absence of a cardiologist.        RADIOLOGY:   Non-plain film images such as CT, Ultrasound and MRI are read by the radiologist. Plain radiographic images are visualized and preliminarily interpreted by the emergency physician with the below findings:      Interpretation per the Radiologist below, if available at the time of this note:    XR CHEST (2 VW)    (Results Pending)         ED BEDSIDE ULTRASOUND:   Performed by ED Physician - none    LABS:  Labs Reviewed   COVID-19, RAPID   RAPID INFLUENZA A/B ANTIGENS   POC PREGNANCY UR-QUAL       All other labs were within normal range or not returned as of this dictation.    EMERGENCY DEPARTMENT COURSE and DIFFERENTIAL DIAGNOSIS/MDM:   Vitals:    Vitals:    05/10/25 1337 05/10/25 1348   BP: (!) 128/95 117/79   Pulse: (!) 110 84   Resp: 22 20   Temp: 97.9 °F (36.6 °C)    SpO2: 98% 97%   Weight: 98.9 kg (218 lb)    Height: 1.651 m (5' 5\")            Medical Decision Making  Patient presented to ED along with child who is being seen as well with similar type symptoms, for cough, nasal congestion, which has been ongoing for last 48 hours, no fevers, no nausea vomiting, no chest pain.  Mother appears in no acute distress, chest x-ray was completed during the visit in the ED, which is negative for acute pulmonary process, negative for influenza, negative for COVID-19.  Child is positive for rhinovirus, as well as human metapneumovirus, mother diagnosed as viral upper respiratory infection, she was given a prescription

## 2025-06-11 ENCOUNTER — TELEPHONE (OUTPATIENT)
Dept: OBGYN CLINIC | Age: 30
End: 2025-06-11

## 2025-06-11 RX ORDER — ONDANSETRON 4 MG/1
4 TABLET, ORALLY DISINTEGRATING ORAL 3 TIMES DAILY PRN
Qty: 30 TABLET | Refills: 4 | Status: SHIPPED | OUTPATIENT
Start: 2025-06-11

## 2025-06-11 NOTE — TELEPHONE ENCOUNTER
Pt is having a lot of nausea and vomiting, not able to keep much down for two weeks.  Pt was advised to go to ER for evaluation and fluids.  She is asking if she can have something sent to pharmacy for nausea and vomiting.  Amenorrhea appointment in July. [facility] in [place]

## 2025-06-23 PROCEDURE — 99284 EMERGENCY DEPT VISIT MOD MDM: CPT

## 2025-06-24 ENCOUNTER — APPOINTMENT (OUTPATIENT)
Dept: ULTRASOUND IMAGING | Age: 30
End: 2025-06-24
Payer: COMMERCIAL

## 2025-06-24 ENCOUNTER — HOSPITAL ENCOUNTER (EMERGENCY)
Age: 30
Discharge: HOME | End: 2025-06-24
Attending: STUDENT IN AN ORGANIZED HEALTH CARE EDUCATION/TRAINING PROGRAM
Payer: COMMERCIAL

## 2025-06-24 VITALS
DIASTOLIC BLOOD PRESSURE: 68 MMHG | SYSTOLIC BLOOD PRESSURE: 101 MMHG | TEMPERATURE: 98.8 F | BODY MASS INDEX: 35.06 KG/M2 | WEIGHT: 210.4 LBS | HEART RATE: 93 BPM | RESPIRATION RATE: 20 BRPM | OXYGEN SATURATION: 95 % | HEIGHT: 65 IN

## 2025-06-24 DIAGNOSIS — N93.9 VAGINAL BLEEDING: Primary | ICD-10-CM

## 2025-06-24 DIAGNOSIS — O20.8 SUBCHORIONIC HEMORRHAGE OF PLACENTA IN FIRST TRIMESTER: ICD-10-CM

## 2025-06-24 LAB
ABO/RH: NORMAL
ALBUMIN SERPL-MCNC: 4.1 G/DL (ref 3.5–4.6)
ALP SERPL-CCNC: 92 U/L (ref 40–130)
ALT SERPL-CCNC: 39 U/L (ref 0–33)
ANION GAP SERPL CALCULATED.3IONS-SCNC: 14 MEQ/L (ref 9–15)
ANTIBODY SCREEN: NORMAL
AST SERPL-CCNC: 27 U/L (ref 0–35)
BACTERIA URNS QL MICRO: ABNORMAL /HPF
BASOPHILS # BLD: 0 K/UL (ref 0–0.2)
BASOPHILS NFR BLD: 0.3 %
BILIRUB SERPL-MCNC: 0.8 MG/DL (ref 0.2–0.7)
BILIRUB UR QL STRIP: ABNORMAL
BUN SERPL-MCNC: 7 MG/DL (ref 6–20)
CALCIUM SERPL-MCNC: 9.1 MG/DL (ref 8.5–9.9)
CHLORIDE SERPL-SCNC: 105 MEQ/L (ref 95–107)
CLARITY UR: ABNORMAL
CO2 SERPL-SCNC: 19 MEQ/L (ref 20–31)
COLOR UR: ABNORMAL
CREAT SERPL-MCNC: 0.64 MG/DL (ref 0.5–0.9)
EOSINOPHIL # BLD: 0.1 K/UL (ref 0–0.7)
EOSINOPHIL NFR BLD: 0.7 %
EPI CELLS #/AREA URNS AUTO: ABNORMAL /HPF (ref 0–5)
ERYTHROCYTE [DISTWIDTH] IN BLOOD BY AUTOMATED COUNT: 13.2 % (ref 11.5–14.5)
GLOBULIN SER CALC-MCNC: 3.5 G/DL (ref 2.3–3.5)
GLUCOSE SERPL-MCNC: 88 MG/DL (ref 70–99)
GLUCOSE UR STRIP-MCNC: NEGATIVE MG/DL
GONADOTROPIN, CHORIONIC (HCG) QUANT: NORMAL MIU/ML
HCG SERPL QL: POSITIVE
HCT VFR BLD AUTO: 41 % (ref 37–47)
HGB BLD-MCNC: 13.4 G/DL (ref 12–16)
HGB UR QL STRIP: ABNORMAL
KETONES UR STRIP-MCNC: >=80 MG/DL
LEUKOCYTE ESTERASE UR QL STRIP: ABNORMAL
LYMPHOCYTES # BLD: 3.1 K/UL (ref 1–4.8)
LYMPHOCYTES NFR BLD: 26.4 %
MCH RBC QN AUTO: 29.6 PG (ref 27–31.3)
MCHC RBC AUTO-ENTMCNC: 32.7 % (ref 33–37)
MCV RBC AUTO: 90.7 FL (ref 79.4–94.8)
MONOCYTES # BLD: 0.7 K/UL (ref 0.2–0.8)
MONOCYTES NFR BLD: 5.9 %
NEUTROPHILS # BLD: 7.9 K/UL (ref 1.4–6.5)
NEUTS SEG NFR BLD: 66.4 %
NITRITE UR QL STRIP: POSITIVE
PH UR STRIP: 5.5 [PH] (ref 5–9)
PLATELET # BLD AUTO: 366 K/UL (ref 130–400)
POTASSIUM SERPL-SCNC: 3.7 MEQ/L (ref 3.4–4.9)
PROT SERPL-MCNC: 7.6 G/DL (ref 6.3–8)
PROT UR STRIP-MCNC: 30 MG/DL
RBC # BLD AUTO: 4.52 M/UL (ref 4.2–5.4)
RBC #/AREA URNS HPF: ABNORMAL /HPF (ref 0–2)
REASON FOR REJECTION: NORMAL
REJECTED TEST: NORMAL
SODIUM SERPL-SCNC: 138 MEQ/L (ref 135–144)
SP GR UR STRIP: 1.04 (ref 1–1.03)
URINE REFLEX TO CULTURE: ABNORMAL
UROBILINOGEN UR STRIP-ACNC: 2 E.U./DL
WBC # BLD AUTO: 11.9 K/UL (ref 4.8–10.8)
WBC #/AREA URNS AUTO: ABNORMAL /HPF (ref 0–5)

## 2025-06-24 PROCEDURE — 76801 OB US < 14 WKS SINGLE FETUS: CPT

## 2025-06-24 PROCEDURE — 80053 COMPREHEN METABOLIC PANEL: CPT

## 2025-06-24 PROCEDURE — 76817 TRANSVAGINAL US OBSTETRIC: CPT

## 2025-06-24 PROCEDURE — 86900 BLOOD TYPING SEROLOGIC ABO: CPT

## 2025-06-24 PROCEDURE — 6360000002 HC RX W HCPCS: Performed by: STUDENT IN AN ORGANIZED HEALTH CARE EDUCATION/TRAINING PROGRAM

## 2025-06-24 PROCEDURE — 96374 THER/PROPH/DIAG INJ IV PUSH: CPT

## 2025-06-24 PROCEDURE — 86901 BLOOD TYPING SEROLOGIC RH(D): CPT

## 2025-06-24 PROCEDURE — 81001 URINALYSIS AUTO W/SCOPE: CPT

## 2025-06-24 PROCEDURE — 84702 CHORIONIC GONADOTROPIN TEST: CPT

## 2025-06-24 PROCEDURE — 84703 CHORIONIC GONADOTROPIN ASSAY: CPT

## 2025-06-24 PROCEDURE — 96375 TX/PRO/DX INJ NEW DRUG ADDON: CPT

## 2025-06-24 PROCEDURE — 2500000003 HC RX 250 WO HCPCS: Performed by: STUDENT IN AN ORGANIZED HEALTH CARE EDUCATION/TRAINING PROGRAM

## 2025-06-24 PROCEDURE — 6370000000 HC RX 637 (ALT 250 FOR IP): Performed by: STUDENT IN AN ORGANIZED HEALTH CARE EDUCATION/TRAINING PROGRAM

## 2025-06-24 PROCEDURE — 85025 COMPLETE CBC W/AUTO DIFF WBC: CPT

## 2025-06-24 PROCEDURE — 2580000003 HC RX 258: Performed by: STUDENT IN AN ORGANIZED HEALTH CARE EDUCATION/TRAINING PROGRAM

## 2025-06-24 PROCEDURE — 36415 COLL VENOUS BLD VENIPUNCTURE: CPT

## 2025-06-24 PROCEDURE — 93975 VASCULAR STUDY: CPT

## 2025-06-24 PROCEDURE — 96361 HYDRATE IV INFUSION ADD-ON: CPT

## 2025-06-24 PROCEDURE — 86850 RBC ANTIBODY SCREEN: CPT

## 2025-06-24 RX ORDER — 0.9 % SODIUM CHLORIDE 0.9 %
1000 INTRAVENOUS SOLUTION INTRAVENOUS ONCE
Status: DISCONTINUED | OUTPATIENT
Start: 2025-06-24 | End: 2025-06-24

## 2025-06-24 RX ORDER — PROGESTERONE 200 MG/1
200 CAPSULE ORAL DAILY
Qty: 5 CAPSULE | Refills: 0 | Status: SHIPPED | OUTPATIENT
Start: 2025-06-24 | End: 2025-06-29

## 2025-06-24 RX ORDER — ACETAMINOPHEN 500 MG
1000 TABLET ORAL ONCE
Status: COMPLETED | OUTPATIENT
Start: 2025-06-24 | End: 2025-06-24

## 2025-06-24 RX ORDER — ONDANSETRON 4 MG/1
4 TABLET, FILM COATED ORAL EVERY 8 HOURS PRN
Qty: 20 TABLET | Refills: 0 | Status: SHIPPED | OUTPATIENT
Start: 2025-06-24

## 2025-06-24 RX ORDER — ACETAMINOPHEN 325 MG/1
325 TABLET ORAL ONCE
Status: COMPLETED | OUTPATIENT
Start: 2025-06-24 | End: 2025-06-24

## 2025-06-24 RX ORDER — DEXTROSE, SODIUM CHLORIDE, SODIUM LACTATE, POTASSIUM CHLORIDE, AND CALCIUM CHLORIDE 5; .6; .31; .03; .02 G/100ML; G/100ML; G/100ML; G/100ML; G/100ML
INJECTION, SOLUTION INTRAVENOUS CONTINUOUS
Status: DISCONTINUED | OUTPATIENT
Start: 2025-06-24 | End: 2025-06-24 | Stop reason: HOSPADM

## 2025-06-24 RX ORDER — ONDANSETRON 2 MG/ML
4 INJECTION INTRAMUSCULAR; INTRAVENOUS ONCE
Status: COMPLETED | OUTPATIENT
Start: 2025-06-24 | End: 2025-06-24

## 2025-06-24 RX ORDER — CEPHALEXIN 500 MG/1
500 CAPSULE ORAL 4 TIMES DAILY
Qty: 20 CAPSULE | Refills: 0 | Status: SHIPPED | OUTPATIENT
Start: 2025-06-24 | End: 2025-06-29

## 2025-06-24 RX ADMIN — WATER 1000 MG: 1 INJECTION INTRAMUSCULAR; INTRAVENOUS; SUBCUTANEOUS at 01:49

## 2025-06-24 RX ADMIN — ACETAMINOPHEN 325 MG: 325 TABLET ORAL at 04:37

## 2025-06-24 RX ADMIN — ONDANSETRON 4 MG: 2 INJECTION, SOLUTION INTRAMUSCULAR; INTRAVENOUS at 01:49

## 2025-06-24 RX ADMIN — ACETAMINOPHEN 1000 MG: 500 TABLET ORAL at 01:49

## 2025-06-24 RX ADMIN — DEXTROSE, SODIUM CHLORIDE, SODIUM LACTATE, POTASSIUM CHLORIDE, AND CALCIUM CHLORIDE: 5; .6; .31; .03; .02 INJECTION, SOLUTION INTRAVENOUS at 01:59

## 2025-06-24 ASSESSMENT — ENCOUNTER SYMPTOMS
COLOR CHANGE: 0
SHORTNESS OF BREATH: 0
COUGH: 0
EYE DISCHARGE: 0
ABDOMINAL PAIN: 0
EYE ITCHING: 0
WHEEZING: 0
VOMITING: 0
EYE PAIN: 0
ABDOMINAL DISTENTION: 0
CHEST TIGHTNESS: 0
NAUSEA: 0
RHINORRHEA: 0
EYE REDNESS: 0
PHOTOPHOBIA: 0

## 2025-06-24 NOTE — ED PROVIDER NOTES
MEDICAL SCREENING EXAM     Basic Information   Time Seen: 12:03 AM   Primary Care Provider: Juana Ch PA     Chief Complaint   Patient presents with    Vaginal Bleeding    Abdominal Pain     10 weeks pregnant      HPI   Lex Escamilla is a 30 yrs female who presents with vaginal bleeding pregnancy.  Patient still is pregnant.   Physical Exam     /82 (06/23/25 2309)    Temp 98.8 °F (37.1 °C) (06/23/25 2309)    Pulse 93 (06/23/25 2309)   Resp 20 (06/23/25 2309)    SpO2 97 % (06/23/25 2309)       General: Awake and Alert, no acute distress   CV: RRR, S1, S2   Resp: LCTAB, even and non labored   Other: Mild suprapubic tenderness   Impression and Plan     Labs Reviewed   CBC WITH AUTO DIFFERENTIAL   COMPREHENSIVE METABOLIC PANEL   HCG, SERUM, QUALITATIVE        US OB LESS THAN 14 WEEKS SINGLE OR FIRST GESTATION    (Results Pending)   Vascular duplex abdominal visceral arteries/veins/organs complete    (Results Pending)   US PELVIS COMPLETE    (Results Pending)      Final Impression   I have performed a medical screening exam on Lex Escamilla. Based on this patient's chief complaint/symptoms of   Chief Complaint   Patient presents with    Vaginal Bleeding    Abdominal Pain     10 weeks pregnant    and my focused exam, their care will be started and transitioned to provider when room is available       Nataliia Rider PA-C  06/24/25 0003    
miscarriage.  She endorsed her understanding.  I advised her to return to the ED if she develops frequent bouts of bleeding, worsening cramping and/or passing of large clots.  Patient is blood type A positive and thus we did not have to administer immunoglobulin at this time.  She is not currently bleeding and is not having any pelvic cramping. I advised patient to follow-up with her OB/GYN which she endorsed she would be able to but she also appreciated that we gave her contact information for our OB/GYN service here at [facility], she endorsed she'd call if she could not get into her OB office soon enough.  I gave her strict verbal and written return precautions and she was stable upon discharging from the ED.      REASSESSMENT          CRITICAL CARE TIME   Total Critical Care time was 0 minutes, excluding separately reportable procedures.  There was a high probability of clinically significant/life threatening deterioration in the patient's condition which required my urgent intervention.     CONSULTS:  None    PROCEDURES:  Unless otherwise noted below, none     Procedures        FINAL IMPRESSION      1. Vaginal bleeding    2. Subchorionic hemorrhage of placenta in first trimester          DISPOSITION/PLAN   DISPOSITION Decision To Discharge 06/24/2025 04:21:39 AM      PATIENT REFERRED TO:  Berenice Dominguez APRN - [facility]  [address] [address]  [phone]    Schedule an appointment as soon as possible for a visit on 6/24/2025  OB/GYN at [facility]      DISCHARGE MEDICATIONS:  Discharge Medication List as of 6/24/2025  4:21 AM        START taking these medications    Details   progesterone (PROMETRIUM) 200 MG CAPS capsule Take 1 capsule by mouth daily for 5 days, Disp-5 capsule, R-0Normal      ondansetron (ZOFRAN) 4 MG tablet Take 1 tablet by mouth every 8 hours as needed for Nausea or Vomiting, Disp-20 tablet, R-0Normal      cephALEXin (KEFLEX) 500 MG capsule Take 1 capsule by mouth 4 times

## 2025-06-24 NOTE — DISCHARGE INSTRUCTIONS
You were seen in the ER today due to concerns for vaginal bleeding.  You did not have any current vaginal bleeding in the ED and her pelvic cramping stopped.  We did find a subchorionic hemorrhage which is a blood collection underneath the placenta that can bleed.  Please understand that this can lead to further bleeding and potentially miscarriage, especially given her history of miscarriage.  Ultrasound imaging showed a 10-week pregnancy with a heartbeat of 170 which is normal and the pregnancy is alive at this time.  I did speak with OB/GYN and they recommended that we prescribe you Prometrium 100 mg capsules which she will apply twice a day, I also prescribed Keflex given your UTI and Zofran for nausea.    Please call tomorrow to schedule an appointment with your OB/GYN, I will also give you [facility] if you cannot get in sooner.    If you develop any lightheadedness, dizziness, profuse vaginal bleeding and pelvic cramping, please come back to the ED for further evaluation and treatment.

## 2025-06-24 NOTE — ED TRIAGE NOTES
Patient states she is 10 weeks pregnant and been having hyper emesis and abdominal pain with vaginal bleeding today

## 2025-06-24 NOTE — CONSULTS
Session ID: [id number]  Session Duration: 24 minutes  Language: American   ID: #[id number]   Name: Mireya

## 2025-06-27 ENCOUNTER — OFFICE VISIT (OUTPATIENT)
Dept: OBGYN CLINIC | Age: 30
End: 2025-06-27
Payer: COMMERCIAL

## 2025-06-27 VITALS
DIASTOLIC BLOOD PRESSURE: 76 MMHG | SYSTOLIC BLOOD PRESSURE: 122 MMHG | WEIGHT: 209 LBS | BODY MASS INDEX: 34.82 KG/M2 | HEIGHT: 65 IN

## 2025-06-27 DIAGNOSIS — N91.1 SECONDARY AMENORRHEA: Primary | ICD-10-CM

## 2025-06-27 DIAGNOSIS — Z32.01 POSITIVE URINE PREGNANCY TEST: ICD-10-CM

## 2025-06-27 LAB
BACTERIA URNS QL MICRO: NEGATIVE /HPF
BILIRUB UR QL STRIP: ABNORMAL
CLARITY UR: CLEAR
COLOR UR: ABNORMAL
EPI CELLS #/AREA URNS AUTO: ABNORMAL /HPF (ref 0–5)
GLUCOSE UR STRIP-MCNC: NEGATIVE MG/DL
HGB UR QL STRIP: NEGATIVE
HYALINE CASTS #/AREA URNS AUTO: ABNORMAL /HPF (ref 0–5)
KETONES UR STRIP-MCNC: >=80 MG/DL
LEUKOCYTE ESTERASE UR QL STRIP: ABNORMAL
NITRITE UR QL STRIP: POSITIVE
PH UR STRIP: 6 [PH] (ref 5–9)
PROT UR STRIP-MCNC: 30 MG/DL
RBC #/AREA URNS AUTO: ABNORMAL /HPF (ref 0–5)
SP GR UR STRIP: 1.03 (ref 1–1.03)
UROBILINOGEN UR STRIP-ACNC: 2 E.U./DL
WBC #/AREA URNS AUTO: ABNORMAL /HPF (ref 0–5)
YEAST URNS QL MICRO: PRESENT /HPF

## 2025-06-27 PROCEDURE — 81025 URINE PREGNANCY TEST: CPT | Performed by: OBSTETRICS & GYNECOLOGY

## 2025-06-27 PROCEDURE — 99204 OFFICE O/P NEW MOD 45 MIN: CPT | Performed by: OBSTETRICS & GYNECOLOGY

## 2025-06-27 RX ORDER — PNV NO.95/FERROUS FUM/FOLIC AC 28MG-0.8MG
1 TABLET ORAL DAILY
Qty: 30 TABLET | Refills: 5 | Status: SHIPPED | OUTPATIENT
Start: 2025-06-27

## 2025-06-27 SDOH — ECONOMIC STABILITY: FOOD INSECURITY: WITHIN THE PAST 12 MONTHS, YOU WORRIED THAT YOUR FOOD WOULD RUN OUT BEFORE YOU GOT MONEY TO BUY MORE.: NEVER TRUE

## 2025-06-27 SDOH — ECONOMIC STABILITY: FOOD INSECURITY: WITHIN THE PAST 12 MONTHS, THE FOOD YOU BOUGHT JUST DIDN'T LAST AND YOU DIDN'T HAVE MONEY TO GET MORE.: NEVER TRUE

## 2025-06-27 ASSESSMENT — PATIENT HEALTH QUESTIONNAIRE - PHQ9
SUM OF ALL RESPONSES TO PHQ QUESTIONS 1-9: 0
1. LITTLE INTEREST OR PLEASURE IN DOING THINGS: NOT AT ALL
SUM OF ALL RESPONSES TO PHQ QUESTIONS 1-9: 0
2. FEELING DOWN, DEPRESSED OR HOPELESS: NOT AT ALL
SUM OF ALL RESPONSES TO PHQ QUESTIONS 1-9: 0
SUM OF ALL RESPONSES TO PHQ QUESTIONS 1-9: 0

## 2025-06-27 NOTE — CONSULTS
Session ID: [id number]  Session Duration: Longer than 53 minutes  Language: Syriac   ID: #[id number]   Name: Karri

## 2025-06-28 ENCOUNTER — RESULTS FOLLOW-UP (OUTPATIENT)
Dept: FAMILY MEDICINE CLINIC | Age: 30
End: 2025-06-28

## 2025-06-28 LAB
BACTERIA UR CULT: NORMAL
CLUE CELLS VAG QL WET PREP: ABNORMAL
T VAGINALIS VAG QL WET PREP: ABNORMAL
TRICHOMONAS VAGINALIS SCREEN: NEGATIVE
YEAST VAG QL WET PREP: ABNORMAL

## 2025-06-30 LAB

## 2025-06-30 ASSESSMENT — ENCOUNTER SYMPTOMS
WHEEZING: 0
NAUSEA: 0
ABDOMINAL DISTENTION: 0
SHORTNESS OF BREATH: 0
BLOOD IN STOOL: 0
ABDOMINAL PAIN: 0
SORE THROAT: 0
COUGH: 0
CONSTIPATION: 0
DIARRHEA: 0
VOMITING: 0

## 2025-07-01 NOTE — PROGRESS NOTES
Subjective:      Patient ID:  Lex Escamilla is a 30 y.o. female with chief complaint of:  Chief Complaint   Patient presents with    Amenorrhea     LMP 3/21/25 pt having some bleeding, just spotting was seen in the ED. Pt decided to stop her wellbutrin and synthroid when she found out she was pregnant        Patient presents for management of recent pregnancy test that was positive. She hd a recent US this month confirming an IUP. No bleeding no cramping no  nausea and vomting        Past Medical History:   Diagnosis Date    Acquired hypothyroidism 2023    Anxiety 2017    Bilateral carpal tunnel syndrome 2021    Class 1 obesity due to excess calories without serious comorbidity with body mass index (BMI) of 30.0 to 30.9 in adult 2017    Dyslipidemia 2023    Fibromyalgia 2022    GERD (gastroesophageal reflux disease) 2024    Heart abnormality     Mental disorder     anxiety    Normal labor and delivery 2019    EFRAIN (obstructive sleep apnea) 2020    PCOS (polycystic ovarian syndrome) 2021     premature rupture of membranes with onset of labor more than 24 hours following rupture in third trimester     Tachycardia     Weight loss 2019     Past Surgical History:   Procedure Laterality Date    BACK SURGERY      tumor removed    CARPAL TUNNEL RELEASE Right 2021    RIGHT HAND CARPAL TUNNEL DECOMPRESSION. performed by Kemar Ann MD at [facility] OR     SECTION N/A 2019     SECTION performed by Ade Tapia DO at [facility] L&D OR    UPPER GASTROINTESTINAL ENDOSCOPY N/A 2023    EGD BIOPSY performed by Neel Rachel MD at [facility]     Family History   Problem Relation Age of Onset    No Known Problems Mother     Diabetes Father     No Known Problems Sister     No Known Problems Brother     No Known Problems Sister     Rheum Arthritis Maternal Grandmother      Current Outpatient Medications

## 2025-07-02 LAB
C TRACH DNA CVX QL NAA+PROBE: NEGATIVE
HPV HR 12 DNA SPEC QL NAA+PROBE: NOT DETECTED
HPV16 DNA SPEC QL NAA+PROBE: NOT DETECTED
HPV16+18+H RISK 12 DNA SPEC-IMP: NORMAL
HPV18 DNA SPEC QL NAA+PROBE: NOT DETECTED
N GONORRHOEA DNA CERV MUCUS QL NAA+PROBE: NEGATIVE

## 2025-07-07 ENCOUNTER — APPOINTMENT (OUTPATIENT)
Dept: ULTRASOUND IMAGING | Age: 30
End: 2025-07-07
Payer: COMMERCIAL

## 2025-07-07 ENCOUNTER — HOSPITAL ENCOUNTER (EMERGENCY)
Age: 30
Discharge: HOME | End: 2025-07-07
Attending: EMERGENCY MEDICINE
Payer: COMMERCIAL

## 2025-07-07 ENCOUNTER — TELEPHONE (OUTPATIENT)
Dept: OBGYN CLINIC | Age: 30
End: 2025-07-07

## 2025-07-07 VITALS
DIASTOLIC BLOOD PRESSURE: 68 MMHG | HEART RATE: 72 BPM | WEIGHT: 202.6 LBS | RESPIRATION RATE: 16 BRPM | OXYGEN SATURATION: 99 % | TEMPERATURE: 98.4 F | SYSTOLIC BLOOD PRESSURE: 103 MMHG | HEIGHT: 65 IN | BODY MASS INDEX: 33.76 KG/M2

## 2025-07-07 DIAGNOSIS — K80.50 BILIARY COLIC: ICD-10-CM

## 2025-07-07 DIAGNOSIS — E87.1 HYPONATREMIA: Primary | ICD-10-CM

## 2025-07-07 DIAGNOSIS — R82.71 BACTERIURIA: ICD-10-CM

## 2025-07-07 DIAGNOSIS — E87.6 HYPOKALEMIA: ICD-10-CM

## 2025-07-07 LAB
ALBUMIN SERPL-MCNC: 3.9 G/DL (ref 3.5–4.6)
ALP SERPL-CCNC: 109 U/L (ref 40–130)
ALT SERPL-CCNC: 69 U/L (ref 0–33)
ANION GAP SERPL CALCULATED.3IONS-SCNC: 11 MEQ/L (ref 9–15)
AST SERPL-CCNC: 42 U/L (ref 0–35)
BACTERIA URNS QL MICRO: ABNORMAL /HPF
BASOPHILS # BLD: 0 K/UL (ref 0–0.2)
BASOPHILS NFR BLD: 0.4 %
BILIRUB DIRECT SERPL-MCNC: 0.6 MG/DL (ref 0–0.4)
BILIRUB SERPL-MCNC: 1.1 MG/DL (ref 0.2–0.7)
BILIRUB UR QL STRIP: ABNORMAL
BUN SERPL-MCNC: 6 MG/DL (ref 6–20)
CALCIUM SERPL-MCNC: 9.3 MG/DL (ref 8.5–9.9)
CHLORIDE SERPL-SCNC: 101 MEQ/L (ref 95–107)
CLARITY UR: ABNORMAL
CO2 SERPL-SCNC: 20 MEQ/L (ref 20–31)
COLOR UR: ABNORMAL
CREAT SERPL-MCNC: 0.62 MG/DL (ref 0.5–0.9)
EOSINOPHIL # BLD: 0.1 K/UL (ref 0–0.7)
EOSINOPHIL NFR BLD: 0.8 %
EPI CELLS #/AREA URNS AUTO: ABNORMAL /HPF (ref 0–5)
ERYTHROCYTE [DISTWIDTH] IN BLOOD BY AUTOMATED COUNT: 12.8 % (ref 11.5–14.5)
GLOBULIN SER CALC-MCNC: 3.7 G/DL (ref 2.3–3.5)
GLUCOSE SERPL-MCNC: 93 MG/DL (ref 70–99)
GLUCOSE UR STRIP-MCNC: NEGATIVE MG/DL
HCT VFR BLD AUTO: 40.6 % (ref 37–47)
HGB BLD-MCNC: 14.2 G/DL (ref 12–16)
HGB UR QL STRIP: NEGATIVE
HYALINE CASTS #/AREA URNS LPF: ABNORMAL /LPF (ref 0–5)
KETONES UR STRIP-MCNC: >=80 MG/DL
LEUKOCYTE ESTERASE UR QL STRIP: ABNORMAL
LIPASE SERPL-CCNC: 35 U/L (ref 12–95)
LYMPHOCYTES # BLD: 2.1 K/UL (ref 1–4.8)
LYMPHOCYTES NFR BLD: 24.2 %
MAGNESIUM SERPL-MCNC: 2 MG/DL (ref 1.7–2.4)
MCH RBC QN AUTO: 31.1 PG (ref 27–31.3)
MCHC RBC AUTO-ENTMCNC: 35 % (ref 33–37)
MCV RBC AUTO: 88.8 FL (ref 79.4–94.8)
MONOCYTES # BLD: 0.6 K/UL (ref 0.2–0.8)
MONOCYTES NFR BLD: 7.3 %
MUCOUS THREADS URNS QL MICRO: PRESENT /LPF
NEUTROPHILS # BLD: 5.7 K/UL (ref 1.4–6.5)
NEUTS SEG NFR BLD: 67.1 %
NITRITE UR QL STRIP: POSITIVE
PH UR STRIP: 6 [PH] (ref 5–9)
PLATELET # BLD AUTO: 353 K/UL (ref 130–400)
POTASSIUM SERPL-SCNC: 3.2 MEQ/L (ref 3.4–4.9)
PROT SERPL-MCNC: 7.6 G/DL (ref 6.3–8)
PROT UR STRIP-MCNC: 30 MG/DL
RBC # BLD AUTO: 4.57 M/UL (ref 4.2–5.4)
RBC #/AREA URNS HPF: ABNORMAL /HPF (ref 0–2)
SODIUM SERPL-SCNC: 132 MEQ/L (ref 135–144)
SP GR UR STRIP: 1.03 (ref 1–1.03)
URINE REFLEX TO CULTURE: ABNORMAL
UROBILINOGEN UR STRIP-ACNC: 4 E.U./DL
WBC # BLD AUTO: 8.5 K/UL (ref 4.8–10.8)
WBC #/AREA URNS AUTO: ABNORMAL /HPF (ref 0–5)

## 2025-07-07 PROCEDURE — 96375 TX/PRO/DX INJ NEW DRUG ADDON: CPT

## 2025-07-07 PROCEDURE — 82248 BILIRUBIN DIRECT: CPT

## 2025-07-07 PROCEDURE — 85025 COMPLETE CBC W/AUTO DIFF WBC: CPT

## 2025-07-07 PROCEDURE — 6360000002 HC RX W HCPCS: Performed by: EMERGENCY MEDICINE

## 2025-07-07 PROCEDURE — 81001 URINALYSIS AUTO W/SCOPE: CPT

## 2025-07-07 PROCEDURE — 83735 ASSAY OF MAGNESIUM: CPT

## 2025-07-07 PROCEDURE — 83690 ASSAY OF LIPASE: CPT

## 2025-07-07 PROCEDURE — 2580000003 HC RX 258: Performed by: EMERGENCY MEDICINE

## 2025-07-07 PROCEDURE — 6370000000 HC RX 637 (ALT 250 FOR IP): Performed by: EMERGENCY MEDICINE

## 2025-07-07 PROCEDURE — 36415 COLL VENOUS BLD VENIPUNCTURE: CPT

## 2025-07-07 PROCEDURE — 99284 EMERGENCY DEPT VISIT MOD MDM: CPT

## 2025-07-07 PROCEDURE — 2500000003 HC RX 250 WO HCPCS: Performed by: EMERGENCY MEDICINE

## 2025-07-07 PROCEDURE — 96365 THER/PROPH/DIAG IV INF INIT: CPT

## 2025-07-07 PROCEDURE — 76705 ECHO EXAM OF ABDOMEN: CPT

## 2025-07-07 PROCEDURE — 80053 COMPREHEN METABOLIC PANEL: CPT

## 2025-07-07 RX ORDER — ACETAMINOPHEN 500 MG
1000 TABLET ORAL ONCE
Status: COMPLETED | OUTPATIENT
Start: 2025-07-07 | End: 2025-07-07

## 2025-07-07 RX ORDER — ONDANSETRON 2 MG/ML
4 INJECTION INTRAMUSCULAR; INTRAVENOUS ONCE
Status: COMPLETED | OUTPATIENT
Start: 2025-07-07 | End: 2025-07-07

## 2025-07-07 RX ORDER — METOCLOPRAMIDE HYDROCHLORIDE 5 MG/ML
10 INJECTION INTRAMUSCULAR; INTRAVENOUS ONCE
Status: COMPLETED | OUTPATIENT
Start: 2025-07-07 | End: 2025-07-07

## 2025-07-07 RX ORDER — PROMETHAZINE HYDROCHLORIDE 25 MG/1
25 SUPPOSITORY RECTAL 2 TIMES DAILY PRN
Qty: 14 SUPPOSITORY | Refills: 0 | Status: ON HOLD | OUTPATIENT
Start: 2025-07-07 | End: 2025-07-11 | Stop reason: HOSPADM

## 2025-07-07 RX ORDER — POTASSIUM CHLORIDE 7.45 MG/ML
10 INJECTION INTRAVENOUS ONCE
Status: COMPLETED | OUTPATIENT
Start: 2025-07-07 | End: 2025-07-07

## 2025-07-07 RX ORDER — 0.9 % SODIUM CHLORIDE 0.9 %
1000 INTRAVENOUS SOLUTION INTRAVENOUS ONCE
Status: COMPLETED | OUTPATIENT
Start: 2025-07-07 | End: 2025-07-07

## 2025-07-07 RX ORDER — PROMETHAZINE HYDROCHLORIDE 25 MG/1
25 SUPPOSITORY RECTAL EVERY 6 HOURS PRN
Qty: 30 SUPPOSITORY | Refills: 2 | Status: ON HOLD | OUTPATIENT
Start: 2025-07-07 | End: 2025-07-11 | Stop reason: HOSPADM

## 2025-07-07 RX ORDER — CEPHALEXIN 500 MG/1
500 CAPSULE ORAL 3 TIMES DAILY
Qty: 21 CAPSULE | Refills: 0 | Status: ON HOLD | OUTPATIENT
Start: 2025-07-07 | End: 2025-07-11 | Stop reason: HOSPADM

## 2025-07-07 RX ADMIN — ONDANSETRON 4 MG: 2 INJECTION, SOLUTION INTRAMUSCULAR; INTRAVENOUS at 21:44

## 2025-07-07 RX ADMIN — METOCLOPRAMIDE 10 MG: 5 INJECTION, SOLUTION INTRAMUSCULAR; INTRAVENOUS at 22:36

## 2025-07-07 RX ADMIN — SODIUM CHLORIDE 1000 ML: 0.9 INJECTION, SOLUTION INTRAVENOUS at 20:47

## 2025-07-07 RX ADMIN — ACETAMINOPHEN 1000 MG: 500 TABLET ORAL at 22:34

## 2025-07-07 RX ADMIN — POTASSIUM CHLORIDE 10 MEQ: 7.46 INJECTION, SOLUTION INTRAVENOUS at 20:53

## 2025-07-07 RX ADMIN — FAMOTIDINE 20 MG: 10 INJECTION, SOLUTION INTRAVENOUS at 21:46

## 2025-07-07 ASSESSMENT — PAIN DESCRIPTION - LOCATION
LOCATION: ABDOMEN
LOCATION: ABDOMEN

## 2025-07-07 ASSESSMENT — PAIN SCALES - GENERAL
PAINLEVEL_OUTOF10: 6
PAINLEVEL_OUTOF10: 8

## 2025-07-07 ASSESSMENT — PAIN DESCRIPTION - DESCRIPTORS
DESCRIPTORS: ACHING
DESCRIPTORS: CRAMPING

## 2025-07-07 ASSESSMENT — PAIN - FUNCTIONAL ASSESSMENT: PAIN_FUNCTIONAL_ASSESSMENT: 0-10

## 2025-07-07 ASSESSMENT — ENCOUNTER SYMPTOMS
VOMITING: 1
NAUSEA: 1
SHORTNESS OF BREATH: 0
ABDOMINAL PAIN: 1

## 2025-07-07 ASSESSMENT — LIFESTYLE VARIABLES
HOW MANY STANDARD DRINKS CONTAINING ALCOHOL DO YOU HAVE ON A TYPICAL DAY: PATIENT DOES NOT DRINK
HOW OFTEN DO YOU HAVE A DRINK CONTAINING ALCOHOL: NEVER

## 2025-07-07 ASSESSMENT — PAIN DESCRIPTION - PAIN TYPE: TYPE: CHRONIC PAIN

## 2025-07-07 ASSESSMENT — PAIN DESCRIPTION - ORIENTATION: ORIENTATION: UPPER

## 2025-07-07 NOTE — CONSULTS
Session ID: [id number]  Session Duration: 10 minutes  Language: Panamanian   ID: #[id number]   Name: Zack Rowley

## 2025-07-07 NOTE — TELEPHONE ENCOUNTER
Patient called because she is unable to keep anything down, patient states she has not picked up her zofran from the pharmacy. Pt advised to contact pharmacy for the zofran and if it does not help to call us back. Conversation interpreted by

## 2025-07-07 NOTE — ED PROVIDER NOTES
Basic Information   Time Seen: 7:24 PM   Primary Care Provider: Julianne Morales DO     Chief Complaint   Patient presents with    Abdominal Pain     Abdominal pain, vomiting X2 wks , no blood in vomit,  12wks preg      HPI   Lex Escamilla is a 30 yrs female who presents with epigastric abdominal pain for the past 2 days.  She states that she has been having nausea and vomiting yellow fluid.  She states that the abdominal pain is constant and is worse when eating.  She denies fever or chills.  She denies vaginal bleeding.   Physical Exam     /73 (07/07/25 1907)    Temp 98.4 °F (36.9 °C) (07/07/25 1907)    Pulse 86 (07/07/25 1907)   Resp 16 (07/07/25 1907)    SpO2 98 % (07/07/25 1907)       General: Awake and Alert, no acute distress   CV: RRR, S1, S2   Resp: LCTAB, even and non labored   Other:   Impression and Plan     Labs Reviewed   COMPREHENSIVE METABOLIC PANEL   CBC WITH AUTO DIFFERENTIAL   LIPASE   MAGNESIUM   URINALYSIS WITH REFLEX TO CULTURE        No orders to display      Final Impression   I have performed a medical screening exam on Lex Escamilla. Based on this patient's chief complaint/symptoms of   Chief Complaint   Patient presents with    Abdominal Pain     Abdominal pain, vomiting X2 wks , no blood in vomit,  12wks preg    and my focused exam, their care will be started and transitioned to provider when room is available       Breezy Jones PA-C  07/07/25 1924

## 2025-07-08 NOTE — DISCHARGE INSTRUCTIONS
Return for fever, persistent vomiting, worsening symptoms or concerns.  You do need your gallbladder out.  Follow-up with your gynecologist and general surgery.  Thank you.

## 2025-07-08 NOTE — DISCHARGE INSTR - COC
Continuity of Care Form    Patient Name: Lex Escamilla   :  1995  MRN:  [id number]    Admit date:  2025  Discharge date:  ***    Code Status Order: Prior   Advance Directives:     Admitting Physician:  No admitting provider for patient encounter.  PCP: Julianne Morales DO    Discharging Nurse: ***  Discharging Hospital Unit/Room#:   Discharging Unit Phone Number: ***    Emergency Contact:   Extended Emergency Contact Information  Primary Emergency Contact: SOFIYA ESCAMILLA   [facility]  Home Phone: [phone]  Work Phone: [phone]  Mobile Phone: [phone]  Relation: Spouse    Past Surgical History:  Past Surgical History:   Procedure Laterality Date    BACK SURGERY  2004    tumor removed    CARPAL TUNNEL RELEASE Right 2021    RIGHT HAND CARPAL TUNNEL DECOMPRESSION. performed by Kemar Ann MD at [facility] [place]     SECTION N/A 2019     SECTION performed by Ade Tapia DO at [facility] L[facility] OR    UPPER GASTROINTESTINAL ENDOSCOPY N/A 2023    EGD BIOPSY performed by Neel Rachel MD at [facility]       Immunization History:   Immunization History   Administered Date(s) Administered    Influenza, AFLURIA (age 3 y+), FLUZONE, (age 6 mo+), Quadv MDV, 0.5mL 2017, 10/19/2018, 10/24/2019    Influenza, FLUCELVAX, (age 6 mo+), MDCK, Quadv PF, 0.5mL 2022, 2023    PPD Test 2018, 2018, 2019    TDaP, ADACEL (age 10y-64y), BOOSTRIX (age 10y+), IM, 0.5mL 2018, 2018, 2019, 2019       Active Problems:  Patient Active Problem List   Diagnosis Code    Anxiety F41.9    Multiple thyroid nodules E04.2    Morbid obesity (HCC) E66.01    Pain in joint involving multiple sites M25.50    Hyponatremia E87.1    Shortness of breath R06.02    Palpitations R00.2    Intercostal pain R07.82    Prolonged Q-T interval on ECG R94.31    Nausea and vomiting R11.2    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

## 2025-07-08 NOTE — CONSULTS
Session ID: [id number]  Session Duration: 2 minutes  Language: Icelandic   ID: #[id number]   Name: Roderick

## 2025-07-08 NOTE — CONSULTS
Session ID: [id number]  Session Duration: Longer than 53 minutes  Language: Kinyarwanda   ID: #[id number]   Name: [place]ge: Kinyarwanda   ID: #[id number]   Name: Tavon

## 2025-07-08 NOTE — ED PROVIDER NOTES
[facility] EMERGENCY DEPARTMENT  EMERGENCY DEPARTMENT ENCOUNTER      Pt Name: Lex Escamilla  MRN: [id number]  Birthdate 1995  Date of evaluation: 2025  Provider: Anselmo Boyd MD  10:37 PM    CHIEF COMPLAINT       Chief Complaint   Patient presents with    Abdominal Pain     Abdominal pain, vomiting X2 wks , no blood in vomit,  12wks preg         HISTORY OF PRESENT ILLNESS    Lex Escamilla is a 30 y.o. female who presents to the emergency department via private vehicle.  History comes from the patient.  Primarily Indonesian-speaking, video interpretation services used.  Reporting upper abdominal pain for the past 2 days with nausea and nonbloody emesis that is not improving with Zofran.  She said she was puking for weeks prior to this.  Worsens with eating.  No reported fever, syncope, traumatic injury, flank pain, dysuria, vaginal bleeding or discharge.    Chart review notes the patient has had recent documented IUP, within the past 2 weeks had an ultrasound  HPI  Chart review notes history of anxiety, thyroid nodules, fibromyalgia, obstructive sleep apnea, GERD, hyperlipidemia,  section  Nursing Notes were reviewed.    REVIEW OF SYSTEMS       Review of Systems   Constitutional:  Negative for fever.   Respiratory:  Negative for shortness of breath.    Cardiovascular:  Negative for chest pain.   Gastrointestinal:  Positive for abdominal pain (Upper abdominal pain, burning), nausea and vomiting.   Genitourinary:  Negative for flank pain, vaginal bleeding, vaginal discharge and vaginal pain.   Musculoskeletal:  Negative for neck pain.   Neurological:  Negative for syncope.   Psychiatric/Behavioral:  Negative for confusion.    All other systems reviewed and are negative.      Except as noted above the remainder of the review of systems was reviewed and negative.       PAST MEDICAL HISTORY     Past Medical History:   Diagnosis Date    Acquired hypothyroidism 2023    Anxiety

## 2025-07-09 ENCOUNTER — HOSPITAL ENCOUNTER (INPATIENT)
Age: 30
LOS: 2 days | Discharge: HOME | DRG: 547 | End: 2025-07-11
Attending: SURGERY | Admitting: SURGERY
Payer: COMMERCIAL

## 2025-07-09 ENCOUNTER — OFFICE VISIT (OUTPATIENT)
Age: 30
End: 2025-07-09
Payer: COMMERCIAL

## 2025-07-09 VITALS
BODY MASS INDEX: 33.66 KG/M2 | WEIGHT: 202 LBS | HEIGHT: 65 IN | SYSTOLIC BLOOD PRESSURE: 124 MMHG | DIASTOLIC BLOOD PRESSURE: 82 MMHG | HEART RATE: 68 BPM | TEMPERATURE: 98 F | OXYGEN SATURATION: 98 %

## 2025-07-09 DIAGNOSIS — R10.11 RIGHT UPPER QUADRANT ABDOMINAL PAIN: Primary | ICD-10-CM

## 2025-07-09 DIAGNOSIS — K80.50 BILIARY COLIC: ICD-10-CM

## 2025-07-09 DIAGNOSIS — K82.8 GALLBLADDER SLUDGE: Primary | ICD-10-CM

## 2025-07-09 DIAGNOSIS — R10.11 RIGHT UPPER QUADRANT ABDOMINAL PAIN: ICD-10-CM

## 2025-07-09 PROBLEM — R79.89 ABNORMAL LFTS: Status: ACTIVE | Noted: 2025-07-09

## 2025-07-09 PROBLEM — K81.9 CHOLECYSTITIS: Status: ACTIVE | Noted: 2025-07-09

## 2025-07-09 LAB
ALBUMIN SERPL-MCNC: 3.8 G/DL (ref 3.5–4.6)
ALP SERPL-CCNC: 107 U/L (ref 40–130)
ALT SERPL-CCNC: 63 U/L (ref 0–33)
ANION GAP SERPL CALCULATED.3IONS-SCNC: 11 MEQ/L (ref 9–15)
AST SERPL-CCNC: 37 U/L (ref 0–35)
BASOPHILS # BLD: 0 K/UL (ref 0–0.2)
BASOPHILS NFR BLD: 0.5 %
BILIRUB SERPL-MCNC: 0.8 MG/DL (ref 0.2–0.7)
BUN SERPL-MCNC: 4 MG/DL (ref 6–20)
CALCIUM SERPL-MCNC: 9.4 MG/DL (ref 8.5–9.9)
CHLORIDE SERPL-SCNC: 103 MEQ/L (ref 95–107)
CO2 SERPL-SCNC: 20 MEQ/L (ref 20–31)
CREAT SERPL-MCNC: 0.51 MG/DL (ref 0.5–0.9)
EOSINOPHIL # BLD: 0.1 K/UL (ref 0–0.7)
EOSINOPHIL NFR BLD: 1 %
ERYTHROCYTE [DISTWIDTH] IN BLOOD BY AUTOMATED COUNT: 12.7 % (ref 11.5–14.5)
GLOBULIN SER CALC-MCNC: 3.5 G/DL (ref 2.3–3.5)
GLUCOSE SERPL-MCNC: 89 MG/DL (ref 70–99)
HCT VFR BLD AUTO: 40.7 % (ref 37–47)
HGB BLD-MCNC: 13.4 G/DL (ref 12–16)
LYMPHOCYTES # BLD: 1.8 K/UL (ref 1–4.8)
LYMPHOCYTES NFR BLD: 22.3 %
MCH RBC QN AUTO: 29.6 PG (ref 27–31.3)
MCHC RBC AUTO-ENTMCNC: 32.9 % (ref 33–37)
MCV RBC AUTO: 90 FL (ref 79.4–94.8)
MONOCYTES # BLD: 0.5 K/UL (ref 0.2–0.8)
MONOCYTES NFR BLD: 6 %
NEUTROPHILS # BLD: 5.6 K/UL (ref 1.4–6.5)
NEUTS SEG NFR BLD: 69.9 %
PLATELET # BLD AUTO: 345 K/UL (ref 130–400)
POTASSIUM SERPL-SCNC: 3.6 MEQ/L (ref 3.4–4.9)
PROT SERPL-MCNC: 7.3 G/DL (ref 6.3–8)
RBC # BLD AUTO: 4.52 M/UL (ref 4.2–5.4)
SODIUM SERPL-SCNC: 134 MEQ/L (ref 135–144)
WBC # BLD AUTO: 8 K/UL (ref 4.8–10.8)

## 2025-07-09 PROCEDURE — 2580000003 HC RX 258: Performed by: SURGERY

## 2025-07-09 PROCEDURE — 99222 1ST HOSP IP/OBS MODERATE 55: CPT | Performed by: INTERNAL MEDICINE

## 2025-07-09 PROCEDURE — 1210000000 HC MED SURG R&B

## 2025-07-09 PROCEDURE — 99204 OFFICE O/P NEW MOD 45 MIN: CPT | Performed by: SURGERY

## 2025-07-09 PROCEDURE — 6370000000 HC RX 637 (ALT 250 FOR IP): Performed by: SURGERY

## 2025-07-09 PROCEDURE — 6360000002 HC RX W HCPCS: Performed by: SURGERY

## 2025-07-09 RX ORDER — ONDANSETRON 4 MG/1
4 TABLET, ORALLY DISINTEGRATING ORAL EVERY 8 HOURS PRN
Status: DISCONTINUED | OUTPATIENT
Start: 2025-07-09 | End: 2025-07-11 | Stop reason: HOSPADM

## 2025-07-09 RX ORDER — POTASSIUM CHLORIDE 7.45 MG/ML
10 INJECTION INTRAVENOUS
Status: DISPENSED | OUTPATIENT
Start: 2025-07-09 | End: 2025-07-09

## 2025-07-09 RX ORDER — ACETAMINOPHEN 325 MG/1
650 TABLET ORAL EVERY 6 HOURS PRN
Status: DISCONTINUED | OUTPATIENT
Start: 2025-07-09 | End: 2025-07-11 | Stop reason: HOSPADM

## 2025-07-09 RX ORDER — SODIUM CHLORIDE 0.9 % (FLUSH) 0.9 %
5-40 SYRINGE (ML) INJECTION EVERY 12 HOURS SCHEDULED
Status: DISCONTINUED | OUTPATIENT
Start: 2025-07-09 | End: 2025-07-11 | Stop reason: HOSPADM

## 2025-07-09 RX ORDER — SODIUM CHLORIDE 9 MG/ML
INJECTION, SOLUTION INTRAVENOUS CONTINUOUS
Status: DISCONTINUED | OUTPATIENT
Start: 2025-07-09 | End: 2025-07-10

## 2025-07-09 RX ORDER — SODIUM CHLORIDE 9 MG/ML
INJECTION, SOLUTION INTRAVENOUS PRN
Status: DISCONTINUED | OUTPATIENT
Start: 2025-07-09 | End: 2025-07-11 | Stop reason: HOSPADM

## 2025-07-09 RX ORDER — ENOXAPARIN SODIUM 100 MG/ML
40 INJECTION SUBCUTANEOUS EVERY EVENING
Status: DISCONTINUED | OUTPATIENT
Start: 2025-07-09 | End: 2025-07-11 | Stop reason: HOSPADM

## 2025-07-09 RX ORDER — ONDANSETRON 2 MG/ML
4 INJECTION INTRAMUSCULAR; INTRAVENOUS EVERY 6 HOURS PRN
Status: DISCONTINUED | OUTPATIENT
Start: 2025-07-09 | End: 2025-07-11 | Stop reason: HOSPADM

## 2025-07-09 RX ORDER — SODIUM CHLORIDE 0.9 % (FLUSH) 0.9 %
5-40 SYRINGE (ML) INJECTION PRN
Status: DISCONTINUED | OUTPATIENT
Start: 2025-07-09 | End: 2025-07-11 | Stop reason: HOSPADM

## 2025-07-09 RX ORDER — OXYCODONE HYDROCHLORIDE 5 MG/1
5 TABLET ORAL EVERY 6 HOURS PRN
Refills: 0 | Status: DISCONTINUED | OUTPATIENT
Start: 2025-07-09 | End: 2025-07-11 | Stop reason: HOSPADM

## 2025-07-09 RX ADMIN — POTASSIUM CHLORIDE 10 MEQ: 7.46 INJECTION, SOLUTION INTRAVENOUS at 18:51

## 2025-07-09 RX ADMIN — POTASSIUM CHLORIDE 10 MEQ: 7.46 INJECTION, SOLUTION INTRAVENOUS at 20:46

## 2025-07-09 RX ADMIN — ENOXAPARIN SODIUM 40 MG: 100 INJECTION SUBCUTANEOUS at 19:43

## 2025-07-09 RX ADMIN — CEPHALEXIN 500 MG: 250 CAPSULE ORAL at 19:42

## 2025-07-09 RX ADMIN — POTASSIUM CHLORIDE 10 MEQ: 7.46 INJECTION, SOLUTION INTRAVENOUS at 17:20

## 2025-07-09 RX ADMIN — SODIUM CHLORIDE: 0.9 INJECTION, SOLUTION INTRAVENOUS at 17:16

## 2025-07-09 ASSESSMENT — PAIN SCALES - GENERAL
PAINLEVEL_OUTOF10: 4
PAINLEVEL_OUTOF10: 2

## 2025-07-09 ASSESSMENT — PAIN DESCRIPTION - LOCATION: LOCATION: ABDOMEN

## 2025-07-09 NOTE — CONSULTS
Inpatient consult to GI  Consult performed by: Thomas Gomez MD  Consult ordered by: Bina Nobles MD      Patient Name: Lex Escamilla  Admit Date: 2025  2:45 PM  MR #: [id number]  : 1995    Attending Physician: Bina Nobles MD    History of Presenting Illness:      Lex Escamilla is a 30 y.o. female on hospital day 0, admitted with abdominal pain, vomiting x 2 weeks, no blood in vomit, 12 weeks pregnant. Patient with  has a past medical history of Acquired hypothyroidism, Anxiety, Bilateral carpal tunnel syndrome, Class 1 obesity due to excess calories without serious comorbidity with body mass index (BMI) of 30.0 to 30.9 in adult, Dyslipidemia, Fibromyalgia, GERD (gastroesophageal reflux disease), Heart abnormality, Mental disorder, Normal labor and delivery, EFRAIN (obstructive sleep apnea), PCOS (polycystic ovarian syndrome),  premature rupture of membranes with onset of labor more than 24 hours following rupture in third trimester, Tachycardia, and Weight loss.    Reason for GI consult: Elevated LFTs, 12 weeks pregnant    History Obtained From:  patient, electronic medical record    Patient is Frisian-speaking.  Due to language barrier, an  was present during the history-taking and subsequent discussion (and for part of the physical exam) with this patient.  [facility] #[id number]    Patient has had epigastric and right upper abdominal pain for the last 4 days associated with nausea and vomiting.  Patient was taking Zofran with no improvement.  Patient reports she has been vomiting for 3 weeks prior.  She describes pain as crampy and constant.  Rating as a 5 out of 10.  She denies any associating factors.  She does report poor oral intake however she does vomit after eating.  This is patient's third pregnancy, she reports miscarriage 4 first pregnancy.  She reports having 8 months of nausea and vomiting with the second pregnancy, with her son.  Patient was initially  cm. BILIARY SYSTEM: Layering echogenic sludge in the gallbladder lumen.  There may be some small gallbladder stones.  Overall the gallbladder is not distended.  In some areas the gallbladder wall measuring 5 mm.  No pericholecystic fluid.  Patient does report some tenderness when imaging over the gallbladder.  Common bile duct is within normal limits measuring 2-3 mm. RIGHT KIDNEY: Corticomedullary differentiation and cortical thickness is maintained.  No renal mass, renal cysts, nor intrarenal calcifications. There is no right hydronephrosis. Right renal length was 10.4 cm. PANCREAS:  Visualized portions of the pancreas are unremarkable. OTHER: No evidence of right upper quadrant ascites.     1.  Gallbladder sludge and perhaps small stones.  Gallbladder wall measured 5 mm.  No pericholecystic fluid.  Patient does report some tenderness when imaging over the gallbladder.  Overall the gallbladder does not appear particularly distended. 2.  Extrahepatic common bile duct measured between 2 and 3 mm.  No intrahepatic ductal dilatation. 3.  Hepatomegaly with diffusely increased echogenicity of the liver is suggestive of an underlying infiltrative pathology the most common of which is hepatic steatosis.  Please consider correlation with patient's liver function test. RECOMMENDATIONS: Please correlate with the patient's clinical presentation.  Nuclear medicine hepatobiliary scan may be useful for further gallbladder workup.     US OB LESS THAN 14 WEEKS SINGLE OR FIRST GESTATION  Result Date: 6/24/2025  EXAMINATION: FIRST TRIMESTER OBSTETRIC ULTRASOUND; DOPPLER EVALUATION OF THE PELVIS; TRANSABDOMINAL AND TRANSVAGINAL FIRST TRIMESTER OBSTETRIC PELVIC ULTRASOUND WITH COLOR DOPPLER FLOW 6/24/2025 TECHNIQUE: Transabdominal and transvaginal first trimester obstetric pelvic duplex ultrasound was performed with real-time imaging, color flow Doppler imaging, and spectral analysis. COMPARISON: None HISTORY: ORDERING SYSTEM PROVIDED  cholecystectomy as planned  Comments:     Thank you for allowing us to participate in the care of this patient.     Will continue to follow.    Please call if questions or concerns arise.    Electronically signed by Thomas Gomez MD on 7/9/2025 at 3:29 PM    Please note this report has been partially produced using speech recognition software and may cause contain errors related to that system including grammar, punctuation and spelling as well as words and phrases that may seem inappropriate. If there are questions or concerns please feel free to contact me to clarify.

## 2025-07-09 NOTE — PROGRESS NOTES
GENERAL SURGERY  NEW PATIENT HISTORY AND PHYSICAL NOTE    Pt Name: Lex Escamilla  MRN: [id number]    Date: 2025    Primary Care Physician: Julianne Morales DO  Referring: ER    Reason for evaluation: Right upper abdominal pain    SUBJECTIVE:     Lex Escamilla is a 30 year old female with a PMH of hypothyroidism, HLD, tachycardia, EFRAIN, GERD, FM, PCOS, and anxiety who presents with right upper abdominal pain in the setting of nausea and vomiting.     She reports experiencing severe vomiting, which occurs 3-4 times daily after consuming food or drink, leaving her stomach empty for the past 3 weeks. She is currently 12 weeks pregnant with her third pregnancy. Notes nausea and vomiting during her second pregnancy, but no abdominal pain then. Her first pregnancy ended in a miscarriage. Her last pregnancy was 5 years ago, and she did not experience these symptoms between her pregnancies. She describes a sour taste when vomiting. She last ate a sandwich and drank water at 8 AM today.     The right upper abdominal pain started 4 days ago and has been constant since then. She rates the pain as 8/10. She has not taken any pain medication. During the ER visit, she was treated for a UTI.    Past Medical History:   Diagnosis Date    Acquired hypothyroidism 2023    Anxiety 2017    Bilateral carpal tunnel syndrome 2021    Class 1 obesity due to excess calories without serious comorbidity with body mass index (BMI) of 30.0 to 30.9 in adult 2017    Dyslipidemia 2023    Fibromyalgia 2022    GERD (gastroesophageal reflux disease) 2024    Heart abnormality     Mental disorder     anxiety    Normal labor and delivery 2019    EFRAIN (obstructive sleep apnea) 2020    PCOS (polycystic ovarian syndrome) 2021     premature rupture of membranes with onset of labor more than 24 hours following rupture in third trimester     Tachycardia     Weight loss 2019

## 2025-07-09 NOTE — CONSULTS
Session ID: [id number]  Session Duration: Longer than 53 minutes  Language: Maltese   ID: #[id number]   Name: Jose

## 2025-07-09 NOTE — PROGRESS NOTES
Pt VS and admission completed. Pt A&O x4, [place] citizen speaking only. Pt on RA, lung sounds clear. Pt with no c/o chest pain or SOB. Pt NPO at this time. Bed locked and in low position. Call light and tray table within reach.      Electronically signed by Amy Lawrence RN on 7/9/2025 at 6:31 PM

## 2025-07-09 NOTE — H&P
GENERAL SURGERY  H&P NOTE    Pt Name: Lex Escamilla  MRN: [id number]  Date: 2025    Reason for admission: Right upper abdominal pain    SUBJECTIVE:     History of Chief Complaint:    Lxe is a 30 y.o. female with a PMH of hypothyroidism, HLD, tachycardia, EFRAIN, GERD, FM, PCOS, and anxiety who presents with right upper abdominal pain in the setting of nausea and vomiting.      She reports experiencing severe vomiting, which occurs 3-4 times daily after consuming food or drink, leaving her stomach empty for the past 3 weeks. She is currently 12 weeks pregnant with her third pregnancy. Notes nausea and vomiting during her second pregnancy, but no abdominal pain then. Her first pregnancy ended in a miscarriage. Her last pregnancy was 5 years ago, and she did not experience these symptoms between her pregnancies. She describes a sour taste when vomiting. She last ate a sandwich and drank water at 8 AM today.      The right upper abdominal pain started 4 days ago and has been constant since then. She rates the pain as 8/10. She has not taken any pain medication. During the ER visit, she was treated for a UTI.    Past Medical History:   Diagnosis Date    Acquired hypothyroidism 2023    Anxiety 2017    Bilateral carpal tunnel syndrome 2021    Class 1 obesity due to excess calories without serious comorbidity with body mass index (BMI) of 30.0 to 30.9 in adult 2017    Dyslipidemia 2023    Fibromyalgia 2022    GERD (gastroesophageal reflux disease) 2024    Heart abnormality     Mental disorder     anxiety    Normal labor and delivery 2019    EFRAIN (obstructive sleep apnea) 2020    PCOS (polycystic ovarian syndrome) 2021     premature rupture of membranes with onset of labor more than 24 hours following rupture in third trimester     Tachycardia     Weight loss 2019     Past Surgical History:   Procedure Laterality Date    BACK SURGERY       tenderness when imaging over the gallbladder.  Overall the gallbladder does not appear particularly distended.  2.  Extrahepatic common bile duct measured between 2 and 3 mm.  No intrahepatic ductal dilatation.   3.  Hepatomegaly with diffusely increased echogenicity of the liver is suggestive of an underlying infiltrative pathology the most common of which is hepatic steatosis.  Please consider correlation with patient's liver function test.            ASSESSMENT AND PLAN:   Lex Escamilla is a 30 y.o. female with a 30 year old female with a PMH of hypothyroidism, HLD, tachycardia, EFRAIN, GERD, FM, PCOS, and anxiety who presents with right upper abdominal pain in the setting of nausea and vomiting concerning for cholecystitis with possible choledocholithiasis.      Pain control: prn tylenol, prn oxy  Prn antiemetic  NPO, IVF, replete lytes prn, bowel reg: holding  GI consulted- awaiting recs  Strict I&Os  Keflex for UTI (prescribed from ER during her last visit two days ago)  Encourage ambulation, OOB x3  SCDs, lovenox      Bina Nobles MD   General surgeon    Electronically signed by Bina Nobles MD, on 7/9/2025

## 2025-07-09 NOTE — PLAN OF CARE
Problem: Discharge Planning  Goal: Discharge to home or other facility with appropriate resources  Outcome: Progressing  Flowsheets (Taken 7/9/2025 1520)  Discharge to home or other facility with appropriate resources:   Identify barriers to discharge with patient and caregiver   Arrange for needed discharge resources and transportation as appropriate     Problem: Pain  Goal: Verbalizes/displays adequate comfort level or baseline comfort level  Outcome: Progressing

## 2025-07-10 ENCOUNTER — ANESTHESIA (OUTPATIENT)
Dept: OPERATING ROOM | Age: 30
End: 2025-07-10
Payer: COMMERCIAL

## 2025-07-10 ENCOUNTER — ANESTHESIA EVENT (OUTPATIENT)
Dept: OPERATING ROOM | Age: 30
End: 2025-07-10
Payer: COMMERCIAL

## 2025-07-10 PROBLEM — R10.11 RIGHT UPPER QUADRANT ABDOMINAL PAIN: Status: ACTIVE | Noted: 2025-07-10

## 2025-07-10 PROBLEM — K80.50 BILIARY COLIC: Status: ACTIVE | Noted: 2025-07-10

## 2025-07-10 LAB
ABO/RH: NORMAL
ALBUMIN SERPL-MCNC: 3.2 G/DL (ref 3.5–4.6)
ALP SERPL-CCNC: 85 U/L (ref 40–130)
ALT SERPL-CCNC: 53 U/L (ref 0–33)
ANION GAP SERPL CALCULATED.3IONS-SCNC: 12 MEQ/L (ref 9–15)
ANTIBODY SCREEN: NORMAL
APTT PPP: 30.5 SEC (ref 24.4–36.8)
AST SERPL-CCNC: 27 U/L (ref 0–35)
BASOPHILS # BLD: 0 K/UL (ref 0–0.2)
BASOPHILS NFR BLD: 0.5 %
BILIRUB SERPL-MCNC: 1 MG/DL (ref 0.2–0.7)
BUN SERPL-MCNC: 3 MG/DL (ref 6–20)
CALCIUM SERPL-MCNC: 8.2 MG/DL (ref 8.5–9.9)
CHLORIDE SERPL-SCNC: 105 MEQ/L (ref 95–107)
CO2 SERPL-SCNC: 17 MEQ/L (ref 20–31)
CREAT SERPL-MCNC: 0.42 MG/DL (ref 0.5–0.9)
EOSINOPHIL # BLD: 0.1 K/UL (ref 0–0.7)
EOSINOPHIL NFR BLD: 1.3 %
ERYTHROCYTE [DISTWIDTH] IN BLOOD BY AUTOMATED COUNT: 12.9 % (ref 11.5–14.5)
GLOBULIN SER CALC-MCNC: 3.2 G/DL (ref 2.3–3.5)
GLUCOSE SERPL-MCNC: 75 MG/DL (ref 70–99)
HCT VFR BLD AUTO: 35.8 % (ref 37–47)
HGB BLD-MCNC: 11.9 G/DL (ref 12–16)
INR PPP: 1.2
LYMPHOCYTES # BLD: 2.1 K/UL (ref 1–4.8)
LYMPHOCYTES NFR BLD: 28.5 %
MAGNESIUM SERPL-MCNC: 1.7 MG/DL (ref 1.7–2.4)
MCH RBC QN AUTO: 30 PG (ref 27–31.3)
MCHC RBC AUTO-ENTMCNC: 33.2 % (ref 33–37)
MCV RBC AUTO: 90.2 FL (ref 79.4–94.8)
MONOCYTES # BLD: 0.5 K/UL (ref 0.2–0.8)
MONOCYTES NFR BLD: 7.1 %
NEUTROPHILS # BLD: 4.6 K/UL (ref 1.4–6.5)
NEUTS SEG NFR BLD: 62.3 %
PHOSPHATE SERPL-MCNC: 2.9 MG/DL (ref 2.3–4.8)
PLATELET # BLD AUTO: 268 K/UL (ref 130–400)
POTASSIUM SERPL-SCNC: 3.4 MEQ/L (ref 3.4–4.9)
PROT SERPL-MCNC: 6.4 G/DL (ref 6.3–8)
PROTHROMBIN TIME: 15.8 SEC (ref 12.3–14.9)
RBC # BLD AUTO: 3.97 M/UL (ref 4.2–5.4)
SODIUM SERPL-SCNC: 134 MEQ/L (ref 135–144)
WBC # BLD AUTO: 7.4 K/UL (ref 4.8–10.8)

## 2025-07-10 PROCEDURE — 6360000002 HC RX W HCPCS: Performed by: ANESTHESIOLOGY

## 2025-07-10 PROCEDURE — 85730 THROMBOPLASTIN TIME PARTIAL: CPT

## 2025-07-10 PROCEDURE — 2709999900 HC NON-CHARGEABLE SUPPLY: Performed by: SURGERY

## 2025-07-10 PROCEDURE — 85610 PROTHROMBIN TIME: CPT

## 2025-07-10 PROCEDURE — 0FT44ZZ RESECTION OF GALLBLADDER, PERCUTANEOUS ENDOSCOPIC APPROACH: ICD-10-PCS | Performed by: SURGERY

## 2025-07-10 PROCEDURE — 84100 ASSAY OF PHOSPHORUS: CPT

## 2025-07-10 PROCEDURE — 2720000010 HC SURG SUPPLY STERILE: Performed by: SURGERY

## 2025-07-10 PROCEDURE — 2500000003 HC RX 250 WO HCPCS: Performed by: SURGERY

## 2025-07-10 PROCEDURE — 99233 SBSQ HOSP IP/OBS HIGH 50: CPT | Performed by: SURGERY

## 2025-07-10 PROCEDURE — 47562 LAPAROSCOPIC CHOLECYSTECTOMY: CPT | Performed by: SURGERY

## 2025-07-10 PROCEDURE — 3600000014 HC SURGERY LEVEL 4 ADDTL 15MIN: Performed by: SURGERY

## 2025-07-10 PROCEDURE — 3700000000 HC ANESTHESIA ATTENDED CARE: Performed by: SURGERY

## 2025-07-10 PROCEDURE — 6370000000 HC RX 637 (ALT 250 FOR IP): Performed by: SURGERY

## 2025-07-10 PROCEDURE — 85025 COMPLETE CBC W/AUTO DIFF WBC: CPT

## 2025-07-10 PROCEDURE — 88304 TISSUE EXAM BY PATHOLOGIST: CPT

## 2025-07-10 PROCEDURE — 7100000000 HC PACU RECOVERY - FIRST 15 MIN: Performed by: SURGERY

## 2025-07-10 PROCEDURE — 3600000004 HC SURGERY LEVEL 4 BASE: Performed by: SURGERY

## 2025-07-10 PROCEDURE — 86901 BLOOD TYPING SEROLOGIC RH(D): CPT

## 2025-07-10 PROCEDURE — 99232 SBSQ HOSP IP/OBS MODERATE 35: CPT | Performed by: NURSE PRACTITIONER

## 2025-07-10 PROCEDURE — 83735 ASSAY OF MAGNESIUM: CPT

## 2025-07-10 PROCEDURE — 6360000002 HC RX W HCPCS: Performed by: SURGERY

## 2025-07-10 PROCEDURE — 6360000002 HC RX W HCPCS

## 2025-07-10 PROCEDURE — 2580000003 HC RX 258: Performed by: SURGERY

## 2025-07-10 PROCEDURE — 36415 COLL VENOUS BLD VENIPUNCTURE: CPT

## 2025-07-10 PROCEDURE — 2500000003 HC RX 250 WO HCPCS

## 2025-07-10 PROCEDURE — 80053 COMPREHEN METABOLIC PANEL: CPT

## 2025-07-10 PROCEDURE — 2580000003 HC RX 258: Performed by: ANESTHESIOLOGY

## 2025-07-10 PROCEDURE — 86850 RBC ANTIBODY SCREEN: CPT

## 2025-07-10 PROCEDURE — 1210000000 HC MED SURG R&B

## 2025-07-10 PROCEDURE — 3E0T3BZ INTRODUCTION OF ANESTHETIC AGENT INTO PERIPHERAL NERVES AND PLEXI, PERCUTANEOUS APPROACH: ICD-10-PCS | Performed by: ANESTHESIOLOGY

## 2025-07-10 PROCEDURE — 7100000001 HC PACU RECOVERY - ADDTL 15 MIN: Performed by: SURGERY

## 2025-07-10 PROCEDURE — 64488 TAP BLOCK BI INJECTION: CPT | Performed by: ANESTHESIOLOGY

## 2025-07-10 PROCEDURE — 86900 BLOOD TYPING SEROLOGIC ABO: CPT

## 2025-07-10 PROCEDURE — 3700000001 HC ADD 15 MINUTES (ANESTHESIA): Performed by: SURGERY

## 2025-07-10 RX ORDER — FENTANYL CITRATE 0.05 MG/ML
25 INJECTION, SOLUTION INTRAMUSCULAR; INTRAVENOUS EVERY 10 MIN PRN
Status: DISCONTINUED | OUTPATIENT
Start: 2025-07-10 | End: 2025-07-10 | Stop reason: HOSPADM

## 2025-07-10 RX ORDER — ONDANSETRON 2 MG/ML
INJECTION INTRAMUSCULAR; INTRAVENOUS
Status: DISCONTINUED | OUTPATIENT
Start: 2025-07-10 | End: 2025-07-10 | Stop reason: SDUPTHER

## 2025-07-10 RX ORDER — MAGNESIUM HYDROXIDE 1200 MG/15ML
LIQUID ORAL CONTINUOUS PRN
Status: DISCONTINUED | OUTPATIENT
Start: 2025-07-10 | End: 2025-07-10 | Stop reason: HOSPADM

## 2025-07-10 RX ORDER — SODIUM CHLORIDE 0.9 % (FLUSH) 0.9 %
5-40 SYRINGE (ML) INJECTION PRN
Status: DISCONTINUED | OUTPATIENT
Start: 2025-07-10 | End: 2025-07-10 | Stop reason: HOSPADM

## 2025-07-10 RX ORDER — SODIUM CHLORIDE 9 MG/ML
INJECTION, SOLUTION INTRAVENOUS CONTINUOUS
Status: DISCONTINUED | OUTPATIENT
Start: 2025-07-10 | End: 2025-07-10 | Stop reason: HOSPADM

## 2025-07-10 RX ORDER — PROPOFOL 10 MG/ML
INJECTION, EMULSION INTRAVENOUS
Status: DISCONTINUED | OUTPATIENT
Start: 2025-07-10 | End: 2025-07-10 | Stop reason: SDUPTHER

## 2025-07-10 RX ORDER — NEOSTIGMINE METHYLSULFATE 1 MG/ML
2.7 INJECTION, SOLUTION INTRAVENOUS ONCE
Status: DISCONTINUED | OUTPATIENT
Start: 2025-07-10 | End: 2025-07-10 | Stop reason: HOSPADM

## 2025-07-10 RX ORDER — POTASSIUM CHLORIDE 750 MG/1
40 TABLET, EXTENDED RELEASE ORAL ONCE
Status: COMPLETED | OUTPATIENT
Start: 2025-07-10 | End: 2025-07-10

## 2025-07-10 RX ORDER — LIDOCAINE HYDROCHLORIDE 10 MG/ML
INJECTION, SOLUTION EPIDURAL; INFILTRATION; INTRACAUDAL; PERINEURAL
Status: DISCONTINUED | OUTPATIENT
Start: 2025-07-10 | End: 2025-07-10 | Stop reason: SDUPTHER

## 2025-07-10 RX ORDER — GLYCOPYRROLATE 0.2 MG/ML
INJECTION INTRAMUSCULAR; INTRAVENOUS
Status: DISCONTINUED | OUTPATIENT
Start: 2025-07-10 | End: 2025-07-10 | Stop reason: SDUPTHER

## 2025-07-10 RX ORDER — SODIUM CHLORIDE 0.9 % (FLUSH) 0.9 %
5-40 SYRINGE (ML) INJECTION EVERY 12 HOURS SCHEDULED
Status: DISCONTINUED | OUTPATIENT
Start: 2025-07-10 | End: 2025-07-10 | Stop reason: HOSPADM

## 2025-07-10 RX ORDER — DEXTROSE MONOHYDRATE, SODIUM CHLORIDE, AND POTASSIUM CHLORIDE 50; 1.49; 9 G/1000ML; G/1000ML; G/1000ML
INJECTION, SOLUTION INTRAVENOUS CONTINUOUS
Status: DISCONTINUED | OUTPATIENT
Start: 2025-07-10 | End: 2025-07-11

## 2025-07-10 RX ORDER — ONDANSETRON 2 MG/ML
4 INJECTION INTRAMUSCULAR; INTRAVENOUS
Status: DISCONTINUED | OUTPATIENT
Start: 2025-07-10 | End: 2025-07-10 | Stop reason: HOSPADM

## 2025-07-10 RX ORDER — DEXAMETHASONE SODIUM PHOSPHATE 10 MG/ML
INJECTION, SOLUTION INTRA-ARTICULAR; INTRALESIONAL; INTRAMUSCULAR; INTRAVENOUS; SOFT TISSUE
Status: DISCONTINUED | OUTPATIENT
Start: 2025-07-10 | End: 2025-07-10 | Stop reason: SDUPTHER

## 2025-07-10 RX ORDER — SODIUM CHLORIDE 9 MG/ML
INJECTION, SOLUTION INTRAVENOUS PRN
Status: DISCONTINUED | OUTPATIENT
Start: 2025-07-10 | End: 2025-07-10 | Stop reason: HOSPADM

## 2025-07-10 RX ORDER — DIPHENHYDRAMINE HYDROCHLORIDE 50 MG/ML
12.5 INJECTION, SOLUTION INTRAMUSCULAR; INTRAVENOUS
Status: DISCONTINUED | OUTPATIENT
Start: 2025-07-10 | End: 2025-07-10 | Stop reason: HOSPADM

## 2025-07-10 RX ORDER — FENTANYL CITRATE 50 UG/ML
INJECTION, SOLUTION INTRAMUSCULAR; INTRAVENOUS
Status: DISCONTINUED | OUTPATIENT
Start: 2025-07-10 | End: 2025-07-10 | Stop reason: SDUPTHER

## 2025-07-10 RX ORDER — ROPIVACAINE HYDROCHLORIDE 5 MG/ML
INJECTION, SOLUTION EPIDURAL; INFILTRATION; PERINEURAL
Status: COMPLETED | OUTPATIENT
Start: 2025-07-10 | End: 2025-07-10

## 2025-07-10 RX ORDER — SUCCINYLCHOLINE/SOD CL,ISO/PF 100 MG/5ML
SYRINGE (ML) INTRAVENOUS
Status: DISCONTINUED | OUTPATIENT
Start: 2025-07-10 | End: 2025-07-10 | Stop reason: SDUPTHER

## 2025-07-10 RX ORDER — ROCURONIUM BROMIDE 10 MG/ML
INJECTION, SOLUTION INTRAVENOUS
Status: DISCONTINUED | OUTPATIENT
Start: 2025-07-10 | End: 2025-07-10 | Stop reason: SDUPTHER

## 2025-07-10 RX ORDER — NEOSTIGMINE METHYLSULFATE 1 MG/ML
INJECTION, SOLUTION INTRAVENOUS
Status: DISCONTINUED | OUTPATIENT
Start: 2025-07-10 | End: 2025-07-10 | Stop reason: SDUPTHER

## 2025-07-10 RX ADMIN — ROCURONIUM BROMIDE 5 MG: 10 INJECTION, SOLUTION INTRAVENOUS at 11:56

## 2025-07-10 RX ADMIN — OXYCODONE 5 MG: 5 TABLET ORAL at 15:49

## 2025-07-10 RX ADMIN — FENTANYL CITRATE 25 MCG: 50 INJECTION, SOLUTION INTRAMUSCULAR; INTRAVENOUS at 11:19

## 2025-07-10 RX ADMIN — GLYCOPYRROLATE 0.4 MG: 0.2 INJECTION INTRAMUSCULAR; INTRAVENOUS at 12:20

## 2025-07-10 RX ADMIN — POTASSIUM CHLORIDE, DEXTROSE MONOHYDRATE AND SODIUM CHLORIDE: 150; 5; 900 INJECTION, SOLUTION INTRAVENOUS at 08:26

## 2025-07-10 RX ADMIN — CEPHALEXIN 500 MG: 250 CAPSULE ORAL at 21:05

## 2025-07-10 RX ADMIN — PROPOFOL 150 MG: 10 INJECTION, EMULSION INTRAVENOUS at 10:58

## 2025-07-10 RX ADMIN — SODIUM CHLORIDE, PRESERVATIVE FREE 10 ML: 5 INJECTION INTRAVENOUS at 08:27

## 2025-07-10 RX ADMIN — FENTANYL CITRATE 25 MCG: 50 INJECTION, SOLUTION INTRAMUSCULAR; INTRAVENOUS at 11:03

## 2025-07-10 RX ADMIN — FENTANYL CITRATE 25 MCG: 50 INJECTION, SOLUTION INTRAMUSCULAR; INTRAVENOUS at 11:56

## 2025-07-10 RX ADMIN — CEPHALEXIN 500 MG: 250 CAPSULE ORAL at 14:16

## 2025-07-10 RX ADMIN — PROPOFOL 50 MG: 10 INJECTION, EMULSION INTRAVENOUS at 11:03

## 2025-07-10 RX ADMIN — FENTANYL CITRATE 25 MCG: 50 INJECTION INTRAMUSCULAR; INTRAVENOUS at 13:04

## 2025-07-10 RX ADMIN — ROCURONIUM BROMIDE 10 MG: 10 INJECTION, SOLUTION INTRAVENOUS at 10:58

## 2025-07-10 RX ADMIN — ROPIVACAINE HYDROCHLORIDE 30 ML: 5 INJECTION, SOLUTION EPIDURAL; INFILTRATION; PERINEURAL at 11:00

## 2025-07-10 RX ADMIN — NEOSTIGMINE METHYLSULFATE 2 MG: 1 INJECTION, SOLUTION INTRAVENOUS at 12:20

## 2025-07-10 RX ADMIN — SODIUM CHLORIDE: 0.9 INJECTION, SOLUTION INTRAVENOUS at 01:13

## 2025-07-10 RX ADMIN — SODIUM CHLORIDE: 0.9 INJECTION, SOLUTION INTRAVENOUS at 10:52

## 2025-07-10 RX ADMIN — ONDANSETRON 4 MG: 2 INJECTION, SOLUTION INTRAMUSCULAR; INTRAVENOUS at 11:58

## 2025-07-10 RX ADMIN — ACETAMINOPHEN 650 MG: 325 TABLET ORAL at 21:05

## 2025-07-10 RX ADMIN — FENTANYL CITRATE 25 MCG: 50 INJECTION INTRAMUSCULAR; INTRAVENOUS at 13:17

## 2025-07-10 RX ADMIN — CEPHALEXIN 500 MG: 250 CAPSULE ORAL at 05:15

## 2025-07-10 RX ADMIN — POTASSIUM CHLORIDE, DEXTROSE MONOHYDRATE AND SODIUM CHLORIDE: 150; 5; 900 INJECTION, SOLUTION INTRAVENOUS at 22:31

## 2025-07-10 RX ADMIN — ACETAMINOPHEN 650 MG: 325 TABLET ORAL at 14:16

## 2025-07-10 RX ADMIN — LIDOCAINE HYDROCHLORIDE 40 MG: 10 INJECTION, SOLUTION EPIDURAL; INFILTRATION; INTRACAUDAL; PERINEURAL at 10:58

## 2025-07-10 RX ADMIN — DEXAMETHASONE SODIUM PHOSPHATE 10 MG: 10 INJECTION INTRAMUSCULAR; INTRAVENOUS at 11:10

## 2025-07-10 RX ADMIN — Medication 100 MG: at 10:58

## 2025-07-10 RX ADMIN — ROCURONIUM BROMIDE 40 MG: 10 INJECTION, SOLUTION INTRAVENOUS at 10:59

## 2025-07-10 RX ADMIN — POTASSIUM CHLORIDE 40 MEQ: 750 TABLET, FILM COATED, EXTENDED RELEASE ORAL at 08:26

## 2025-07-10 RX ADMIN — CEFAZOLIN 2000 MG: 2 INJECTION, POWDER, FOR SOLUTION INTRAMUSCULAR; INTRAVENOUS at 11:07

## 2025-07-10 ASSESSMENT — PAIN DESCRIPTION - LOCATION
LOCATION: ABDOMEN

## 2025-07-10 ASSESSMENT — PAIN SCALES - GENERAL
PAINLEVEL_OUTOF10: 9
PAINLEVEL_OUTOF10: 10
PAINLEVEL_OUTOF10: 10
PAINLEVEL_OUTOF10: 0
PAINLEVEL_OUTOF10: 10
PAINLEVEL_OUTOF10: 8

## 2025-07-10 ASSESSMENT — PAIN DESCRIPTION - DESCRIPTORS
DESCRIPTORS: ACHING
DESCRIPTORS: ACHING

## 2025-07-10 ASSESSMENT — ENCOUNTER SYMPTOMS: SHORTNESS OF BREATH: 1

## 2025-07-10 NOTE — BRIEF OP NOTE
Brief Postoperative Note      Patient: Lex Escamilla  YOB: 1995  MRN: [id number]    Date of Procedure: 7/10/2025  Procedure Start Time: 11:19 am  Procedure End Time: 11:55 am    Pre-Op Diagnosis:  Biliary colic  Post-Op Diagnosis: Same       Procedure: Laparoscopic cholecystectomy    Surgeon: Bina Nobles MD   First Assistant: Sandra Knott    Anesthesia: General    Estimated Blood Loss (mL): Minimal    Complications: None    Specimens:   ID Type Source Tests Collected by Time Destination   A : gallbladder Tissue Gallbladder SURGICAL PATHOLOGY Bina Nobles MD 7/10/2025 1214      Implants: None      Drains: None    Findings: Critical view of safety obtained  Infection Present At Time Of Surgery (PATOS) (choose all levels that have infection present):  None    Electronically signed by Bina Nobles MD on 7/10/2025 at 12:19 PM

## 2025-07-10 NOTE — PROGRESS NOTES
Pt off the floor-OR Procedure    Electronically signed by JOHNSON PARSONS RN on 7/10/25 at 10:24 AM EDT

## 2025-07-10 NOTE — OP NOTE
Operative Note        Patient:  Lex Escamilla   YOB: 1995  MRN:  [id number]      Date of Procedure: 7/10/2025  Procedure Start Time: 11:19 am  Procedure End Time: 11:55 am     Pre-Op Diagnosis:  Biliary colic  Post-Op Diagnosis: Same       Procedure: Laparoscopic cholecystectomy     Surgeon: Bina Nobles MD   First Assistant: Sandra Knott     Anesthesia: General     Estimated Blood Loss (mL): Minimal     Specimens: Gallbladder    Description of Procedure:   With the patient in supine position, adequate general anesthesia obtained. She was given preoperative ancef and SCDs plugged in. Abdomen was prepped with ChloraPrep and draped in sterile fashion. A time-out was performed to verify patient and procedure. A small incision was made at Ghotra's point then using optical entry, a 5mm port was placed to gain access into the abdomen and establish pneumoperitoneum to decreased pressures between 8-12mm Hg given she is pregnant. There was no evidence of injury from this. Then, a 5mm right umbilical port, a 12mm epigastric port, and 2 right upper quadrant 5mm ports were inserted under direct visualization.  There were omental adhesions to the gallbladder that were taken down using electrocautery. The gallbladder appeared dilated and chronically inflamed. The cystic triangle was fatty.  The peritoneum overlying the cystic duct and artery were incised. The cystic duct and cystic artery were skeletonized to obtain the critical view of safety. The cystic duct was doubly clipped with hemoclips and divided. Likewise, the cystic artery was doubly clipped and divided. Now, the peritoneum overlying the gallbladder was incised.  There was also a posterior branch of the cystic artery that was clipped and divided. The gallbladder was dissected free from the liver bed completely. There was no bile or stones spilled. Hemostasis in the liver bed was ensured. The gallbladder was placed into an EndoCatch bag

## 2025-07-10 NOTE — PROGRESS NOTES
Spiritual Health History and Assessment/Progress Note  [facility]    Initial Encounter, Spiritual/Emotional Needs,  , Adjustment to illness, Life Adjustments,      Name: Lex Escamilla MRN: [id number]    Age: 30 y.o.     Sex: female   Language: [place]an   Moravian: Non-Restoration   Cholecystitis     Date: 7/10/2025            Total Time Calculated: 15 min              Spiritual Assessment began in [facility]  4W MED SURG UNIT        Referral/Consult From: Physician   Encounter Overview/Reason: Initial Encounter, Spiritual/Emotional Needs  Service Provided For: Patient    The  visited with the pt during rounds. The pt was awake, and reclining on the bed. The pt reported she is doing better, misses her home, and desire to go home. The pt reported that is a Catholic of the Spiritism denia and would appreciate some prayers. The  provided active listening, supportive presence, spiritual support. The  will continue to provide support as it is needed.    Denia, Belief, Meaning:   Patient identifies as spiritual, is connected with a denia tradition or spiritual practice, and has beliefs or practices that help with coping during difficult times  Family/Friends No family/friends present      Importance and Influence:  Patient has spiritual/personal beliefs that influence decisions regarding their health  Family/Friends No family/friends present    Community:  Patient feels well-supported. Support system includes: Denia Community, Friends, and Extended family  Family/Friends No family/friends present    Assessment and Plan of Care:     Patient Interventions include: Facilitated expression of thoughts and feelings, Explored spiritual coping/struggle/distress, Engaged in theological reflection, and Affirmed coping skills/support systems  Family/Friends Interventions include: No family/friends present    Patient Plan of Care: Spiritual Care available upon further referral  Family/Friends

## 2025-07-10 NOTE — PLAN OF CARE
Problem: Discharge Planning  Goal: Discharge to home or other facility with appropriate resources  7/9/2025 2356 by Pritesh Simon, RN  Outcome: Progressing  7/9/2025 1807 by Amy Lawrence RN  Outcome: Progressing  Flowsheets (Taken 7/9/2025 1520)  Discharge to home or other facility with appropriate resources:   Identify barriers to discharge with patient and caregiver   Arrange for needed discharge resources and transportation as appropriate     Problem: Pain  Goal: Verbalizes/displays adequate comfort level or baseline comfort level  7/9/2025 2356 by Pritesh Simon, RN  Outcome: Progressing  7/9/2025 1807 by Amy Lawrence, RN  Outcome: Progressing

## 2025-07-10 NOTE — ANESTHESIA PRE PROCEDURE
RIGHT HAND CARPAL TUNNEL DECOMPRESSION. performed by Kemar Ann MD at [facility] OR   •  SECTION N/A 2019     SECTION performed by Ade Tapia DO at [facility]D OR   • UPPER GASTROINTESTINAL ENDOSCOPY N/A 2023    EGD BIOPSY performed by Neel Rachel MD at [facility]       Social History:    Social History     Tobacco Use   • Smoking status: Never   • Smokeless tobacco: Never   Substance Use Topics   • Alcohol use: No                                Counseling given: Not Answered      Vital Signs (Current):   Vitals:    25 1927 07/10/25 0000 07/10/25 0313 07/10/25 0715   BP: 106/67 116/75 106/73 100/66   Pulse: 62 65 65 70   Resp: 18 18 16 16   Temp: 98.1 °F (36.7 °C) 98.3 °F (36.8 °C) 98.4 °F (36.9 °C) 98.1 °F (36.7 °C)   TempSrc: Oral Oral Oral Oral   SpO2: 100% 100% 99% 98%   Weight:       Height:                                                  BP Readings from Last 3 Encounters:   07/10/25 100/66   25 124/82   25 103/68       NPO Status: Time of last liquid consumption: 0800 (with pills)                        Time of last solid consumption: 1800                        Date of last liquid consumption: 07/10/25                        Date of last solid food consumption: 25    BMI:   Wt Readings from Last 3 Encounters:   25 92.6 kg (204 lb 1.6 oz)   25 91.6 kg (202 lb)   25 91.9 kg (202 lb 9.6 oz)     Body mass index is 33.96 kg/m².    CBC:   Lab Results   Component Value Date/Time    WBC 7.4 07/10/2025 05:58 AM    RBC 3.97 07/10/2025 05:58 AM    HGB 11.9 07/10/2025 05:58 AM    HCT 35.8 07/10/2025 05:58 AM    MCV 90.2 07/10/2025 05:58 AM    RDW 12.9 07/10/2025 05:58 AM     07/10/2025 05:58 AM       CMP:   Lab Results   Component Value Date/Time     07/10/2025 05:58 AM    K 3.4 07/10/2025 05:58 AM     07/10/2025 05:58 AM    CO2 17 07/10/2025 05:58 AM    BUN 3 07/10/2025 05:58 AM    CREATININE 0.42

## 2025-07-10 NOTE — PROGRESS NOTES
GENERAL SURGERY  PROGRESS NOTE    Pt Name: Lex Escamilla  MRN: [id number]  Date: 7/10/2025    Subjective  JACKIE overnight. Remains AVSS on RA. This morning, she reports her pain is improved, now down to a 4/10 (she has been NPO). Feeling a bit nauseous but no emesis.     Vitals  /66   Pulse 70   Temp 98.1 °F (36.7 °C) (Oral)   Resp 16   Ht 1.651 m (5' 5\")   Wt 92.6 kg (204 lb 1.6 oz)   LMP 03/03/2025   SpO2 98%   BMI 33.96 kg/m²      Physical Exam  GEN: A&Ox3, NAD, cooperative   PULM: no increased work of breathing, satting well on RA  CV: regular rate  ABD: Soft, TTP in RUQ > epigastrium else nontender, nondistended, no guarding, gravid uterus  EXTREMITIES: Warm, dry    Intake/ Output    Intake/Output Summary (Last 24 hours) at 7/10/2025 1107  Last data filed at 7/10/2025 0808  Gross per 24 hour   Intake 0 ml   Output --   Net 0 ml     Date 07/10/25 0000 - 07/10/25 2359   Shift 7268-0152 3274-7684 0541-2552 24 Hour Total   INTAKE   P.O.(mL/kg/hr)  0  0   Shift Total(mL/kg)  0(0)  0(0)   OUTPUT   Shift Total(mL/kg)       Weight (kg) 92.6 92.6 92.6 92.6       Labs  Recent Labs     07/07/25  1947 07/07/25 2002 07/09/25  1227 07/10/25  0558   WBC 8.5  --  8.0 7.4   HGB 14.2  --  13.4 11.9*   HCT 40.6  --  40.7 35.8*     --  345 268   *  --  134* 134*   K 3.2*  --  3.6 3.4     --  103 105   CO2 20  --  20 17*   BUN 6  --  4* 3*   CREATININE 0.62  --  0.51 0.42*   MG 2.0  --   --  1.7   PHOS  --   --   --  2.9   CALCIUM 9.3  --  9.4 8.2*   INR  --   --   --  1.2   AST 42*  --  37* 27   ALT 69*  --  63* 53*   BILITOT 1.1*  --  0.8* 1.0*   BILIDIR 0.6*  --   --   --    LIPASE 35  --   --   --    NITRU  --  POSITIVE*  --   --    COLORU  --  DARK YELLOW*  --   --    BACTERIA  --  FEW*  --   --      Assessment/ Plan  30F now 12 weeks pregnant with a PMH of hypothyroidism, HLD, tachycardia, EFRAIN, GERD, FM, PCOS, and anxiety who presents with a 4 day history of constant right upper

## 2025-07-10 NOTE — PROGRESS NOTES
Gastroenterology Progress Note    Lex Escamilla is a 30 y.o. female patient.  Hospitalization Day:1    Chief C/O: Elevated LFTs, 12 weeks pregnant    SUBJECTIVE: Patient continues to have right upper quadrant pain. Apryl RN translated as patient is [place]er-speaking.  Patient is scheduled for cholecystectomy today.    ROS:  Gastrointestinal ROS:- abdominal pain    Physical    VITALS:  /69   Pulse 83   Temp 97.9 °F (36.6 °C) (Oral)   Resp 16   Ht 1.651 m (5' 5\")   Wt 92.6 kg (204 lb 1.6 oz)   LMP 2025   SpO2 99%   BMI 33.96 kg/m²   TEMPERATURE:  Current - Temp: 97.9 °F (36.6 °C); Max - Temp  Av.9 °F (36.6 °C)  Min: 97 °F (36.1 °C)  Max: 98.4 °F (36.9 °C)    General -no acute distress  Eyes -no icterus, no pallor  Cardiovascular -RRR, no murmur  Lungs -clear to auscultation bilaterally  Abdomen - non-distended, epigastric/right upper quadrant tender, no organomegaly, Bowel sounds no  Extremities -no edema  Skin -warm and dry  Neuro: No asterixis     Data    Data Review:    Recent Labs     07/07/25  1947 07/09/25  1227 07/10/25  0558   WBC 8.5 8.0 7.4   HGB 14.2 13.4 11.9*   HCT 40.6 40.7 35.8*   MCV 88.8 90.0 90.2    345 268     Recent Labs     07/07/25  1947 07/09/25  1227 07/10/25  0558   * 134* 134*   K 3.2* 3.6 3.4    103 105   CO2 20 20 17*   PHOS  --   --  2.9   BUN 6 4* 3*   CREATININE 0.62 0.51 0.42*     Recent Labs     07/07/25  1947 07/09/25  1227 07/10/25  0558   AST 42* 37* 27   ALT 69* 63* 53*   BILIDIR 0.6*  --   --    BILITOT 1.1* 0.8* 1.0*   ALKPHOS 109 107 85     Recent Labs     25   LIPASE 35     Recent Labs     07/10/25  0558   PROTIME 15.8*   INR 1.2       Radiology Review:  US GALLBLADDER RUQ  Narrative: EXAMINATION:  RIGHT UPPER QUADRANT ULTRASOUND    2025 7:57 pm    COMPARISON:  CT abdomen and pelvis from 2023    HISTORY:  ORDERING SYSTEM PROVIDED HISTORY: R/O Cholecystitis  TECHNOLOGIST PROVIDED HISTORY:  Reason for    Patient was seen in the ER 7/7/2025 with the onset of epigastric, right upper quadrant pain for the past 4 days.  Patient has had nausea and vomiting for the past 3 weeks, however increase in pain and symptoms with vomiting after each meal.  Patient is currently 12 weeks pregnant, third pregnancy. Ultrasound gallbladder right upper quadrant noted gallbladder sludge or perhaps small stones, CBD 2 to 3 mm, hepatic steatosis.  LFTs elevated with ALT 63, AST 37 and total bilirubin 0.8.  These labs have slight improvement since ER visit 7/7/2025.  Clinical history suggestive of symptoms related to biliary etiology i.e. gallbladder, versus cholecystitis, may have had stones that have passed, no clinical or radiological evidence for choledocholithiasis at this time given improvement in LFTs and CBD measuring 2 to 3 mm.    7/11/2025: LFTs remain elevated.  ALT 53 and total bilirubin 1.0.  Patient is scheduled for cholecystectomy today.    PLAN :  -Proceed with cholecystectomy  -Monitor LFTs and lipase in a.m.    All findings, assessment, and plan discussed with and confirmed by supervising physician.    Thank you for allowing me to participate in the care of your patient.  Please feel free to contact me with any concerns.    Kelly J Slavik-Bosworth, APRN - CNP

## 2025-07-10 NOTE — ANESTHESIA POSTPROCEDURE EVALUATION
Department of Anesthesiology  Postprocedure Note    Patient: Lex Escamilla  MRN: [id number]  YOB: 1995  Date of evaluation: 7/10/2025    Procedure Summary       Date: 07/10/25 Room / Location: [address]    Anesthesia Start: 1052 Anesthesia Stop: 1236    Procedure: Laparoscopic cholecystectomy (Abdomen) Diagnosis:       Biliary colic      (Biliary colic [K80.50])    Surgeons: Bina Nobles MD Responsible Provider: Constantine Horta MD    Anesthesia Type: General, Regional ASA Status: 2 - Emergent            Anesthesia Type: General, Regional    Pedro Phase I:      Pedro Phase II:      Anesthesia Post Evaluation    Patient location during evaluation: PACU  Patient participation: waiting for patient participation  Level of consciousness: lethargic  Pain score: 0  Airway patency: patent  Nausea & Vomiting: no nausea and no vomiting  Cardiovascular status: blood pressure returned to baseline and hemodynamically stable  Respiratory status: nonlabored ventilation, spontaneous ventilation, acceptable and face mask  Hydration status: euvolemic  Pain management: adequate        No notable events documented.

## 2025-07-10 NOTE — CARE COORDINATION
Case Management Assessment  Initial Evaluation    Date/Time of Evaluation: 7/10/2025 3:02 PM  Assessment Completed by: RHIANNA Padilla    If patient is discharged prior to next notation, then this note serves as note for discharge by case management.    Patient Name: Lex Escamilla                   YOB: 1995  Diagnosis: Cholecystitis [K81.9]                   Date / Time: 7/9/2025  2:45 PM    Patient Admission Status: Inpatient   Readmission Risk (Low < 19, Mod (19-27), High > 27): Readmission Risk Score: 10.2    Current PCP: Julianne Morales, DO  PCP verified by CM? Yes    Chart Reviewed: Yes      History Provided by: Patient  Patient Orientation: Alert and Oriented, Person, Place, Self, Situation    Patient Cognition: Alert    Hospitalization in the last 30 days (Readmission):  No    If yes, Readmission Assessment in CM Navigator will be completed.    Advance Directives:      Code Status: Full Code   Patient's Primary Decision Maker is: Legal Next of Kin      Discharge Planning:    Patient lives with: Children Type of Home: House  Primary Care Giver: Self  Patient Support Systems include: Spouse/Significant Other, Family Members   Current Financial resources:    Current community resources:    Current services prior to admission: None            Current DME:              Type of Home Care services:  None    ADLS  Prior functional level: Independent in ADLs/IADLs  Current functional level:      PT AM-PAC:   /24  OT AM-PAC:   /24    Family can provide assistance at DC: Yes  Would you like Case Management to discuss the discharge plan with any other family members/significant others, and if so, who? Yes ()  Plans to Return to Present Housing: Yes  Other Identified Issues/Barriers to RETURNING to current housing: MEDICAL COMPLICATIONS    Potential Assistance needed at discharge: N/A            Potential DME:    Patient expects to discharge to: House  Plan for transportation at discharge:

## 2025-07-10 NOTE — ANESTHESIA PROCEDURE NOTES
Peripheral Block    Patient location during procedure: OR  Reason for block: post-op pain management and at surgeon's request  Start time: 7/10/2025 11:00 AM  End time: 7/10/2025 11:05 AM  Staffing  Performed: anesthesiologist   Anesthesiologist: Constantine Horta MD  Performed by: Constantine Horta MD  Authorized by: Constantine Horta MD    Preanesthetic Checklist  Completed: patient identified, IV checked, site marked, risks and benefits discussed, surgical/procedural consents, equipment checked, pre-op evaluation, timeout performed, anesthesia consent given, oxygen available and monitors applied/VS acknowledged  Peripheral Block   Patient position: supine  Prep: ChloraPrep  Provider prep: mask and sterile gloves (Sterile probe cover)  Patient monitoring: cardiac monitor, continuous pulse ox, frequent blood pressure checks and IV access  Block type: TAP  Laterality: bilateral  Injection technique: single-shot  Guidance: ultrasound guided  Infiltration strength: 1 %  Dose: 5 mL    Needle   Needle type: combined needle/nerve stimulator   Needle gauge: 22 G  Needle localization: anatomical landmarks and ultrasound guidance  Needle length: 5 cm  Assessment   Injection assessment: negative aspiration for heme, no paresthesia on injection and local visualized surrounding nerve on ultrasound  Paresthesia pain: immediately resolved  Slow fractionated injection: yes  Hemodynamics: stable    Additional Notes  Ultrasound guidance used to view needle for placement.    Ultrasound image stored,  printed and saved in patient chart.    Sterile probe cover used    Medications Administered  ropivacaine (NAROPIN) injection 0.5% - Perineural   30 mL - 7/10/2025 11:00:00 AM

## 2025-07-10 NOTE — PROGRESS NOTES
PRN Jerica given for c/o 10/10 ABD pain. Pt  tolerating oral intake without nausea or vomiting so far, ambulating halls, and voiding spontaneously. Family at bedside.    Electronically signed by JOHNSON PARSONS RN on 7/10/25 at 4:01 PM EDT

## 2025-07-10 NOTE — CONSULTS
Session ID: [id number]  Session Duration: 22 minutes  Language: Jordanian   ID: #[id number]   Name: Lizeth

## 2025-07-11 VITALS
OXYGEN SATURATION: 98 % | TEMPERATURE: 99 F | WEIGHT: 204.1 LBS | HEIGHT: 65 IN | RESPIRATION RATE: 18 BRPM | BODY MASS INDEX: 34.01 KG/M2 | DIASTOLIC BLOOD PRESSURE: 70 MMHG | SYSTOLIC BLOOD PRESSURE: 106 MMHG | HEART RATE: 64 BPM

## 2025-07-11 PROBLEM — Z3A.12 12 WEEKS GESTATION OF PREGNANCY: Status: ACTIVE | Noted: 2025-07-11

## 2025-07-11 PROBLEM — E44.0 MODERATE PROTEIN-CALORIE MALNUTRITION: Status: ACTIVE | Noted: 2025-07-11

## 2025-07-11 LAB
ALBUMIN SERPL-MCNC: 3.4 G/DL (ref 3.5–4.6)
ALP SERPL-CCNC: 95 U/L (ref 40–130)
ALT SERPL-CCNC: 65 U/L (ref 0–33)
ANION GAP SERPL CALCULATED.3IONS-SCNC: 7 MEQ/L (ref 9–15)
AST SERPL-CCNC: 44 U/L (ref 0–35)
BASOPHILS # BLD: 0 K/UL (ref 0–0.2)
BASOPHILS NFR BLD: 0.1 %
BILIRUB SERPL-MCNC: 0.9 MG/DL (ref 0.2–0.7)
BUN SERPL-MCNC: <2 MG/DL (ref 6–20)
CALCIUM SERPL-MCNC: 8.5 MG/DL (ref 8.5–9.9)
CHLORIDE SERPL-SCNC: 108 MEQ/L (ref 95–107)
CO2 SERPL-SCNC: 20 MEQ/L (ref 20–31)
CREAT SERPL-MCNC: 0.45 MG/DL (ref 0.5–0.9)
EOSINOPHIL # BLD: 0 K/UL (ref 0–0.7)
EOSINOPHIL NFR BLD: 0.1 %
ERYTHROCYTE [DISTWIDTH] IN BLOOD BY AUTOMATED COUNT: 12.9 % (ref 11.5–14.5)
GLOBULIN SER CALC-MCNC: 3.1 G/DL (ref 2.3–3.5)
GLUCOSE SERPL-MCNC: 116 MG/DL (ref 70–99)
HCT VFR BLD AUTO: 37.6 % (ref 37–47)
HGB BLD-MCNC: 12.5 G/DL (ref 12–16)
LIPASE SERPL-CCNC: 25 U/L (ref 12–95)
LYMPHOCYTES # BLD: 1.5 K/UL (ref 1–4.8)
LYMPHOCYTES NFR BLD: 16.5 %
MAGNESIUM SERPL-MCNC: 1.8 MG/DL (ref 1.7–2.4)
MCH RBC QN AUTO: 30.3 PG (ref 27–31.3)
MCHC RBC AUTO-ENTMCNC: 33.2 % (ref 33–37)
MCV RBC AUTO: 91 FL (ref 79.4–94.8)
MONOCYTES # BLD: 0.6 K/UL (ref 0.2–0.8)
MONOCYTES NFR BLD: 6.6 %
NEUTROPHILS # BLD: 6.9 K/UL (ref 1.4–6.5)
NEUTS SEG NFR BLD: 76.4 %
PHOSPHATE SERPL-MCNC: 2.1 MG/DL (ref 2.3–4.8)
PLATELET # BLD AUTO: 310 K/UL (ref 130–400)
POTASSIUM SERPL-SCNC: 4.1 MEQ/L (ref 3.4–4.9)
PROT SERPL-MCNC: 6.5 G/DL (ref 6.3–8)
RBC # BLD AUTO: 4.13 M/UL (ref 4.2–5.4)
SODIUM SERPL-SCNC: 135 MEQ/L (ref 135–144)
WBC # BLD AUTO: 9.1 K/UL (ref 4.8–10.8)

## 2025-07-11 PROCEDURE — 85025 COMPLETE CBC W/AUTO DIFF WBC: CPT

## 2025-07-11 PROCEDURE — 36415 COLL VENOUS BLD VENIPUNCTURE: CPT

## 2025-07-11 PROCEDURE — 80053 COMPREHEN METABOLIC PANEL: CPT

## 2025-07-11 PROCEDURE — 84100 ASSAY OF PHOSPHORUS: CPT

## 2025-07-11 PROCEDURE — 83735 ASSAY OF MAGNESIUM: CPT

## 2025-07-11 PROCEDURE — 83690 ASSAY OF LIPASE: CPT

## 2025-07-11 PROCEDURE — 6370000000 HC RX 637 (ALT 250 FOR IP): Performed by: SURGERY

## 2025-07-11 RX ORDER — OXYCODONE HYDROCHLORIDE 5 MG/1
5 TABLET ORAL EVERY 8 HOURS PRN
Qty: 9 TABLET | Refills: 0 | Status: SHIPPED | OUTPATIENT
Start: 2025-07-11 | End: 2025-07-14

## 2025-07-11 RX ADMIN — CEPHALEXIN 500 MG: 250 CAPSULE ORAL at 05:50

## 2025-07-11 RX ADMIN — OXYCODONE 5 MG: 5 TABLET ORAL at 05:50

## 2025-07-11 ASSESSMENT — PAIN SCALES - GENERAL
PAINLEVEL_OUTOF10: 8
PAINLEVEL_OUTOF10: 8
PAINLEVEL_OUTOF10: 4

## 2025-07-11 NOTE — CARE COORDINATION
MET W/PT TO ASSESS NEEDS AND DISCUSS DISCHARGE PLAN WHICH IS HOME. PT AMBULATES IN ROOM INDEPENDENTLY. DENIES HOME GOING NEEDS.

## 2025-07-11 NOTE — DISCHARGE SUMMARY
Physician Discharge Summary     Patient ID:  Lex Escamilla  [id number]  30 y.o.  1995    Admit date: 7/9/2025    Discharge date and time: No discharge date for patient encounter.     Admitting Physician: Bina Nobles MD     Discharge Physician: Jason Tuttle MD, [facility]    Admission Diagnoses: Cholecystitis [K81.9]    Discharge Diagnoses: Cholecystitis    Admission Condition: fair    Discharged Condition: good    Indication for Admission: Cholecystitis    Hospital Course: Patient admitted with the diagnosis of Cholecystitis and underwent an uneventful Laparoscopic Cholecystectomy. Patient recovered well after the procedure and discharged to home.     Consults: gynecology    Significant Diagnostic Studies: radiology: Ultrasound: Cholecystitis    Treatments: surgery: Laparoscopic Cholecystectomy    Discharge Exam:  /70   Pulse 64   Temp 99 °F (37.2 °C) (Oral)   Resp 18   Ht 1.651 m (5' 5\")   Wt 92.6 kg (204 lb 1.6 oz)   LMP 03/03/2025   SpO2 98%   BMI 33.96 kg/m²     General Appearance:    Alert, cooperative, no distress, appears stated age   Head:    Normocephalic, without obvious abnormality, atraumatic   Eyes:    PERRL, conjunctiva/corneas clear, EOM's intact, fundi     benign, both eyes   Ears:    Normal TM's and external ear canals, both ears   Nose:   Nares normal, septum midline, mucosa normal, no drainage    or sinus tenderness   Throat:   Lips, mucosa, and tongue normal; teeth and gums normal   Neck:   Supple, symmetrical, trachea midline, no adenopathy;     thyroid:  no enlargement/tenderness/nodules; no carotid    bruit or JVD   Back:     Symmetric, no curvature, ROM normal, no CVA tenderness   Lungs:     Clear to auscultation bilaterally, respirations unlabored   Chest Wall:    No tenderness or deformity    Heart:    Regular rate and rhythm, S1 and S2 normal, no murmur, rub   or gallop   Breast Exam:    No tenderness, masses, or nipple abnormality   Abdomen:     Soft, non-tender,  d/w PT residents, nursing, , patient residents, nursing, , patient

## 2025-07-11 NOTE — PLAN OF CARE
Nutrition Problem #1: Moderate malnutrition, in context of acute illness or injury  Intervention: Food and/or Nutrient Delivery: Continue Current Diet, Start Oral Nutrition Supplement    Problem: Nutrition Deficit:  Goal: Optimize nutritional status  Flowsheets (Taken 7/11/2025 5722)  Nutrient intake appropriate for improving, restoring, or maintaining nutritional needs:   Assess nutritional status and recommend course of action   Monitor oral intake, labs, and treatment plans   Recommend appropriate diets, oral nutritional supplements, and vitamin/mineral supplements   Provide specific nutrition education to patient or family as appropriate

## 2025-07-11 NOTE — PROGRESS NOTES
1058: Discharge instructions reviewed with pt in detail and in [place]ican. No questions or concerns voiced. IV removed.    1158: Pt has a ride home. Transport placed. Pt being transported out of unit via wheelchair.     Electronically signed by JOHNSON PARSONS RN on 7/11/25 at 10:59 AM EDT

## 2025-07-11 NOTE — PROGRESS NOTES
Gastroenterology Progress Note    Lex Escamilla is a 30 y.o. female patient.  Hospitalization Day:2    Chief C/O: Elevated LFTs, 12 weeks pregnant    SUBJECTIVE: Patient tolerating regular diet without difficulty.  Patient denies any nausea nor vomiting.  Patient reports generalized abdominal pain status post cholecystectomy yesterday.  Patient did not have bowel movement overnight.  Apryl RN translated as patient is Nigerian-speaking.      ROS:  Gastrointestinal ROS:- abdominal pain    Physical    VITALS:  /70   Pulse 64   Temp 99 °F (37.2 °C) (Oral)   Resp 18   Ht 1.651 m (5' 5\")   Wt 92.6 kg (204 lb 1.6 oz)   LMP 2025   SpO2 98%   BMI 33.96 kg/m²   TEMPERATURE:  Current - Temp: 99 °F (37.2 °C); Max - Temp  Av.7 °F (36.5 °C)  Min: 97 °F (36.1 °C)  Max: 99 °F (37.2 °C)    General -no acute distress  Eyes -no icterus, no pallor  Cardiovascular -RRR, no murmur  Lungs -clear to auscultation bilaterally  Abdomen - non-distended, epigastric/right upper quadrant tender, no organomegaly, Bowel sounds no  Extremities -no edema  Skin -warm and dry  Neuro: No asterixis     Data    Data Review:    Recent Labs     07/09/25  1227 07/10/25  0558 07/11/25  0530   WBC 8.0 7.4 9.1   HGB 13.4 11.9* 12.5   HCT 40.7 35.8* 37.6   MCV 90.0 90.2 91.0    268 310     Recent Labs     07/09/25  1227 07/10/25  0558 25  0530   * 134* 135   K 3.6 3.4 4.1    105 108*   CO2 20 17* 20   PHOS  --  2.9 2.1*   BUN 4* 3* <2*   CREATININE 0.51 0.42* 0.45*     Recent Labs     07/09/25  1227 07/10/25  0558 25  0530   AST 37* 27 44*   ALT 63* 53* 65*   BILITOT 0.8* 1.0* 0.9*   ALKPHOS 107 85 95     Recent Labs     25  0530   LIPASE 25     Recent Labs     07/10/25  0558   PROTIME 15.8*   INR 1.2       Radiology Review:  US GALLBLADDER RUQ  Narrative: EXAMINATION:  RIGHT UPPER QUADRANT ULTRASOUND    2025 7:57 pm    COMPARISON:  CT abdomen and pelvis from ,  vomiting x 2 weeks, no blood in vomit, 12 weeks pregnant, GI consulted for elevated LFTs, 12 weeks pregnant.   Patient was seen in the ER 7/7/2025 with the onset of epigastric, right upper quadrant pain for the past 4 days.  Patient has had nausea and vomiting for the past 3 weeks, however increase in pain and symptoms with vomiting after each meal.  Patient is currently 12 weeks pregnant, third pregnancy. Ultrasound gallbladder right upper quadrant noted gallbladder sludge or perhaps small stones, CBD 2 to 3 mm, hepatic steatosis.  LFTs elevated with ALT 63, AST 37 and total bilirubin 0.8.  These labs have slight improvement since ER visit 7/7/2025.  Clinical history suggestive of symptoms related to biliary etiology i.e. gallbladder, versus cholecystitis, may have had stones that have passed, no clinical or radiological evidence for choledocholithiasis at this time given improvement in LFTs and CBD measuring 2 to 3 mm.    7/10/2025: LFTs remain elevated.  ALT 53 and total bilirubin 1.0.  Patient is scheduled for cholecystectomy today.    7/11/2025: Repeat lipase today within normal limits.  No significant increase in LFTs, bilirubin slight decrease 0.9 today.    PLAN :  - Inpatient GI will sign off    All findings, assessment, and plan discussed with and confirmed by supervising physician.    Thank you for allowing me to participate in the care of your patient.  Please feel free to contact me with any concerns.    Kelly J Slavik-Bosworth, JIM - CNP

## 2025-07-11 NOTE — DISCHARGE INSTRUCTIONS
Home Going Instructions    Symptoms or health problems to watch for after I leave the hospital:   -Increasing abdominal pain or swelling unrelieved by rest, medication, and ice.   -Severe or unrelenting nausea or vomiting.  -Fever greater than 101.5°F (38.6°C) or chills.  -Unable to take in liquids or solid foods for greater than 24 hours.  -Unusual drainage coming from the incision or any increased redness or warmth around the incision.   -Shortness of breath.  -Chest pain.  -Racing heartbeat.  -Difficulty urinating.  -Bloody, dark, or tarry stool.  For these or any other concerning symptom, please call immediately for advice      Activity: Walking is OK and encouraged, climbing stairs is acceptable.   Driving: No driving while taking narcotics. Pain from the incision may slow your reaction time.    Wound care: OK to shower daily letting warm soapy water run over your incisions. Afterwards gently pat dry the wound thoroughly. Please use mild soap (Dial or Ivory). Do not scrub or be abrasive with your incisions.   No soaking in a bath, tub, or pool until your incision is fully healed (Usually around 6-8 weeks).  Do not apply lotions, ointments, antibacterial creams or other topical products unless you have been specifically instructed to do so by your surgical team.   If there is any drainage place a dry gauze over the area and secure with tape. Clear, light yellow, pink, or slightly bloody drainage is OK. If the drainage is persistent, increased, or has a foul smell, please call for advice.  Monitor for signs of infection such as increased drainage, redness, warmth, swelling or worsening tenderness around the incision or a fever greater than 101 degrees F (38.4 degrees C).    Medications: Please refer to your discharge medication list for all current medications. If it is not on your medication list, do not take it.  Pain medication: you will be discharged on a multimodal pain control regimen.

## 2025-07-11 NOTE — PROGRESS NOTES
Comprehensive Nutrition Assessment    Type and Reason for Visit:  Initial, Positive nutrition screen    Nutrition Recommendations/Plan:   Continue ADULT DIET; Regular; Low Fat (less than or equal to 50 gm/day)  Start clear supplement daily at lunch   Prenatal vitamin daily      Malnutrition Assessment:  Malnutrition Status:  Moderate malnutrition (07/11/25 0717)    Context:  Acute Illness     Findings of the 6 clinical characteristics of malnutrition:  Energy Intake:  50% or less of estimated energy requirements for 5 or more days  Weight Loss:  5% over 1 month (3% x 2 weeks)     Body Fat Loss:  No body fat loss     Muscle Mass Loss:  No muscle mass loss    Fluid Accumulation:  Unable to assess     Strength:  Not Performed    Nutrition Assessment:    Pt meets criteria for moderate malnutrition, evidenced by consuming <50% of estimated energy requirements for >5 days and a 3% wt loss x 2 weeks. Anticipate PO intake to improve s/p cholecystectomy. Currently tolerating low fat diet. RD to trial clear supplement daily to help optimize nutrition status. Continue to monitor.    Nutrition Related Findings:    Presents with cholecystisis. c/o severe voimitting 3-4 x daily with food/drink x 3 weeks. Currently 12 weeks pregnant. PMH of hypothyroidism, HLD, tachycardia, EFRAIN, GERD, FM, PCOS, and anxiety. Cholecystectomy 7/10. Low fat diet started. Tolerating. Wound Type: Surgical Incision       Current Nutrition Intake & Therapies:    Average Meal Intake: 1-25%  Average Supplements Intake: None Ordered  ADULT DIET; Regular; Low Fat (less than or equal to 50 gm/day)    Anthropometric Measures:  Height: 165.1 cm (5' 5\")  Ideal Body Weight (IBW): 125 lbs (57 kg)    Admission Body Weight: 92.6 kg (204 lb 2 oz)  Current Body Weight: 92.6 kg (204 lb 2 oz),   Weight Source: Bed scale  Current BMI (kg/m2): 34  Usual Body Weight: 95.4 kg (210 lb 6 oz) (6/23/25 bed)     % Weight Change (Calculated): -3                    BMI

## 2025-07-11 NOTE — CONSULTS
Department of Obstetrics  Consult Note      Reason for Consult:  early pregnancy  Requesting Physician:  Dr Nobles     CHIEF COMPLAINT:   S/p Lap Cholecystectomy; OB Clearance     History obtained from patient, medical records    HISTORY OF PRESENT ILLNESS:                   The patient is a 30 y.o.  female @ 12.4 weeks with EDC 2026 s/b 10.1 week sono who is s/p Laparoscopic Cholecystectomy.     Past Medical History:        Diagnosis Date    Acquired hypothyroidism 2023    Anxiety 2017    Bilateral carpal tunnel syndrome 2021    Class 1 obesity due to excess calories without serious comorbidity with body mass index (BMI) of 30.0 to 30.9 in adult 2017    Dyslipidemia 2023    Fibromyalgia 2022    GERD (gastroesophageal reflux disease) 2024    Heart abnormality     Normal labor and delivery 2019    EFRAIN (obstructive sleep apnea) 2020    PCOS (polycystic ovarian syndrome) 2021     premature rupture of membranes with onset of labor more than 24 hours following rupture in third trimester     Tachycardia     Weight loss 2019       Past Surgical History:        Procedure Laterality Date    BACK SURGERY  2004    tumor removed    CARPAL TUNNEL RELEASE Right 2021    RIGHT HAND CARPAL TUNNEL DECOMPRESSION. performed by Kemar Ann MD at [facility] [place] OR     SECTION N/A 2019     SECTION performed by Ade Tapia DO at [facility] [place] [facility] L&D OR    CHOLECYSTECTOMY, LAPAROSCOPIC N/A 7/10/2025    Laparoscopic cholecystectomy performed by Bina Nobles MD at [facility] [place] [facility] OR    UPPER GASTROINTESTINAL ENDOSCOPY N/A 2023    EGD BIOPSY performed by Neel Rachel MD at [facility] [place] [facility]       Past Gynecological History:  Last menstrual period:  3/3/2025      OB History    Para Term  AB Living   3 1  1 1 1   SAB IAB Ectopic Molar Multiple Live Births   1    0 1      # Outcome Date GA Lbr Isreal/2nd Weight Sex  Relation Age of Onset    No Known Problems Mother     Diabetes Father     No Known Problems Sister     No Known Problems Brother     No Known Problems Sister     Rheum Arthritis Maternal Grandmother        ROS:  Gen: Negative  CV: Negative  Lungs: Negative  Abdomen: fullness  Pelvis: Negative  Rest of systems reviewed and found to be negative.     PHYSICAL EXAM:    Vitals:  VSS   General appearance:  awake, alert, cooperative, no apparent distress, and appears stated age  Neurologic:  Awake, alert, oriented to name, place and time  Motor is 5 out of 5 bilaterally.  Sensory is intact  Lungs:  No increased work of breathing, good air exchange, clear to auscultation bilaterally, no crackles or wheezing  Heart:  Normal apical impulse, regular rate and rhythm, normal S1 and S2, no S3 or S4, and no murmur noted  Abdomen:  Normal bowel sounds, soft, non-distended, appropriately tender, gravid  Fetal heart rate:  audible fetal movement; 150s  by Doptones  Pelvis: Deferred        US OB TRANSVAGINAL [[id number]]  Status: Final result     PACS Images     Show images for US OB TRANSVAGINAL  Related Results     US OB LESS THAN 14 WEEKS SINGLE OR FIRST GESTATION Final result 6/24/2025          Narrative   EXAMINATION:  FIRST TRIMESTER OBSTETRIC ULTRASOUND; DOPPLER EVALUATION OF THE PELVIS;  TRANSABDOMINAL AND TRANSVAGINAL FIRST TRIMESTER OBSTETRIC PELVIC ULTRASOUND  WITH COLOR DOPPLER FLOW    6/24/2025    TECHNIQUE:  Transabdominal and transvaginal first trimester obstetric pelvic duplex  ultrasound was performed with real-time imaging, color flow Doppler imaging, ...   Impression   Single live intrauterine pregnancy with estimated fetal age of 10 weeks and 0  days by crown-rump length.    There is a subchorionic hemorrhage measuring 3.0 x 0.7 cm with mobile  internal echoes on cine clips, consistent with flow in this region.  This  could reflect active bleeding or acute/non thrombosed blood products.          Vascular duplex

## 2025-07-11 NOTE — PROGRESS NOTES
Pt cleared for discharge from OB/GYN standpoint per Dr. Bismark Elizabeth. Dr. Tuttle made aware via [employer].    Electronically signed by JOHNSON PARSONS RN on 7/11/25 at 10:18 AM EDT

## 2025-07-11 NOTE — PLAN OF CARE
Problem: Discharge Planning  Goal: Discharge to home or other facility with appropriate resources  7/10/2025 2128 by Marilee Johns, RN  Outcome: Progressing  7/10/2025 0809 by Apryl Escamilla, RN  Outcome: Progressing     Problem: Pain  Goal: Verbalizes/displays adequate comfort level or baseline comfort level  7/10/2025 2128 by Marilee Johns, RN  Outcome: Progressing  7/10/2025 0809 by Apryl Escamilla, RN  Outcome: Progressing

## 2025-07-16 ENCOUNTER — INITIAL PRENATAL (OUTPATIENT)
Dept: OBGYN CLINIC | Age: 30
End: 2025-07-16
Payer: COMMERCIAL

## 2025-07-16 VITALS — DIASTOLIC BLOOD PRESSURE: 84 MMHG | WEIGHT: 203 LBS | SYSTOLIC BLOOD PRESSURE: 122 MMHG | BODY MASS INDEX: 33.78 KG/M2

## 2025-07-16 DIAGNOSIS — Z3A.13 13 WEEKS GESTATION OF PREGNANCY: ICD-10-CM

## 2025-07-16 DIAGNOSIS — E28.2 PCOS (POLYCYSTIC OVARIAN SYNDROME): ICD-10-CM

## 2025-07-16 DIAGNOSIS — Z34.82 ENCOUNTER FOR SUPERVISION OF OTHER NORMAL PREGNANCY, SECOND TRIMESTER: Primary | ICD-10-CM

## 2025-07-16 PROCEDURE — 99213 OFFICE O/P EST LOW 20 MIN: CPT | Performed by: OBSTETRICS & GYNECOLOGY

## 2025-07-16 NOTE — CONSULTS
Session ID: [id number]  Session Duration: Longer than 54 minutes  Language: Arabic   ID: #[id number]   Name: Antoinette

## 2025-07-16 NOTE — PROGRESS NOTES
Patient's last menstrual period was 03/03/2025.  Estimated Date of Delivery: 1/19/26   Please reference prenatal and OB flow chart for further information  PT here today for routine prenatal care  Pt endorses no fetal movement and denies loss of fluid, contractions or vaginal bleeding  Pt without complaints except s/p cholecystectomy one week post op  Patient has one child PTD and autism and nonverbal  ROS:  Pt denies headache, dysuria, nausea/vomiting  PT denies chest pain or SOB  PE:  /84   Wt 92.1 kg (203 lb)   LMP 03/03/2025   BMI 33.78 kg/m²   Gen - Alert and oriented x 3  HEENT- NC/AT, CVS - RRR, Lungs - CTAB  Abd - FH Appropriate fetal growth  LE no edema  Reassuring fetal status at this time     Diagnosis Orders   1. Encounter for supervision of other normal pregnancy, second trimester  Horizon 14 (Pan-Ethnic Standard)    PANORAMA PRENATAL TEST      2. 13 weeks gestation of pregnancy  Horizon 14 (Pan-Ethnic Standard)    PANORAMA PRENATAL TEST      3. PCOS (polycystic ovarian syndrome)  Horizon 14 (Pan-Ethnic Standard)    PANORAMA PRENATAL TEST          Upon completion of the visit all questions were answered and the patients follow-up and testing schedule were reviewed.  Pt recommended to continue pnvit and iron if ordered along with other supporting meds  Spent 20-25 min total time with pt

## 2025-07-22 DIAGNOSIS — Z32.01 POSITIVE URINE PREGNANCY TEST: ICD-10-CM

## 2025-07-22 DIAGNOSIS — N91.1 SECONDARY AMENORRHEA: ICD-10-CM

## 2025-07-22 LAB
ABO/RH: NORMAL
ALBUMIN SERPL-MCNC: 3.6 G/DL (ref 3.5–4.6)
ALP SERPL-CCNC: 136 U/L (ref 40–130)
ALT SERPL-CCNC: 55 U/L (ref 0–33)
ANION GAP SERPL CALCULATED.3IONS-SCNC: 10 MEQ/L (ref 9–15)
ANTIBODY SCREEN: NORMAL
AST SERPL-CCNC: 28 U/L (ref 0–35)
BASOPHILS # BLD: 0 K/UL (ref 0–0.2)
BASOPHILS NFR BLD: 0.4 %
BILIRUB SERPL-MCNC: 0.6 MG/DL (ref 0.2–0.7)
BUN SERPL-MCNC: 5 MG/DL (ref 6–20)
CALCIUM SERPL-MCNC: 9 MG/DL (ref 8.5–9.9)
CHLORIDE SERPL-SCNC: 106 MEQ/L (ref 95–107)
CO2 SERPL-SCNC: 20 MEQ/L (ref 20–31)
CREAT SERPL-MCNC: 0.56 MG/DL (ref 0.5–0.9)
EOSINOPHIL # BLD: 0.2 K/UL (ref 0–0.7)
EOSINOPHIL NFR BLD: 2.8 %
ERYTHROCYTE [DISTWIDTH] IN BLOOD BY AUTOMATED COUNT: 13.4 % (ref 11.5–14.5)
GLOBULIN SER CALC-MCNC: 3.5 G/DL (ref 2.3–3.5)
GLUCOSE SERPL-MCNC: 101 MG/DL (ref 70–99)
HBV SURFACE AG SERPL QL IA: NORMAL
HCT VFR BLD AUTO: 39 % (ref 37–47)
HEPATITIS C ANTIBODY: NONREACTIVE
HGB BLD-MCNC: 12.7 G/DL (ref 12–16)
HIV AG/AB: NONREACTIVE
LYMPHOCYTES # BLD: 1.5 K/UL (ref 1–4.8)
LYMPHOCYTES NFR BLD: 20 %
MCH RBC QN AUTO: 30.1 PG (ref 27–31.3)
MCHC RBC AUTO-ENTMCNC: 32.6 % (ref 33–37)
MCV RBC AUTO: 92.4 FL (ref 79.4–94.8)
MONOCYTES # BLD: 0.5 K/UL (ref 0.2–0.8)
MONOCYTES NFR BLD: 6.4 %
NEUTROPHILS # BLD: 5.3 K/UL (ref 1.4–6.5)
NEUTS SEG NFR BLD: 69.9 %
PLATELET # BLD AUTO: 359 K/UL (ref 130–400)
POTASSIUM SERPL-SCNC: 3.9 MEQ/L (ref 3.4–4.9)
PROT SERPL-MCNC: 7.1 G/DL (ref 6.3–8)
RBC # BLD AUTO: 4.22 M/UL (ref 4.2–5.4)
RUBELLA ANTIBODY IGG: 68 IU/ML
SODIUM SERPL-SCNC: 136 MEQ/L (ref 135–144)
WBC # BLD AUTO: 7.5 K/UL (ref 4.8–10.8)

## 2025-07-24 LAB — VZV IGG SER IA-ACNC: 0.12 S/CO

## 2025-07-30 LAB
Lab: NEGATIVE
Lab: NORMAL
NTRA ALPHA-THALASSEMIA: NEGATIVE
NTRA BETA-HEMOGLOBINOPATHIES: NEGATIVE
NTRA CANAVAN DISEASE: NEGATIVE
NTRA CYSTIC FIBROSIS: NEGATIVE
NTRA DUCHENNE/BECKER MUSCULAR DYSTROPHY: NEGATIVE
NTRA FAMILIAL DYSAUTONOMIA: NEGATIVE
NTRA FRAGILE X SYNDROME: NEGATIVE
NTRA GALACTOSEMIA: NEGATIVE
NTRA GAUCHER DISEASE: NEGATIVE
NTRA MEDIUM CHAIN ACYL-COA DEHYDROGENASE DEFICIENCY: NEGATIVE
NTRA POLYCYSTIC KIDNEY DISEASE, AUTOSOMAL RECESSIVE: NEGATIVE
NTRA SMITH-LEMLI-OPITZ SYNDROME: NEGATIVE
NTRA SPINAL MUSCULAR ATROPHY: NEGATIVE
NTRA TAY-SACHS DISEASE: NEGATIVE

## 2025-08-05 RX ORDER — ONDANSETRON 4 MG/1
4 TABLET, ORALLY DISINTEGRATING ORAL 3 TIMES DAILY PRN
Qty: 30 TABLET | Refills: 4 | Status: SHIPPED | OUTPATIENT
Start: 2025-08-05

## 2025-08-20 ENCOUNTER — ROUTINE PRENATAL (OUTPATIENT)
Dept: OBGYN CLINIC | Age: 30
End: 2025-08-20
Payer: COMMERCIAL

## 2025-08-20 VITALS — DIASTOLIC BLOOD PRESSURE: 68 MMHG | SYSTOLIC BLOOD PRESSURE: 112 MMHG | BODY MASS INDEX: 32.28 KG/M2 | WEIGHT: 194 LBS

## 2025-08-20 DIAGNOSIS — Z3A.18 18 WEEKS GESTATION OF PREGNANCY: ICD-10-CM

## 2025-08-20 DIAGNOSIS — Z34.80 SUPERVISION OF OTHER NORMAL PREGNANCY, ANTEPARTUM: ICD-10-CM

## 2025-08-20 DIAGNOSIS — O09.219 HIGH RISK PREGNANCY DUE TO HISTORY OF PRETERM LABOR, ANTEPARTUM: Primary | ICD-10-CM

## 2025-08-20 PROCEDURE — 99213 OFFICE O/P EST LOW 20 MIN: CPT | Performed by: OBSTETRICS & GYNECOLOGY

## 2025-08-20 RX ORDER — PROGESTERONE 200 MG/1
200 CAPSULE ORAL NIGHTLY
Qty: 30 CAPSULE | Refills: 4 | Status: SHIPPED | OUTPATIENT
Start: 2025-08-20

## 2025-08-31 ENCOUNTER — HOSPITAL ENCOUNTER (EMERGENCY)
Age: 30
Discharge: HOME | End: 2025-09-01
Payer: COMMERCIAL

## 2025-08-31 VITALS
TEMPERATURE: 98.6 F | RESPIRATION RATE: 19 BRPM | BODY MASS INDEX: 31.49 KG/M2 | HEIGHT: 65 IN | WEIGHT: 189 LBS | OXYGEN SATURATION: 96 % | HEART RATE: 96 BPM | DIASTOLIC BLOOD PRESSURE: 76 MMHG | SYSTOLIC BLOOD PRESSURE: 115 MMHG

## 2025-08-31 DIAGNOSIS — B34.2 CORONAVIRUS INFECTION: Primary | ICD-10-CM

## 2025-08-31 LAB
ALBUMIN SERPL-MCNC: 3.5 G/DL (ref 3.5–4.6)
ALP SERPL-CCNC: 117 U/L (ref 40–130)
ALT SERPL-CCNC: 35 U/L (ref 0–33)
ANION GAP SERPL CALCULATED.3IONS-SCNC: 12 MEQ/L (ref 9–15)
AST SERPL-CCNC: 33 U/L (ref 0–35)
B PARAP IS1001 DNA NPH QL NAA+NON-PROBE: NOT DETECTED
B PERT.PT PRMT NPH QL NAA+NON-PROBE: NOT DETECTED
BASOPHILS # BLD: 0 K/UL (ref 0–0.2)
BASOPHILS NFR BLD: 0.2 %
BILIRUB SERPL-MCNC: 0.5 MG/DL (ref 0.2–0.7)
BILIRUB UR QL STRIP: NEGATIVE
BUN SERPL-MCNC: 4 MG/DL (ref 6–20)
C PNEUM DNA NPH QL NAA+NON-PROBE: NOT DETECTED
CALCIUM SERPL-MCNC: 8.9 MG/DL (ref 8.5–9.9)
CHLORIDE SERPL-SCNC: 99 MEQ/L (ref 95–107)
CLARITY UR: CLEAR
CO2 SERPL-SCNC: 21 MEQ/L (ref 20–31)
COLOR UR: YELLOW
CREAT SERPL-MCNC: 0.46 MG/DL (ref 0.5–0.9)
EOSINOPHIL # BLD: 0 K/UL (ref 0–0.7)
EOSINOPHIL NFR BLD: 0.6 %
ERYTHROCYTE [DISTWIDTH] IN BLOOD BY AUTOMATED COUNT: 12.8 % (ref 11.5–14.5)
FLUAV RNA NPH QL NAA+NON-PROBE: NOT DETECTED
FLUBV RNA NPH QL NAA+NON-PROBE: NOT DETECTED
GLOBULIN SER CALC-MCNC: 3.3 G/DL (ref 2.3–3.5)
GLUCOSE SERPL-MCNC: 87 MG/DL (ref 70–99)
GLUCOSE UR STRIP-MCNC: NEGATIVE MG/DL
HADV DNA NPH QL NAA+NON-PROBE: NOT DETECTED
HCOV 229E RNA NPH QL NAA+NON-PROBE: NOT DETECTED
HCOV HKU1 RNA NPH QL NAA+NON-PROBE: NOT DETECTED
HCOV NL63 RNA NPH QL NAA+NON-PROBE: NOT DETECTED
HCOV OC43 RNA NPH QL NAA+NON-PROBE: NOT DETECTED
HCT VFR BLD AUTO: 37.8 % (ref 37–47)
HGB BLD-MCNC: 12.6 G/DL (ref 12–16)
HGB UR QL STRIP: NEGATIVE
HMPV RNA NPH QL NAA+NON-PROBE: NOT DETECTED
HPIV1 RNA NPH QL NAA+NON-PROBE: NOT DETECTED
HPIV2 RNA NPH QL NAA+NON-PROBE: NOT DETECTED
HPIV3 RNA NPH QL NAA+NON-PROBE: NOT DETECTED
HPIV4 RNA NPH QL NAA+NON-PROBE: NOT DETECTED
KETONES UR STRIP-MCNC: ABNORMAL MG/DL
LEUKOCYTE ESTERASE UR QL STRIP: NEGATIVE
LYMPHOCYTES # BLD: 0.4 K/UL (ref 1–4.8)
LYMPHOCYTES NFR BLD: 8.7 %
M PNEUMO DNA NPH QL NAA+NON-PROBE: NOT DETECTED
MCH RBC QN AUTO: 31 PG (ref 27–31.3)
MCHC RBC AUTO-ENTMCNC: 33.3 % (ref 33–37)
MCV RBC AUTO: 92.9 FL (ref 79.4–94.8)
MONOCYTES # BLD: 0.4 K/UL (ref 0.2–0.8)
MONOCYTES NFR BLD: 8.3 %
NEUTROPHILS # BLD: 4.2 K/UL (ref 1.4–6.5)
NEUTS SEG NFR BLD: 81.8 %
NITRITE UR QL STRIP: NEGATIVE
PH UR STRIP: 6.5 [PH] (ref 5–9)
PLATELET # BLD AUTO: 290 K/UL (ref 130–400)
POTASSIUM SERPL-SCNC: 3.5 MEQ/L (ref 3.4–4.9)
PROT SERPL-MCNC: 6.8 G/DL (ref 6.3–8)
PROT UR STRIP-MCNC: NEGATIVE MG/DL
RBC # BLD AUTO: 4.07 M/UL (ref 4.2–5.4)
RSV RNA NPH QL NAA+NON-PROBE: NOT DETECTED
RV+EV RNA NPH QL NAA+NON-PROBE: NOT DETECTED
SARS-COV-2 RNA NPH QL NAA+NON-PROBE: DETECTED
SODIUM SERPL-SCNC: 132 MEQ/L (ref 135–144)
SP GR UR STRIP: 1.01 (ref 1–1.03)
STREP GRP A PCR: NEGATIVE
URINE REFLEX TO CULTURE: ABNORMAL
UROBILINOGEN UR STRIP-ACNC: 1 E.U./DL
WBC # BLD AUTO: 5.1 K/UL (ref 4.8–10.8)

## 2025-08-31 PROCEDURE — 99284 EMERGENCY DEPT VISIT MOD MDM: CPT

## 2025-08-31 PROCEDURE — 80053 COMPREHEN METABOLIC PANEL: CPT

## 2025-08-31 PROCEDURE — 96374 THER/PROPH/DIAG INJ IV PUSH: CPT

## 2025-08-31 PROCEDURE — 87651 STREP A DNA AMP PROBE: CPT

## 2025-08-31 PROCEDURE — 6360000002 HC RX W HCPCS: Performed by: PHYSICIAN ASSISTANT

## 2025-08-31 PROCEDURE — 0202U NFCT DS 22 TRGT SARS-COV-2: CPT

## 2025-08-31 PROCEDURE — 36415 COLL VENOUS BLD VENIPUNCTURE: CPT

## 2025-08-31 PROCEDURE — 81003 URINALYSIS AUTO W/O SCOPE: CPT

## 2025-08-31 PROCEDURE — 96361 HYDRATE IV INFUSION ADD-ON: CPT

## 2025-08-31 PROCEDURE — 6370000000 HC RX 637 (ALT 250 FOR IP): Performed by: PHYSICIAN ASSISTANT

## 2025-08-31 PROCEDURE — 93005 ELECTROCARDIOGRAM TRACING: CPT

## 2025-08-31 PROCEDURE — 2580000003 HC RX 258: Performed by: PHYSICIAN ASSISTANT

## 2025-08-31 PROCEDURE — 85025 COMPLETE CBC W/AUTO DIFF WBC: CPT

## 2025-08-31 RX ORDER — SODIUM CHLORIDE, SODIUM LACTATE, POTASSIUM CHLORIDE, AND CALCIUM CHLORIDE .6; .31; .03; .02 G/100ML; G/100ML; G/100ML; G/100ML
1000 INJECTION, SOLUTION INTRAVENOUS ONCE
Status: COMPLETED | OUTPATIENT
Start: 2025-08-31 | End: 2025-09-01

## 2025-08-31 RX ORDER — ONDANSETRON 4 MG/1
4 TABLET, FILM COATED ORAL 3 TIMES DAILY PRN
Qty: 12 TABLET | Refills: 0 | Status: SHIPPED | OUTPATIENT
Start: 2025-08-31

## 2025-08-31 RX ORDER — ACETAMINOPHEN 325 MG/1
650 TABLET ORAL ONCE
Status: COMPLETED | OUTPATIENT
Start: 2025-08-31 | End: 2025-08-31

## 2025-08-31 RX ORDER — ONDANSETRON 2 MG/ML
4 INJECTION INTRAMUSCULAR; INTRAVENOUS ONCE
Status: COMPLETED | OUTPATIENT
Start: 2025-08-31 | End: 2025-08-31

## 2025-08-31 RX ADMIN — ONDANSETRON 4 MG: 2 INJECTION, SOLUTION INTRAMUSCULAR; INTRAVENOUS at 22:43

## 2025-08-31 RX ADMIN — ACETAMINOPHEN 650 MG: 325 TABLET ORAL at 22:43

## 2025-08-31 RX ADMIN — SODIUM CHLORIDE, SODIUM LACTATE, POTASSIUM CHLORIDE, AND CALCIUM CHLORIDE 1000 ML: .6; .31; .03; .02 INJECTION, SOLUTION INTRAVENOUS at 22:44

## 2025-08-31 ASSESSMENT — PAIN SCALES - GENERAL: PAINLEVEL_OUTOF10: 2

## 2025-08-31 ASSESSMENT — PAIN - FUNCTIONAL ASSESSMENT: PAIN_FUNCTIONAL_ASSESSMENT: 0-10

## 2025-09-01 ASSESSMENT — PAIN SCALES - GENERAL: PAINLEVEL_OUTOF10: 1

## 2025-09-02 LAB
EKG ATRIAL RATE: 97 BPM
EKG DIAGNOSIS: NORMAL
EKG P AXIS: 36 DEGREES
EKG P-R INTERVAL: 150 MS
EKG Q-T INTERVAL: 346 MS
EKG QRS DURATION: 80 MS
EKG QTC CALCULATION (BAZETT): 439 MS
EKG R AXIS: 2 DEGREES
EKG T AXIS: 20 DEGREES
EKG VENTRICULAR RATE: 97 BPM

## 2025-09-02 PROCEDURE — 93010 ELECTROCARDIOGRAM REPORT: CPT | Performed by: INTERNAL MEDICINE

## 2025-09-03 ENCOUNTER — APPOINTMENT (OUTPATIENT)
Dept: GENERAL RADIOLOGY | Age: 30
End: 2025-09-03
Payer: COMMERCIAL

## 2025-09-03 ENCOUNTER — HOSPITAL ENCOUNTER (EMERGENCY)
Age: 30
Discharge: HOME | End: 2025-09-03
Attending: EMERGENCY MEDICINE
Payer: COMMERCIAL

## 2025-09-03 ENCOUNTER — TELEPHONE (OUTPATIENT)
Dept: OBGYN CLINIC | Age: 30
End: 2025-09-03

## 2025-09-03 VITALS
HEART RATE: 72 BPM | DIASTOLIC BLOOD PRESSURE: 73 MMHG | HEIGHT: 65 IN | TEMPERATURE: 97.9 F | WEIGHT: 190 LBS | SYSTOLIC BLOOD PRESSURE: 92 MMHG | RESPIRATION RATE: 18 BRPM | OXYGEN SATURATION: 99 % | BODY MASS INDEX: 31.65 KG/M2

## 2025-09-03 DIAGNOSIS — U07.1 COVID-19: Primary | ICD-10-CM

## 2025-09-03 DIAGNOSIS — R53.83 OTHER FATIGUE: ICD-10-CM

## 2025-09-03 LAB
ALBUMIN SERPL-MCNC: 3.3 G/DL (ref 3.5–4.6)
ALP SERPL-CCNC: 124 U/L (ref 40–130)
ALT SERPL-CCNC: 24 U/L (ref 0–33)
ANION GAP SERPL CALCULATED.3IONS-SCNC: 11 MEQ/L (ref 9–15)
AST SERPL-CCNC: 23 U/L (ref 0–35)
BACTERIA URNS QL MICRO: ABNORMAL /HPF
BASOPHILS # BLD: 0 K/UL (ref 0–0.2)
BASOPHILS NFR BLD: 0.5 %
BILIRUB SERPL-MCNC: 0.5 MG/DL (ref 0.2–0.7)
BILIRUB UR QL STRIP: ABNORMAL
BUN SERPL-MCNC: 3 MG/DL (ref 6–20)
CALCIUM SERPL-MCNC: 8.4 MG/DL (ref 8.5–9.9)
CHLORIDE SERPL-SCNC: 104 MEQ/L (ref 95–107)
CHP ED QC CHECK: YES
CLARITY UR: CLEAR
CO2 SERPL-SCNC: 21 MEQ/L (ref 20–31)
COLOR UR: ABNORMAL
CREAT SERPL-MCNC: 0.4 MG/DL (ref 0.5–0.9)
EOSINOPHIL # BLD: 0.1 K/UL (ref 0–0.7)
EOSINOPHIL NFR BLD: 2.1 %
EPI CELLS #/AREA URNS AUTO: ABNORMAL /HPF (ref 0–5)
ERYTHROCYTE [DISTWIDTH] IN BLOOD BY AUTOMATED COUNT: 13.1 % (ref 11.5–14.5)
GLOBULIN SER CALC-MCNC: 3.3 G/DL (ref 2.3–3.5)
GLUCOSE BLD-MCNC: 70 MG/DL
GLUCOSE BLD-MCNC: 70 MG/DL (ref 70–99)
GLUCOSE SERPL-MCNC: 72 MG/DL (ref 70–99)
GLUCOSE UR STRIP-MCNC: NEGATIVE MG/DL
HCT VFR BLD AUTO: 40 % (ref 37–47)
HGB BLD-MCNC: 12.7 G/DL (ref 12–16)
HGB UR QL STRIP: NEGATIVE
HYALINE CASTS #/AREA URNS AUTO: ABNORMAL /HPF (ref 0–5)
KETONES UR STRIP-MCNC: NEGATIVE MG/DL
LEUKOCYTE ESTERASE UR QL STRIP: ABNORMAL
LYMPHOCYTES # BLD: 1.3 K/UL (ref 1–4.8)
LYMPHOCYTES NFR BLD: 29.8 %
MCH RBC QN AUTO: 30.5 PG (ref 27–31.3)
MCHC RBC AUTO-ENTMCNC: 31.8 % (ref 33–37)
MCV RBC AUTO: 96.2 FL (ref 79.4–94.8)
MONOCYTES # BLD: 0.4 K/UL (ref 0.2–0.8)
MONOCYTES NFR BLD: 8.3 %
NEUTROPHILS # BLD: 2.6 K/UL (ref 1.4–6.5)
NEUTS SEG NFR BLD: 58.8 %
NITRITE UR QL STRIP: NEGATIVE
PERFORMED ON: NORMAL
PH UR STRIP: 6.5 [PH] (ref 5–9)
PLATELET # BLD AUTO: 316 K/UL (ref 130–400)
POTASSIUM SERPL-SCNC: 3.8 MEQ/L (ref 3.4–4.9)
PROT SERPL-MCNC: 6.6 G/DL (ref 6.3–8)
PROT UR STRIP-MCNC: ABNORMAL MG/DL
RBC # BLD AUTO: 4.16 M/UL (ref 4.2–5.4)
RBC #/AREA URNS HPF: ABNORMAL /HPF (ref 0–2)
SODIUM SERPL-SCNC: 136 MEQ/L (ref 135–144)
SP GR UR STRIP: 1.02 (ref 1–1.03)
URINE REFLEX TO CULTURE: ABNORMAL
UROBILINOGEN UR STRIP-ACNC: 4 E.U./DL
WBC # BLD AUTO: 4.4 K/UL (ref 4.8–10.8)
WBC #/AREA URNS AUTO: ABNORMAL /HPF (ref 0–5)

## 2025-09-03 PROCEDURE — 2580000003 HC RX 258: Performed by: PERSONAL EMERGENCY RESPONSE ATTENDANT

## 2025-09-03 PROCEDURE — 71045 X-RAY EXAM CHEST 1 VIEW: CPT

## 2025-09-03 PROCEDURE — 99285 EMERGENCY DEPT VISIT HI MDM: CPT

## 2025-09-03 PROCEDURE — 93005 ELECTROCARDIOGRAM TRACING: CPT | Performed by: PHYSICIAN ASSISTANT

## 2025-09-03 PROCEDURE — 80053 COMPREHEN METABOLIC PANEL: CPT

## 2025-09-03 PROCEDURE — 85025 COMPLETE CBC W/AUTO DIFF WBC: CPT

## 2025-09-03 PROCEDURE — 81001 URINALYSIS AUTO W/SCOPE: CPT

## 2025-09-03 RX ORDER — 0.9 % SODIUM CHLORIDE 0.9 %
1000 INTRAVENOUS SOLUTION INTRAVENOUS ONCE
Status: COMPLETED | OUTPATIENT
Start: 2025-09-03 | End: 2025-09-03

## 2025-09-03 RX ADMIN — SODIUM CHLORIDE 1000 ML: 0.9 INJECTION, SOLUTION INTRAVENOUS at 20:04

## 2025-09-03 ASSESSMENT — ENCOUNTER SYMPTOMS
SORE THROAT: 0
ABDOMINAL PAIN: 0
SHORTNESS OF BREATH: 0
RHINORRHEA: 0
ABDOMINAL DISTENTION: 0
EYE DISCHARGE: 0
VOMITING: 0
NAUSEA: 0
COLOR CHANGE: 0
CONSTIPATION: 0

## 2025-09-03 ASSESSMENT — VISUAL ACUITY
OD: 20/20
OU: 20/20
OS: 20/20

## 2025-09-04 LAB
EKG ATRIAL RATE: 66 BPM
EKG DIAGNOSIS: NORMAL
EKG P AXIS: 41 DEGREES
EKG P-R INTERVAL: 152 MS
EKG Q-T INTERVAL: 404 MS
EKG QRS DURATION: 74 MS
EKG QTC CALCULATION (BAZETT): 423 MS
EKG R AXIS: 4 DEGREES
EKG T AXIS: 14 DEGREES
EKG VENTRICULAR RATE: 66 BPM

## 2025-09-04 PROCEDURE — 93010 ELECTROCARDIOGRAM REPORT: CPT | Performed by: INTERNAL MEDICINE

## 2025-09-10 NOTE — PROGRESS NOTES
Physician Progress Note      PATIENT:               TAMARA GARCIA  CSN #:                  [id number]  :                       1995  ADMIT DATE:       2025 2:45 PM  DISCH DATE:        2025 12:46 PM  RESPONDING  PROVIDER #:        Jason Tuttle MD          QUERY TEXT:    Please clarify the patient?s nutritional status:    The clinical indicators include:  -Moderate malnutrition (25 0717)  Context:  Acute Illness  Findings of the 6 clinical characteristics of malnutrition:  Energy Intake:  50% or less of estimated energy requirements for 5 or more   days  Weight Loss:  5% over 1 month (3% x 2 weeks)  -Presents with cholecystitis. Currently 12 weeks pregnant.  -regular diet, clear supplement at lunch, prenatal vitamins daily.  (from dietician assessment on  by PERRY Roberts, RD, LD)  Options provided:  -- Protein calorie malnutrition moderate  -- Other - I will add my own diagnosis  -- Disagree - Not applicable / Not valid  -- Refer to Clinical Documentation Reviewer    PROVIDER RESPONSE TEXT:    This patient has moderate protein calorie malnutrition.    Query created by: Kira De La Rosa on 9/10/2025 3:27 PM      Electronically signed by:  Jason Tuttle MD 9/10/2025 3:58 PM

## (undated) DEVICE — TUBE SET 96 MM 64 MM H2O PERISTALTIC STD AUX CHANNEL

## (undated) DEVICE — COVER LT HNDL BLU PLAS

## (undated) DEVICE — LIQUIBAND RAPID ADHESIVE 36/CS 0.8ML: Brand: MEDLINE

## (undated) DEVICE — PADDING CAST W2INXL4YD COT LO LINTING WYTEX

## (undated) DEVICE — SUTURE MONOCRYL SZ 4-0 L27IN ABSRB UD L19MM PS-2 1/2 CIR PRIM Y426H

## (undated) DEVICE — DRESSING GZ W1XL8IN COT XRFRM N ADH OVERWRAP CURAD

## (undated) DEVICE — GAUZE,SPONGE,FLUFF,6"X6.75",STRL,10/TRAY: Brand: MEDLINE

## (undated) DEVICE — SUTURE VICRYL + SZ 0 L27IN ABSRB VLT L26MM UR-6 5/8 CIR VCP603H

## (undated) DEVICE — STERILE NEOPRENE POWDER-FREE SURGICAL GLOVES WITH NITRILE COATING: Brand: PROTEXIS

## (undated) DEVICE — ENDOSCOPIC TRAY TRNSPRT 20.5X16.5X4.1 IN RECYCL SUGAR PULP

## (undated) DEVICE — Device

## (undated) DEVICE — SYRINGE IRRIG 60ML SFT PLIABLE BLB EZ TO GRP 1 HND USE W/

## (undated) DEVICE — HAND II: Brand: MEDLINE INDUSTRIES, INC.

## (undated) DEVICE — SINGLE PORT MANIFOLD: Brand: NEPTUNE 2

## (undated) DEVICE — TROCARS: Brand: KII® BALLOON BLUNT TIP SYSTEM

## (undated) DEVICE — MARKER SURG SKIN GENTIAN VLT REG TIP W/ 6IN RUL

## (undated) DEVICE — ADAPTER FLSH PMP FLD MGMT GI IRRIG OFP 2 DISPOSABLE

## (undated) DEVICE — GLOVE ORANGE PI 8   MSG9080

## (undated) DEVICE — BANDAGE COMPR W2INXL5YD WHT BGE POLY COT M E WRP WV HK AND

## (undated) DEVICE — APPLIER CLP M/L SHFT DIA5MM 15 LIG LIGAMAX 5

## (undated) DEVICE — SINGLE-USE BIOPSY FORCEPS: Brand: RADIAL JAW 4

## (undated) DEVICE — PADDING UNDERCAST W4INXL12FT RAYON POLY SYN NONADHESIVE

## (undated) DEVICE — SUTURE VICRYL + SZ 3-0 L27IN ABSRB UD L26MM SH 1/2 CIR VCP416H

## (undated) DEVICE — ELECTRODE LAP L36CM PTFE WIRE J HK CLEANCOAT

## (undated) DEVICE — NEPTUNE E-SEP SMOKE EVACUATION PENCIL, COATED, 70MM BLADE, PUSH BUTTON SWITCH: Brand: NEPTUNE E-SEP

## (undated) DEVICE — STANDARD SURGICAL GOWN, L: Brand: CONVERTORS

## (undated) DEVICE — DRAPE,UTILITY,TAPE,15X26,STERILE: Brand: MEDLINE

## (undated) DEVICE — GLOVE SURG SZ 55 THK91MIL ORANGE  LTX FREE SYN POLYISOPRENE

## (undated) DEVICE — ZIMMER® STERILE DISPOSABLE TOURNIQUET CUFF, DUAL PORT, SINGLE BLADDER, 18 IN. (46 CM)

## (undated) DEVICE — DRESSING TELFA STRL 3X6

## (undated) DEVICE — BLADE,CARBON-STEEL,15,STRL,DISPOSABLE,TB: Brand: MEDLINE

## (undated) DEVICE — GENERAL LAPAROSCOPY: Brand: MEDLINE INDUSTRIES, INC.

## (undated) DEVICE — ISLAND DRESSING 2IN X 3IN: Brand: SILVERLON ANTIMICROBIAL WOUND DRESSING

## (undated) DEVICE — TROCAR: Brand: KII® SLEEVE

## (undated) DEVICE — CORD,CAUTERY,BIPOLAR,STERILE: Brand: MEDLINE

## (undated) DEVICE — TROCAR: Brand: KII FIOS FIRST ENTRY

## (undated) DEVICE — BRUSH ENDO CLN L90.5IN SHTH DIA1.7MM BRIST DIA5-7MM 2-6MM

## (undated) DEVICE — GLOVE SURG SZ 6 L12IN FNGR THK79MIL GRN LTX FREE

## (undated) DEVICE — CONMED SCOPE SAVER BITE BLOCK, 20X27 MM: Brand: SCOPE SAVER

## (undated) DEVICE — GOWN,SIRUS,POLYRNF,BRTHSLV,XLN/XL,20/CS: Brand: MEDLINE

## (undated) DEVICE — ENDO CARRY-ON PROCEDURE KIT INCLUDES LUBRICANT, DEFENDO OLYMPUS AIR, WATER, SUCTION, BIOPSY VALVE KIT, ENZYMATIC SPONGE, AND BASIN.: Brand: ENDO CARRY-ON PROCEDURE KIT

## (undated) DEVICE — SPONGE,LAP,18"X18",DLX,XR,ST,5/PK,40/PK: Brand: MEDLINE

## (undated) DEVICE — LAPAROSCOPIC TROCAR SLEEVE/SINGLE USE: Brand: KII® OPTICAL ACCESS SYSTEM

## (undated) DEVICE — SUTURE ETHLN SZ 4-0 L18IN NONABSORBABLE BLK L19MM PS-2 3/8 1667H

## (undated) DEVICE — Device: Brand: ENDO SMARTCAP

## (undated) DEVICE — SUTURE VCRL SZ 1 L36IN ABSRB UD L36MM CT-1 1/2 CIR J947H

## (undated) DEVICE — APPLICATOR MEDICATED 26 CC SOLUTION HI LT ORNG CHLORAPREP

## (undated) DEVICE — TISSUE RETRIEVAL SYSTEM: Brand: INZII RETRIEVAL SYSTEM